# Patient Record
Sex: MALE | Race: WHITE | NOT HISPANIC OR LATINO | Employment: OTHER | URBAN - METROPOLITAN AREA
[De-identification: names, ages, dates, MRNs, and addresses within clinical notes are randomized per-mention and may not be internally consistent; named-entity substitution may affect disease eponyms.]

---

## 2017-03-24 ENCOUNTER — GENERIC CONVERSION - ENCOUNTER (OUTPATIENT)
Dept: OTHER | Facility: OTHER | Age: 77
End: 2017-03-24

## 2017-06-07 ENCOUNTER — GENERIC CONVERSION - ENCOUNTER (OUTPATIENT)
Dept: OTHER | Facility: OTHER | Age: 77
End: 2017-06-07

## 2017-07-27 ENCOUNTER — ALLSCRIPTS OFFICE VISIT (OUTPATIENT)
Dept: OTHER | Facility: OTHER | Age: 77
End: 2017-07-27

## 2017-08-15 ENCOUNTER — GENERIC CONVERSION - ENCOUNTER (OUTPATIENT)
Dept: OTHER | Facility: OTHER | Age: 77
End: 2017-08-15

## 2017-09-19 ENCOUNTER — GENERIC CONVERSION - ENCOUNTER (OUTPATIENT)
Dept: OTHER | Facility: OTHER | Age: 77
End: 2017-09-19

## 2017-10-26 ENCOUNTER — ALLSCRIPTS OFFICE VISIT (OUTPATIENT)
Dept: OTHER | Facility: OTHER | Age: 77
End: 2017-10-26

## 2017-10-27 NOTE — PROGRESS NOTES
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)   · severe per hospital ([facility] 2017)   3  Hypertension (401 9) (I10)   4  Need for pneumococcal vaccine (V03 82) (Z23)    Plan  Acute low back pain without sciatica, unspecified back pain laterality    · Apply an ice pack 4 times a day for 20 minutes the first 2 days ; Status:Complete;   Done:  80XSH5551 03:53PM   · Begin a walking program  Start with walks lasting 10 minutes and slowly work up to 30-40  minutes as pain allows ; Status:Complete;   Done: 74OJU3966 03:53PM   · Lumbopelvic stabilization exercises; Status:Complete;   Done: 86KSU6540 03:53PM   · We recommend that you avoid straining your back while lifting ; Status:Complete;   Done:  77LJC5322 03:53PM   · You may continue or resume your normal level of activity ; Status:Complete;   Done:  55XLZ2393 03:53PM  Need for pneumococcal vaccine    · Stop: Pneumovax 23 25 MCG/0 5ML Injection Injectable    Discussion/Summary    1 : Acute low back pain: Recommend range of motion and stretching exercises, ice, heat  For the 1st 24-48 hours, ice definitely, but then can add heat  I would recommend heat in the morning, and ice in the evening  Follow-up in approximately 1-2 weeks if needed  As far as medication for this, Tylenol for the moment  Will try to avoid nonsteroidal anti-inflammatories as they can competitively inhibit aspirin activity  Follow-up as above  If there is no change with this, consider formal physical therapy  3 vessel CAD: Stable  Follow with Cardiology  No changes  Hypertension: Stable at the moment  Doing quite well  Chief Complaint  c/o (L) sided low back pain with pain radiating into buttock and down entire leg  Onset about 5 weeks ago with involvement in both legs  Pain in (R) leg has since resolved  Is able to get some relief from Tylenol  pneumonia vaccine        History of Present Illness  HPI: 77 y/o male presents c/o lower back pain x 5 weeks as well as pain shooting down the back of the left leg  Symptoms are improving but only slightly  Has been taking tylenol pm at night  Used to go to a chiropractor for back pain and once had terrible right thigh pain that was similar  Per patient report, an MRI was done and showed a spinal abnormality but could not recall more specific details  No recent procedures, fever, chills, no bowel or bladder incontinence or saddle anaesthesia  Denies any trauma to the back or strenuous exercise  is having difficulty walking do to pain but no weakness  CAD: No current symptoms  Stable  He has not been having any particular issues at the moment  has not been having any cardiomyopathy issues recently  fibrillation has been going quite well  He was not recently having symptoms, but he was prior to putting on the monitor  Review of Systems    Constitutional: no fever,-- not feeling poorly,-- no chills-- and-- not feeling tired  Cardiovascular: no complaints of slow or fast heart rate, no chest pain, no palpitations, no leg claudication or lower extremity edema  Respiratory: no complaints of shortness of breath, no wheezing or cough, no dyspnea on exertion, no orthopnea or PND  Gastrointestinal: no complaints of abdominal pain, no constipation, no nausea or vomiting, no diarrhea or bloody stools  Genitourinary: no complaints of dysuria or incontinence, no hesitancy, no nocturia, no genital lesion, no inadequacy of penile erection  Musculoskeletal: arthralgias-- and-- myalgias, but-- as noted in HPI,-- no joint swelling-- and-- no joint stiffness  Integumentary: no complaints of skin rash or lesion, no itching or dry skin, no skin wounds  Neurological: no numbness,-- no dizziness-- and-- no fainting  ROS reviewed  Active Problems  1  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)   2  Callus (700) (L84)   3  Cerumen impaction (380 4) (H61 20)   4   Cholelithiasis And Bile Duct Calculi With Acute And Chronic Cholecystitis (574 80)   5  Chronic systolic congestive heart failure (428 22,428 0) (I50 22)   6  Depression screening (V79 0) (Z13 89)   7  Dermatitis (692 9) (L30 9)   8  Disc degeneration, lumbar (722 52) (M51 36)   9  Diverticulosis (562 10) (K57 90)   10  Dyslipidemia (272 4) (E78 5)   11  Fatigue (780 79) (R53 83)   12  Glaucoma screening (V80 1) (Z13 5)   13  Glaucoma Screening   14  Hemorrhoids (455 6) (K64 9)   15  Hyperglycemia (790 29) (R73 9)   16  Hypertension (401 9) (I10)   17  Hypothyroidism (244 9) (E03 9)   18  Impacted cerumen of both ears (380 4) (H61 23)   19  Ischemic cardiomyopathy (414 8) (I25 5)   20  Localized, primary osteoarthritis of lower leg, unspecified laterality   21  Need for influenza vaccination (V04 81) (Z23)   22  NSTEMI (non-ST elevated myocardial infarction) (410 70) (I21 4)   23  Old myocardial 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) (I25 2)   24  Otitis externa (380 10) (H60 90)   25  Overweight (278 02) (E66 3)   26  Palpitations (785 1) (R00 2)   27  Paroxysmal atrial fibrillation (427 31) (I48 0)   28  Preop general physical exam (V72 83) (Z01 818)   29  Prostate nodule (600 10) (N40 2)   30  Screening for colorectal cancer (V76 51) (Z12 11,Z12 12)   31  Screening for genitourinary condition (V81 6) (Z13 89)   32  Spinal stenosis (724 00) (M48 00)    Past Medical History  1  History of Acute Medial Meniscus Tear (836 0)   2  History of Bleeding hemorrhoids (455 8) (K64 9)   3  History of Choledocholithiasis (574 50) (K80 50)   4  History of Difficulty breathing (786 09) (R06 89)   5  History of Diverticulosis (562 10) (K57 90)   6  History of Foot Pain (Soft Tissue) (729 5)   7  History of abdominal pain (V13 89) (Z87 898)   8  History of Ileus (560 1) (K56 7)  Active Problems And Past Medical History Reviewed: The active problems and past medical history were reviewed and updated today  Family History  Mother    1   Family history of Lung Cancer (V16 1)  Father    2  Family history of Coronary Artery Disease (V17 49)  Brother    3  Family history of Diabetes Mellitus (V18 0)  Family History    4  Family history of Lung Cancer (V16 1)  Family History Reviewed: The family history was reviewed and updated today  Social History   · Being A Social Drinker   · Denied: History of Drug Use   · Marital History - Currently    · Never a smoker   · Occupation: Retired  The social history was reviewed and updated today  The social history was reviewed and is unchanged  Surgical History  1  History of Appendectomy   2  History of Appendectomy   3  History of CABG   4  History of CABG   5  History of Cath Stent Placement   6  History of Cholecystectomy Laparoscopic   7  History of Endoscopic Retrograde Cholangiopancreatography (ERCP)   8  History of PTCA   9  History of Tonsillectomy  Surgical History Reviewed: The surgical history was reviewed and updated today  Current Meds   1  Aspirin EC 81 MG Oral Tablet Delayed Release; Take 1 tablet daily; Therapy: 85POO7192 to Recorded   2  Co Q 10 100 MG Oral Capsule Recorded   3  Furosemide 20 MG Oral Tablet Recorded   4  Furosemide 20 MG Oral Tablet; One tablet daily; Therapy: (Recorded:84Voq1224) to Recorded   5  Isosorbide Mononitrate ER 60 MG Oral Tablet Extended Release 24 Hour; TAKE 1   TABLET ONCE DAILY; Therapy: 83JYR2889 to (96 070597) Recorded   6  Losartan Potassium 25 MG Oral Tablet; ONE HALF TABLET DAILY; Therapy: (Recorded:34Hkb3247) to Recorded   7  Nitrostat 0 4 MG Sublingual Tablet Sublingual; PLACE 1 TABLET UNDER THE TONGUE   EVERY 5 MINUTES UP TO 3 DOSES AS NEEDED FOR CHEST PAIN;   Therapy: 67VFC2059 to (Evaluate:85Cov2743)  Requested for: 11Phm3971; Last   Rx:58Tte5966 Ordered   8  Ranexa 500 MG Oral Tablet Extended Release 12 Hour; TAKE 1 TABLET EVERY 12   HOURS; Therapy: (Recorded:89Mwo1189) to Recorded   9   Toprol  MG Oral Tablet Extended Release 24 Hour; TAKE 1 TABLET ONCE DAILY; Therapy: (Recorded:66Tqb4416) to Recorded   10  Vitamin D 2000 UNIT Oral Capsule Recorded   11  Xarelto 20 MG Oral Tablet Recorded    The medication list was reviewed and updated today  Allergies  1  Zocor TABS    Physical Exam    Constitutional   General appearance: No acute distress, well appearing and well nourished  Eyes   Conjunctiva and lids: No swelling, erythema, or discharge  Pupils and irises: Equal, round and reactive to light  Pulmonary   Respiratory effort: No increased work of breathing or signs of respiratory distress  Auscultation of lungs: Clear to auscultation, equal breath sounds bilaterally, no wheezes, no rales, no rhonci  Cardiovascular   Auscultation of heart: Normal rate and rhythm, normal S1 and S2, without murmurs  Examination of extremities for edema and/or varicosities: Normal     Musculoskeletal   Gait and station: Abnormal  -- limp  Inspection/palpation of joints, bones, and muscles: Abnormal  -- +SLR on left  Full and = motor strength b/l  Neurologic   Reflexes: 2+ and symmetric  Sensation: No sensory loss  Results/Data  Falls Risk Assessment (Dx Z13 89 Screen for Neurologic Disorder) 26Oct2017 03:11PM User, [facility]     Test Name Result Flag Reference   Falls Risk      No falls in the past year       Signatures   Electronically signed by :  DANIEL Dickinson ; Oct 26 2017  3:59PM EST                       (Author)

## 2017-12-20 ENCOUNTER — GENERIC CONVERSION - ENCOUNTER (OUTPATIENT)
Dept: FAMILY MEDICINE CLINIC | Facility: CLINIC | Age: 77
End: 2017-12-20

## 2018-01-11 NOTE — MISCELLANEOUS
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)   6  Hypertension (401 9) (I10)   7  Old myocardial 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) (I25 2)   8  Need for influenza vaccination (V04 81) (Z23)    Plan  3-vessel CAD, Chronic systolic congestive heart failure, Ischemic cardiomyopathy,  NSTEMI (non-ST elevated myocardial infarction), Paroxysmal atrial fibrillation    · Cardiology Referral Other Co-Management  Dr Latosha Perez with [facility]  Status: Hold For -  Scheduling  Requested for: 92CQN6879   Ordered; For: 3-vessel CAD, Chronic systolic congestive heart failure, Ischemic cardiomyopathy, NSTEMI (non-ST elevated myocardial infarction), Paroxysmal atrial fibrillation; Ordered By: Ximena Marshall Performed:  Due: 44RUO1055  are Referring to a non- Preferred Provider : Established Patient  Care Summary provided  : Yes  3-vessel CAD, Ischemic cardiomyopathy, NSTEMI (non-ST elevated myocardial  infarction)    · Nitrostat 0 4 MG Sublingual Tablet Sublingual (Nitroglycerin); PLACE 1 TABLET  UNDER THE TONGUE EVERY 5 MINUTES UP TO 3 DOSES AS NEEDED FOR  CHEST PAIN   Rx By: Ximena Marshall; Dispense: 30 Days ; #:1 X 25 Tablet Sublingual Bottle; Refill: 0; For: 3-vessel CAD, Ischemic cardiomyopathy, NSTEMI (non-ST elevated myocardial infarction); DANY = N; Sent To: [facility]/PHARMACY #[id number] Need for influenza vaccination    · Stop: Influenza   For: Need for influenza vaccination; Ordered By:Tray Ocasio; Effective Date:27Jul2017; Last Updated By: Oracio Sierra; 7/27/2017 4:27:18 PM    Discussion/Summary  Discussion Summary:   #1: NST DEXTER: Patient had another acute MI  He is to follow with cardiology as soon as possible  He does have an appointment later on with [facility] heart specialists    #2: Chronic systolic heart failure: This is noted on the hospital discharge  Currently, he is having some coughing, along with some dyspnea on exertion  Question if this is secondary to CHF or the cardiomyopathy  Would follow-up with cardiology with regard to this  He does not seem to have any symptoms of edema, nor is cough secondary to ace inhibitors (i e  he is not on ACE inhibitors)  #3: Paroxysmal atrial fibrillation: Again, this was noted in the hospital  He is doing quite well at this point from the standpoint of atrial fibrillation, i e  no current palpitations or problems  He is on Xarelto for this  Follow with cardiology  #4: Ischemic cardiomyopathy: Patient did have catheterization recently, and they also recommended that he get a pacer/defibrillator  Kwasi Velasquez going to place that in approximately 30-40 days  Of note, we did review ischemic cardiomyopathy, and treatments  We also reviewed lethal arrhythmias that can occur with cardiomyopathy, and that will be the reason behind recommending a defibrillator  #5: CAD: Again, follow with Dr Florin Flores  Did have worsening of disease, as evidenced by having had the non-ST DEXTER  #6: Hypertension: Blood pressure today is quite good  No problems at the moment  He did mention that there was minor lightheadedness that was occurring on some occasions, mostly in the mornings  I would recommend that he follow-up about this with cardiology  I would not change medications at this point  #7: Old myocardial infarction: Patient has had 2 prior MIs as well  Again, follow with cardiology at [facility]   Patient is currently on Xarelto and aspirin  I would recommend that he speak with cardiology about whether or not this should continue, as Xarelto is a full anticoagulant, and this may result in slight increased risk of bleeding with aspirin added to it  Chief Complaint  Chief Complaint Free Text Note Form: Hospital follow up for: NSTEMI  Was in [facility] from 7/22/17 to 7/25/17    Pt states he is feeling well  Med list reviewed and up-dated  Requesting rx for Nitrostat  Unsure of dosage  Declines pneumonia vaccine  History of Present Illness  TCM Communication [place]: He was hospitalized at [facility]  The date of admission: 7/22/17, date of discharge: 7/25/17  Diagnosis: Heart Attack  He was discharged to home  Communication performed and completed by Julian Whitt   HPI: Patient was on vacation last week and [facility]  Was camping  He developed pain and pressure in the chest, with radiation through to the back  He also had some dyspnea with this  This was on Wednesday, and then on Thursday and Friday improved, with return to home on Friday  He continue to have some shortness of breath along with this, so he went to the emergency room on Saturday rather than call cardiology on Monday, and was admitted and diagnosed with NST DEXTER  Reviewed history and the chart  Patient did have myocardial infarction in 2014, 2016, and now in 2017  He has had catheterization Ã2, bypass surgery in 1999, and further evaluation recently in the hospital   On discharge from the hospital, he was also diagnosed with chronic systolic heart failure, paroxysmal atrial fibrillation, ischemic cardiomyopathy  Currently, he is having shortness of breath and difficulty with exertion  He is also noticing that he has a significant amount of fatigue since discharge from the hospital   Since discharge he is in cardiac rehab  Review of Systems  Complete-Male:   Constitutional: No fever or chills, feels well, no tiredness, no recent weight gain or weight loss  Eyes: No complaints of eye pain, no red eyes, no discharge from eyes, no itchy eyes  ENT: no complaints of earache, no hearing loss, no nosebleeds, no nasal discharge, no sore throat, no hoarseness  Cardiovascular: No complaints of slow heart rate, no fast heart rate, no chest pain, no palpitations, no leg claudication, no lower extremity  Respiratory: as noted in HPI  Gastrointestinal: No complaints of abdominal pain, no constipation, no nausea or vomiting, no diarrhea or bloody stools  Genitourinary: No complaints of dysuria, no incontinence, no hesitancy, no nocturia, no genital lesion, no testicular pain  Musculoskeletal: No complaints of arthralgia, no myalgias, no joint swelling or stiffness, no limb pain or swelling  Integumentary: No complaints of skin rash or skin lesions, no itching, no skin wound, no dry skin  Neurological: No compliants of headache, no confusion, no convulsions, no numbness or tingling, no dizziness or fainting, no limb weakness, no difficulty walking  Psychiatric: Is not suicidal, no sleep disturbances, no anxiety or depression, no change in personality, no emotional problems  Endocrine: No complaints of proptosis, no hot flashes, no muscle weakness, no erectile dysfunction, no deepening of the voice, no feelings of weakness  Hematologic/Lymphatic: No complaints of swollen glands, no swollen glands in the neck, does not bleed easily, no easy bruising  Active Problems    1  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)   2  Callus (700) (L84)   3  Cerumen impaction (380 4) (H61 20)   4  Cholelithiasis And Bile Duct Calculi With Acute And Chronic Cholecystitis (574 80)   5  Depression screening (V79 0) (Z13 89)   6  Dermatitis (692 9) (L30 9)   7  Disc degeneration, lumbar (722 52) (M51 36)   8  Diverticulosis (562 10) (K57 90)   9  Dyslipidemia (272 4) (E78 5)   10  Fatigue (780 79) (R53 83)   11  Glaucoma screening (V80 1) (Z13 5)   12  Glaucoma Screening   13  Hemorrhoids (455 6) (K64 9)   14  Hyperglycemia (790 29) (R73 9)   15  Hypertension (401 9) (I10)   16  Hypothyroidism (244 9) (E03 9)   17  Impacted cerumen of both ears (380 4) (H61 23)   18  Localized, primary osteoarthritis of lower leg, unspecified laterality   19  NSTEMI (non-ST elevated myocardial infarction) (410 70) (I21 4)   20   Otitis externa (380 10) (H60 90)   21  Overweight (278 02) (E66 3)   22  Palpitations (785 1) (R00 2)   23  Preop general physical exam (V72 83) (Z01 818)   24  Prostate nodule (600 10) (N40 2)   25  Screening for colorectal cancer (V76 51) (Z12 11,Z12 12)   26  Screening for genitourinary condition (V81 6) (Z13 89)   27  Spinal stenosis (724 00) (M48 00)    Past Medical History    1  History of Acute Medial Meniscus Tear (836 0)   2  History of Bleeding hemorrhoids (455 8) (K64 9)   3  History of Choledocholithiasis (574 50) (K80 50)   4  History of Difficulty breathing (786 09) (R06 89)   5  History of Diverticulosis (562 10) (K57 90)   6  History of Foot Pain (Soft Tissue) (729 5)   7  History of abdominal pain (V13 89) (Z87 898)   8  History of Ileus (560 1) (K56 7)    Surgical History    1  History of Appendectomy   2  History of Appendectomy   3  History of CABG   4  History of CABG   5  History of Cath Stent Placement   6  History of Cholecystectomy Laparoscopic   7  History of Endoscopic Retrograde Cholangiopancreatography (ERCP)   8  History of PTCA   9  History of Tonsillectomy  Surgical History Reviewed: The surgical history was reviewed and updated today  Family History  Mother    1  Family history of Lung Cancer (V16 1)  Father    2  Family history of Coronary Artery Disease (V17 49)  Brother    3  Family history of Diabetes Mellitus (V18 0)  Family History    4  Family history of Lung Cancer (V16 1)  Family History Reviewed: The family history was reviewed and updated today  Social History    · Being A Social Drinker   · Denied: History of Drug Use   · Marital History - Currently    · Never a smoker   · Occupation: Retired  Social History Reviewed: The social history was reviewed and updated today  The social history was reviewed and is unchanged  Current Meds   1  Aspirin EC 81 MG Oral Tablet Delayed Release; Take 1 tablet daily; Therapy: 22YWC5589 to Recorded   2   Co Q 10 100 MG Oral Capsule Recorded   3  Furosemide 20 MG Oral Tablet Recorded   4  Furosemide 20 MG Oral Tablet; One tablet daily; Therapy: (Recorded:60Evk8380) to Recorded   5  Isosorbide Mononitrate ER 60 MG Oral Tablet Extended Release 24 Hour; TAKE 1   TABLET ONCE DAILY; Therapy: 81VVM2443 to ((60) 1235-3165) Recorded   6  Losartan Potassium 25 MG Oral Tablet; ONE HALF TABLET DAILY; Therapy: (Recorded:68Osz6968) to Recorded   7  Ranexa 500 MG Oral Tablet Extended Release 12 Hour; TAKE 1 TABLET EVERY 12   HOURS; Therapy: (Recorded:13Zat5931) to Recorded   8  Toprol  MG Oral Tablet Extended Release 24 Hour; TAKE 1 TABLET ONCE DAILY; Therapy: (Recorded:46Zde0556) to Recorded   9  Vitamin D 2000 UNIT Oral Capsule Recorded   10  Xarelto 20 MG Oral Tablet Recorded  Medication List Reviewed: The medication list was reviewed and updated today  Allergies    1  Zocor TABS    Vitals  Signs   Recorded: 66Cir7136 04:34PM   Weight: 264 lb 12 8 oz  BMI Calculated: 34 18  BSA Calculated: 2 44  Recorded: 27Jul2017 04:31PM   Heart Rate: 60, L Radial  Pulse Quality: Irregular, L Radial  Systolic: 822, LUE, Sitting  Diastolic: 60, LUE, Sitting  BP Cuff Size: Large  Height: 6 ft 1 8 in    Physical Exam    Constitutional   General appearance: No acute distress, well appearing and well nourished  Eyes   Conjunctiva and lids: No swelling, erythema, or discharge  Pupils and irises: Equal, round and reactive to light  Ears, Nose, Mouth, and Throat   External inspection of ears and nose: Normal     Oropharynx: Normal with no erythema, edema, exudate or lesions  Pulmonary   Respiratory effort: No increased work of breathing or signs of respiratory distress  Auscultation of lungs: Clear to auscultation, equal breath sounds bilaterally, no wheezes, no rales, no rhonci  Cardiovascular   Auscultation of heart: Normal rate and rhythm, normal S1 and S2, without murmurs      Carotid pulses: Normal     Abdomen   Abdomen: Non-tender, no masses  Liver and spleen: No hepatomegaly or splenomegaly  Skin   Skin and subcutaneous tissue: Normal without rashes or lesions  Neurologic   Cranial nerves: Cranial nerves 2-12 intact  Reflexes: 2+ and symmetric  Sensation: No sensory loss  Psychiatric   Orientation to person, place and time: Normal     Mood and affect: Normal          Results/Data  PHQ-2 Adult Depression Screening 19Fqb6242 04:28PM User, Julianne     Test Name Result Flag Reference   PHQ-2 Adult Depression Score 0     Over the last two weeks, how often have you been bothered by any of the following problems? Little interest or pleasure in doing things: Not at all - 0  Feeling down, depressed, or hopeless: Not at all - 0   PHQ-2 Adult Depression Screening Negative         Health Management  Screening for colorectal cancer   COLONOSCOPY; every 10 years; Last 41YXU6841; Next Due: [id number]; Overdue  Health Maintenance   COLONOSCOPY; every 10 years; Last 58BVY0522; Next Due: [id number]; Overdue    Signatures   Electronically signed by :  DANIEL Martin ; Jul 27 2017  5:21PM EST                       (Author)

## 2018-01-14 VITALS
HEIGHT: 74 IN | BODY MASS INDEX: 33.98 KG/M2 | HEART RATE: 60 BPM | WEIGHT: 264.8 LBS | SYSTOLIC BLOOD PRESSURE: 110 MMHG | DIASTOLIC BLOOD PRESSURE: 60 MMHG

## 2018-01-17 ENCOUNTER — GENERIC CONVERSION - ENCOUNTER (OUTPATIENT)
Dept: OTHER | Facility: OTHER | Age: 78
End: 2018-01-17

## 2018-05-02 ENCOUNTER — OFFICE VISIT (OUTPATIENT)
Dept: FAMILY MEDICINE CLINIC | Facility: CLINIC | Age: 78
End: 2018-05-02
Payer: COMMERCIAL

## 2018-05-02 VITALS
DIASTOLIC BLOOD PRESSURE: 68 MMHG | HEART RATE: 64 BPM | WEIGHT: 266 LBS | SYSTOLIC BLOOD PRESSURE: 116 MMHG | BODY MASS INDEX: 34.34 KG/M2

## 2018-05-02 DIAGNOSIS — M48.061 SPINAL STENOSIS OF LUMBAR REGION, UNSPECIFIED WHETHER NEUROGENIC CLAUDICATION PRESENT: Primary | ICD-10-CM

## 2018-05-02 DIAGNOSIS — E78.2 MIXED HYPERLIPIDEMIA: ICD-10-CM

## 2018-05-02 DIAGNOSIS — M54.50 ACUTE BILATERAL LOW BACK PAIN WITHOUT SCIATICA: ICD-10-CM

## 2018-05-02 DIAGNOSIS — I10 ESSENTIAL HYPERTENSION: ICD-10-CM

## 2018-05-02 DIAGNOSIS — M51.36 DISC DEGENERATION, LUMBAR: ICD-10-CM

## 2018-05-02 PROBLEM — I25.2 OLD MYOCARDIAL INFARCTION: Status: ACTIVE | Noted: 2017-07-27

## 2018-05-02 PROBLEM — I25.5 CARDIOMYOPATHY, ISCHEMIC: Status: ACTIVE | Noted: 2017-07-25

## 2018-05-02 PROBLEM — I48.0 PAROXYSMAL ATRIAL FIBRILLATION (HCC): Status: ACTIVE | Noted: 2017-07-27

## 2018-05-02 PROCEDURE — 99214 OFFICE O/P EST MOD 30 MIN: CPT | Performed by: FAMILY MEDICINE

## 2018-05-02 RX ORDER — ISOSORBIDE MONONITRATE 60 MG/1
1 TABLET, EXTENDED RELEASE ORAL DAILY
COMMUNITY
Start: 2017-07-27

## 2018-05-02 RX ORDER — ASPIRIN 81 MG/1
1 TABLET ORAL DAILY
COMMUNITY
Start: 2013-08-14

## 2018-05-02 RX ORDER — NITROGLYCERIN 0.4 MG/1
1 TABLET SUBLINGUAL
COMMUNITY
Start: 2017-07-27

## 2018-05-02 RX ORDER — MULTIVIT-MIN/IRON/FOLIC ACID/K 18-600-40
CAPSULE ORAL
COMMUNITY

## 2018-05-02 RX ORDER — FUROSEMIDE 20 MG/1
TABLET ORAL
COMMUNITY
End: 2022-08-08

## 2018-05-02 RX ORDER — METOPROLOL SUCCINATE 200 MG/1
150 TABLET, EXTENDED RELEASE ORAL DAILY
COMMUNITY
End: 2022-08-08

## 2018-05-02 RX ORDER — RANOLAZINE 500 MG/1
1 TABLET, EXTENDED RELEASE ORAL EVERY 12 HOURS
COMMUNITY
End: 2020-02-25 | Stop reason: ALTCHOICE

## 2018-05-02 RX ORDER — CELECOXIB 200 MG/1
200 CAPSULE ORAL DAILY
Qty: 30 CAPSULE | Refills: 0 | Status: SHIPPED | OUTPATIENT
Start: 2018-05-02 | End: 2018-06-04 | Stop reason: SINTOL

## 2018-05-02 RX ORDER — LOSARTAN POTASSIUM 25 MG/1
0.5 TABLET ORAL DAILY
COMMUNITY

## 2018-05-02 NOTE — PATIENT INSTRUCTIONS
Problem List Items Addressed This Visit     Acute low back pain without sciatica     Patient does have history of spinal stenosis  At this point, his symptoms are more like sciatica then they are spinal stenosis  Would recommend physical therapy formal, consider x-ray, recommend Celebrex 200 mg daily  Re-evaluate in 2-4 weeks  Relevant Medications    celecoxib (CeleBREX) 200 mg capsule    Other Relevant Orders    Ambulatory referral to Physical Therapy    Disc degeneration, lumbar     Patient does have history of spinal stenosis  At this point, his symptoms are more like sciatica then they are spinal stenosis  Would recommend physical therapy formal, consider x-ray, recommend Celebrex 200 mg daily  Re-evaluate in 2-4 weeks  Hyperlipidemia     Stable at the moment  No changes  Hypertension     Stable at the moment  No changes  Relevant Medications    furosemide (LASIX) 20 mg tablet    losartan (COZAAR) 25 mg tablet    metoprolol succinate (TOPROL XL) 200 MG 24 hr tablet    Spinal stenosis - Primary     Patient does have history of spinal stenosis  At this point, his symptoms are more like sciatica then they are spinal stenosis  Would recommend physical therapy formal, consider x-ray, recommend Celebrex 200 mg daily  Re-evaluate in 2-4 weeks           Relevant Medications    celecoxib (CeleBREX) 200 mg capsule    Other Relevant Orders    Ambulatory referral to Physical Therapy

## 2018-05-02 NOTE — PROGRESS NOTES
Assessment/Plan:    Spinal stenosis  Patient does have history of spinal stenosis  At this point, his symptoms are more like sciatica then they are spinal stenosis  Would recommend physical therapy formal, consider x-ray, recommend Celebrex 200 mg daily  Re-evaluate in 2-4 weeks  Disc degeneration, lumbar  Patient does have history of spinal stenosis  At this point, his symptoms are more like sciatica then they are spinal stenosis  Would recommend physical therapy formal, consider x-ray, recommend Celebrex 200 mg daily  Re-evaluate in 2-4 weeks  Acute low back pain without sciatica  Patient does have history of spinal stenosis  At this point, his symptoms are more like sciatica then they are spinal stenosis  Would recommend physical therapy formal, consider x-ray, recommend Celebrex 200 mg daily  Re-evaluate in 2-4 weeks  Hyperlipidemia  Stable at the moment  No changes  Hypertension  Stable at the moment  No changes  Diagnoses and all orders for this visit:    Spinal stenosis of lumbar region, unspecified whether neurogenic claudication present  -     Ambulatory referral to Physical Therapy; Future  -     celecoxib (CeleBREX) 200 mg capsule; Take 1 capsule (200 mg total) by mouth daily for 30 days    Acute bilateral low back pain without sciatica  -     Ambulatory referral to Physical Therapy; Future  -     celecoxib (CeleBREX) 200 mg capsule; Take 1 capsule (200 mg total) by mouth daily for 30 days    Essential hypertension    Disc degeneration, lumbar    Mixed hyperlipidemia    Other orders  -     aspirin (ECOTRIN LOW STRENGTH) 81 mg EC tablet; Take 1 tablet by mouth daily  -     Cholecalciferol (VITAMIN D) 2000 units CAPS; Take by mouth  -     Coenzyme Q10 (CO Q 10) 100 MG CAPS; Take by mouth  -     diphenhydrAMINE-APAP  MG TABS; Take 1 tablet by mouth daily  -     furosemide (LASIX) 20 mg tablet; Take by mouth  -     isosorbide mononitrate (IMDUR) 60 mg 24 hr tablet;  Take 1 tablet by mouth daily  -     losartan (COZAAR) 25 mg tablet; Take 0 5 tablets by mouth daily  -     Multiple Vitamins-Minerals (MULTIVITAMIN ADULT PO); Take 1 capsule by mouth  -     nitroglycerin (NITROSTAT) 0 4 mg SL tablet; Place 1 tablet under the tongue every 5 (five) minutes as needed  -     ranolazine (RANEXA) 500 mg 12 hr tablet; Take 1 tablet by mouth every 12 (twelve) hours  -     metoprolol succinate (TOPROL XL) 200 MG 24 hr tablet; Take 1 tablet by mouth daily  -     rivaroxaban (XARELTO) 20 mg tablet; Take by mouth          Subjective: patient c/o low back pain x 2 months  that radiate more down the right leg, but that alternates and changes as well  Patient was doing stretches at home ( as well as cardiac rehab )Patient is taking Tylenol with little relief  ak     Patient ID: Betsy Montague is a 78 y o  male  Patient is having a significant amount of pain in his low back, along with going down into the legs  Initially was is left leg, but he is now also having right leg pain, and the severity varies from right to left  Currently it is mostly the right leg that the problem  Worse with lying down  When he goes to sleep, he has approximately 3 hours of severe pain  He is using Tylenol currently, but is not really changing things  Patient has had the pain for several months  He has been in cardiac rehab, so he has completed physical therapy treatment  Unfortunately, the pain continues to be a problem for him  He did mention that his right leg also gets quite weak at times            The following portions of the patient's history were reviewed and updated as appropriate: allergies, current medications, past family history, past medical history, past social history, past surgical history and problem list     Review of Systems      Objective:      /68   Pulse 64   Wt 121 kg (266 lb)   BMI 34 34 kg/m²          Physical Exam   Musculoskeletal:        Lumbar back: He exhibits decreased range of motion  He exhibits no tenderness, no bony tenderness, no swelling, no edema and no deformity  Legs:  Nursing note and vitals reviewed

## 2018-05-02 NOTE — ASSESSMENT & PLAN NOTE
Patient does have history of spinal stenosis  At this point, his symptoms are more like sciatica then they are spinal stenosis  Would recommend physical therapy formal, consider x-ray, recommend Celebrex 200 mg daily  Re-evaluate in 2-4 weeks

## 2018-05-07 ENCOUNTER — OFFICE VISIT (OUTPATIENT)
Dept: FAMILY MEDICINE CLINIC | Facility: CLINIC | Age: 78
End: 2018-05-07
Payer: COMMERCIAL

## 2018-05-07 VITALS
DIASTOLIC BLOOD PRESSURE: 70 MMHG | HEART RATE: 76 BPM | HEIGHT: 72 IN | WEIGHT: 266 LBS | SYSTOLIC BLOOD PRESSURE: 128 MMHG | BODY MASS INDEX: 36.03 KG/M2 | TEMPERATURE: 97.8 F

## 2018-05-07 DIAGNOSIS — J34.89 NASAL DISCHARGE: Primary | ICD-10-CM

## 2018-05-07 PROCEDURE — 99214 OFFICE O/P EST MOD 30 MIN: CPT | Performed by: FAMILY MEDICINE

## 2018-05-07 NOTE — PATIENT INSTRUCTIONS
Problem List Items Addressed This Visit     None      Visit Diagnoses     Nasal discharge    -  Primary    Unsure why patient has nasal discharge  Check sinus CT, ENT  Question sinus infection versus other      Relevant Orders    CT sinus wo contrast    Ambulatory Referral to Otolaryngology

## 2018-05-07 NOTE — PROGRESS NOTES
Assessment/Plan:    No problem-specific Assessment & Plan notes found for this encounter  Diagnoses and all orders for this visit:    Nasal discharge  Comments:  Unsure why patient has nasal discharge  Check sinus CT, ENT  Question sinus infection versus other  Orders:  -     CT sinus wo contrast; Future  -     Ambulatory Referral to Otolaryngology; Future          Subjective:   c/o last night while on the ground looking under his car, he suddenly had yellow discharge come out of his nose  He said it was very different from usual mucous drainage  Described it as the color of yolk  He denies any other symptoms except his back pain that he recentyt came in for  Wife encouraged this OV today  mjs     Patient ID: Jeramy Pascual is a 78 y o  male  Please see chief complaint  Patient had some discharge from the nose, watery, quite yellow, so he was unsure what it would be  Decided to come to the office to have us evaluate  There was a slight headache at the time that this was happening  It was more noticeable afterwards  Blood pressure immediately before this episode was normal, as he was checking as per his routine  Right afterwards, it was elevated  The blood pressure afterwards was approximately 155/102  The following portions of the patient's history were reviewed and updated as appropriate: allergies, current medications, past family history, past medical history, past social history, past surgical history and problem list     Review of Systems   Constitutional: Negative  HENT:        Per HPI   Respiratory: Negative  Cardiovascular: Negative  Objective:      /70   Pulse 76   Temp 97 8 °F (36 6 °C)   Ht 6' (1 829 m)   Wt 121 kg (266 lb)   BMI 36 08 kg/m²          Physical Exam   Constitutional: He appears well-developed and well-nourished  HENT:   Head: Normocephalic and atraumatic     Right Ear: External ear normal    Left Ear: External ear normal    Nose: Nose normal    Mouth/Throat: Oropharynx is clear and moist    Eyes: Pupils are equal, round, and reactive to light  Lymphadenopathy:     He has no cervical adenopathy  Nursing note and vitals reviewed

## 2018-05-09 ENCOUNTER — EVALUATION (OUTPATIENT)
Dept: PHYSICAL THERAPY | Facility: REHABILITATION | Age: 78
End: 2018-05-09
Payer: COMMERCIAL

## 2018-05-09 DIAGNOSIS — M48.061 SPINAL STENOSIS OF LUMBAR REGION WITHOUT NEUROGENIC CLAUDICATION: Primary | ICD-10-CM

## 2018-05-09 DIAGNOSIS — M54.50 ACUTE BILATERAL LOW BACK PAIN WITHOUT SCIATICA: ICD-10-CM

## 2018-05-09 PROCEDURE — G8978 MOBILITY CURRENT STATUS: HCPCS | Performed by: PHYSICAL THERAPIST

## 2018-05-09 PROCEDURE — G8979 MOBILITY GOAL STATUS: HCPCS | Performed by: PHYSICAL THERAPIST

## 2018-05-09 PROCEDURE — 97161 PT EVAL LOW COMPLEX 20 MIN: CPT | Performed by: PHYSICAL THERAPIST

## 2018-05-09 PROCEDURE — 97110 THERAPEUTIC EXERCISES: CPT | Performed by: PHYSICAL THERAPIST

## 2018-05-09 NOTE — PROGRESS NOTES
PT Evaluation     Today's date: 2018  Patient name: Bekah Valentin  : 1940  MRN: [id number]  Referring provider: Alexandra Elizabeth MD  Dx:   Encounter Diagnosis     ICD-10-CM    1  Spinal stenosis of lumbar region without neurogenic claudication M48 061    2  Acute bilateral low back pain without sciatica M54 5        Start Time: 1055  Stop Time: 1148  Total time in clinic (min): 53 minutes    Assessment  Impairments: abnormal or restricted ROM, activity intolerance, impaired physical strength and pain with function  Functional limitations: difficulty walking  Assessment details: Pt will benefit from skilled PT to address impairments associated with low back pain and lumbar stenosis including increased pain, decreased ROM, decreased strength and impaired functional mobility     Understanding of Dx/Px/POC: excellent and good   Prognosis: good    Goals  ST) Pt will demonstrate decreased pain to 5/10 at worst  2) Pt will demonstrate increased lumbar flexion ROM by >5 cm FTTF  3) Pt will demonstrated increased pop angle flexibility by >10 deg b/l  LT) Pt will demonstrate decreased pain to 3/10 at worst  2) Pt will demonstrate increased lumbar flexion ROM by >10 cm FTTF  3) Pt will demonstrated increased LE strength all planes to >4+-5/5 b/l  4) Pt will demonstrate ability to walk x30 mins in a store without increase in pain   5) Pt will demonstrate ability to SLS x15 secs b/l     Plan  Patient would benefit from: skilled PT  Planned modality interventions: cryotherapy, traction and thermotherapy: hydrocollator packs  Planned therapy interventions: abdominal trunk stabilization, balance, body mechanics training, functional ROM exercises, flexibility, manual therapy, neuromuscular re-education, patient education, postural training, strengthening, stretching, therapeutic activities, therapeutic exercise and home exercise program  Frequency: 2x week  Duration in weeks: 6  Treatment plan discussed with: patient  Plan details: Cont per POC  Assess response to HEP NV  Subjective Evaluation    History of Present Illness  Mechanism of injury: Pain began > 2 months ago  Pt reports pain started in his low back and shortly after he noticed LLE symptoms, post into thigh, calf and ankle  Pt reports symptoms progressed to RLE, mainly RLE in post buttocks and thigh now  Prescribed celebrex but cardiology advised him to not take it  Pt was in cardiac rehab 2018 for MI in   Pt is also being treated for unknown nasal discharge, will be getting CT scan this week and see an ENT  Recurrent probem    Quality of life: good    Pain  Current pain ratin  At best pain ratin  At worst pain rating: 10  Location: central low back pain   Relieving factors: change in position (sitting)  Aggravating factors: standing (sit to stand transfers, walking >3-4 mins, standing >1 hour)    Social Support  Steps to enter house: no  Stairs in house: yes   12  Lives in: [facility] and multiple-level home [place] with a basement)  Lives with: spouse and adult children    Employment status: not working    Diagnostic Tests  No diagnostic tests performed  Treatments  Current treatment: physical therapy  Patient Goals  Patient goals for therapy: decreased pain        Objective     Postural Observations  Standing posture: fair (forward flexed)        Active Range of Motion     Lumbar   Flexion: Active lumbar flexion: 35 cm FTTF     Extension: 9 (symptms into b/l post LE) degrees     Strength/Myotome Testing     Left Hip   Planes of Motion   Flexion: 4+  Abduction: 4  External rotation: 4+  Internal rotation: 4+    Right Hip   Planes of Motion   Flexion: 4-  Abduction: 4  External rotation: 4+  Internal rotation: 4+    Left Knee   Flexion: 4+  Extension: 4+    Right Knee   Flexion: 4+  Extension: 4+    Left Ankle/Foot   Dorsiflexion: 4+    Right Ankle/Foot   Dorsiflexion: 4+    General Comments     Lumbar Comments  Able to SLS x 3 secs b/l  Able to hr/tr  Pop angle 46 deg L, 55 deg R  SLR (+) R at 52 deg, L (+) at 44 deg  Slump test (+) b/l      Flowsheet Rows      Most Recent Value   PT/OT G-Codes   Current Score  54   Projected Score  64   FOTO information reviewed  Yes   Assessment Type  Evaluation   G code set  Mobility: Walking & Moving Around   Mobility: Walking and Moving Around Current Status ()  CK   Mobility: Walking and Moving Around Goal Status ()  CJ          Precautions: HTN, Hx of MI     Daily Treatment Diary     Manual  5/9                                                                                 Exercise Diary              Seated fwd flex stetch 5"x10            Seated hs stretch 10"x5ea            skc 10"x5 ea            Supine piri crossover stretch 10"x5 a            Tac/ppt *            Tac/ppt with ball sq *            Tac/ppt with pull apart *            Tac/ppt with SLR *            Tac/ppt with s/l abd *            Tac/ppt with march *            sls *            sharpened romberg *            Standing hip abd *            Hr/tr *            Standing slr *            recumbant bike *                                                                    Modalities              mhp prn            ice prn

## 2018-05-11 ENCOUNTER — OFFICE VISIT (OUTPATIENT)
Dept: PHYSICAL THERAPY | Facility: REHABILITATION | Age: 78
End: 2018-05-11
Payer: COMMERCIAL

## 2018-05-11 DIAGNOSIS — M48.061 SPINAL STENOSIS OF LUMBAR REGION WITHOUT NEUROGENIC CLAUDICATION: Primary | ICD-10-CM

## 2018-05-11 DIAGNOSIS — M54.50 ACUTE BILATERAL LOW BACK PAIN WITHOUT SCIATICA: ICD-10-CM

## 2018-05-11 PROCEDURE — 97010 HOT OR COLD PACKS THERAPY: CPT | Performed by: PHYSICAL THERAPIST

## 2018-05-11 PROCEDURE — 97110 THERAPEUTIC EXERCISES: CPT | Performed by: PHYSICAL THERAPIST

## 2018-05-11 NOTE — PROGRESS NOTES
Daily Note     Today's date: 2018  Patient name: Mansi Rhodes  : 1940  MRN: [id number]  Referring provider: Rosmery Lopez MD  Dx:   Encounter Diagnosis     ICD-10-CM    1  Spinal stenosis of lumbar region without neurogenic claudication M48 061    2  Acute bilateral low back pain without sciatica M54 5        Start Time: 0859  Stop Time: 0955  Total time in clinic (min): 56 minutes    Subjective: Pt reports compliance with HEP  Pt reports symptoms in his calf last night, he spent the day cutting his grass and doing yardwork  Right post thigh symptoms right now, no low back pain  Pt rates his symptoms at 1/10 right now         Objective: See treatment diary below    Precautions: HTN, Hx of MI      Daily Treatment Diary      Manual                      RLE long axis distraction    3 mins                                                                                                                         Exercise Diary                        Seated fwd flex stetch 5"x10  5"x10                   Seated hs stretch 10"x5ea 10"x5 ea                   skc 10"x5 ea 10"x3 ea                   Supine piri crossover stretch 10"x5 a 10"x3 ea                   Tac/ppt *  10"x10                   Tac/ppt with ball sq *  5"x10                   Tac/ppt with pull apart * red 5" 1x10                   Tac/ppt with SLR *                     Tac/ppt with s/l abd *                     Tac/ppt with march *                     sls *                     sharpened romberg *                     Standing hip abd *                     Hr/tr *                     Standing slr *                     recumbant bike *  5 mins                                                                                                                          Modalities                        mhp prn                     ice prn  10 mins low back seated                                                   Assessment: Tolerated treatment well  Pt required vc/tc to avoid lumbar ext upon return from seated fwd flexion stretch to avoid increase in RLE symptoms  After cueing, pt able to complete correctly without pain  Patient exhibited good technique with therapeutic exercises, able to complete TAC/PPT with LE exercises with good form but required cueing to avoid holding his breath  Pt will benefit from continued skilled PT to progress LE and core strength  Plan: Continue per plan of care  Assess compliance with updated HEP NV

## 2018-05-12 ENCOUNTER — HOSPITAL ENCOUNTER (OUTPATIENT)
Dept: CT IMAGING | Facility: HOSPITAL | Age: 78
Discharge: HOME | End: 2018-05-12
Payer: COMMERCIAL

## 2018-05-12 DIAGNOSIS — J34.89 NASAL DISCHARGE: ICD-10-CM

## 2018-05-12 PROCEDURE — 70486 CT MAXILLOFACIAL W/O DYE: CPT

## 2018-05-15 DIAGNOSIS — I48.0 PAROXYSMAL ATRIAL FIBRILLATION (HCC): Primary | ICD-10-CM

## 2018-05-15 DIAGNOSIS — J01.00 ACUTE NON-RECURRENT MAXILLARY SINUSITIS: Primary | ICD-10-CM

## 2018-05-15 PROCEDURE — 87205 SMEAR GRAM STAIN: CPT | Performed by: SPECIALIST

## 2018-05-15 PROCEDURE — 87070 CULTURE OTHR SPECIMN AEROBIC: CPT | Performed by: SPECIALIST

## 2018-05-15 RX ORDER — AMOXICILLIN 500 MG/1
500 TABLET, FILM COATED ORAL 3 TIMES DAILY
Qty: 30 TABLET | Refills: 0 | Status: SHIPPED | OUTPATIENT
Start: 2018-05-15 | End: 2018-05-25

## 2018-05-16 ENCOUNTER — LAB REQUISITION (OUTPATIENT)
Dept: LAB | Facility: HOSPITAL | Age: 78
End: 2018-05-16
Payer: COMMERCIAL

## 2018-05-16 ENCOUNTER — OFFICE VISIT (OUTPATIENT)
Dept: PHYSICAL THERAPY | Facility: REHABILITATION | Age: 78
End: 2018-05-16
Payer: COMMERCIAL

## 2018-05-16 DIAGNOSIS — M54.50 ACUTE BILATERAL LOW BACK PAIN WITHOUT SCIATICA: ICD-10-CM

## 2018-05-16 DIAGNOSIS — M48.061 SPINAL STENOSIS OF LUMBAR REGION WITHOUT NEUROGENIC CLAUDICATION: Primary | ICD-10-CM

## 2018-05-16 DIAGNOSIS — J32.0 CHRONIC MAXILLARY SINUSITIS: ICD-10-CM

## 2018-05-16 PROCEDURE — 97110 THERAPEUTIC EXERCISES: CPT | Performed by: PHYSICAL THERAPIST

## 2018-05-16 NOTE — PROGRESS NOTES
Daily Note     Today's date: 2018  Patient name: Karissa Garcia  : 1940  MRN: [id number]  Referring provider: Monica Summers MD  Dx:   Encounter Diagnosis     ICD-10-CM    1  Spinal stenosis of lumbar region without neurogenic claudication M48 061    2  Acute bilateral low back pain without sciatica M54 5        Start Time: 0900  Stop Time: 0955  Total time in clinic (min): 55 minutes    Subjective: Pt reports he is "doing pretty good" Today he feels pretty good with his back, no pain   2 nights ago he had increased low back pain, "I think it has to do with what I do in the day"     Objective: See treatment diary below    Precautions: HTN, Hx of MI      Daily Treatment Diary      Manual                    RLE long axis distraction    3 mins  3 mins                                                                                                                       Exercise Diary                      Seated fwd flex stetch 5"x10  5"x10  HEP                 Seated hs stretch 10"x5ea 10"x5 ea  HEP                 skc 10"x5 ea 10"x3 ea  HEP                 Supine piri crossover stretch 10"x5 a 10"x3 ea 10"x5 ea                 Tac/ppt *  10"x10 10"x10                 Tac/ppt with ball sq *  5"x10  5"x10                 Tac/ppt with pull apart * red 5" 1x10 Red 5"x10                 Tac/ppt with SLR *   1x5 ea                 Tac/ppt with s/l abd *   1x5 ea                 Tac/ppt with march *   1x10 ea alt                 sls *                     sharpened romberg *                     Standing hip abd *                     Hr/tr *                     Standing slr *                     recumbant bike *  5 mins   5 mins                 Repeat sit to stand                                                                                                     Modalities                      mhp prn                     ice prn  10 mins low back seated 10 mins low back seated                                                  Assessment: Pt demonstrated good control with TAC/PPT in supine however when cued to perform TAC in standing pt elevated shouders and held breath requiring vc/tc to avoid compensation and focus on breathing while performing TAC  Pt also required cueing to utilize TAC/PPT with bed mobility and transfers to assist in decreasing pain  Pt will benefit form continued skilled PT to progress core stab and LE strength       Plan: Cont per POC

## 2018-05-18 ENCOUNTER — OFFICE VISIT (OUTPATIENT)
Dept: PHYSICAL THERAPY | Facility: REHABILITATION | Age: 78
End: 2018-05-18
Payer: COMMERCIAL

## 2018-05-18 DIAGNOSIS — M54.50 ACUTE BILATERAL LOW BACK PAIN WITHOUT SCIATICA: ICD-10-CM

## 2018-05-18 DIAGNOSIS — M48.061 SPINAL STENOSIS OF LUMBAR REGION WITHOUT NEUROGENIC CLAUDICATION: Primary | ICD-10-CM

## 2018-05-18 LAB
BACTERIA WND AEROBE CULT: NORMAL
GRAM STN SPEC: NORMAL
GRAM STN SPEC: NORMAL

## 2018-05-18 PROCEDURE — 97012 MECHANICAL TRACTION THERAPY: CPT | Performed by: PHYSICAL THERAPIST

## 2018-05-18 PROCEDURE — 97110 THERAPEUTIC EXERCISES: CPT | Performed by: PHYSICAL THERAPIST

## 2018-05-18 NOTE — PROGRESS NOTES
Daily Note     Today's date: 2018  Patient name: Kristen Branham  : 1940  MRN: [id number]  Referring provider: Be Bruner MD  Dx:   Encounter Diagnosis     ICD-10-CM    1  Spinal stenosis of lumbar region without neurogenic claudication M48 061    2  Acute bilateral low back pain without sciatica M54 5        Start Time: 0856  Stop Time: 0954  Total time in clinic (min): 58 minutes    Subjective: Pt reports he still has a little bit of pain  Intermittent tingling  Sitting he feels fine but when he gets up his leg starts tingling      Objective: See treatment diary below    Precautions: HTN, Hx of MI      Daily Treatment Diary      Manual                 RLE long axis distraction    3 mins  3 mins  NP                                                                                                                   Exercise Diary                    Seated fwd flex stetch 5"x10  5"x10  HEP                 Seated hs stretch 10"x5ea 10"x5 ea  HEP                 skc 10"x5 ea 10"x3 ea  HEP                 Supine piri crossover stretch 10"x5 a 10"x3 ea 10"x5 ea  HEP               Tac/ppt *  10"x10 10"x10  HEP               Tac/ppt with ball sq *  5"x10  5"x10 5"x10               Tac/ppt with pull apart * red 5" 1x10 Red 5"x10 Green5"x12                Tac/ppt with SLR *   1x5 ea 1x5 ea                Tac/ppt with s/l abd *   1x5 ea 1x5 ea                Tac/ppt with march *   1x10 ea alt 1x10 ea alt               sls *                     sharpened romberg *                    Standing hip abd *                     Hr/tr *   1x10 ea                 Standing slr *                     recumbant bike *  5 mins  5 mins                 Repeat sit to stand     1x10                 TAC/PPT in standing     10"x5                                                                       Modalities                    mhp prn                     ice prn  10 mins low back seated 10 mins low back seated  pt defer               Mech lumbar txn       0-65#,static,  5 min                        Assessment: Pt required vc/tc to avoid lumbar extension upon standing during sit to stand transfer and to maintain TAC/PPT to decrease reproduction of radicular symptoms  Pt demonstrated good control with TAC/PPT in standing  Initiated use of mech txn as pt has demonstrated positive response to manual LAD in previous treatments  Plan: Cont per POC, assess response to updated HEP  Assess response to mech txn NV

## 2018-05-23 ENCOUNTER — OFFICE VISIT (OUTPATIENT)
Dept: PHYSICAL THERAPY | Facility: REHABILITATION | Age: 78
End: 2018-05-23
Payer: COMMERCIAL

## 2018-05-23 DIAGNOSIS — M54.50 ACUTE BILATERAL LOW BACK PAIN WITHOUT SCIATICA: ICD-10-CM

## 2018-05-23 DIAGNOSIS — M48.061 SPINAL STENOSIS OF LUMBAR REGION WITHOUT NEUROGENIC CLAUDICATION: Primary | ICD-10-CM

## 2018-05-23 PROCEDURE — 97150 GROUP THERAPEUTIC PROCEDURES: CPT | Performed by: PHYSICAL THERAPIST

## 2018-05-23 PROCEDURE — 97012 MECHANICAL TRACTION THERAPY: CPT | Performed by: PHYSICAL THERAPIST

## 2018-05-23 PROCEDURE — 97110 THERAPEUTIC EXERCISES: CPT | Performed by: PHYSICAL THERAPIST

## 2018-05-23 NOTE — PROGRESS NOTES
Daily Note     Today's date: 2018  Patient name: Maria Fernanda Ortega  : 1940  MRN: [id number]  Referring provider: Ghada Hartley MD  Dx:   Encounter Diagnosis     ICD-10-CM    1  Spinal stenosis of lumbar region without neurogenic claudication M48 061    2  Acute bilateral low back pain without sciatica M54 5        Start Time: 09  Stop Time: 09  Total time in clinic (min): 56 minutes    Subjective: Pt reports he is doing "really good, a lot less pain" He reports he is only taking 1 tylenol a day at night  Pt reports he still gets tingling when he gets up from a sitting position but it is less often       Objective: See treatment diary below    Precautions: HTN, Hx of MI      Daily Treatment Diary      Manual               RLE long axis distraction    3 mins  3 mins  NP                                                                                                                   Exercise Diary                  Seated fwd flex stetch 5"x10  5"x10  HEP                 Seated hs stretch 10"x5ea 10"x5 ea  HEP                 skc 10"x5 ea 10"x3 ea  HEP                 Supine piri crossover stretch 10"x5 a 10"x3 ea 10"x5 ea  HEP               Tac/ppt *  10"x10 10"x10  HEP               Tac/ppt with ball sq *  5"x10  5"x10 5"x10 HEP              Tac/ppt with pull apart * red 5" 1x10 Red 5"x10 Green 5"x12  HEP              Tac/ppt with SLR *   1x5 ea 1x5 ea  2x5 ea              Tac/ppt with s/l abd *   1x5 ea 1x5 ea  1x10 ea              Tac/ppt with march *   1x10 ea alt 1x10 ea alt HEP              sls *       30"x2 ea              sharpened romberg *      1'x1 ea              Standing hip abd *       1x10 ea              Hr/tr *   1x10 ea   2x10 ea              Standing slr *       1x10 ea              recumbant bike *  5 mins  5 mins   5 minsL2             Repeat sit to stand     1x10   2x10              TAC/PPT in standing     10"x5   10"x2           Mini squats          *              Lunges          *                   Modalities     5/11 5/16 5/18 5/23             mhp prn                     ice prn  10 mins low back seated 10 mins low back seated  pt defer               Mech lumbar txn       0-65#,static,  5 min  0-65#static,8min                      Assessment: Pt is progressing well with current treatment program evident by overall decreased pain intensity and frequency and increased activity tolerance       Plan: Cont per POC    Group from 9:02-9:19, 1:1 from 9:19-9:57

## 2018-05-25 ENCOUNTER — OFFICE VISIT (OUTPATIENT)
Dept: PHYSICAL THERAPY | Facility: REHABILITATION | Age: 78
End: 2018-05-25
Payer: COMMERCIAL

## 2018-05-25 DIAGNOSIS — M48.061 SPINAL STENOSIS OF LUMBAR REGION WITHOUT NEUROGENIC CLAUDICATION: Primary | ICD-10-CM

## 2018-05-25 DIAGNOSIS — M54.50 ACUTE BILATERAL LOW BACK PAIN WITHOUT SCIATICA: ICD-10-CM

## 2018-05-25 PROCEDURE — 97110 THERAPEUTIC EXERCISES: CPT | Performed by: PHYSICAL THERAPIST

## 2018-05-25 PROCEDURE — 97012 MECHANICAL TRACTION THERAPY: CPT | Performed by: PHYSICAL THERAPIST

## 2018-05-25 NOTE — PROGRESS NOTES
Daily Note     Today's date: 2018  Patient name: Bekah Valentin  : 1940  MRN: [id number]  Referring provider: Alexandra Elizabeth MD  Dx:   Encounter Diagnosis     ICD-10-CM    1  Spinal stenosis of lumbar region without neurogenic claudication M48 061    2  Acute bilateral low back pain without sciatica M54 5        Start Time: 0900  Stop Time: 09  Total time in clinic (min): 52 minutes    Subjective: Pt reports no pain or symptoms today  Pt reports 80% improvement as of today        Objective: See treatment diary below    Precautions: HTN, Hx of MI      Daily Treatment Diary      Manual              RLE long axis distraction    3 mins  3 mins  NP                                                                                                                   Exercise Diary                 Seated fwd flex stetch 5"x10  5"x10  HEP     5"x10            Seated hs stretch 10"x5ea 10"x5 ea  HEP                 skc 10"x5 ea 10"x3 ea  HEP                 Supine piri crossover stretch 10"x5 a 10"x3 ea 10"x5 ea  HEP               Tac/ppt *  10"x10 10"x10  HEP               Tac/ppt with ball sq *  5"x10  5"x10 5"x10 HEP              Tac/ppt with pull apart * red 5" 1x10 Red 5"x10 Green 5"x12  HEP              Tac/ppt with SLR *   1x5 ea 1x5 ea  2x5 ea              Tac/ppt with s/l abd *   1x5 ea 1x5 ea  1x10 ea              Tac/ppt with march *   1x10 ea alt 1x10 ea alt HEP              sls *       30"x2 ea  30"x2 ea            sharpened romberg *      1'x1 ea  1'x1ea            Standing hip abd *       1x10 ea  1x10ea            Hr/tr *   1x10 ea   2x10 ea  2x10ea            Standing slr *       1x10 ea  1x10 ea            recumbant bike *  5 mins  5 mins   5 minsL2 5 minsL3            Repeat sit to stand     1x10   2x10  2x10            TAC/PPT in standing     10"x5   10"x2             Mini squats          *              Lunges          *                 Modalities     5/11 5/16 5/18 5/23 5/25           mhp prn                     ice prn  10 mins low back seated 10 mins low back seated  pt defer               Mech lumbar txn       0-65#,static,  5 min  0-65#static,8min  85# static, 8 mins                    Assessment: Pt able to complete all exercises without increase in symptoms however pt fatigued with increased reps for standing exercises  Pt is progressing well towards LTG's with decreased pain intensity and frequency         Plan: Cont per POC

## 2018-05-30 ENCOUNTER — OFFICE VISIT (OUTPATIENT)
Dept: PHYSICAL THERAPY | Facility: REHABILITATION | Age: 78
End: 2018-05-30
Payer: COMMERCIAL

## 2018-05-30 DIAGNOSIS — M54.50 ACUTE BILATERAL LOW BACK PAIN WITHOUT SCIATICA: ICD-10-CM

## 2018-05-30 DIAGNOSIS — M48.061 SPINAL STENOSIS OF LUMBAR REGION WITHOUT NEUROGENIC CLAUDICATION: Primary | ICD-10-CM

## 2018-05-30 PROCEDURE — 97110 THERAPEUTIC EXERCISES: CPT | Performed by: PHYSICAL THERAPIST

## 2018-05-30 NOTE — PROGRESS NOTES
Daily Note     Today's date: 2018  Patient name: Lauren Carlson  : 1940  MRN: [id number]  Referring provider: Hesham Garcia MD  Dx:   Encounter Diagnosis     ICD-10-CM    1  Spinal stenosis of lumbar region without neurogenic claudication M48 061    2  Acute bilateral low back pain without sciatica M54 5        Start Time: 0859  Stop Time: 0950  Total time in clinic (min): 51 minutes    Subjective: Pt reports he still gets tingling in his LLE when he is sitting for a while and goes to stand up  He reports the symptoms are transient but he still notices them  He reports he has not tried to stretch prior to standing       Objective: See treatment diary below    Precautions: HTN, Hx of MI      Daily Treatment Diary      Manual          RLE long axis distraction    3 mins  3 mins  NP                                                                                                         Exercise Diary             Seated fwd flex stetch 5"x10  5"x10  HEP     5"x10  5" 2x5        Seated hs stretch 10"x5ea 10"x5 ea  HEP               skc 10"x5 ea 10"x3 ea  HEP               Supine piri crossover stretch 10"x5 a 10"x3 ea 10"x5 ea  HEP             Tac/ppt *  10"x10 10"x10  HEP             Tac/ppt with ball sq *  5"x10  5"x10 5"x10 HEP            Tac/ppt with pull apart * red 5" 1x10 Red 5"x10 Green 5"x12  HEP            Tac/ppt with SLR *   1x5 ea 1x5 ea  2x5 ea            Tac/ppt with s/l abd *   1x5 ea 1x5 ea  1x10 ea            Tac/ppt with march *   1x10 ea alt 1x10 ea alt HEP            sls *       30"x2 ea  30"x2 ea  30"x2 ea        sharpened romberg *      1'x1 ea  1'x1ea  1'x1 ea  foam NV      Standing hip abd *       1x10 ea  1x10ea  2x10 ea        Hr/tr *   1x10 ea   2x10 ea  2x10ea  2x10 ea        Standing slr *       1x10 ea  1x10 ea  2x10 ea        recumbant bike *  5 mins  5 mins   5 minsL2 5 mins L3  5 mins L3        Repeat sit to stand     1x10   2x10  2x10  1x10        TAC/PPT in standing     10"x5   10"x2   HEP        Mini squats          *    3" 1x10        Lunges          *           Star tap       1x5 ea     Seated nerve glide            Side steps                              Modalities     5/11 5/16 5/18 5/23 5/25 5/30        mhp prn                   ice prn  10 mins low back seated 10 mins low back seated  pt defer             Mech lumbar txn       0-65#,static,  5 min  0-65#static,8min  85# static, 8 mins  Trial hold                Assessment: Pt demonstrates increased lumbar lordosis during initial stage of sit to stand transfer  Pt cued to maintain TAC/PPT throughout entire sit to stand transfer to avoid increase in LLE symptoms  Pt had no reproduction of LLE symptoms throughout session  Pt challenged with addition of dynamic stability with star tap requiring increased use of UE support to maintain balance  Plan: Cont per POC  Pt to utilize seated fwd flex stretch during prolonged sitting and prior to standing to help alleviate symptoms and maintain TAC/PPT during transfer  Pt reports he is getting a tooth pulled tomorrow  Pt to discuss with dentist his PT and if he has any immediate restrictions for his appt on Friday

## 2018-06-01 ENCOUNTER — OFFICE VISIT (OUTPATIENT)
Dept: PHYSICAL THERAPY | Facility: REHABILITATION | Age: 78
End: 2018-06-01
Payer: COMMERCIAL

## 2018-06-01 DIAGNOSIS — M48.061 SPINAL STENOSIS OF LUMBAR REGION WITHOUT NEUROGENIC CLAUDICATION: Primary | ICD-10-CM

## 2018-06-01 DIAGNOSIS — M54.50 ACUTE BILATERAL LOW BACK PAIN WITHOUT SCIATICA: ICD-10-CM

## 2018-06-01 PROCEDURE — 97110 THERAPEUTIC EXERCISES: CPT | Performed by: PHYSICAL THERAPIST

## 2018-06-04 ENCOUNTER — OFFICE VISIT (OUTPATIENT)
Dept: FAMILY MEDICINE CLINIC | Facility: CLINIC | Age: 78
End: 2018-06-04
Payer: COMMERCIAL

## 2018-06-04 VITALS
HEART RATE: 76 BPM | BODY MASS INDEX: 35.35 KG/M2 | DIASTOLIC BLOOD PRESSURE: 72 MMHG | WEIGHT: 261 LBS | HEIGHT: 72 IN | SYSTOLIC BLOOD PRESSURE: 104 MMHG

## 2018-06-04 DIAGNOSIS — I10 ESSENTIAL HYPERTENSION: ICD-10-CM

## 2018-06-04 DIAGNOSIS — M54.50 ACUTE BILATERAL LOW BACK PAIN WITHOUT SCIATICA: Primary | ICD-10-CM

## 2018-06-04 DIAGNOSIS — J32.0 CHRONIC MAXILLARY SINUSITIS: ICD-10-CM

## 2018-06-04 PROCEDURE — 99214 OFFICE O/P EST MOD 30 MIN: CPT | Performed by: FAMILY MEDICINE

## 2018-06-04 RX ORDER — HYDROCODONE BITARTRATE AND ACETAMINOPHEN 5; 300 MG/1; MG/1
1 TABLET ORAL EVERY 4 HOURS PRN
Refills: 0 | COMMUNITY
Start: 2018-05-31 | End: 2020-02-25 | Stop reason: ALTCHOICE

## 2018-06-04 RX ORDER — AMOXICILLIN AND CLAVULANATE POTASSIUM 875; 125 MG/1; MG/1
1 TABLET, FILM COATED ORAL 2 TIMES DAILY
Refills: 0 | COMMUNITY
Start: 2018-05-30 | End: 2020-02-25 | Stop reason: ALTCHOICE

## 2018-06-04 NOTE — ASSESSMENT & PLAN NOTE
Patient continues to have some low back symptoms  There is radiculopathy, especially on the right lower extremity  At this point, he has completed physical therapy and had a significant improvement, but it is not gone, it still affect his activities of daily living  Based on this, I offered him the possibility of having an MRI, pain management, orthopedic follow-up  Initial step would be an MRI, and then follow up after that  With regard to closed versus open MRI, most pain management and orthopedic physicians feel that closed MRI is a better test, but open MRI is an alternative that is reasonably acceptable

## 2018-06-04 NOTE — PATIENT INSTRUCTIONS
Problem List Items Addressed This Visit        Respiratory    Chronic maxillary sinusitis     Improved after dental extraction  Follow-up in the future  Cardiovascular and Mediastinum    Hypertension     Stable at the moment  Blood pressure is actually on the low side today  No changes  Re-evaluate in the future  Of note, patient has had some issues with low blood pressures at times, and has been feeling hypotension  He feels quite tired and fatigued at those times, Bella Jo a dish rag    I would recommend that he follow with Cardiology did discuss the possibility of changing medications based on this  I did not feel comfortable adjusting any of his medications at the moment, including the Lasix, isosorbide, Cozaar, metoprolol, Ranexa  Other    Acute low back pain without sciatica - Primary     Patient continues to have some low back symptoms  There is radiculopathy, especially on the right lower extremity  At this point, he has completed physical therapy and had a significant improvement, but it is not gone, it still affect his activities of daily living  Based on this, I offered him the possibility of having an MRI, pain management, orthopedic follow-up  Initial step would be an MRI, and then follow up after that  With regard to closed versus open MRI, most pain management and orthopedic physicians feel that closed MRI is a better test, but open MRI is an alternative that is reasonably acceptable           Relevant Orders    MRI lumbar spine wo contrast

## 2018-06-04 NOTE — PROGRESS NOTES
Assessment/Plan:    Acute low back pain without sciatica  Patient continues to have some low back symptoms  There is radiculopathy, especially on the right lower extremity  At this point, he has completed physical therapy and had a significant improvement, but it is not gone, it still affect his activities of daily living  Based on this, I offered him the possibility of having an MRI, pain management, orthopedic follow-up  Initial step would be an MRI, and then follow up after that  With regard to closed versus open MRI, most pain management and orthopedic physicians feel that closed MRI is a better test, but open MRI is an alternative that is reasonably acceptable  Chronic maxillary sinusitis  Improved after dental extraction  Follow-up in the future  Hypertension  Stable at the moment  Blood pressure is actually on the low side today  No changes  Re-evaluate in the future  Of note, patient has had some issues with low blood pressures at times, and has been feeling hypotension  He feels quite tired and fatigued at those times, Quincy Olivera a dish rag    I would recommend that he follow with Cardiology did discuss the possibility of changing medications based on this  I did not feel comfortable adjusting any of his medications at the moment, including the Lasix, isosorbide, Cozaar, metoprolol, Ranexa  Diagnoses and all orders for this visit:    Acute bilateral low back pain without sciatica  -     MRI lumbar spine wo contrast; Future    Chronic maxillary sinusitis    Essential hypertension    Other orders  -     amoxicillin-clavulanate (AUGMENTIN) 875-125 mg per tablet; Take 1 tablet by mouth 2 (two) times a day  -     HYDROcodone-acetaminophen (XODOL) 5-300 MG per tablet; Take 1 tablet by mouth every 4 (four) hours as needed          Subjective:  2 week follow up to sinus infection and back pain/sciatica  sb     Patient ID: Evan Hurtado is a 78 y o  male      Patient did have his tooth extraction recently  That infection was concurrent with his sinus infection  Patient does report that the area is somewhat sore, but overall it is not doing too badly  There is not a significant amount of sinus tenderness or pain at the moment either  Patient no longer has discharge from the sinuses  He in his wife wondered about heart conditions causing back pain  He went to PT  He is done there at this time  It did help to resolve severe pain  Right leg is tingling at times  He can't walk for long time with this  On further discussion, the patient's pain that he is having is lower back pain, along with the radiculopathy of the right leg being numb and tingling  He did mention that that improved a bit if he walks for a longer period of time  Certain exercises and stretching improves the pain, and it goes away  All the pains that he is talking about are with low back initially, then into the lower extremities  The following portions of the patient's history were reviewed and updated as appropriate: allergies, current medications, past family history, past medical history, past social history, past surgical history and problem list     Review of Systems   Constitutional: Negative  HENT: Negative  Eyes: Negative  Respiratory: Negative  Cardiovascular:        Per HPI   Gastrointestinal: Negative  Endocrine: Negative  Genitourinary: Negative  Musculoskeletal: Negative  Skin: Negative  Allergic/Immunologic: Negative  Neurological: Negative  Hematological: Negative  Psychiatric/Behavioral: Negative  Objective:      /72   Pulse 76   Ht 6' (1 829 m)   Wt 118 kg (261 lb)   BMI 35 40 kg/m²          Physical Exam   Constitutional: He appears well-developed and well-nourished  HENT:   Head: Normocephalic and atraumatic  Neck: Normal range of motion  Neck supple  Cardiovascular: Normal rate, regular rhythm and normal heart sounds    Exam reveals no gallop and no friction rub  No murmur heard  Pulses:       Carotid pulses are 2+ on the right side, and 2+ on the left side  Pulmonary/Chest: Effort normal and breath sounds normal  No respiratory distress  He has no wheezes  He has no rales  Nursing note and vitals reviewed

## 2018-06-04 NOTE — LETTER
June 4, 2018     Tessa Guaman MD    Patient: Nakia Adams   YOB: 1940   Date of Visit: 6/4/2018       Dear Dr Jas Escobedo: Thank you for referring Joao Felton to me for evaluation  Below are my notes for this consultation  If you have questions, please do not hesitate to call me  I look forward to following your patient along with you  Sincerely,        Ann-Marie Zaragoza MD        CC: No Recipients  Ann-Marie Zaragoza MD  6/4/2018  1:47 PM  Sign at close encounter  Assessment/Plan:    Acute low back pain without sciatica  Patient continues to have some low back symptoms  There is radiculopathy, especially on the right lower extremity  At this point, he has completed physical therapy and had a significant improvement, but it is not gone, it still affect his activities of daily living  Based on this, I offered him the possibility of having an MRI, pain management, orthopedic follow-up  Initial step would be an MRI, and then follow up after that  With regard to closed versus open MRI, most pain management and orthopedic physicians feel that closed MRI is a better test, but open MRI is an alternative that is reasonably acceptable  Chronic maxillary sinusitis  Improved after dental extraction  Follow-up in the future  Hypertension  Stable at the moment  Blood pressure is actually on the low side today  No changes  Re-evaluate in the future  Of note, patient has had some issues with low blood pressures at times, and has been feeling hypotension  He feels quite tired and fatigued at those times, Sushil Rapp a dish rag    I would recommend that he follow with Cardiology did discuss the possibility of changing medications based on this  I did not feel comfortable adjusting any of his medications at the moment, including the Lasix, isosorbide, Cozaar, metoprolol, Ranexa         Diagnoses and all orders for this visit:    Acute bilateral low back pain without sciatica  -     MRI lumbar spine wo contrast; Future    Chronic maxillary sinusitis    Essential hypertension    Other orders  -     amoxicillin-clavulanate (AUGMENTIN) 875-125 mg per tablet; Take 1 tablet by mouth 2 (two) times a day  -     HYDROcodone-acetaminophen (XODOL) 5-300 MG per tablet; Take 1 tablet by mouth every 4 (four) hours as needed          Subjective:  2 week follow up to sinus infection and back pain/sciatica  s     Patient ID: Bekah Valentin is a 78 y o  male  Patient did have his tooth extraction recently  That infection was concurrent with his sinus infection  Patient does report that the area is somewhat sore, but overall it is not doing too badly  There is not a significant amount of sinus tenderness or pain at the moment either  Patient no longer has discharge from the sinuses  He in his wife wondered about heart conditions causing back pain  He went to PT  He is done there at this time  It did help to resolve severe pain  Right leg is tingling at times  He can't walk for long time with this  On further discussion, the patient's pain that he is having is lower back pain, along with the radiculopathy of the right leg being numb and tingling  He did mention that that improved a bit if he walks for a longer period of time  Certain exercises and stretching improves the pain, and it goes away  All the pains that he is talking about are with low back initially, then into the lower extremities  The following portions of the patient's history were reviewed and updated as appropriate: allergies, current medications, past family history, past medical history, past social history, past surgical history and problem list     Review of Systems   Constitutional: Negative  HENT: Negative  Eyes: Negative  Respiratory: Negative  Cardiovascular:        Per HPI   Gastrointestinal: Negative  Endocrine: Negative  Genitourinary: Negative  Musculoskeletal: Negative  Skin: Negative  Allergic/Immunologic: Negative  Neurological: Negative  Hematological: Negative  Psychiatric/Behavioral: Negative  Objective:      /72   Pulse 76   Ht 6' (1 829 m)   Wt 118 kg (261 lb)   BMI 35 40 kg/m²           Physical Exam   Constitutional: He appears well-developed and well-nourished  HENT:   Head: Normocephalic and atraumatic  Neck: Normal range of motion  Neck supple  Cardiovascular: Normal rate, regular rhythm and normal heart sounds  Exam reveals no gallop and no friction rub  No murmur heard  Pulses:       Carotid pulses are 2+ on the right side, and 2+ on the left side  Pulmonary/Chest: Effort normal and breath sounds normal  No respiratory distress  He has no wheezes  He has no rales  Nursing note and vitals reviewed

## 2018-06-04 NOTE — ASSESSMENT & PLAN NOTE
Stable at the moment  Blood pressure is actually on the low side today  No changes  Re-evaluate in the future  Of note, patient has had some issues with low blood pressures at times, and has been feeling hypotension  He feels quite tired and fatigued at those times, Cinthia June a dish rag    I would recommend that he follow with Cardiology did discuss the possibility of changing medications based on this  I did not feel comfortable adjusting any of his medications at the moment, including the Lasix, isosorbide, Cozaar, metoprolol, Ranexa

## 2018-06-06 ENCOUNTER — EVALUATION (OUTPATIENT)
Dept: PHYSICAL THERAPY | Facility: REHABILITATION | Age: 78
End: 2018-06-06
Payer: COMMERCIAL

## 2018-06-06 DIAGNOSIS — M48.061 SPINAL STENOSIS OF LUMBAR REGION WITHOUT NEUROGENIC CLAUDICATION: Primary | ICD-10-CM

## 2018-06-06 DIAGNOSIS — M54.50 ACUTE BILATERAL LOW BACK PAIN WITHOUT SCIATICA: ICD-10-CM

## 2018-06-06 PROCEDURE — G8980 MOBILITY D/C STATUS: HCPCS | Performed by: PHYSICAL THERAPIST

## 2018-06-06 PROCEDURE — G8979 MOBILITY GOAL STATUS: HCPCS | Performed by: PHYSICAL THERAPIST

## 2018-06-06 PROCEDURE — 97140 MANUAL THERAPY 1/> REGIONS: CPT | Performed by: PHYSICAL THERAPIST

## 2018-06-06 NOTE — PROGRESS NOTES
PT Evaluation     Today's date: 2018  Patient name: João Kimble  : 1940  MRN: [id number]  Referring provider: Carlotta Skiff, MD  Dx:   Encounter Diagnosis     ICD-10-CM    1  Spinal stenosis of lumbar region without neurogenic claudication M48 061    2  Acute bilateral low back pain without sciatica M54 5        Start Time: 0858  Stop Time: 0954  Total time in clinic (min): 56 minutes    Assessment  Impairments: abnormal or restricted ROM, activity intolerance, impaired physical strength and pain with function  Functional limitations: difficulty walking  Assessment details: Pt presents with decreased pain intensity and frequency, increased LE strength and improved balance and walking tolerance  Pt's LLE radicular symptoms have resolved since beginning PT however pt has RLE radicular symptoms present as well as some lingering low back pain and decreased standing tolerance     Understanding of Dx/Px/POC: good   Prognosis: good    Goals  ST) Pt will demonstrate decreased pain to 5/10 at worst -MET  2) Pt will demonstrate increased lumbar flexion ROM by >5 cm FTTF -MET  3) Pt will demonstrated increased pop angle flexibility by >10 deg b/l - PARTIALLY MET  LT) Pt will demonstrate decreased pain to 3/10 at worst -PARTIALLY MET  2) Pt will demonstrate increased lumbar flexion ROM by >10 cm FTTF -MET  3) Pt will demonstrated increased LE strength all planes to >4+-5/5 b/l -MET  4) Pt will demonstrate ability to walk x30 mins in a store without increase in pain - PARTIALLY MET  5) Pt will demonstrate ability to SLS x15 secs b/l -PARTIALLY MET      Plan  Patient would benefit from: skilled PT  Planned modality interventions: cryotherapy, traction and thermotherapy: hydrocollator packs  Planned therapy interventions: abdominal trunk stabilization, balance, body mechanics training, functional ROM exercises, flexibility, manual therapy, neuromuscular re-education, patient education, postural training, strengthening, stretching, therapeutic activities, therapeutic exercise and home exercise program  Treatment plan discussed with: patient  Plan details: D/C from skilled PT with [facility]  Pt to have MRI completed and f/u with physician in 3-4 weeks  Pt will contact PT regarding results and if continued PT is recommended  Subjective Evaluation    History of Present Illness  Mechanism of injury: Pt reports some continued low back pain  No symptoms in LLE since beginning PT  Pt does reports new RLE pain that he reports his physician is aware of and he recommended an MRI  He reports noticing his RLE pain a lot when sleeping  Pt reports 75% improvement  Pt reports improved sit to stand transfers, walking tolerance: 10-15 mins, standing tolerance: 20 mins  He reports being limited mostly by RLE pain now, his LLE has improved  Recurrent probem    Quality of life: good    Pain  Current pain ratin  At best pain ratin  At worst pain ratin  Location: central low back pain and RLE  Progression: improved    Social Support  Steps to enter house: no  Stairs in house: yes   12  Lives in: [facility] and multiple-level home [place] with a basement)  Lives with: spouse and adult children    Employment status: not working    Diagnostic Tests  No diagnostic tests performed  Treatments  Current treatment: physical therapy  Patient Goals  Patient goals for therapy: decreased pain        Objective     Postural Observations  Standing posture: fair (forward flexed)        Active Range of Motion     Lumbar   Flexion: Active lumbar flexion: 14 cm FTTF     Extension: 8 (symptms into R LE) degrees     Strength/Myotome Testing     Left Hip   Planes of Motion   Flexion: 4+  Abduction: 5  External rotation: 5  Internal rotation: 5    Right Hip   Planes of Motion   Flexion: 4+  Abduction: 5  External rotation: 4+  Internal rotation: 5    Left Knee   Flexion: 4+  Extension: 5    Right Knee   Flexion: 4+  Extension: 5    Left Ankle/Foot   Dorsiflexion: 5    Right Ankle/Foot   Dorsiflexion: 5    General Comments     Lumbar Comments  Able to SLS x 10 secs L, 13 secs R  Pop angle 44 deg L, 49 deg R  SLR (+) R at 44 deg, L (+) at 55 deg, (+) crossed SLR test on L  Slump test (+) R, (-) L      Flowsheet Rows      Most Recent Value   PT/OT G-Codes   Current Score  62   Projected Score  62   FOTO information reviewed  Yes   Assessment Type  Discharge   G code set  Mobility: Walking & Moving Around   Mobility: Walking and Moving Around Goal Status ()  CJ   Mobility: Walking and Moving Around Discharge Status ()  CJ        Precautions: HTN, Hx of MI      Daily Treatment Diary      Manual  5/9 5/11 5/16 5/17 5/23 5/25 5/30 6/1 6/6    RLE long axis distraction    3 mins  3 mins  NP             Re-eval/measurements                                                                                              Exercise Diary     5/11 5/16 5/17 5/23 5/25 5/30 6/1  6/6    Seated fwd flex stetch 5"x10  5"x10  HEP     5"x10  5" 2x5  5" 1x5  5"x10     Seated hs stretch 10"x5ea 10"x5 ea  HEP           10"x5 ea    skc 10"x5 ea 10"x3 ea  HEP               Supine piri crossover stretch 10"x5 a 10"x3 ea 10"x5 ea  HEP             Tac/ppt *  10"x10 10"x10  HEP             Tac/ppt with ball sq *  5"x10  5"x10 5"x10 HEP            Tac/ppt with pull apart * red 5" 1x10 Red 5"x10 Green 5"x12  HEP            Tac/ppt with SLR *   1x5 ea 1x5 ea  2x5 ea            Tac/ppt with s/l abd *   1x5 ea 1x5 ea  1x10 ea            Tac/ppt with march *   1x10 ea alt 1x10 ea alt HEP            sls *       30"x2 ea  30"x2 ea  30"x2 ea  30"x2 ea  HEP    sharpened romberg *       1'x1 ea  1'x1ea  1'x1 ea  1'x1 ea foam HEP    Standing hip abd *       1x10 ea  1x10ea  2x10 ea  2x10 ea  HEP    Hr/tr *   1x10 ea   2x10 ea  2x10ea  2x10 ea  2x10 ea  HEP    Standing slr *       1x10 ea  1x10 ea  2x10 ea  2x10 ea  HEP    recumbant bike *  5 mins  5 mins   5 minsL2 5 mins L3  5 mins L3  5 mins L3      Repeat sit to stand     1x10   2x10  2x10  1x10  D/C      TAC/PPT in standing     10"x5   10"x2   HEP        Mini squats          *    3" 1x10  3" 2x10  HEP    Lunges          *           Star tap             1x5 ea 1x5 ea HEP    Seated nerve glide               1x10 ea     Side steps               1/2 lap                                 Modalities     5/11 5/16 5/18 5/23 5/25 5/30 6/1 6/6    mhp prn                   ice prn  10 mins low back seated 10 mins low back seated  pt defer             Mech lumbar txn       0-65#,static,  5 min  0-65#static,8min  85# static, 8 mins  Trial hold

## 2018-06-15 ENCOUNTER — TELEPHONE (OUTPATIENT)
Dept: FAMILY MEDICINE CLINIC | Facility: CLINIC | Age: 78
End: 2018-06-15

## 2018-06-15 NOTE — TELEPHONE ENCOUNTER
Pt had an MRI DONE LAST WEEK AT [facility] AND WOULD LIKE TO KNOW THE RESULTS FROM THAT TEST  PLEASE CALL PT [phone] WITH FURTHER INSTRUCTIONS

## 2018-06-18 ENCOUNTER — TELEPHONE (OUTPATIENT)
Dept: FAMILY MEDICINE CLINIC | Facility: CLINIC | Age: 78
End: 2018-06-18

## 2018-06-18 DIAGNOSIS — M48.061 SPINAL STENOSIS OF LUMBAR REGION, UNSPECIFIED WHETHER NEUROGENIC CLAUDICATION PRESENT: Primary | ICD-10-CM

## 2018-06-18 NOTE — TELEPHONE ENCOUNTER
Pt had MRI done at [facility] AT THE [facility] on 6/9, I see the results in the computer, but nothing sent to clinical, pt would like to know the results if you can look at it and have someone from clinical call pt  Thanks!

## 2018-06-18 NOTE — TELEPHONE ENCOUNTER
I was never notified that the patient's MRI was completed  MRI does show canal stenosis, so he should be following up with Neurosurgery for further treatment options

## 2018-07-13 ENCOUNTER — OFFICE VISIT (OUTPATIENT)
Dept: FAMILY MEDICINE CLINIC | Facility: CLINIC | Age: 78
End: 2018-07-13
Payer: COMMERCIAL

## 2018-07-13 VITALS
HEIGHT: 72 IN | HEART RATE: 72 BPM | SYSTOLIC BLOOD PRESSURE: 110 MMHG | BODY MASS INDEX: 36.11 KG/M2 | DIASTOLIC BLOOD PRESSURE: 64 MMHG | WEIGHT: 266.6 LBS

## 2018-07-13 DIAGNOSIS — N28.1 RENAL CYST: ICD-10-CM

## 2018-07-13 DIAGNOSIS — Z12.5 SCREENING FOR PROSTATE CANCER: Primary | ICD-10-CM

## 2018-07-13 DIAGNOSIS — I10 ESSENTIAL HYPERTENSION: ICD-10-CM

## 2018-07-13 DIAGNOSIS — M48.061 SPINAL STENOSIS OF LUMBAR REGION, UNSPECIFIED WHETHER NEUROGENIC CLAUDICATION PRESENT: ICD-10-CM

## 2018-07-13 PROCEDURE — 3008F BODY MASS INDEX DOCD: CPT | Performed by: FAMILY MEDICINE

## 2018-07-13 PROCEDURE — 99214 OFFICE O/P EST MOD 30 MIN: CPT | Performed by: FAMILY MEDICINE

## 2018-07-13 PROCEDURE — 1036F TOBACCO NON-USER: CPT | Performed by: FAMILY MEDICINE

## 2018-07-13 PROCEDURE — 3078F DIAST BP <80 MM HG: CPT | Performed by: FAMILY MEDICINE

## 2018-07-13 PROCEDURE — 1160F RVW MEDS BY RX/DR IN RCRD: CPT | Performed by: FAMILY MEDICINE

## 2018-07-13 PROCEDURE — 3074F SYST BP LT 130 MM HG: CPT | Performed by: FAMILY MEDICINE

## 2018-07-13 NOTE — PROGRESS NOTES
Assessment and Plan:    Problem List Items Addressed This Visit        Cardiovascular and Mediastinum    Hypertension     Blood pressure doing well  No changes  Genitourinary    Renal cyst     Noted on MRI for back problems  This appears to be an incidental finding  Can repeat ultrasound in 6-12 months  MRI clearly said this was a cyst          Relevant Orders    US retroperitoneal complete       Other    Spinal stenosis     Patient reports he is doing much better with the spinal stenosis  It was definitely noticed on MRI, but after consult with Neurosurgery he did not wish to proceed at this time as he was feeling better  If things change in the future, he will certainly re-evaluate with Neurosurgery  I did review spinal stenosis with models that the patient's wife and himself could review  Other Visit Diagnoses     Screening for prostate cancer    -  Primary    Relevant Orders    PSA, total and free             Diagnoses and all orders for this visit:    Screening for prostate cancer  -     PSA, total and free; Future    Spinal stenosis of lumbar region, unspecified whether neurogenic claudication present    Renal cyst  -     US retroperitoneal complete; Future    Essential hypertension              Subjective: pt is here for follow up of mri of his back~cd     Patient ID: Bentley Tate is a 78 y o  male  CC:    No chief complaint on file  HPI:    Patient did have MRI recently  He did have no particular problems with it  It found that he had spinal stenosis  He did talk to Neurosurgery about it, and they offered him a possible procedure  He did have physical therapy prior in [place], and feels that that did help  Patient did mention that he got a new mattress, after that his back seem to be somewhat improved  Reviewed hypertension  No particular concerns today  Patient is still on anticoagulation for atrial fibrillation  No problems          The following portions of the patient's history were reviewed and updated as appropriate: allergies, current medications, past family history, past medical history, past social history, past surgical history and problem list       Review of Systems   Constitutional: Negative  HENT: Negative  Musculoskeletal:        Per HPI   Skin: Negative  All other systems reviewed and are negative  Data to review:       Objective:    Vitals:    07/13/18 1300   BP: 110/64   BP Location: Left arm   Patient Position: Sitting   Cuff Size: Large   Pulse: 72   Weight: 121 kg (266 lb 9 6 oz)   Height: 6' (1 829 m)        Physical Exam   Constitutional: He appears well-developed and well-nourished  Nursing note and vitals reviewed

## 2018-07-13 NOTE — ASSESSMENT & PLAN NOTE
Patient reports he is doing much better with the spinal stenosis  It was definitely noticed on MRI, but after consult with Neurosurgery he did not wish to proceed at this time as he was feeling better  If things change in the future, he will certainly re-evaluate with Neurosurgery  I did review spinal stenosis with models that the patient's wife and himself could review

## 2018-07-13 NOTE — PATIENT INSTRUCTIONS
Problem List Items Addressed This Visit        Cardiovascular and Mediastinum    Hypertension     Blood pressure doing well  No changes  Genitourinary    Renal cyst     Noted on MRI for back problems  This appears to be an incidental finding  Can repeat ultrasound in 6-12 months  MRI clearly said this was a cyst          Relevant Orders    US retroperitoneal complete       Other    Spinal stenosis     Patient reports he is doing much better with the spinal stenosis  It was definitely noticed on MRI, but after consult with Neurosurgery he did not wish to proceed at this time as he was feeling better  If things change in the future, he will certainly re-evaluate with Neurosurgery  I did review spinal stenosis with models that the patient's wife and himself could review             Other Visit Diagnoses     Screening for prostate cancer    -  Primary    Relevant Orders    PSA, total and free

## 2018-07-13 NOTE — ASSESSMENT & PLAN NOTE
Noted on MRI for back problems  This appears to be an incidental finding  Can repeat ultrasound in 6-12 months    MRI clearly said this was a cyst

## 2020-02-25 ENCOUNTER — OFFICE VISIT (OUTPATIENT)
Dept: FAMILY MEDICINE CLINIC | Facility: CLINIC | Age: 80
End: 2020-02-25
Payer: COMMERCIAL

## 2020-02-25 VITALS
BODY MASS INDEX: 34.46 KG/M2 | SYSTOLIC BLOOD PRESSURE: 110 MMHG | WEIGHT: 260 LBS | HEIGHT: 73 IN | TEMPERATURE: 96.7 F | DIASTOLIC BLOOD PRESSURE: 68 MMHG | RESPIRATION RATE: 16 BRPM | HEART RATE: 68 BPM

## 2020-02-25 DIAGNOSIS — R73.9 HYPERGLYCEMIA: ICD-10-CM

## 2020-02-25 DIAGNOSIS — I10 ESSENTIAL HYPERTENSION: ICD-10-CM

## 2020-02-25 DIAGNOSIS — H25.9 AGE-RELATED CATARACT OF BOTH EYES, UNSPECIFIED AGE-RELATED CATARACT TYPE: ICD-10-CM

## 2020-02-25 DIAGNOSIS — Z01.818 PRE-OP EXAMINATION: Primary | ICD-10-CM

## 2020-02-25 DIAGNOSIS — I50.22 CHRONIC SYSTOLIC CHF (CONGESTIVE HEART FAILURE) (HCC): ICD-10-CM

## 2020-02-25 DIAGNOSIS — M48.061 SPINAL STENOSIS OF LUMBAR REGION, UNSPECIFIED WHETHER NEUROGENIC CLAUDICATION PRESENT: ICD-10-CM

## 2020-02-25 DIAGNOSIS — Z00.00 HEALTHCARE MAINTENANCE: ICD-10-CM

## 2020-02-25 DIAGNOSIS — E66.09 CLASS 1 OBESITY DUE TO EXCESS CALORIES WITH SERIOUS COMORBIDITY AND BODY MASS INDEX (BMI) OF 34.0 TO 34.9 IN ADULT: ICD-10-CM

## 2020-02-25 DIAGNOSIS — I48.0 PAROXYSMAL ATRIAL FIBRILLATION (HCC): ICD-10-CM

## 2020-02-25 DIAGNOSIS — M17.0 PRIMARY OSTEOARTHRITIS OF BOTH KNEES: ICD-10-CM

## 2020-02-25 DIAGNOSIS — M54.6 ACUTE BILATERAL THORACIC BACK PAIN: ICD-10-CM

## 2020-02-25 PROBLEM — H26.9 CATARACT: Status: ACTIVE | Noted: 2020-02-25

## 2020-02-25 PROBLEM — M19.90 OSTEOARTHRITIS: Status: ACTIVE | Noted: 2020-02-25

## 2020-02-25 PROCEDURE — 3288F FALL RISK ASSESSMENT DOCD: CPT | Performed by: FAMILY MEDICINE

## 2020-02-25 PROCEDURE — G0439 PPPS, SUBSEQ VISIT: HCPCS | Performed by: FAMILY MEDICINE

## 2020-02-25 PROCEDURE — 3078F DIAST BP <80 MM HG: CPT | Performed by: FAMILY MEDICINE

## 2020-02-25 PROCEDURE — 1170F FXNL STATUS ASSESSED: CPT | Performed by: FAMILY MEDICINE

## 2020-02-25 PROCEDURE — 1160F RVW MEDS BY RX/DR IN RCRD: CPT | Performed by: FAMILY MEDICINE

## 2020-02-25 PROCEDURE — 99214 OFFICE O/P EST MOD 30 MIN: CPT | Performed by: FAMILY MEDICINE

## 2020-02-25 PROCEDURE — 1036F TOBACCO NON-USER: CPT | Performed by: FAMILY MEDICINE

## 2020-02-25 PROCEDURE — 3074F SYST BP LT 130 MM HG: CPT | Performed by: FAMILY MEDICINE

## 2020-02-25 PROCEDURE — 3008F BODY MASS INDEX DOCD: CPT | Performed by: FAMILY MEDICINE

## 2020-02-25 PROCEDURE — 1125F AMNT PAIN NOTED PAIN PRSNT: CPT | Performed by: FAMILY MEDICINE

## 2020-02-25 PROCEDURE — 1101F PT FALLS ASSESS-DOCD LE1/YR: CPT | Performed by: FAMILY MEDICINE

## 2020-02-25 RX ORDER — ACETAMINOPHEN 500 MG
500 TABLET ORAL
COMMUNITY

## 2020-02-25 RX ORDER — CLOPIDOGREL BISULFATE 75 MG/1
TABLET ORAL
COMMUNITY
End: 2020-02-25 | Stop reason: ALTCHOICE

## 2020-02-25 RX ORDER — TRAMADOL HYDROCHLORIDE 50 MG/1
50 TABLET ORAL EVERY 6 HOURS PRN
Qty: 30 TABLET | Refills: 0 | Status: SHIPPED | OUTPATIENT
Start: 2020-02-25 | End: 2022-04-22 | Stop reason: ALTCHOICE

## 2020-02-25 RX ORDER — HYDROCORTISONE ACETATE 25 MG/1
SUPPOSITORY RECTAL
COMMUNITY
Start: 2015-10-05 | End: 2020-02-25 | Stop reason: ALTCHOICE

## 2020-02-25 RX ORDER — OFLOXACIN 3 MG/ML
SOLUTION/ DROPS OPHTHALMIC
COMMUNITY
Start: 2020-02-14 | End: 2020-05-13

## 2020-02-25 RX ORDER — PREDNISOLONE ACETATE 10 MG/ML
SUSPENSION/ DROPS OPHTHALMIC
COMMUNITY
Start: 2020-02-14 | End: 2020-05-13

## 2020-02-25 NOTE — ASSESSMENT & PLAN NOTE
Patient does have some significant arthritis in the right knee based on findings today  Start with x-rays  After that, consider injection verses follow-up with Orthopedics

## 2020-02-25 NOTE — ASSESSMENT & PLAN NOTE
Wt Readings from Last 3 Encounters:   02/25/20 118 kg (260 lb)   07/13/18 121 kg (266 lb 9 6 oz)   06/04/18 118 kg (261 lb)     Stable at the moment  Follow with Cardiology as scheduled  No changes  Does not seem to disqualify him from surgery

## 2020-02-25 NOTE — ASSESSMENT & PLAN NOTE
Patient appears to be at low risk for the planned cataract surgery, despite is heart disease  Cardiology did visit with him recently, there was no further testing it was necessary  Given the fact that this is a low risk procedure, reasonable to proceed

## 2020-02-25 NOTE — PROGRESS NOTES
Presurgical Evaluation    Subjective:      Patient ID: Antonio Olea is a 70 y o  male  Chief Complaint   Patient presents with    Medicare Wellness Visit    Pre-op Exam     Pt is here for pre op for cataract sx       Patient is here for evaluation for cataract surgery  He will be having this done in the near future on the right eye on the 2nd of March, and then the left eye on the 23rd of March  Has been having some increasing problems with vision, went Ophthalmology, they recommended cataract removals  CAD:  Stable at the moment  He has not had any particular symptoms currently  Patient did see cardiology in January  At that time, no further cardiac testing was recommended  Currently on multiple medications including Imdur, losartan, metoprolol  Back pain:  Depending on movement, the patient does develop some significant lower back pain  Pain appears to be in the lower thoracic to midthoracic area  The pain seems to be related more when he does certain movements  It is present at all times, however  He has not tried any medications for it so far  Bilateral knee pain  Patient has had this for several months at least   Previously had surgery on the left knee  Knees do not give out  A-Fib: no current issues  Currently on Xarelto  Hyperlipidemia:  Noted previously  Not currently on medications  CHF:  Noted previously with Cardiology  Currently on Cozaar, Toprol  At 1 point, he was on Ranexa but he is not on that anymore  Patient also uses furosemide        The following portions of the patient's history were reviewed and updated as appropriate: allergies, current medications, past family history, past medical history, past social history, past surgical history and problem list     Procedure date:  March 2nd OD, March 23rd OS    Surgeon:  Dr Amina Lyons  Planned procedure:  Cataract extraction  Diagnosis for procedure:  Cataract bilateral    Prior anesthesia: Yes   General; Complications:  None / Tolerated well  MAC; Complications:  None / Tolerated well    CAD History: CAD  CHF  Arrhythmia  A-Fib   NOTE: Patient should see Cardiology if time available before surgery, and if appropriate  Pulmonary History: None    Renal history: None    Diabetes History:  None     Neurological History: None     On Immunosuppressant meds/biologics: No      Review of Systems   Constitutional: Negative  HENT: Negative  Eyes: Negative  Respiratory: Negative  Cardiovascular: Negative  Gastrointestinal: Negative  Endocrine: Negative  Genitourinary: Negative  Musculoskeletal: Positive for arthralgias and back pain  Skin: Negative  Allergic/Immunologic: Negative  Neurological: Negative  Hematological: Negative  Psychiatric/Behavioral: Negative            Current Outpatient Medications   Medication Sig Dispense Refill    acetaminophen (TYLENOL) 500 mg tablet Take 500 mg by mouth      aspirin (ECOTRIN LOW STRENGTH) 81 mg EC tablet Take 1 tablet by mouth daily      Cholecalciferol (VITAMIN D) 2000 units CAPS Take by mouth      Coenzyme Q10 (CO Q 10) 100 MG CAPS Take by mouth      furosemide (LASIX) 20 mg tablet Take by mouth      isosorbide mononitrate (IMDUR) 60 mg 24 hr tablet Take 1 tablet by mouth daily      losartan (COZAAR) 25 mg tablet Take 0 5 tablets by mouth daily      MAGNESIUM PO Take by mouth      metoprolol succinate (TOPROL XL) 200 MG 24 hr tablet Take 150 mg by mouth daily       Multiple Vitamins-Minerals (MULTIVITAMIN ADULT PO) Take 1 capsule by mouth      nitroglycerin (NITROSTAT) 0 4 mg SL tablet Place 1 tablet under the tongue every 5 (five) minutes as needed      rivaroxaban (XARELTO) 20 mg tablet Take 1 tablet (20 mg total) by mouth daily with dinner 28 tablet 0    ofloxacin (OCUFLOX) 0 3 % ophthalmic solution       prednisoLONE acetate (PRED FORTE) 1 % ophthalmic suspension       traMADol (ULTRAM) 50 mg tablet Take 1 tablet (50 mg total) by mouth every 6 (six) hours as needed for moderate pain 30 tablet 0     No current facility-administered medications for this visit  Allergies on file:   Atorvastatin; Bee venom;  Lisinopril; Perflutren; Perflutren lipid microsphere; Ranolazine; and Simvastatin    Patient Active Problem List   Diagnosis    3-vessel CAD    Acute low back pain without sciatica    Cardiomyopathy, ischemic    Calculus of gallbladder and bile duct with acute on chronic cholecystitis    Chronic systolic CHF (congestive heart failure) ([facility]    Disc degeneration, lumbar    Diverticulosis    Hyperlipidemia    Hyperglycemia    Hemorrhoids    Hypertension    Hypothyroidism    NSTEMI (non-ST elevated myocardial infarction) ([facility] [address] )    Old myocardial infarction    Obese    Paroxysmal atrial fibrillation ([facility])    Prostate nodule    S/P PTCA (percutaneous transluminal coronary angioplasty)    Spinal stenosis    Chronic maxillary sinusitis    Renal cyst    Cataract    Osteoarthritis    Thoracic back pain        Past Medical History:   Diagnosis Date    Bleeding hemorrhoids     Choledocholithiasis     Difficulty breathing     Diverticulosis     Ileus ([facility])        Past Surgical History:   Procedure Laterality Date    APPENDECTOMY      CHOLECYSTECTOMY LAPAROSCOPIC      CORONARY ANGIOPLASTY      CORONARY ANGIOPLASTY WITH STENT PLACEMENT      CORONARY ARTERY BYPASS GRAFT      ERCP      TONSILLECTOMY         Family History   Problem Relation Age of Onset    Lung cancer Mother     Coronary artery disease Father     Diabetes Brother     Lung cancer Family        Social History     Tobacco Use    Smoking status: Never Smoker    Smokeless tobacco: Never Used   Substance Use Topics    Alcohol use: Yes     Comment: social    Drug use: No       Objective:    Vitals:    02/25/20 1337   BP: 110/68   BP Location: Left arm   Patient Position: Sitting   Cuff Size: Adult   Pulse: 68   Resp: 16   Temp: (!) 96 7 °F (35 9 °C)   TempSrc: Tympanic   Weight: 118 kg (260 lb)   Height: 6' 1" (1 854 m)        Physical Exam   Constitutional: He is oriented to person, place, and time  He appears well-developed and well-nourished  No distress  HENT:   Head: Normocephalic and atraumatic  Right Ear: Hearing, tympanic membrane, external ear and ear canal normal    Left Ear: Hearing, tympanic membrane, external ear and ear canal normal    Nose: Nose normal  Right sinus exhibits no maxillary sinus tenderness and no frontal sinus tenderness  Left sinus exhibits no maxillary sinus tenderness and no frontal sinus tenderness  Mouth/Throat: Uvula is midline, oropharynx is clear and moist and mucous membranes are normal  No oropharyngeal exudate  Eyes: Pupils are equal, round, and reactive to light  Conjunctivae and EOM are normal  Lids are everted and swept, no foreign bodies found  Neck: Normal range of motion  Neck supple  Carotid bruit is not present  No tracheal deviation present  No thyromegaly present  Cardiovascular: Normal rate, regular rhythm, normal heart sounds and intact distal pulses  Exam reveals no gallop and no friction rub  No murmur heard  Pulses:       Carotid pulses are 2+ on the right side, and 2+ on the left side  Pulmonary/Chest: Effort normal and breath sounds normal  No respiratory distress  He has no wheezes  He has no rales  Abdominal: Soft  Bowel sounds are normal  He exhibits no distension  There is no tenderness  Musculoskeletal:        Right knee: He exhibits no swelling and no effusion  Tenderness found  Medial joint line tenderness noted  Thoracic back: He exhibits decreased range of motion and pain  He exhibits no tenderness  Back:    Lymphadenopathy:     He has no cervical adenopathy  Neurological: He is alert and oriented to person, place, and time  Coordination normal    Skin: Skin is warm and dry  He is not diaphoretic  Psychiatric: He has a normal mood and affect   His behavior is normal  Judgment and thought content normal    Nursing note and vitals reviewed  Preop labs/testing available and reviewed: no               EKG no    Echo no    Stress test/cath yes: [facility] at [facility]  See Cardio notes in [place], care everywhere  PFT/[place] no    Functional capacity: Climb stairs                        4 Mets   Pick the highest level patient can comfortably perform   4 mets or greater for surgery    RCRI  High Risk surgery? 1 Point  CAD History:         1 Point   MI; Positive Stress Test; CP due to Mi;  Nitrate Usage to control Angina; Pathologic Q wave on EKG  CHF Active:         1 Point   Pulm Edema; Paroxysmal Nocturnal Dyspnea;  Bibasilar Rales (crackles);S3; CHF on CXR  Cerebrovascular Disease (TIA or CVA):     1 Point  DM on Insulin:        1 Point  Serum Creat >2 0 mg/dl:       1 Point          Total Points: 1     Scorin: Class I, Very Low Risk (0 4%)     1: Class II, Low risk (0 9%)     2: Class III Moderate (6 6%)     3: Class IV High (>11%)      MARK Risk:  GFR:        For PCP:  If GFR>60, Hold ACE/ARB/Diuretic on the day of surgery, and NSAIDS 10 days before  If GFR<45, Consider PRE and POST op Nephrology Consult  If 46 <GFR> 59 : Has Patient had MARK in last 6 Months? no   If YES: Preop Nephrology consult   If No:  [facility] Nephrology consult  Assessment/Plan:    Patient is medically optimized (cleared) for the planned procedure  Further testing/evaluation is not required  Postop concerns: no    Problem List Items Addressed This Visit     Cataract     Patient appears to be at low risk for the planned cataract surgery, despite is heart disease  Cardiology did visit with him recently, there was no further testing it was necessary  Given the fact that this is a low risk procedure, reasonable to proceed           Relevant Medications    prednisoLONE acetate (PRED FORTE) 1 % ophthalmic suspension    ofloxacin (OCUFLOX) 0 3 % ophthalmic solution Chronic systolic CHF (congestive heart failure) (HCC)     Wt Readings from Last 3 Encounters:   02/25/20 118 kg (260 lb)   07/13/18 121 kg (266 lb 9 6 oz)   06/04/18 118 kg (261 lb)     Stable at the moment  Follow with Cardiology as scheduled  No changes  Does not seem to disqualify him from surgery  Hyperglycemia     Stable  Noted before  Did not rise to the level of diabetes  Hypertension     Blood pressure today is excellent  No changes in medications  Check in the future  Obese     BMI is slightly elevated  Would recommend limit carbohydrates, and increase exercise as tolerable  Exercise increase is a little bit difficult lately with back pain, as well as knee pain  Consider follow-up for those, then re-evaluate for obesity  Osteoarthritis     Patient does have some significant arthritis in the right knee based on findings today  Start with x-rays  After that, consider injection verses follow-up with Orthopedics  Relevant Medications    traMADol (ULTRAM) 50 mg tablet    Other Relevant Orders    XR knee 3 vw left non injury    XR knee 3 vw right non injury    Paroxysmal atrial fibrillation (HCC)     AFib seems to be doing quite well at this point  Exam today did not reveal any abnormalities  Follow with Cardiology  Continue on Xarelto  Spinal stenosis     In the past, the patient did have spinal stenosis  Went to neurosurgery at [address] [place] [address] [address]  They did seem to help him quite a bit  If he needs to have further evaluation/intervention, family would prefer that patient go to [facility]  Thoracic back pain     Reviewed with him about chronic pain from this, and verses acute pain  Given that he did have disc issues before, is likely to be present again but in a different level  With the eye surgery that is coming up, would recommend at this time starting on tramadol, 1 every 6 hours as needed for 30 days only    Educated patient that this is a short-term prescription only, not for chronic use  Reviewed about narcotics, as well as addiction with narcotics  Controlled Substance Review    [place] PDMP or [place]  reviewed: No red flags were identified; safe to proceed with prescription                Relevant Medications    traMADol (ULTRAM) 50 mg tablet    Other Relevant Orders    XR spine thoracic 3 vw      Other Visit Diagnoses     Pre-op examination    -  Primary           Diagnoses and all orders for this visit:    Pre-op examination    Age-related cataract of both eyes, unspecified age-related cataract type    Primary osteoarthritis of both knees  -     traMADol (ULTRAM) 50 mg tablet; Take 1 tablet (50 mg total) by mouth every 6 (six) hours as needed for moderate pain  -     XR knee 3 vw left non injury; Future  -     XR knee 3 vw right non injury; Future    Paroxysmal atrial fibrillation (HCC)    Essential hypertension    Hyperglycemia    Chronic systolic CHF (congestive heart failure) ([facility] [address] )    Spinal stenosis of lumbar region, unspecified whether neurogenic claudication present    Class 1 obesity due to excess calories with serious comorbidity and body mass index (BMI) of 34 0 to 34 9 in adult    Acute bilateral thoracic back pain  -     traMADol (ULTRAM) 50 mg tablet; Take 1 tablet (50 mg total) by mouth every 6 (six) hours as needed for moderate pain  -     XR spine thoracic 3 vw; Future    Other orders  -     Discontinue: hydrocortisone (Anusol-HC) 25 mg suppository  -     Discontinue: clopidogrel (PLAVIX) 75 mg tablet  -     acetaminophen (TYLENOL) 500 mg tablet;  Take 500 mg by mouth  -     prednisoLONE acetate (PRED FORTE) 1 % ophthalmic suspension  -     ofloxacin (OCUFLOX) 0 3 % ophthalmic solution  -     MAGNESIUM PO; Take by mouth          Pre-Surgery Instructions:   Medication Instructions    hydrocortisone (Anusol-HC) 25 mg suppository per anesthesia guidelines         NOTE: Please use the above to review important meds for your specialty, the remainder "per anesthesia Guidelines "    NOTE: Please place an Inbasket message for "[facility]" pool for complicated patients

## 2020-02-25 NOTE — PROGRESS NOTES
Assessment and Plan:     Problem List Items Addressed This Visit     Cataract     Patient appears to be at low risk for the planned cataract surgery, despite is heart disease  Cardiology did visit with him recently, there was no further testing it was necessary  Given the fact that this is a low risk procedure, reasonable to proceed  Relevant Medications    prednisoLONE acetate (PRED FORTE) 1 % ophthalmic suspension    ofloxacin (OCUFLOX) 0 3 % ophthalmic solution    Chronic systolic CHF (congestive heart failure) (HCC)     Wt Readings from Last 3 Encounters:   02/25/20 118 kg (260 lb)   07/13/18 121 kg (266 lb 9 6 oz)   06/04/18 118 kg (261 lb)     Stable at the moment  Follow with Cardiology as scheduled  No changes  Does not seem to disqualify him from surgery  Hyperglycemia     Stable  Noted before  Did not rise to the level of diabetes  Hypertension     Blood pressure today is excellent  No changes in medications  Check in the future  Obese     BMI is slightly elevated  Would recommend limit carbohydrates, and increase exercise as tolerable  Exercise increase is a little bit difficult lately with back pain, as well as knee pain  Consider follow-up for those, then re-evaluate for obesity  Osteoarthritis     Patient does have some significant arthritis in the right knee based on findings today  Start with x-rays  After that, consider injection verses follow-up with Orthopedics  Relevant Medications    traMADol (ULTRAM) 50 mg tablet    Other Relevant Orders    XR knee 3 vw left non injury    XR knee 3 vw right non injury    Paroxysmal atrial fibrillation (HCC)     AFib seems to be doing quite well at this point  Exam today did not reveal any abnormalities  Follow with Cardiology  Continue on Xarelto  Spinal stenosis     In the past, the patient did have spinal stenosis  Went to neurosurgery at [address] [place] [address] [address]    They did seem to help him quite a bit   If he needs to have further evaluation/intervention, family would prefer that patient go to [facility]  Thoracic back pain     Reviewed with him about chronic pain from this, and verses acute pain  Given that he did have disc issues before, is likely to be present again but in a different level  With the eye surgery that is coming up, would recommend at this time starting on tramadol, 1 every 6 hours as needed for 30 days only  Educated patient that this is a short-term prescription only, not for chronic use  Reviewed about narcotics, as well as addiction with narcotics  Controlled Substance Review    [place]MP or [place]  reviewed: No red flags were identified; safe to proceed with prescription                Relevant Medications    traMADol (ULTRAM) 50 mg tablet    Other Relevant Orders    XR spine thoracic 3 vw      Other Visit Diagnoses     Pre-op examination    -  Primary    Healthcare maintenance        Recommend Pneumovax/Prevnar  Otherwise, reasonable exam   Please see the a [address] note  BMI Counseling: Body mass index is 34 3 kg/m²  The BMI is above normal  Nutrition recommendations include decreasing portion sizes, encouraging healthy choices of fruits and vegetables, decreasing fast food intake, consuming healthier snacks, limiting drinks that contain sugar, moderation in carbohydrate intake, increasing intake of lean protein, reducing intake of saturated and trans fat and reducing intake of cholesterol  Exercise recommendations include exercising 3-5 times per week  No pharmacotherapy was ordered  Preventive health issues were discussed with patient, and age appropriate screening tests were ordered as noted in patient's After Visit Summary  Personalized health advice and appropriate referrals for health education or preventive services given if needed, as noted in patient's After Visit Summary       History of Present Illness:     Patient presents for [facility] Annual Wellness visit    Patient Care Team:  Liang Herrera MD as PCP - Rey Diamond MD as PCP - [facility] ([facility]     Problem List:     Patient Active Problem List   Diagnosis    3-vessel CAD    Acute low back pain without sciatica    Cardiomyopathy, ischemic    Calculus of gallbladder and bile duct with acute on chronic cholecystitis    Chronic systolic CHF [facility]congestive heart failure) ([facility])    Disc degeneration, lumbar    Diverticulosis    Hyperlipidemia    Hyperglycemia    Hemorrhoids    Hypertension    Hypothyroidism    NSTEMI (non-ST elevated myocardial infarction) ([facility] [address] [facility]    Old myocardial infarction    Obese    Paroxysmal atrial fibrillation (HCC)    Prostate nodule    S/P PTCA (percutaneous transluminal coronary angioplasty)    Spinal stenosis    Chronic maxillary sinusitis    Renal cyst    Cataract    Osteoarthritis    Thoracic back pain      Past Medical and Surgical History:     Past Medical History:   Diagnosis Date    Bleeding hemorrhoids     Choledocholithiasis     Difficulty breathing     Diverticulosis     Ileus ([facility])      Past Surgical History:   Procedure Laterality Date    APPENDECTOMY      CHOLECYSTECTOMY LAPAROSCOPIC      CORONARY ANGIOPLASTY      CORONARY ANGIOPLASTY WITH STENT PLACEMENT      CORONARY ARTERY BYPASS GRAFT      ERCP      TONSILLECTOMY        Family History:     Family History   Problem Relation Age of Onset    Lung cancer Mother     Coronary artery disease Father     Diabetes Brother     Lung cancer Family       Social History:        Social History     Socioeconomic History    Marital status: /Civil Union     Spouse name: None    Number of children: None    Years of education: None    Highest education level: None   Occupational History    Occupation: retired   Social Needs    Financial resource strain: None    Food insecurity:     Worry: None     Inability: None    Transportation needs:     Medical: None     Non-medical: None   Tobacco Use    Smoking status: Never Smoker    Smokeless tobacco: Never Used   Substance and Sexual Activity    Alcohol use: Yes     Comment: social    Drug use: No    Sexual activity: None   Lifestyle    Physical activity:     Days per week: None     Minutes per session: None    Stress: None   Relationships    Social connections:     Talks on phone: None     Gets together: None     Attends Baptism service: None     Active member of club or organization: None     Attends meetings of clubs or organizations: None     Relationship status: None    Intimate partner violence:     Fear of current or ex partner: None     Emotionally abused: None     Physically abused: None     Forced sexual activity: None   Other Topics Concern    None   Social History Narrative    None      Medications and Allergies:     Current Outpatient Medications   Medication Sig Dispense Refill    acetaminophen (TYLENOL) 500 mg tablet Take 500 mg by mouth      aspirin (ECOTRIN LOW STRENGTH) 81 mg EC tablet Take 1 tablet by mouth daily      Cholecalciferol (VITAMIN D) 2000 units CAPS Take by mouth      Coenzyme Q10 (CO Q 10) 100 MG CAPS Take by mouth      furosemide (LASIX) 20 mg tablet Take by mouth      isosorbide mononitrate (IMDUR) 60 mg 24 hr tablet Take 1 tablet by mouth daily      losartan (COZAAR) 25 mg tablet Take 0 5 tablets by mouth daily      MAGNESIUM PO Take by mouth      metoprolol succinate (TOPROL XL) 200 MG 24 hr tablet Take 150 mg by mouth daily       Multiple Vitamins-Minerals (MULTIVITAMIN ADULT PO) Take 1 capsule by mouth      nitroglycerin (NITROSTAT) 0 4 mg SL tablet Place 1 tablet under the tongue every 5 (five) minutes as needed      rivaroxaban (XARELTO) 20 mg tablet Take 1 tablet (20 mg total) by mouth daily with dinner 28 tablet 0    ofloxacin (OCUFLOX) 0 3 % ophthalmic solution       prednisoLONE acetate (PRED FORTE) 1 % ophthalmic suspension       traMADol (ULTRAM) 50 mg tablet Take 1 tablet (50 mg total) by mouth every 6 (six) hours as needed for moderate pain 30 tablet 0     No current facility-administered medications for this visit  Allergies   Allergen Reactions    Atorvastatin Cough     Per pt has had a problem with other statins also, does not remember names    Bee Venom     Lisinopril Other (See Comments)     cough    Perflutren Other (See Comments)     severe back lower back spasm    Perflutren Lipid Microsphere     Ranolazine      Alters mood   Simvastatin       Immunizations:     Immunization History   Administered Date(s) Administered    Influenza TIV (IM) 1940    TD (adult) Preservative Free 01/01/2007      Health Maintenance: There are no preventive care reminders to display for this patient  Topic Date Due    DTaP,Tdap,and Td Vaccines (1 - Tdap) 11/17/1951    Pneumococcal Vaccine: 65+ Years (1 of 2 - PCV13) 11/17/2007      Medicare Health Risk Assessment:     /68 (BP Location: Left arm, Patient Position: Sitting, Cuff Size: Adult)   Pulse 68   Temp (!) 96 7 °F (35 9 °C) (Tympanic)   Resp 16   Ht 6' 1" (1 854 m)   Wt 118 kg (260 lb)   BMI 34 30 kg/m²      Jessica Jaimes is here for his Subsequent Wellness visit  Health Risk Assessment:   Patient rates overall health as good  Patient feels that their physical health rating is same  Eyesight was rated as slightly worse  Hearing was rated as same  Patient feels that their emotional and mental health rating is same  Pain experienced in the last 7 days has been a lot  Patient's pain rating has been 8/10  Patient states that he has experienced no weight loss or gain in last 6 months  Depression Screening:   PHQ-2 Score: 0      Fall Risk Screening: In the past year, patient has experienced: no history of falling in past year      Home Safety:  Patient has trouble with stairs inside or outside of their home  Patient has working smoke alarms and has working carbon monoxide detector   Home safety hazards include: none  Nutrition:   Current diet is Low Carb, Limited junk food and No Added Salt  Medications:   Patient is currently taking over-the-counter supplements  OTC medications include: see medication list  Patient is able to manage medications  Activities of Daily Living (ADLs)/Instrumental Activities of Daily Living (IADLs):   Walk and transfer into and out of bed and chair?: Yes  Dress and groom yourself?: Yes    Bathe or shower yourself?: Yes    Feed yourself?  Yes  Do your laundry/housekeeping?: Yes  Manage your money, pay your bills and track your expenses?: Yes  Make your own meals?: Yes    Do your own shopping?: Yes    Previous Hospitalizations:   Any hospitalizations or ED visits within the last 12 months?: No      Advance Care Planning:   Living will: No    Durable POA for healthcare: No    Advanced directive: Yes    Advanced directive counseling given: Yes    Five wishes given: Yes    Patient declined ACP directive: No      Cognitive Screening:   Provider or family/friend/caregiver concerned regarding cognition?: No    PREVENTIVE SCREENINGS      Cardiovascular Screening:    General: Screening Not Indicated and History Lipid Disorder      Diabetes Screening:     General: Risks and Benefits Discussed and Screening Current      Prostate Cancer Screening:    General: Screening Not Indicated      Osteoporosis Screening:    General: Risks and Benefits Discussed and Screening Not Indicated      Abdominal Aortic Aneurysm (AAA) Screening:        General: Screening Not Indicated      Lung Cancer Screening:     General: Screening Not Indicated      Hepatitis C Screening:    General: Screening Not Indicated      Pankaj MD Berny

## 2020-02-25 NOTE — ASSESSMENT & PLAN NOTE
BMI is slightly elevated  Would recommend limit carbohydrates, and increase exercise as tolerable  Exercise increase is a little bit difficult lately with back pain, as well as knee pain  Consider follow-up for those, then re-evaluate for obesity

## 2020-02-25 NOTE — ASSESSMENT & PLAN NOTE
Reviewed with him about chronic pain from this, and verses acute pain  Given that he did have disc issues before, is likely to be present again but in a different level  With the eye surgery that is coming up, would recommend at this time starting on tramadol, 1 every 6 hours as needed for 30 days only  Educated patient that this is a short-term prescription only, not for chronic use  Reviewed about narcotics, as well as addiction with narcotics  Controlled Substance Review    [place]MP or [place]  reviewed: No red flags were identified; safe to proceed with prescription  Herlinda Song

## 2020-02-25 NOTE — PATIENT INSTRUCTIONS
Problem List Items Addressed This Visit     Cataract     Patient appears to be at low risk for the planned cataract surgery, despite is heart disease  Cardiology did visit with him recently, there was no further testing it was necessary  Given the fact that this is a low risk procedure, reasonable to proceed  Relevant Medications    prednisoLONE acetate (PRED FORTE) 1 % ophthalmic suspension    ofloxacin (OCUFLOX) 0 3 % ophthalmic solution    Chronic systolic CHF (congestive heart failure) (HCC)     Wt Readings from Last 3 Encounters:   02/25/20 118 kg (260 lb)   07/13/18 121 kg (266 lb 9 6 oz)   06/04/18 118 kg (261 lb)     Stable at the moment  Follow with Cardiology as scheduled  No changes  Does not seem to disqualify him from surgery  Hyperglycemia     Stable  Noted before  Did not rise to the level of diabetes  Hypertension     Blood pressure today is excellent  No changes in medications  Check in the future  Obese     BMI is slightly elevated  Would recommend limit carbohydrates, and increase exercise as tolerable  Exercise increase is a little bit difficult lately with back pain, as well as knee pain  Consider follow-up for those, then re-evaluate for obesity  Osteoarthritis     Patient does have some significant arthritis in the right knee based on findings today  Start with x-rays  After that, consider injection verses follow-up with Orthopedics  Relevant Medications    traMADol (ULTRAM) 50 mg tablet    Other Relevant Orders    XR knee 3 vw left non injury    XR knee 3 vw right non injury    Paroxysmal atrial fibrillation (HCC)     AFib seems to be doing quite well at this point  Exam today did not reveal any abnormalities  Follow with Cardiology  Continue on Xarelto  Spinal stenosis     In the past, the patient did have spinal stenosis  Went to neurosurgery at [address]  They did seem to help him quite a bit    If he needs to have further evaluation/intervention, family would prefer that patient go to [facility]  Thoracic back pain     Reviewed with him about chronic pain from this, and verses acute pain  Given that he did have disc issues before, is likely to be present again but in a different level  With the eye surgery that is coming up, would recommend at this time starting on tramadol, 1 every 6 hours as needed for 30 days only  Educated patient that this is a short-term prescription only, not for chronic use  Reviewed about narcotics, as well as addiction with narcotics  Controlled Sub[place] Review    [place]  reviewed: No red flags were identified; safe to proceed with prescription                Relevant Medications    traMADol (ULTRAM) 50 mg tablet    Other Relevant Orders    XR spine thoracic 3 vw      Other Visit Diagnoses     Pre-op examination    -  Primary

## 2020-02-25 NOTE — ASSESSMENT & PLAN NOTE
In the past, the patient did have spinal stenosis  Went to neurosurgery at [facility]  They did seem to help him quite a bit  If he needs to have further evaluation/intervention, family would prefer that patient go to [facility]

## 2020-02-25 NOTE — ASSESSMENT & PLAN NOTE
AFib seems to be doing quite well at this point  Exam today did not reveal any abnormalities  Follow with Cardiology  Continue on Xarelto

## 2020-02-25 NOTE — LETTER
February 25, 2020     Blanca Gtz DO  [address] [place], [place] [address] [place]    Patient: Jose Sosa   YOB: 1940   Date of Visit: 2/25/2020       Dear Dr Julia Lemons:    Thank you for referring Jorge Deras to me for evaluation  Below are my notes for this consultation  If you have questions, please do not hesitate to call me  I look forward to following your patient along with you  Sincerely,        Garrett Castorena MD        CC: No Recipients  Garrett Castorena MD  2/25/2020  2:26 PM  Sign at close encounter  Presurgical Evaluation    Subjective:      Patient ID: Jose Sosa is a 70 y o  male  Chief Complaint   Patient presents with    Medicare Wellness Visit    Pre-op Exam     Pt is here for pre op for cataract sx       Patient is here for evaluation for cataract surgery  He will be having this done in the near future on the right eye on the 2nd of March, and then the left eye on the 23rd of March  Has been having some increasing problems with vision, went Ophthalmology, they recommended cataract removals  CAD:  Stable at the moment  He has not had any particular symptoms currently  Patient did see cardiology in January  At that time, no further cardiac testing was recommended  Currently on multiple medications including Imdur, losartan, metoprolol  Back pain:  Depending on movement, the patient does develop some significant lower back pain  Pain appears to be in the lower thoracic to midthoracic area  The pain seems to be related more when he does certain movements  It is present at all times, however  He has not tried any medications for it so far  Bilateral knee pain  Patient has had this for several months at least   Previously had surgery on the left knee  Knees do not give out  A-Fib: no current issues  Currently on Xarelto  Hyperlipidemia:  Noted previously  Not currently on medications      CHF:  Noted previously with Cardiology  Currently on Cozaar, Toprol  At 1 point, he was on Ranexa but he is not on that anymore  Patient also uses furosemide  The following portions of the patient's history were reviewed and updated as appropriate: allergies, current medications, past family history, past medical history, past social history, past surgical history and problem list     Procedure date:  March 2nd OD, March 23rd OS    Surgeon:  Dr Judie Burger  Planned procedure:  Cataract extraction  Diagnosis for procedure:  Cataract bilateral    Prior anesthesia: Yes   General; Complications:  None / Tolerated well  MAC; Complications:  None / Tolerated well    CAD History: CAD  CHF  Arrhythmia  A-Fib   NOTE: Patient should see Cardiology if time available before surgery, and if appropriate  Pulmonary History: None    Renal history: None    Diabetes History:  None     Neurological History: None     On Immunosuppressant meds/biologics: No      Review of Systems   Constitutional: Negative  HENT: Negative  Eyes: Negative  Respiratory: Negative  Cardiovascular: Negative  Gastrointestinal: Negative  Endocrine: Negative  Genitourinary: Negative  Musculoskeletal: Positive for arthralgias and back pain  Skin: Negative  Allergic/Immunologic: Negative  Neurological: Negative  Hematological: Negative  Psychiatric/Behavioral: Negative            Current Outpatient Medications   Medication Sig Dispense Refill    acetaminophen (TYLENOL) 500 mg tablet Take 500 mg by mouth      aspirin (ECOTRIN LOW STRENGTH) 81 mg EC tablet Take 1 tablet by mouth daily      Cholecalciferol (VITAMIN D) 2000 units CAPS Take by mouth      Coenzyme Q10 (CO Q 10) 100 MG CAPS Take by mouth      furosemide (LASIX) 20 mg tablet Take by mouth      isosorbide mononitrate (IMDUR) 60 mg 24 hr tablet Take 1 tablet by mouth daily      losartan (COZAAR) 25 mg tablet Take 0 5 tablets by mouth daily      MAGNESIUM PO Take by mouth      metoprolol succinate (TOPROL XL) 200 MG 24 hr tablet Take 150 mg by mouth daily       Multiple Vitamins-Minerals (MULTIVITAMIN ADULT PO) Take 1 capsule by mouth      nitroglycerin (NITROSTAT) 0 4 mg SL tablet Place 1 tablet under the tongue every 5 (five) minutes as needed      rivaroxaban (XARELTO) 20 mg tablet Take 1 tablet (20 mg total) by mouth daily with dinner 28 tablet 0    ofloxacin (OCUFLOX) 0 3 % ophthalmic solution       prednisoLONE acetate (PRED FORTE) 1 % ophthalmic suspension       traMADol (ULTRAM) 50 mg tablet Take 1 tablet (50 mg total) by mouth every 6 (six) hours as needed for moderate pain 30 tablet 0     No current facility-administered medications for this visit  Allergies on file:   Atorvastatin; Bee venom;  Lisinopril; Perflutren; Perflutren lipid microsphere; Ranolazine; and Simvastatin    Patient Active Problem List   Diagnosis    3-vessel CAD    Acute low back pain without sciatica    Cardiomyopathy, ischemic    Calculus of gallbladder and bile duct with acute on chronic cholecystitis    Chronic systolic CHF [facility]congestive heart failure) ([facility])    Disc degeneration, lumbar    Diverticulosis    Hyperlipidemia    Hyperglycemia    Hemorrhoids    Hypertension    Hypothyroidism    NSTEMI (non-ST elevated myocardial infarction) ([facility] [address] )    Old myocardial infarction    Obese    Paroxysmal atrial fibrillation ([facility])    Prostate nodule    S/P PTCA (percutaneous transluminal coronary angioplasty)    Spinal stenosis    Chronic maxillary sinusitis    Renal cyst    Cataract    Osteoarthritis    Thoracic back pain        Past Medical History:   Diagnosis Date    Bleeding hemorrhoids     Choledocholithiasis     Difficulty breathing     Diverticulosis     Ileus ([facility])        Past Surgical History:   Procedure Laterality Date    APPENDECTOMY      CHOLECYSTECTOMY LAPAROSCOPIC      CORONARY ANGIOPLASTY      CORONARY ANGIOPLASTY WITH STENT PLACEMENT      CORONARY ARTERY BYPASS GRAFT      ERCP      TONSILLECTOMY         Family History   Problem Relation Age of Onset    Lung cancer Mother     Coronary artery disease Father     Diabetes Brother     Lung cancer Family        Social History     Tobacco Use    Smoking status: Never Smoker    Smokeless tobacco: Never Used   Substance Use Topics    Alcohol use: Yes     Comment: social    Drug use: No       Objective:    Vitals:    02/25/20 1337   BP: 110/68   BP Location: Left arm   Patient Position: Sitting   Cuff Size: Adult   Pulse: 68   Resp: 16   Temp: (!) 96 7 °F (35 9 °C)   TempSrc: Tympanic   Weight: 118 kg (260 lb)   Height: 6' 1" (1 854 m)        Physical Exam   Constitutional: He is oriented to person, place, and time  He appears well-developed and well-nourished  No distress  HENT:   Head: Normocephalic and atraumatic  Right Ear: Hearing, tympanic membrane, external ear and ear canal normal    Left Ear: Hearing, tympanic membrane, external ear and ear canal normal    Nose: Nose normal  Right sinus exhibits no maxillary sinus tenderness and no frontal sinus tenderness  Left sinus exhibits no maxillary sinus tenderness and no frontal sinus tenderness  Mouth/Throat: Uvula is midline, oropharynx is clear and moist and mucous membranes are normal  No oropharyngeal exudate  Eyes: Pupils are equal, round, and reactive to light  Conjunctivae and EOM are normal  Lids are everted and swept, no foreign bodies found  Neck: Normal range of motion  Neck supple  Carotid bruit is not present  No tracheal deviation present  No thyromegaly present  Cardiovascular: Normal rate, regular rhythm, normal heart sounds and intact distal pulses  Exam reveals no gallop and no friction rub  No murmur heard  Pulses:       Carotid pulses are 2+ on the right side, and 2+ on the left side  Pulmonary/Chest: Effort normal and breath sounds normal  No respiratory distress  He has no wheezes  He has no rales  Abdominal: Soft   Bowel sounds are normal  He exhibits no distension  There is no tenderness  Musculoskeletal:        Right knee: He exhibits no swelling and no effusion  Tenderness found  Medial joint line tenderness noted  Thoracic back: He exhibits decreased range of motion and pain  He exhibits no tenderness  Back:    Lymphadenopathy:     He has no cervical adenopathy  Neurological: He is alert and oriented to person, place, and time  Coordination normal    Skin: Skin is warm and dry  He is not diaphoretic  Psychiatric: He has a normal mood and affect  His behavior is normal  Judgment and thought content normal    Nursing note and vitals reviewed  Preop labs/testing available and reviewed: no               EKG no    Echo no    Stress test/cath yes: [facility] at [facility]  See Cardio notes in [facility] [address], care everywhere  PFT/[place] no    Functional capacity: Climb stairs                        4 Mets   Pick the highest level patient can comfortably perform   4 mets or greater for surgery    RCRI  High Risk surgery? 1 Point  CAD History:         1 Point   MI; Positive Stress Test; CP due to Mi;  Nitrate Usage to control Angina; Pathologic Q wave on EKG  CHF Active:         1 Point   Pulm Edema; Paroxysmal Nocturnal Dyspnea;  Bibasilar Rales (crackles);S3; CHF on CXR  Cerebrovascular Disease (TIA or CVA):     1 Point  DM on Insulin:        1 Point  Serum Creat >2 0 mg/dl:       1 Point          Total Points: 1     Scorin: Class I, Very Low Risk (0 4%)     1: Class II, Low risk (0 9%)     2: Class III Moderate (6 6%)     3: Class IV High (>11%)      MARK Risk:  GFR:        For PCP:  If GFR>60, Hold ACE/ARB/Diuretic on the day of surgery, and NSAIDS 10 days before  If GFR<45, Consider PRE and POST op Nephrology Consult  If 46 <GFR> 59 : Has Patient had MARK in last 6 Months? no   If YES: Preop Nephrology consult   If No:  [address] Nephrology consult             Assessment/Plan:    Patient is medically optimized (cleared) for the planned procedure  Further testing/evaluation is not required  Postop concerns: no    Problem List Items Addressed This Visit     Cataract     Patient appears to be at low risk for the planned cataract surgery, despite is heart disease  Cardiology did visit with him recently, there was no further testing it was necessary  Given the fact that this is a low risk procedure, reasonable to proceed  Relevant Medications    prednisoLONE acetate (PRED FORTE) 1 % ophthalmic suspension    ofloxacin (OCUFLOX) 0 3 % ophthalmic solution    Chronic systolic CHF (congestive heart failure) (HCC)     Wt Readings from Last 3 Encounters:   02/25/20 118 kg (260 lb)   07/13/18 121 kg (266 lb 9 6 oz)   06/04/18 118 kg (261 lb)     Stable at the moment  Follow with Cardiology as scheduled  No changes  Does not seem to disqualify him from surgery  Hyperglycemia     Stable  Noted before  Did not rise to the level of diabetes  Hypertension     Blood pressure today is excellent  No changes in medications  Check in the future  Obese     BMI is slightly elevated  Would recommend limit carbohydrates, and increase exercise as tolerable  Exercise increase is a little bit difficult lately with back pain, as well as knee pain  Consider follow-up for those, then re-evaluate for obesity  Osteoarthritis     Patient does have some significant arthritis in the right knee based on findings today  Start with x-rays  After that, consider injection verses follow-up with Orthopedics  Relevant Medications    traMADol (ULTRAM) 50 mg tablet    Other Relevant Orders    XR knee 3 vw left non injury    XR knee 3 vw right non injury    Paroxysmal atrial fibrillation (HCC)     AFib seems to be doing quite well at this point  Exam today did not reveal any abnormalities  Follow with Cardiology  Continue on Xarelto           Spinal stenosis     In the past, the patient did have spinal stenosis  [facility]surgery [facility]  They did seem to help him quite a bit  If he needs to have further evaluation/intervention, family would prefer that patient go to [facility]  Thoracic back pain     Reviewed with him about chronic pain from this, and verses acute pain  Given that he did have disc issues before, is likely to be present again but in a different level  With the eye surgery that is coming up, would recommend at this time starting on tramadol, 1 every 6 hours as needed for 30 days only  Educated patient that this is a short-term prescription only, not for chronic use  Reviewed about narcotics, as well as addiction with narcotics  Controlled Substance Review    [place] PDMP or [place]  reviewed: No red flags were identified; safe to proceed with prescription                Relevant Medications    traMADol (ULTRAM) 50 mg tablet    Other Relevant Orders    XR spine thoracic 3 vw      Other Visit Diagnoses     Pre-op examination    -  Primary           Diagnoses and all orders for this visit:    Pre-op examination    Age-related cataract of both eyes, unspecified age-related cataract type    Primary osteoarthritis of both knees  -     traMADol (ULTRAM) 50 mg tablet; Take 1 tablet (50 mg total) by mouth every 6 (six) hours as needed for moderate pain  -     XR knee 3 vw left non injury; Future  -     XR knee 3 vw right non injury; Future    Paroxysmal atrial fibrillation (HCC)    Essential hypertension    Hyperglycemia    Chronic systolic CHF (congestive heart failure) ([facility] [address] )    Spinal stenosis of lumbar region, unspecified whether neurogenic claudication present    Class 1 obesity due to excess calories with serious comorbidity and body mass index (BMI) of 34 0 to 34 9 in adult    Acute bilateral thoracic back pain  -     traMADol (ULTRAM) 50 mg tablet;  Take 1 tablet (50 mg total) by mouth every 6 (six) hours as needed for moderate pain  -     XR spine thoracic 3 vw; Future    Other orders  - Discontinue: hydrocortisone (Anusol-HC) 25 mg suppository  -     Discontinue: clopidogrel (PLAVIX) 75 mg tablet  -     acetaminophen (TYLENOL) 500 mg tablet; Take 500 mg by mouth  -     prednisoLONE acetate (PRED FORTE) 1 % ophthalmic suspension  -     ofloxacin (OCUFLOX) 0 3 % ophthalmic solution  -     MAGNESIUM PO; Take by mouth          Pre-Surgery Instructions:   Medication Instructions    hydrocortisone (Anusol-HC) 25 mg suppository per anesthesia guidelines         NOTE: Please use the above to review important meds for your specialty, the remainder "per anesthesia Guidelines "    NOTE: Please place an Inbasket message for "[facility]" pool for complicated patients

## 2020-04-14 DIAGNOSIS — I48.0 PAROXYSMAL ATRIAL FIBRILLATION (HCC): ICD-10-CM

## 2020-05-13 ENCOUNTER — OFFICE VISIT (OUTPATIENT)
Dept: FAMILY MEDICINE CLINIC | Facility: CLINIC | Age: 80
End: 2020-05-13
Payer: COMMERCIAL

## 2020-05-13 VITALS
DIASTOLIC BLOOD PRESSURE: 78 MMHG | HEART RATE: 80 BPM | SYSTOLIC BLOOD PRESSURE: 120 MMHG | HEIGHT: 73 IN | TEMPERATURE: 97.4 F | BODY MASS INDEX: 34.19 KG/M2 | WEIGHT: 258 LBS

## 2020-05-13 DIAGNOSIS — Z23 NEED FOR PNEUMOCOCCAL VACCINE: Primary | ICD-10-CM

## 2020-05-13 DIAGNOSIS — M17.0 PRIMARY OSTEOARTHRITIS OF BOTH KNEES: ICD-10-CM

## 2020-05-13 DIAGNOSIS — I10 ESSENTIAL HYPERTENSION: ICD-10-CM

## 2020-05-13 DIAGNOSIS — I48.0 PAROXYSMAL ATRIAL FIBRILLATION (HCC): ICD-10-CM

## 2020-05-13 DIAGNOSIS — I50.22 CHRONIC SYSTOLIC CHF (CONGESTIVE HEART FAILURE) (HCC): ICD-10-CM

## 2020-05-13 DIAGNOSIS — I25.5 CARDIOMYOPATHY, ISCHEMIC: ICD-10-CM

## 2020-05-13 PROCEDURE — 1036F TOBACCO NON-USER: CPT | Performed by: FAMILY MEDICINE

## 2020-05-13 PROCEDURE — 3074F SYST BP LT 130 MM HG: CPT | Performed by: FAMILY MEDICINE

## 2020-05-13 PROCEDURE — 1160F RVW MEDS BY RX/DR IN RCRD: CPT | Performed by: FAMILY MEDICINE

## 2020-05-13 PROCEDURE — 3078F DIAST BP <80 MM HG: CPT | Performed by: FAMILY MEDICINE

## 2020-05-13 PROCEDURE — 99214 OFFICE O/P EST MOD 30 MIN: CPT | Performed by: FAMILY MEDICINE

## 2020-05-13 PROCEDURE — 3008F BODY MASS INDEX DOCD: CPT | Performed by: FAMILY MEDICINE

## 2020-06-24 ENCOUNTER — OFFICE VISIT (OUTPATIENT)
Dept: FAMILY MEDICINE CLINIC | Facility: CLINIC | Age: 80
End: 2020-06-24
Payer: COMMERCIAL

## 2020-06-24 VITALS
HEART RATE: 76 BPM | DIASTOLIC BLOOD PRESSURE: 60 MMHG | SYSTOLIC BLOOD PRESSURE: 122 MMHG | HEIGHT: 73 IN | TEMPERATURE: 97.5 F | BODY MASS INDEX: 34.59 KG/M2 | WEIGHT: 261 LBS

## 2020-06-24 DIAGNOSIS — M17.0 PRIMARY OSTEOARTHRITIS OF BOTH KNEES: Primary | ICD-10-CM

## 2020-06-24 DIAGNOSIS — I48.0 PAROXYSMAL ATRIAL FIBRILLATION (HCC): ICD-10-CM

## 2020-06-24 DIAGNOSIS — I10 ESSENTIAL HYPERTENSION: ICD-10-CM

## 2020-06-24 PROCEDURE — 99214 OFFICE O/P EST MOD 30 MIN: CPT | Performed by: FAMILY MEDICINE

## 2020-06-24 PROCEDURE — 1160F RVW MEDS BY RX/DR IN RCRD: CPT | Performed by: FAMILY MEDICINE

## 2020-06-24 PROCEDURE — 3074F SYST BP LT 130 MM HG: CPT | Performed by: FAMILY MEDICINE

## 2020-06-24 PROCEDURE — 1036F TOBACCO NON-USER: CPT | Performed by: FAMILY MEDICINE

## 2020-06-24 PROCEDURE — 3078F DIAST BP <80 MM HG: CPT | Performed by: FAMILY MEDICINE

## 2020-06-24 PROCEDURE — 3008F BODY MASS INDEX DOCD: CPT | Performed by: FAMILY MEDICINE

## 2020-09-29 ENCOUNTER — OFFICE VISIT (OUTPATIENT)
Dept: FAMILY MEDICINE CLINIC | Facility: CLINIC | Age: 80
End: 2020-09-29
Payer: COMMERCIAL

## 2020-09-29 VITALS
HEIGHT: 73 IN | WEIGHT: 260 LBS | SYSTOLIC BLOOD PRESSURE: 132 MMHG | DIASTOLIC BLOOD PRESSURE: 82 MMHG | HEART RATE: 88 BPM | TEMPERATURE: 97.5 F | BODY MASS INDEX: 34.46 KG/M2

## 2020-09-29 DIAGNOSIS — I48.0 PAROXYSMAL ATRIAL FIBRILLATION (HCC): ICD-10-CM

## 2020-09-29 DIAGNOSIS — I10 ESSENTIAL HYPERTENSION: ICD-10-CM

## 2020-09-29 DIAGNOSIS — I50.22 CHRONIC SYSTOLIC CHF (CONGESTIVE HEART FAILURE) (HCC): ICD-10-CM

## 2020-09-29 DIAGNOSIS — R59.1 LYMPHADENOPATHY OF HEAD AND NECK: Primary | ICD-10-CM

## 2020-09-29 PROCEDURE — 99214 OFFICE O/P EST MOD 30 MIN: CPT | Performed by: FAMILY MEDICINE

## 2020-09-29 RX ORDER — CEFUROXIME AXETIL 500 MG/1
500 TABLET ORAL EVERY 12 HOURS SCHEDULED
Qty: 20 TABLET | Refills: 0 | Status: SHIPPED | OUTPATIENT
Start: 2020-09-29 | End: 2020-10-09

## 2020-09-29 NOTE — PATIENT INSTRUCTIONS
Problem List Items Addressed This Visit     Atrial fibrillation ([address] )     Stable at the moment  Continue on Xarelto  Continue with rate control  Doing quite well for now  CHF (congestive heart failure) (HCC)     Wt Readings from Last 3 Encounters:   09/29/20 118 kg (260 lb)   06/24/20 118 kg (261 lb)   05/13/20 117 kg (258 lb)       Stable  Follow with Cardiology as scheduled  No change at the moment  Of note, his weight is slightly elevated versus before  Hypertension     Stable the moment  No change  Lymphadenopathy of head and neck - Primary     New finding  Would recommend using Ceftin 500mg 1 BID for 10 days  Relevant Medications    cefuroxime (CEFTIN) 500 mg tablet          COVID 19 Instructions    Patsy Carrizales was advised to limit contact with others to essential tasks such as getting food, medications, and medical care  Proper handwashing reviewed, and Hand sanitzer when washing is not available  If the patient develops symptoms of COVID 19, the patient should call the office as soon as possible  For [id number] Flu season, it is strongly recommended that Flu Vaccinations be obtained  Please try to download [employer] Duo  Once you do download this on your phone, you will be prompted to add your phone number to the account  After that, he should receive a text from [employer], and use that code to verify your phone number  After that, you should be able to use Google Duo to receive and make video calls  Please download Microsoft Teams to your phone or computer  We will be transitioning to this platform for Video Visits  Instructions for downloading this are available from the office

## 2020-09-29 NOTE — PROGRESS NOTES
Assessment and Plan:    Problem List Items Addressed This Visit     Atrial fibrillation ([address] )     Stable at the moment  Continue on Xarelto  Continue with rate control  Doing quite well for now  CHF (congestive heart failure) (HCC)     Wt Readings from Last 3 Encounters:   09/29/20 118 kg (260 lb)   06/24/20 118 kg (261 lb)   05/13/20 117 kg (258 lb)       Stable  Follow with Cardiology as scheduled  No change at the moment  Of note, his weight is slightly elevated versus before  Hypertension     Stable the moment  No change  Lymphadenopathy of head and neck - Primary     New finding  Would recommend using Ceftin 500mg 1 BID for 10 days  Relevant Medications    cefuroxime (CEFTIN) 500 mg tablet                 Diagnoses and all orders for this visit:    Lymphadenopathy of head and neck  -     cefuroxime (CEFTIN) 500 mg tablet; Take 1 tablet (500 mg total) by mouth every 12 (twelve) hours for 10 days    Chronic systolic CHF (congestive heart failure) (HCC)    Paroxysmal atrial fibrillation (HCC)    Essential hypertension              Subjective:      Patient ID: Lokesh Last is a 70 y o  male  CC:    Chief Complaint   Patient presents with    Pain     c/o pain to the touch in left side of head and neck just above his ear  He thinks he feels a lump  He had one episode of dizzines when i asked  Onset 2 weeks  mjs       HPI:    Patient is having pain in the left-sided head  When he touches the area causes discomfort  Pain starts around the temple, and then goes back towards the back of the head, just above the ear, then radiates down into the neck as well  He thought there might be a lump at the base of the occiput  Palpating or rubbing on the area causes discomfort  Has been present for about 2 weeks now  Patient has not tried anything in particular yet, and nothing else seems to have made it better  Denies any rash  CAD: Has been doing well  Denies CP   Last Cardio recently  CHF: Has seen cardio for this  Again, no conerns today  A-Fib: Has been OK  On anticoags  The following portions of the patient's history were reviewed and updated as appropriate: allergies, current medications, past family history, past medical history, past social history, past surgical history and problem list       Review of Systems   Constitutional: Negative  HENT: Negative  Eyes: Negative  Respiratory: Negative  Cardiovascular: Negative  Gastrointestinal: Negative  Endocrine: Negative  Genitourinary: Negative  Musculoskeletal: Negative  Skin: Negative  Allergic/Immunologic: Negative  Neurological: Negative  Hematological: Positive for adenopathy  Psychiatric/Behavioral: Negative  Data to review:       Objective:    Vitals:    09/29/20 1416   BP: 132/82   Pulse: 88   Temp: 97 5 °F (36 4 °C)   Weight: 118 kg (260 lb)   Height: 6' 1" (1 854 m)        Physical Exam  Vitals signs and nursing note reviewed  Constitutional:       Appearance: Normal appearance  Neck:      Vascular: No carotid bruit  Cardiovascular:      Rate and Rhythm: Normal rate and regular rhythm  Pulses: Normal pulses  Carotid pulses are 2+ on the right side and 2+ on the left side  Heart sounds: Normal heart sounds  No murmur  No gallop  Pulmonary:      Effort: Pulmonary effort is normal  No respiratory distress  Breath sounds: Normal breath sounds  No stridor  No wheezing, rhonchi or rales  Chest:      Chest wall: No tenderness  Lymphadenopathy:      Cervical: Cervical adenopathy present  Right cervical: No superficial, deep or posterior cervical adenopathy  Left cervical: Posterior cervical adenopathy present  No superficial or deep cervical adenopathy  Neurological:      Mental Status: He is alert

## 2020-09-29 NOTE — ASSESSMENT & PLAN NOTE
Wt Readings from Last 3 Encounters:   09/29/20 118 kg (260 lb)   06/24/20 118 kg (261 lb)   05/13/20 117 kg (258 lb)       Stable  Follow with Cardiology as scheduled  No change at the moment  Of note, his weight is slightly elevated versus before

## 2020-10-13 ENCOUNTER — OFFICE VISIT (OUTPATIENT)
Dept: FAMILY MEDICINE CLINIC | Facility: CLINIC | Age: 80
End: 2020-10-13
Payer: COMMERCIAL

## 2020-10-13 VITALS
HEIGHT: 73 IN | HEART RATE: 80 BPM | TEMPERATURE: 97.3 F | DIASTOLIC BLOOD PRESSURE: 78 MMHG | WEIGHT: 260 LBS | SYSTOLIC BLOOD PRESSURE: 108 MMHG | BODY MASS INDEX: 34.46 KG/M2

## 2020-10-13 DIAGNOSIS — I50.42 HYPERTENSIVE HEART DISEASE WITH CHRONIC COMBINED SYSTOLIC AND DIASTOLIC CONGESTIVE HEART FAILURE (HCC): ICD-10-CM

## 2020-10-13 DIAGNOSIS — R59.1 LYMPHADENOPATHY OF HEAD AND NECK: Primary | ICD-10-CM

## 2020-10-13 DIAGNOSIS — I11.0 HYPERTENSIVE HEART DISEASE WITH CHRONIC COMBINED SYSTOLIC AND DIASTOLIC CONGESTIVE HEART FAILURE (HCC): ICD-10-CM

## 2020-10-13 PROCEDURE — 1160F RVW MEDS BY RX/DR IN RCRD: CPT | Performed by: FAMILY MEDICINE

## 2020-10-13 PROCEDURE — 3078F DIAST BP <80 MM HG: CPT | Performed by: FAMILY MEDICINE

## 2020-10-13 PROCEDURE — 1036F TOBACCO NON-USER: CPT | Performed by: FAMILY MEDICINE

## 2020-10-13 PROCEDURE — 99213 OFFICE O/P EST LOW 20 MIN: CPT | Performed by: FAMILY MEDICINE

## 2020-12-01 ENCOUNTER — OFFICE VISIT (OUTPATIENT)
Dept: FAMILY MEDICINE CLINIC | Facility: CLINIC | Age: 80
End: 2020-12-01
Payer: COMMERCIAL

## 2020-12-01 VITALS
HEIGHT: 73 IN | TEMPERATURE: 94.6 F | WEIGHT: 263 LBS | HEART RATE: 80 BPM | BODY MASS INDEX: 34.85 KG/M2 | DIASTOLIC BLOOD PRESSURE: 68 MMHG | SYSTOLIC BLOOD PRESSURE: 116 MMHG

## 2020-12-01 DIAGNOSIS — I11.0 HYPERTENSIVE HEART DISEASE WITH CHRONIC COMBINED SYSTOLIC AND DIASTOLIC CONGESTIVE HEART FAILURE (HCC): ICD-10-CM

## 2020-12-01 DIAGNOSIS — S63.501A SPRAIN OF RIGHT WRIST, INITIAL ENCOUNTER: ICD-10-CM

## 2020-12-01 DIAGNOSIS — Z79.01 ANTICOAGULATED BY ANTICOAGULATION TREATMENT: ICD-10-CM

## 2020-12-01 DIAGNOSIS — I48.0 PAROXYSMAL ATRIAL FIBRILLATION (HCC): ICD-10-CM

## 2020-12-01 DIAGNOSIS — M25.531 RIGHT WRIST PAIN: Primary | ICD-10-CM

## 2020-12-01 DIAGNOSIS — I50.42 HYPERTENSIVE HEART DISEASE WITH CHRONIC COMBINED SYSTOLIC AND DIASTOLIC CONGESTIVE HEART FAILURE (HCC): ICD-10-CM

## 2020-12-01 PROCEDURE — 99214 OFFICE O/P EST MOD 30 MIN: CPT | Performed by: FAMILY MEDICINE

## 2020-12-01 RX ORDER — PREDNISONE 20 MG/1
TABLET ORAL
Qty: 20 TABLET | Refills: 0 | Status: SHIPPED | OUTPATIENT
Start: 2020-12-01 | End: 2020-12-11

## 2020-12-15 ENCOUNTER — TELEMEDICINE (OUTPATIENT)
Dept: FAMILY MEDICINE CLINIC | Facility: CLINIC | Age: 80
End: 2020-12-15
Payer: COMMERCIAL

## 2020-12-15 VITALS
DIASTOLIC BLOOD PRESSURE: 72 MMHG | TEMPERATURE: 96.8 F | WEIGHT: 253 LBS | BODY MASS INDEX: 33.38 KG/M2 | SYSTOLIC BLOOD PRESSURE: 105 MMHG

## 2020-12-15 DIAGNOSIS — I11.0 HYPERTENSIVE HEART DISEASE WITH CHRONIC COMBINED SYSTOLIC AND DIASTOLIC CONGESTIVE HEART FAILURE (HCC): ICD-10-CM

## 2020-12-15 DIAGNOSIS — I50.42 CHRONIC COMBINED SYSTOLIC AND DIASTOLIC CONGESTIVE HEART FAILURE (HCC): ICD-10-CM

## 2020-12-15 DIAGNOSIS — I50.42 HYPERTENSIVE HEART DISEASE WITH CHRONIC COMBINED SYSTOLIC AND DIASTOLIC CONGESTIVE HEART FAILURE (HCC): ICD-10-CM

## 2020-12-15 DIAGNOSIS — B34.9 VIRAL INFECTION, UNSPECIFIED: ICD-10-CM

## 2020-12-15 DIAGNOSIS — B34.9 VIRAL INFECTION, UNSPECIFIED: Primary | ICD-10-CM

## 2020-12-15 PROCEDURE — U0003 INFECTIOUS AGENT DETECTION BY NUCLEIC ACID (DNA OR RNA); SEVERE ACUTE RESPIRATORY SYNDROME CORONAVIRUS 2 (SARS-COV-2) (CORONAVIRUS DISEASE [COVID-19]), AMPLIFIED PROBE TECHNIQUE, MAKING USE OF HIGH THROUGHPUT TECHNOLOGIES AS DESCRIBED BY CMS-2020-01-R: HCPCS | Performed by: FAMILY MEDICINE

## 2020-12-15 PROCEDURE — 3074F SYST BP LT 130 MM HG: CPT | Performed by: FAMILY MEDICINE

## 2020-12-15 PROCEDURE — 99214 OFFICE O/P EST MOD 30 MIN: CPT | Performed by: FAMILY MEDICINE

## 2020-12-15 PROCEDURE — 3078F DIAST BP <80 MM HG: CPT | Performed by: FAMILY MEDICINE

## 2020-12-16 ENCOUNTER — TELEMEDICINE (OUTPATIENT)
Dept: FAMILY MEDICINE CLINIC | Facility: CLINIC | Age: 80
End: 2020-12-16
Payer: COMMERCIAL

## 2020-12-16 DIAGNOSIS — I21.4 NSTEMI (NON-ST ELEVATED MYOCARDIAL INFARCTION) (HCC): ICD-10-CM

## 2020-12-16 DIAGNOSIS — I48.0 PAROXYSMAL ATRIAL FIBRILLATION (HCC): ICD-10-CM

## 2020-12-16 DIAGNOSIS — I50.42 CHRONIC COMBINED SYSTOLIC AND DIASTOLIC CONGESTIVE HEART FAILURE (HCC): ICD-10-CM

## 2020-12-16 DIAGNOSIS — I11.0 HYPERTENSIVE HEART DISEASE WITH CHRONIC COMBINED SYSTOLIC AND DIASTOLIC CONGESTIVE HEART FAILURE (HCC): ICD-10-CM

## 2020-12-16 DIAGNOSIS — I50.42 HYPERTENSIVE HEART DISEASE WITH CHRONIC COMBINED SYSTOLIC AND DIASTOLIC CONGESTIVE HEART FAILURE (HCC): ICD-10-CM

## 2020-12-16 DIAGNOSIS — U07.1 COVID-19 VIRUS INFECTION: Primary | ICD-10-CM

## 2020-12-16 LAB — SARS-COV-2 RNA SPEC QL NAA+PROBE: DETECTED

## 2020-12-16 PROCEDURE — 99214 OFFICE O/P EST MOD 30 MIN: CPT | Performed by: FAMILY MEDICINE

## 2020-12-16 RX ORDER — ALBUTEROL SULFATE 90 UG/1
3 AEROSOL, METERED RESPIRATORY (INHALATION) ONCE AS NEEDED
Status: CANCELLED | OUTPATIENT
Start: 2020-12-18

## 2020-12-16 RX ORDER — ACETAMINOPHEN 325 MG/1
650 TABLET ORAL ONCE AS NEEDED
Status: CANCELLED | OUTPATIENT
Start: 2020-12-18

## 2020-12-16 RX ORDER — SODIUM CHLORIDE 9 MG/ML
20 INJECTION, SOLUTION INTRAVENOUS ONCE
Status: CANCELLED | OUTPATIENT
Start: 2020-12-18

## 2020-12-16 NOTE — RESULT ENCOUNTER NOTE
COVID 19 Positive  Patient needs to isolate in home, and should have Virtual visits set up for daily check in till provider deems patient is released  Tylenol for fevers and aches if not allergic  Other meds can be reviewed with the provider

## 2020-12-17 ENCOUNTER — TELEMEDICINE (OUTPATIENT)
Dept: FAMILY MEDICINE CLINIC | Facility: CLINIC | Age: 80
End: 2020-12-17
Payer: COMMERCIAL

## 2020-12-17 DIAGNOSIS — U07.1 COVID-19 VIRUS INFECTION: Primary | ICD-10-CM

## 2020-12-17 PROCEDURE — 99213 OFFICE O/P EST LOW 20 MIN: CPT | Performed by: FAMILY MEDICINE

## 2020-12-18 ENCOUNTER — TELEMEDICINE (OUTPATIENT)
Dept: FAMILY MEDICINE CLINIC | Facility: CLINIC | Age: 80
End: 2020-12-18
Payer: COMMERCIAL

## 2020-12-18 ENCOUNTER — HOSPITAL ENCOUNTER (OUTPATIENT)
Dept: INFUSION CENTER | Facility: HOSPITAL | Age: 80
Discharge: HOME | End: 2020-12-18
Payer: COMMERCIAL

## 2020-12-18 VITALS
SYSTOLIC BLOOD PRESSURE: 115 MMHG | HEART RATE: 70 BPM | OXYGEN SATURATION: 95 % | RESPIRATION RATE: 20 BRPM | DIASTOLIC BLOOD PRESSURE: 71 MMHG | TEMPERATURE: 97.4 F

## 2020-12-18 DIAGNOSIS — U07.1 COVID-19 VIRUS INFECTION: Primary | ICD-10-CM

## 2020-12-18 PROCEDURE — 99213 OFFICE O/P EST LOW 20 MIN: CPT | Performed by: FAMILY MEDICINE

## 2020-12-18 PROCEDURE — 1160F RVW MEDS BY RX/DR IN RCRD: CPT | Performed by: FAMILY MEDICINE

## 2020-12-18 PROCEDURE — 1036F TOBACCO NON-USER: CPT | Performed by: FAMILY MEDICINE

## 2020-12-18 PROCEDURE — M0239 BAMLANIVIMAB-XXXX INFUSION: HCPCS | Performed by: FAMILY MEDICINE

## 2020-12-18 RX ORDER — ACETAMINOPHEN 325 MG/1
650 TABLET ORAL ONCE AS NEEDED
Status: CANCELLED | OUTPATIENT
Start: 2020-12-18

## 2020-12-18 RX ORDER — ALBUTEROL SULFATE 90 UG/1
3 AEROSOL, METERED RESPIRATORY (INHALATION) ONCE AS NEEDED
Status: DISCONTINUED | OUTPATIENT
Start: 2020-12-18 | End: 2020-12-21 | Stop reason: HOSPADM

## 2020-12-18 RX ORDER — ALBUTEROL SULFATE 90 UG/1
3 AEROSOL, METERED RESPIRATORY (INHALATION) ONCE AS NEEDED
Status: CANCELLED | OUTPATIENT
Start: 2020-12-18

## 2020-12-18 RX ORDER — ACETAMINOPHEN 325 MG/1
650 TABLET ORAL ONCE AS NEEDED
Status: DISCONTINUED | OUTPATIENT
Start: 2020-12-18 | End: 2020-12-21 | Stop reason: HOSPADM

## 2020-12-18 RX ORDER — SODIUM CHLORIDE 9 MG/ML
20 INJECTION, SOLUTION INTRAVENOUS ONCE
Status: COMPLETED | OUTPATIENT
Start: 2020-12-18 | End: 2020-12-18

## 2020-12-18 RX ORDER — SODIUM CHLORIDE 9 MG/ML
20 INJECTION, SOLUTION INTRAVENOUS ONCE
Status: CANCELLED | OUTPATIENT
Start: 2020-12-18

## 2020-12-18 RX ADMIN — SODIUM CHLORIDE 20 ML/HR: 0.9 INJECTION, SOLUTION INTRAVENOUS at 11:45

## 2020-12-18 RX ADMIN — SODIUM CHLORIDE 700 MG: 9 INJECTION, SOLUTION INTRAVENOUS at 12:25

## 2020-12-21 ENCOUNTER — TELEMEDICINE (OUTPATIENT)
Dept: FAMILY MEDICINE CLINIC | Facility: CLINIC | Age: 80
End: 2020-12-21
Payer: COMMERCIAL

## 2020-12-21 VITALS — TEMPERATURE: 97.6 F | DIASTOLIC BLOOD PRESSURE: 75 MMHG | SYSTOLIC BLOOD PRESSURE: 115 MMHG

## 2020-12-21 DIAGNOSIS — U07.1 COVID-19 VIRUS INFECTION: Primary | ICD-10-CM

## 2020-12-21 PROCEDURE — 99213 OFFICE O/P EST LOW 20 MIN: CPT | Performed by: FAMILY MEDICINE

## 2020-12-22 ENCOUNTER — TELEMEDICINE (OUTPATIENT)
Dept: FAMILY MEDICINE CLINIC | Facility: CLINIC | Age: 80
End: 2020-12-22
Payer: COMMERCIAL

## 2020-12-22 VITALS — TEMPERATURE: 98.3 F | SYSTOLIC BLOOD PRESSURE: 123 MMHG | DIASTOLIC BLOOD PRESSURE: 64 MMHG

## 2020-12-22 DIAGNOSIS — I48.0 PAROXYSMAL ATRIAL FIBRILLATION (HCC): ICD-10-CM

## 2020-12-22 DIAGNOSIS — I11.0 HYPERTENSIVE HEART DISEASE WITH CHRONIC COMBINED SYSTOLIC AND DIASTOLIC CONGESTIVE HEART FAILURE (HCC): ICD-10-CM

## 2020-12-22 DIAGNOSIS — I50.42 HYPERTENSIVE HEART DISEASE WITH CHRONIC COMBINED SYSTOLIC AND DIASTOLIC CONGESTIVE HEART FAILURE (HCC): ICD-10-CM

## 2020-12-22 DIAGNOSIS — I50.42 CHRONIC COMBINED SYSTOLIC AND DIASTOLIC CONGESTIVE HEART FAILURE (HCC): ICD-10-CM

## 2020-12-22 DIAGNOSIS — U07.1 COVID-19 VIRUS INFECTION: Primary | ICD-10-CM

## 2020-12-22 PROCEDURE — 3074F SYST BP LT 130 MM HG: CPT | Performed by: FAMILY MEDICINE

## 2020-12-22 PROCEDURE — 99214 OFFICE O/P EST MOD 30 MIN: CPT | Performed by: FAMILY MEDICINE

## 2020-12-22 PROCEDURE — 3078F DIAST BP <80 MM HG: CPT | Performed by: FAMILY MEDICINE

## 2020-12-23 ENCOUNTER — TELEMEDICINE (OUTPATIENT)
Dept: FAMILY MEDICINE CLINIC | Facility: CLINIC | Age: 80
End: 2020-12-23
Payer: COMMERCIAL

## 2020-12-23 DIAGNOSIS — U07.1 COVID-19 VIRUS INFECTION: Primary | ICD-10-CM

## 2020-12-23 PROCEDURE — 99212 OFFICE O/P EST SF 10 MIN: CPT | Performed by: FAMILY MEDICINE

## 2020-12-23 PROCEDURE — 1160F RVW MEDS BY RX/DR IN RCRD: CPT | Performed by: FAMILY MEDICINE

## 2020-12-23 PROCEDURE — 1036F TOBACCO NON-USER: CPT | Performed by: FAMILY MEDICINE

## 2021-02-12 DIAGNOSIS — Z23 ENCOUNTER FOR IMMUNIZATION: ICD-10-CM

## 2021-03-10 ENCOUNTER — TELEPHONE (OUTPATIENT)
Dept: FAMILY MEDICINE CLINIC | Facility: CLINIC | Age: 81
End: 2021-03-10

## 2021-03-10 NOTE — TELEPHONE ENCOUNTER
The patient was called and refused to schedule the Covid-19 Vaccine appointment  The Covid-19 Vaccine Health Maintenance Topic was postponed for 6 months

## 2021-12-06 ENCOUNTER — TELEPHONE (OUTPATIENT)
Dept: FAMILY MEDICINE CLINIC | Facility: CLINIC | Age: 81
End: 2021-12-06

## 2021-12-06 DIAGNOSIS — Z20.822 EXPOSURE TO COVID-19 VIRUS: Primary | ICD-10-CM

## 2021-12-06 PROCEDURE — U0003 INFECTIOUS AGENT DETECTION BY NUCLEIC ACID (DNA OR RNA); SEVERE ACUTE RESPIRATORY SYNDROME CORONAVIRUS 2 (SARS-COV-2) (CORONAVIRUS DISEASE [COVID-19]), AMPLIFIED PROBE TECHNIQUE, MAKING USE OF HIGH THROUGHPUT TECHNOLOGIES AS DESCRIBED BY CMS-2020-01-R: HCPCS | Performed by: FAMILY MEDICINE

## 2021-12-06 PROCEDURE — U0005 INFEC AGEN DETEC AMPLI PROBE: HCPCS | Performed by: FAMILY MEDICINE

## 2022-01-06 ENCOUNTER — OFFICE VISIT (OUTPATIENT)
Dept: FAMILY MEDICINE CLINIC | Facility: CLINIC | Age: 82
End: 2022-01-06
Payer: COMMERCIAL

## 2022-01-06 VITALS
HEIGHT: 73 IN | SYSTOLIC BLOOD PRESSURE: 122 MMHG | DIASTOLIC BLOOD PRESSURE: 74 MMHG | HEART RATE: 55 BPM | BODY MASS INDEX: 34.57 KG/M2 | WEIGHT: 260.8 LBS | TEMPERATURE: 97.2 F

## 2022-01-06 DIAGNOSIS — I50.42 CHRONIC COMBINED SYSTOLIC AND DIASTOLIC CONGESTIVE HEART FAILURE (HCC): Primary | ICD-10-CM

## 2022-01-06 DIAGNOSIS — Z86.16 HISTORY OF 2019 NOVEL CORONAVIRUS DISEASE (COVID-19): ICD-10-CM

## 2022-01-06 DIAGNOSIS — E66.09 CLASS 1 OBESITY DUE TO EXCESS CALORIES WITH SERIOUS COMORBIDITY AND BODY MASS INDEX (BMI) OF 34.0 TO 34.9 IN ADULT: ICD-10-CM

## 2022-01-06 DIAGNOSIS — R73.9 HYPERGLYCEMIA: ICD-10-CM

## 2022-01-06 DIAGNOSIS — I25.5 CARDIOMYOPATHY, ISCHEMIC: ICD-10-CM

## 2022-01-06 DIAGNOSIS — N40.2 PROSTATE NODULE: ICD-10-CM

## 2022-01-06 DIAGNOSIS — I48.0 PAROXYSMAL ATRIAL FIBRILLATION (HCC): ICD-10-CM

## 2022-01-06 DIAGNOSIS — M17.0 PRIMARY OSTEOARTHRITIS OF BOTH KNEES: ICD-10-CM

## 2022-01-06 DIAGNOSIS — E78.2 MIXED HYPERLIPIDEMIA: ICD-10-CM

## 2022-01-06 DIAGNOSIS — I50.42 HYPERTENSIVE HEART DISEASE WITH CHRONIC COMBINED SYSTOLIC AND DIASTOLIC CONGESTIVE HEART FAILURE (HCC): ICD-10-CM

## 2022-01-06 DIAGNOSIS — Z12.5 ENCOUNTER FOR SCREENING FOR MALIGNANT NEOPLASM OF PROSTATE: ICD-10-CM

## 2022-01-06 DIAGNOSIS — I11.0 HYPERTENSIVE HEART DISEASE WITH CHRONIC COMBINED SYSTOLIC AND DIASTOLIC CONGESTIVE HEART FAILURE (HCC): ICD-10-CM

## 2022-01-06 DIAGNOSIS — Z71.89 EDUCATED ABOUT COVID-19 VIRUS INFECTION: ICD-10-CM

## 2022-01-06 DIAGNOSIS — E03.9 ACQUIRED HYPOTHYROIDISM: ICD-10-CM

## 2022-01-06 PROBLEM — I25.2 OLD MYOCARDIAL INFARCTION: Status: RESOLVED | Noted: 2017-07-27 | Resolved: 2022-01-06

## 2022-01-06 PROBLEM — U07.1 COVID-19 VIRUS INFECTION: Status: RESOLVED | Noted: 2020-12-15 | Resolved: 2022-01-06

## 2022-01-06 PROCEDURE — 3288F FALL RISK ASSESSMENT DOCD: CPT | Performed by: FAMILY MEDICINE

## 2022-01-06 PROCEDURE — 1160F RVW MEDS BY RX/DR IN RCRD: CPT | Performed by: FAMILY MEDICINE

## 2022-01-06 PROCEDURE — G0439 PPPS, SUBSEQ VISIT: HCPCS | Performed by: FAMILY MEDICINE

## 2022-01-06 PROCEDURE — 1036F TOBACCO NON-USER: CPT | Performed by: FAMILY MEDICINE

## 2022-01-06 PROCEDURE — 1170F FXNL STATUS ASSESSED: CPT | Performed by: FAMILY MEDICINE

## 2022-01-06 PROCEDURE — 1125F AMNT PAIN NOTED PAIN PRSNT: CPT | Performed by: FAMILY MEDICINE

## 2022-01-06 PROCEDURE — 1101F PT FALLS ASSESS-DOCD LE1/YR: CPT | Performed by: FAMILY MEDICINE

## 2022-01-06 PROCEDURE — 3725F SCREEN DEPRESSION PERFORMED: CPT | Performed by: FAMILY MEDICINE

## 2022-01-06 PROCEDURE — 99214 OFFICE O/P EST MOD 30 MIN: CPT | Performed by: FAMILY MEDICINE

## 2022-01-06 RX ORDER — METOPROLOL SUCCINATE 50 MG/1
TABLET, EXTENDED RELEASE ORAL
COMMUNITY
Start: 2021-10-22

## 2022-01-06 RX ORDER — ASCORBIC ACID 500 MG
500 TABLET ORAL DAILY
COMMUNITY

## 2022-01-06 RX ORDER — FUROSEMIDE 40 MG/1
TABLET ORAL 2 TIMES DAILY
COMMUNITY
Start: 2021-03-15

## 2022-01-06 RX ORDER — METOPROLOL SUCCINATE 50 MG/1
50 TABLET, EXTENDED RELEASE ORAL DAILY
COMMUNITY
Start: 2021-04-22 | End: 2022-04-22

## 2022-01-06 RX ORDER — CALCIUM CARBONATE 300MG(750)
TABLET,CHEWABLE ORAL
COMMUNITY

## 2022-01-06 NOTE — PATIENT INSTRUCTIONS
Problem List Items Addressed This Visit     Atrial fibrillation ([address] )     Currently on Xarelto  Also using metoprolol for heart rate  Follow with Cardiology  Relevant Medications    metoprolol succinate (TOPROL-XL) 50 mg 24 hr tablet    metoprolol succinate (TOPROL-XL) 50 mg 24 hr tablet    Cardiomyopathy, ischemic     Patient following with Cardiology  No change at the moment  Relevant Medications    metoprolol succinate (TOPROL-XL) 50 mg 24 hr tablet    metoprolol succinate (TOPROL-XL) 50 mg 24 hr tablet    CHF (congestive heart failure) (HCC) - Primary     Wt Readings from Last 3 Encounters:   01/06/22 118 kg (260 lb 12 8 oz)   12/15/20 115 kg (253 lb)   12/01/20 119 kg (263 lb)       Weight fairly stable  No change  Follow with Cardiology  Continue current medications  Relevant Medications    metoprolol succinate (TOPROL-XL) 50 mg 24 hr tablet    metoprolol succinate (TOPROL-XL) 50 mg 24 hr tablet    Hyperglycemia     In November, the patient did have blood work done at [facility]  This included lipid panel and CMP  Blood sugar was elevated at that point  Recommend limit carbohydrates, recheck blood sugar  Will order testing  He can do that in the near future  Relevant Orders    Basic metabolic panel    Hyperlipidemia     Noted previously  Lipid panel continued to be up  Cardiology recommended he consider a newer agent, such as Praluent  He will be talking with them about this  He previously had problems with statins  Relevant Medications    Alirocumab 75 MG/ML SOAJ    Hypertensive heart disease with congestive heart failure (HCC)     Wt Readings from Last 3 Encounters:   01/06/22 118 kg (260 lb 12 8 oz)   12/15/20 115 kg (253 lb)   12/01/20 119 kg (263 lb)     Blood pressure stable  No change               Relevant Medications    metoprolol succinate (TOPROL-XL) 50 mg 24 hr tablet    metoprolol succinate (TOPROL-XL) 50 mg 24 hr tablet    Other Relevant Orders TSH, 3rd generation    CBC and differential    Basic metabolic panel    Hypothyroidism     Patient is due for TSH  Relevant Medications    metoprolol succinate (TOPROL-XL) 50 mg 24 hr tablet    metoprolol succinate (TOPROL-XL) 50 mg 24 hr tablet    Other Relevant Orders    TSH, 3rd generation    Obese     BMI elevated  Follow with future recheck  Limit carbs, try to increase exercise, given limitations of his current medical conditions  Osteoarthritis     Agree with knee replacement  Is likely that he is having some hamstring tenderness secondary to the arthritis and walking differently  Orthopedics recommended knee replacement  Patient will need cardiac clearance prior to surgery  Prostate nodule     Prostate nodule noted previously on the chart  No PSA recently  Check lab  Relevant Orders    PSA, Total Screen      Other Visit Diagnoses     Encounter for screening for malignant neoplasm of prostate         Relevant Orders    PSA, Total Screen    History of 2019 novel coronavirus disease (COVID-19)        Reviewed benefits and risks for antibody testing  Questions answered  Relevant Orders    SARS CoV 2 SEROLOGY (COVID 19) AB (IGG), [place]- Lab Collect    Educated about COVID-19 virus infection        Recommend COVID vaccine, despite prior history of COVID  COVID 19 Instructions    Bailee Castaneda was advised to limit contact with others to essential tasks such as getting food, medications, and medical care  Proper handwashing reviewed, and Hand sanitzer when washing is not available  If the patient develops symptoms of COVID 19, the patient should call the office as soon as possible  For 0905-1088 Flu season, it is strongly recommended that Flu Vaccinations be obtained  Virtual Visits:  Ayanna: This works on smart phones (any phone with Internet browsing capability)  You should get a text message when the provider is ready to see you    Click on the link in the text message, and the call should start  You will need to type in your name, and allow camera and microphone access  This is HIPPA compliant, and secure  If you have not already done so, get immunized to COVID 19  We are committed to getting you vaccinated as soon as possible and will be closely following CDC and [facility]  guidelines as they are released and revised  Please refer to our COVID-19 vaccine webpage for the most up to date information on the vaccine and our distribution efforts  This site will also have the most up to date recommendations for COVID booster vaccine  Mikhail subramanian    Call [phone] ([phone]), option 7    OUR NEW LOCATION:    [address], [address] [facility]  Fax: [phone]    Lab services and OB/GYN are at this location as well

## 2022-01-06 NOTE — ASSESSMENT & PLAN NOTE
Wt Readings from Last 3 Encounters:   01/06/22 118 kg (260 lb 12 8 oz)   12/15/20 115 kg (253 lb)   12/01/20 119 kg (263 lb)       Weight fairly stable  No change  Follow with Cardiology  Continue current medications

## 2022-01-06 NOTE — PROGRESS NOTES
Assessment and Plan:     Problem List Items Addressed This Visit     None           Preventive health issues were discussed with patient, and age appropriate screening tests were ordered as noted in patient's After Visit Summary  Personalized health advice and appropriate referrals for health education or preventive services given if needed, as noted in patient's After Visit Summary       History of Present Illness:     Patient presents for Medicare Annual Wellness visit    Patient Care Team:  Nata Mc MD as PCP - Yumiko Child MD as PCP - [address] [place] [address] (RTE)  [facility]     Problem List:     Patient Active Problem List   Diagnosis    3-vessel CAD    Acute low back pain without sciatica    Cardiomyopathy, ischemic    Calculus of gallbladder and bile duct with acute on chronic cholecystitis    CHF [facility]congestive [facility] failure) [facility]    Disc degeneration, lumbar    Diverticulosis    Hyperlipidemia    Hyperglycemia    Hemorrhoids    Hypertensive heart disease with congestive heart failure ([facility]    Hypothyroidism    NSTEMI (non-ST elevated myocardial infarction) ([facility]    Old myocardial infarction    Obese    Atrial fibrillation ([facility]    Prostate nodule    S/P PTCA (percutaneous transluminal coronary angioplasty)    Spinal stenosis    Chronic maxillary sinusitis    Renal cyst    Cataract    Osteoarthritis    Thoracic back pain    Anticoagulated by anticoagulation treatment    COVID-19 virus infection      Past Medical and Surgical History:     Past Medical History:   Diagnosis Date    Bleeding hemorrhoids     Choledocholithiasis     Difficulty breathing     Diverticulosis     Ileus ([facility]     Lymphadenopathy of head and neck 9/29/2020     Past Surgical History:   Procedure Laterality Date    APPENDECTOMY      CHOLECYSTECTOMY LAPAROSCOPIC      CORONARY ANGIOPLASTY      CORONARY ANGIOPLASTY WITH STENT PLACEMENT      CORONARY ARTERY BYPASS GRAFT      ERCP      TONSILLECTOMY        Family History:     Family History   Problem Relation Age of Onset    Lung cancer Mother     Coronary artery disease Father     Diabetes Brother     Lung cancer Family       Social History:     Social History     Socioeconomic History    Marital status: /Civil Union     Spouse name: None    Number of children: None    Years of education: None    Highest education level: None   Occupational History    Occupation: retired   Tobacco Use    Smoking status: Never Smoker    Smokeless tobacco: Never Used   Substance and Sexual Activity    Alcohol use: Not Currently     Comment: social    Drug use: No    Sexual activity: None   Other Topics Concern    None   Social History Narrative    None     Social Determinants of Health     Financial Resource Strain: Not on file   Food Insecurity: Not on file   Transportation Needs: Not on file   Physical Activity: Not on file   Stress: Not on file   Social Connections: Not on file   Intimate Partner Violence: Not on file   Housing Stability: Not on file      Medications and Allergies:     Current Outpatient Medications   Medication Sig Dispense Refill    acetaminophen (TYLENOL) 500 mg tablet Take 500 mg by mouth      Alirocumab 75 MG/ML SOAJ Inject 1 mL under the skin every 14 (fourteen) days      ascorbic acid ([employer] Vitamin C-Zora Hips) 500 MG tablet Take 500 mg by mouth daily      aspirin (ECOTRIN LOW STRENGTH) 81 mg EC tablet Take 1 tablet by mouth daily      Cholecalciferol (VITAMIN D) 2000 units CAPS Take by mouth      Coenzyme Q10 (CO Q 10) 100 MG CAPS Take by mouth      furosemide (LASIX) 20 mg tablet Take by mouth      furosemide (LASIX) 40 mg tablet TAKE 1 TABLET BY MOUTH EVERY DAY      isosorbide mononitrate (IMDUR) 60 mg 24 hr tablet Take 1 tablet by mouth daily      losartan (COZAAR) 25 mg tablet Take 0 5 tablets by mouth daily      Magnesium 400 MG TABS Take by mouth      MAGNESIUM PO Take by mouth      metoprolol succinate (TOPROL XL) 200 MG 24 hr tablet Take 150 mg by mouth daily       metoprolol succinate (TOPROL-XL) 50 mg 24 hr tablet       metoprolol succinate (TOPROL-XL) 50 mg 24 hr tablet Take 50 mg by mouth daily      Multiple Vitamins-Minerals (MULTIVITAMIN ADULT PO) Take 1 capsule by mouth      nitroglycerin (NITROSTAT) 0 4 mg SL tablet Place 1 tablet under the tongue every 5 (five) minutes as needed      rivaroxaban (XARELTO) 20 mg tablet Take 1 tablet (20 mg total) by mouth daily with dinner 28 tablet 0    traMADol (ULTRAM) 50 mg tablet Take 1 tablet (50 mg total) by mouth every 6 (six) hours as needed for moderate pain 30 tablet 0     No current facility-administered medications for this visit  Allergies   Allergen Reactions    Atorvastatin Cough     Per pt has had a problem with other statins also, does not remember names    Bee Venom     Lisinopril Other (See Comments)     cough    Perflutren Lipid Microsphere     Perflutren Lipid Microspheres Other (See Comments)     severe back lower back spasm    Ranolazine      Alters mood   Rosuvastatin Myalgia    Simvastatin Myalgia      Immunizations:     Immunization History   Administered Date(s) Administered    Influenza, seasonal, injectable 1942    TD (adult) Preservative Free 01/01/2007      Health Maintenance:         Topic Date Due    Hepatitis C Screening  Never done         Topic Date Due    COVID-19 Vaccine (1) Never done    Pneumococcal Vaccine: 65+ Years (1 of 2 - PPSV23) Never done    DTaP,Tdap,and Td Vaccines (1 - Tdap) 01/02/2007    Influenza Vaccine (1) 09/01/2021      Medicare Health Risk Assessment:     /74 (BP Location: Left arm, Patient Position: Sitting, Cuff Size: Adult)   Pulse 55   Temp (!) 97 2 °F (36 2 °C) (Temporal)   Ht 6' 1" (1 854 m)   Wt 118 kg (260 lb 12 8 oz)   BMI 34 41 kg/m²      Early Crew is here for his Subsequent Wellness visit       Health Risk Assessment: Patient rates overall health as fair  Patient feels that their physical health rating is slightly worse  Patient is satisfied with their life  Eyesight was rated as same  Hearing was rated as same  Patient feels that their emotional and mental health rating is same  Patients states they are sometimes angry  Patient states they are always unusually tired/fatigued  Pain experienced in the last 7 days has been some  Patient's pain rating has been 8/10  Patient states that he has experienced no weight loss or gain in last 6 months  Depression Screening:   PHQ-2 Score: 0      Fall Risk Screening: In the past year, patient has experienced: no history of falling in past year      Home Safety:  Patient has trouble with stairs inside or outside of their home  Patient has working smoke alarms and has working carbon monoxide detector  Nutrition:   Current diet is Diabetic  Medications:   Patient is currently taking over-the-counter supplements  OTC medications include: see medication list  Patient is not able to manage medications  Activities of Daily Living (ADLs)/Instrumental Activities of Daily Living (IADLs):   Walk and transfer into and out of bed and chair?: Yes  Dress and groom yourself?: Yes    Bathe or shower yourself?: Yes    Feed yourself?  Yes  Do your laundry/housekeeping?: Yes  Manage your money, pay your bills and track your expenses?: Yes  Make your own meals?: Yes    Do your own shopping?: Yes    Previous Hospitalizations:   Any hospitalizations or ED visits within the last 12 months?: No      Advance Care Planning:   Living will: No    Durable POA for healthcare: No    Advanced directive: No    Advanced directive counseling given: Yes    Five wishes given: Yes      Cognitive Screening:   Provider or family/friend/caregiver concerned regarding cognition?: No    PREVENTIVE SCREENINGS      Cardiovascular Screening:    General: Screening Not Indicated and History Lipid Disorder      Diabetes Screening:     General: Risks and Benefits Discussed      Colorectal Cancer Screening:     General: Risks and Benefits Discussed      Prostate Cancer Screening:    General: Screening Not Indicated      Osteoporosis Screening:    General: Screening Not Indicated      Abdominal Aortic Aneurysm (AAA) Screening:        General: Screening Not Indicated      Lung Cancer Screening:     General: Screening Not Indicated      Hepatitis C Screening:    General: Screening Not Indicated    Screening, Brief Intervention, and Referral to Treatment (SBIRT)    Screening  Typical number of drinks in a day: 0  Typical number of drinks in a week: 0  Interpretation: Low risk drinking behavior      Single Item Drug Screening:  How often have you used an illegal drug (including marijuana) or a prescription medication for non-medical reasons in the past year? never    Single Item Drug Screen Score: 0  Interpretation: Negative screen for possible drug use disorder      Gregor Aragon MD

## 2022-01-06 NOTE — ASSESSMENT & PLAN NOTE
Wt Readings from Last 3 Encounters:   01/06/22 118 kg (260 lb 12 8 oz)   12/15/20 115 kg (253 lb)   12/01/20 119 kg (263 lb)     Blood pressure stable  No change

## 2022-01-06 NOTE — ASSESSMENT & PLAN NOTE
Noted previously  Lipid panel continued to be up  Cardiology recommended he consider a newer agent, such as Praluent  He will be talking with them about this  He previously had problems with statins

## 2022-01-06 NOTE — PROGRESS NOTES
Assessment and Plan:    Problem List Items Addressed This Visit     Atrial fibrillation ([address] )     Currently on Xarelto  Also using metoprolol for heart rate  Follow with Cardiology  Relevant Medications    metoprolol succinate (TOPROL-XL) 50 mg 24 hr tablet    metoprolol succinate (TOPROL-XL) 50 mg 24 hr tablet    Cardiomyopathy, ischemic     Patient following with Cardiology  No change at the moment  Relevant Medications    metoprolol succinate (TOPROL-XL) 50 mg 24 hr tablet    metoprolol succinate (TOPROL-XL) 50 mg 24 hr tablet    CHF (congestive heart failure) (HCC) - Primary     Wt Readings from Last 3 Encounters:   01/06/22 118 kg (260 lb 12 8 oz)   12/15/20 115 kg (253 lb)   12/01/20 119 kg (263 lb)       Weight fairly stable  No change  Follow with Cardiology  Continue current medications  Relevant Medications    metoprolol succinate (TOPROL-XL) 50 mg 24 hr tablet    metoprolol succinate (TOPROL-XL) 50 mg 24 hr tablet    Hyperglycemia     In November, the patient did have blood work done at [address] [place] [address]  This included lipid panel and CMP  Blood sugar was elevated at that point  Recommend limit carbohydrates, recheck blood sugar  Will order testing  He can do that in the near future  Relevant Orders    Basic metabolic panel    Hyperlipidemia     Noted previously  Lipid panel continued to be up  Cardiology recommended he consider a newer agent, such as Praluent  He will be talking with them about this  He previously had problems with statins  Relevant Medications    Alirocumab 75 MG/ML SOAJ    Hypertensive heart disease with congestive heart failure (HCC)     Wt Readings from Last 3 Encounters:   01/06/22 118 kg (260 lb 12 8 oz)   12/15/20 115 kg (253 lb)   12/01/20 119 kg (263 lb)     Blood pressure stable  No change               Relevant Medications    metoprolol succinate (TOPROL-XL) 50 mg 24 hr tablet    metoprolol succinate (TOPROL-XL) 50 mg 24 hr tablet Other Relevant Orders    TSH, 3rd generation    CBC and differential    Basic metabolic panel    Hypothyroidism     Patient is due for TSH  Relevant Medications    metoprolol succinate (TOPROL-XL) 50 mg 24 hr tablet    metoprolol succinate (TOPROL-XL) 50 mg 24 hr tablet    Other Relevant Orders    TSH, 3rd generation    Obese     BMI elevated  Follow with future recheck  Limit carbs, try to increase exercise, given limitations of his current medical conditions  Osteoarthritis     Agree with knee replacement  Is likely that he is having some hamstring tenderness secondary to the arthritis and walking differently  Orthopedics recommended knee replacement  Patient will need cardiac clearance prior to surgery  Prostate nodule     Prostate nodule noted previously on the chart  No PSA recently  Check lab  Relevant Orders    PSA, Total Screen      Other Visit Diagnoses     Encounter for screening for malignant neoplasm of prostate         Relevant Orders    PSA, Total Screen    History of 2019 novel coronavirus disease (COVID-19)        Reviewed benefits and risks for antibody testing  Questions answered  Relevant Orders    SARS CoV 2 SEROLOGY (COVID 19) AB (IGG), [place]- Lab Collect    Educated about COVID-19 virus infection        Recommend COVID vaccine, despite prior history of COVID  Diagnoses and all orders for this visit:    Chronic combined systolic and diastolic congestive heart failure (HCC)    Paroxysmal atrial fibrillation (HCC)    Cardiomyopathy, ischemic    Mixed hyperlipidemia    Hyperglycemia  -     Basic metabolic panel; Future  -     Basic metabolic panel    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)  -     TSH, 3rd generation; Future  -     CBC and differential; Future  -     Basic metabolic panel;  Future  -     TSH, 3rd generation  -     CBC and differential  -     Basic metabolic panel    Acquired hypothyroidism  -     TSH, 3rd generation; Future  -     TSH, 3rd generation    Class 1 obesity due to excess calories with serious comorbidity and body mass index (BMI) of 34 0 to 34 9 in adult    Primary osteoarthritis of both knees    Prostate nodule  -     PSA, Total Screen; Future  -     PSA, Total Screen    Encounter for screening for malignant neoplasm of prostate   -     PSA, Total Screen; Future  -     PSA, Total Screen    History of 2019 novel coronavirus disease (COVID-19)  Comments:  Reviewed benefits and risks for antibody testing  Questions answered  Orders:  -     SARS CoV 2 SEROLOGY (COVID 19) AB (IGG), [place]- Lab Collect; Future  -     SARS CoV 2 SEROLOGY (COVID 19) AB (IGG), IA- Lab Collect    Educated about COVID-19 virus infection  Comments:  Recommend COVID vaccine, despite prior history of COVID  Other orders  -     metoprolol succinate (TOPROL-XL) 50 mg 24 hr tablet  -     metoprolol succinate (TOPROL-XL) 50 mg 24 hr tablet; Take 50 mg by mouth daily  -     furosemide (LASIX) 40 mg tablet; TAKE 1 TABLET BY MOUTH EVERY DAY  -     ascorbic acid ([employer] Vitamin C-Zora Hips) 500 MG tablet; Take 500 mg by mouth daily  -     Magnesium 400 MG TABS; Take by mouth  -     Alirocumab 75 MG/ML SOAJ; Inject 1 mL under the skin every 14 (fourteen) days              Subjective:      Patient ID: Zena Ambrose is a 80 y o  male  CC:    Chief Complaint   Patient presents with    Follow-up     Pt here for follow up   [facility] Wellness Visit     Pt here for AWV       HPI:    Patient is here to follow-up on multiple issues  Has not been to the office in a while  CHF:  Patient is following with Cardiology  His next visit is due in April  CAD: Again: Seeing Cardio  DJD:  Patient's have significant amount of arthritis in the knees  He reports that Orthopedics told him that he should have knee replacements  So far, the patient has had viscosupplementation    The viscosupplementation has not really made any improvement in his symptoms  Has pain posterior in the right leg  Has been lately for this  Hypertension: Seeing Cardio  Has been OK  Cardiomyopathy: Seeing Cardio as well  The following portions of the patient's history were reviewed and updated as appropriate: allergies, current medications, past family history, past medical history, past social history, past surgical history and problem list       Review of Systems   Constitutional: Negative for chills and fever  HENT: Negative for ear pain and sore throat  Eyes: Negative for pain and visual disturbance  Respiratory: Negative for cough and shortness of breath  Cardiovascular: Negative for chest pain and palpitations  Gastrointestinal: Negative for abdominal pain and vomiting  Genitourinary: Negative for dysuria and hematuria  Musculoskeletal: Positive for arthralgias and myalgias  Negative for back pain  Skin: Negative for color change and rash  Neurological: Negative for seizures and syncope  All other systems reviewed and are negative  Data to review:       Objective:    Vitals:    01/06/22 1527   BP: 122/74   BP Location: Left arm   Patient Position: Sitting   Cuff Size: Adult   Pulse: 55   Temp: (!) 97 2 °F (36 2 °C)   TempSrc: Temporal   Weight: 118 kg (260 lb 12 8 oz)   Height: 6' 1" (1 854 m)        Physical Exam  Vitals and nursing note reviewed  Constitutional:       Appearance: Normal appearance  Neck:      Vascular: No carotid bruit  Cardiovascular:      Rate and Rhythm: Normal rate and regular rhythm  Pulses: Normal pulses  Carotid pulses are 2+ on the right side and 2+ on the left side  Heart sounds: Normal heart sounds  No murmur heard  No gallop  Pulmonary:      Effort: Pulmonary effort is normal  No respiratory distress  Breath sounds: Normal breath sounds  No stridor  No wheezing, rhonchi or rales     Chest:      Chest wall: No tenderness  Neurological:      Mental Status: He is alert  BMI Counseling: Body mass index is 34 41 kg/m²  The BMI is above normal  Nutrition recommendations include decreasing portion sizes, encouraging healthy choices of fruits and vegetables, decreasing fast food intake, consuming healthier snacks, limiting drinks that contain sugar, moderation in carbohydrate intake, increasing intake of lean protein, reducing intake of saturated and trans fat and reducing intake of cholesterol  Exercise recommendations include exercising 3-5 times per week  No pharmacotherapy was ordered  Rationale for BMI follow-up plan is due to patient being overweight or obese  Depression Screening and Follow-up Plan: Patient was screened for depression during today's encounter  They screened negative with a PHQ-2 score of 0

## 2022-01-06 NOTE — ASSESSMENT & PLAN NOTE
In November, the patient did have blood work done at [address] [place] [address]  This included lipid panel and CMP  Blood sugar was elevated at that point  Recommend limit carbohydrates, recheck blood sugar  Will order testing  He can do that in the near future

## 2022-01-06 NOTE — ASSESSMENT & PLAN NOTE
Agree with knee replacement  Is likely that he is having some hamstring tenderness secondary to the arthritis and walking differently  Orthopedics recommended knee replacement  Patient will need cardiac clearance prior to surgery

## 2022-01-06 NOTE — ASSESSMENT & PLAN NOTE
BMI elevated  Follow with future recheck  Limit carbs, try to increase exercise, given limitations of his current medical conditions

## 2022-01-07 ENCOUNTER — TELEPHONE (OUTPATIENT)
Dept: FAMILY MEDICINE CLINIC | Facility: CLINIC | Age: 82
End: 2022-01-07

## 2022-01-07 NOTE — TELEPHONE ENCOUNTER
Pt spouse called advising pt is having nose bleeds, sxs started this morning  Ad per Vance Perez pt has bled for half an hour stop for ten minutes and restarted again  Pt is on Xarelto twice a day  Pt refuses to go to the ER or UC  Please advise

## 2022-01-07 NOTE — TELEPHONE ENCOUNTER
Unfortunately, life-threatening nosebleeds is a potential side effect to Xarelto  Because of this, the only option is the emergency room

## 2022-04-22 ENCOUNTER — OFFICE VISIT (OUTPATIENT)
Dept: FAMILY MEDICINE CLINIC | Facility: CLINIC | Age: 82
End: 2022-04-22
Payer: COMMERCIAL

## 2022-04-22 VITALS
SYSTOLIC BLOOD PRESSURE: 90 MMHG | BODY MASS INDEX: 33.8 KG/M2 | HEIGHT: 73 IN | RESPIRATION RATE: 16 BRPM | WEIGHT: 255 LBS | HEART RATE: 88 BPM | DIASTOLIC BLOOD PRESSURE: 58 MMHG | OXYGEN SATURATION: 98 %

## 2022-04-22 DIAGNOSIS — I20.8 STABLE ANGINA (HCC): ICD-10-CM

## 2022-04-22 DIAGNOSIS — I50.42 HYPERTENSIVE HEART DISEASE WITH CHRONIC COMBINED SYSTOLIC AND DIASTOLIC CONGESTIVE HEART FAILURE (HCC): ICD-10-CM

## 2022-04-22 DIAGNOSIS — K62.5 RECTAL BLEEDING: Primary | ICD-10-CM

## 2022-04-22 DIAGNOSIS — I25.5 CARDIOMYOPATHY, ISCHEMIC: ICD-10-CM

## 2022-04-22 DIAGNOSIS — I11.0 HYPERTENSIVE HEART DISEASE WITH CHRONIC COMBINED SYSTOLIC AND DIASTOLIC CONGESTIVE HEART FAILURE (HCC): ICD-10-CM

## 2022-04-22 DIAGNOSIS — I48.0 PAROXYSMAL ATRIAL FIBRILLATION (HCC): ICD-10-CM

## 2022-04-22 PROBLEM — I20.89 STABLE ANGINA: Status: ACTIVE | Noted: 2022-04-22

## 2022-04-22 LAB — SL AMB POCT HGB: 14.7

## 2022-04-22 PROCEDURE — 1036F TOBACCO NON-USER: CPT | Performed by: FAMILY MEDICINE

## 2022-04-22 PROCEDURE — 3725F SCREEN DEPRESSION PERFORMED: CPT | Performed by: FAMILY MEDICINE

## 2022-04-22 PROCEDURE — 99214 OFFICE O/P EST MOD 30 MIN: CPT | Performed by: FAMILY MEDICINE

## 2022-04-22 PROCEDURE — 1160F RVW MEDS BY RX/DR IN RCRD: CPT | Performed by: FAMILY MEDICINE

## 2022-04-22 PROCEDURE — 85018 HEMOGLOBIN: CPT | Performed by: FAMILY MEDICINE

## 2022-04-22 NOTE — PROGRESS NOTES
Assessment and Plan:    Problem List Items Addressed This Visit     Atrial fibrillation [facility])     Patient does have atrial fibrillation  As such, he is on anticoagulation with Xarelto  This will increase the chances of rectal bleeding issues  He is currently having some bleeding  Should continue on Xarelto for now  Relevant Orders    Ambulatory Referral to Colorectal Surgery    Cardiomyopathy, ischemic     Stable  Occasional chest pains  Follow with Cardiology  Hypertensive heart disease with congestive heart failure (HCC)     Wt Readings from Last 3 Encounters:   04/22/22 116 kg (255 lb)   01/06/22 118 kg (260 lb 12 8 oz)   12/15/20 115 kg (253 lb)       Patient's blood pressure on initial check today was reasonable, but then he did have orthostasis  Quite concerned that means that he is bleeding quite significantly  Will enter referral to Colorectal surgery stat  Rectal bleeding - Primary     Patient does have rectal bleeding  Fingerstick hemoglobin was quite good, but he did till  This suggest that he is going to have more problems in the very near future  Recommend stat consult with colorectal surgery  If he develops mica problems with orthostasis at home, would recommend that he call the ambulance and go directly to the hospital          Relevant Orders    POCT hemoglobin fingerstick (Completed)    Ambulatory Referral to Colorectal Surgery    Stable angina ([facility] [address] )     Occasional issues  Overall OK  Diagnoses and all orders for this visit:    Rectal bleeding  -     POCT hemoglobin fingerstick  -     Ambulatory Referral to Colorectal Surgery; Future    Paroxysmal atrial fibrillation ([address] )  -     Ambulatory Referral to Colorectal Surgery;  Future    Cardiomyopathy, ischemic    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)    Stable angina (HCC)            Subjective:      Patient ID: Nanda Lozano is a 80 y o  male  CC:    Chief Complaint   Patient presents with    Rectal Bleeding     pt here with c/o of rectal bleeding since tuesday, pt states there is alot of blood in the toilet when he has a bowel movement  Rcruz       HPI:    Please see CC  Has had rectal bleeding  Noted first on Tuesday  Red blood  No black stools  Has had in past with hard BM  No lightheadedness  The following portions of the patient's history were reviewed and updated as appropriate: allergies, current medications, past family history, past medical history, past social history, past surgical history and problem list       Review of Systems   Constitutional: Negative  Negative for fatigue  HENT: Negative  Respiratory: Negative  Cardiovascular: Negative  Gastrointestinal: Positive for anal bleeding and blood in stool  Negative for abdominal distention, abdominal pain, constipation, diarrhea, nausea, rectal pain and vomiting  Neurological: Negative for dizziness and light-headedness  Data to review:       Objective:    Vitals:    04/22/22 0802 04/22/22 0825 04/22/22 0826 04/22/22 0827   BP: 116/68 106/66 98/60 90/58   BP Location: Left arm      Patient Position: Sitting Supine Sitting Standing   Cuff Size: Large      Pulse: 60 76 82 88   Resp: 16      SpO2:  97% 97% 98%   Weight: 116 kg (255 lb)      Height: 6' 1" (1 854 m)           Physical Exam  Vitals and nursing note reviewed  Constitutional:       Appearance: Normal appearance  Neck:      Vascular: No carotid bruit  Cardiovascular:      Rate and Rhythm: Normal rate and regular rhythm  Pulses: Normal pulses  Carotid pulses are 2+ on the right side and 2+ on the left side  Heart sounds: Normal heart sounds  No murmur heard  No gallop  Pulmonary:      Effort: Pulmonary effort is normal  No respiratory distress  Breath sounds: Normal breath sounds  No stridor  No wheezing, rhonchi or rales     Chest:      Chest wall: No tenderness  Neurological:      Mental Status: He is alert

## 2022-04-22 NOTE — ASSESSMENT & PLAN NOTE
Patient does have rectal bleeding  Fingerstick hemoglobin was quite good, but he did till  This suggest that he is going to have more problems in the very near future  Recommend stat consult with colorectal surgery    If he develops mica problems with orthostasis at home, would recommend that he call the ambulance and go directly to the hospital

## 2022-04-22 NOTE — PATIENT INSTRUCTIONS
Problem List Items Addressed This Visit     Atrial fibrillation [facility])     Patient does have atrial fibrillation  As such, he is on anticoagulation with Xarelto  This will increase the chances of rectal bleeding issues  He is currently having some bleeding  Should continue on Xarelto for now  Relevant Orders    Ambulatory Referral to Colorectal Surgery    Cardiomyopathy, ischemic     Stable  Occasional chest pains  Follow with Cardiology  Hypertensive heart disease with congestive heart failure (HCC)     Wt Readings from Last 3 Encounters:   04/22/22 116 kg (255 lb)   01/06/22 118 kg (260 lb 12 8 oz)   12/15/20 115 kg (253 lb)       Patient's blood pressure on initial check today was reasonable, but then he did have orthostasis  Quite concerned that means that he is bleeding quite significantly  Will enter referral to Colorectal surgery stat  Rectal bleeding - Primary     Patient does have rectal bleeding  Fingerstick hemoglobin was quite good, but he did till  This suggest that he is going to have more problems in the very near future  Recommend stat consult with colorectal surgery  If he develops mica problems with orthostasis at home, would recommend that he call the ambulance and go directly to the hospital          Relevant Orders    POCT hemoglobin fingerstick (Completed)    Ambulatory Referral to Colorectal Surgery    Stable angina ([address] )     Occasional issues  Overall OK  COVID 19 Instructions    Annalee Gann was advised to limit contact with others to essential tasks such as getting food, medications, and medical care  Proper handwashing reviewed, and Hand sanitzer when washing is not available  If the patient develops symptoms of COVID 19, the patient should call the office as soon as possible  For 0019-1692 Flu season, it is strongly recommended that Flu Vaccinations be obtained        Virtual Visits:  Ayanna: This works on smart phones (any phone with Internet browsing capability)  You should get a text message when the provider is ready to see you  Click on the link in the text message, and the call should start  You will need to type in your name, and allow camera and microphone access  This is HIPPA compliant, and secure  If you have not already done so, get immunized to COVID 19  We are committed to getting you vaccinated as soon as possible and will be closely following CDC and [facility]  guidelines as they are released and revised  Please refer to our COVID-19 vaccine webpage for the most up to date information on the vaccine and our distribution efforts  This site will also have the most up to date recommendations for COVID booster vaccine  [email or web address] tn    Call [phone] ([phone]), option 7    OUR NEW LOCATION:    [address] [place], [place], [address]  Fax: [phone]    Lab services and OB/GYN are at this location as well

## 2022-04-22 NOTE — ASSESSMENT & PLAN NOTE
Patient does have atrial fibrillation  As such, he is on anticoagulation with Xarelto  This will increase the chances of rectal bleeding issues  He is currently having some bleeding  Should continue on Xarelto for now

## 2022-04-22 NOTE — ASSESSMENT & PLAN NOTE
Wt Readings from Last 3 Encounters:   04/22/22 116 kg (255 lb)   01/06/22 118 kg (260 lb 12 8 oz)   12/15/20 115 kg (253 lb)       Patient's blood pressure on initial check today was reasonable, but then he did have orthostasis  Quite concerned that means that he is bleeding quite significantly  Will enter referral to Colorectal surgery stat

## 2022-05-09 ENCOUNTER — OFFICE VISIT (OUTPATIENT)
Dept: FAMILY MEDICINE CLINIC | Facility: CLINIC | Age: 82
End: 2022-05-09
Payer: COMMERCIAL

## 2022-05-09 VITALS
TEMPERATURE: 97.6 F | HEIGHT: 73 IN | DIASTOLIC BLOOD PRESSURE: 62 MMHG | SYSTOLIC BLOOD PRESSURE: 100 MMHG | WEIGHT: 257.4 LBS | HEART RATE: 62 BPM | RESPIRATION RATE: 16 BRPM | BODY MASS INDEX: 34.11 KG/M2

## 2022-05-09 DIAGNOSIS — K64.9 HEMORRHOIDS, UNSPECIFIED HEMORRHOID TYPE: Primary | ICD-10-CM

## 2022-05-09 DIAGNOSIS — K63.5 POLYP OF COLON, UNSPECIFIED PART OF COLON, UNSPECIFIED TYPE: ICD-10-CM

## 2022-05-09 DIAGNOSIS — K62.5 RECTAL BLEEDING: ICD-10-CM

## 2022-05-09 DIAGNOSIS — K57.90 DIVERTICULOSIS: ICD-10-CM

## 2022-05-09 DIAGNOSIS — M17.0 PRIMARY OSTEOARTHRITIS OF BOTH KNEES: ICD-10-CM

## 2022-05-09 PROBLEM — I20.8 STABLE ANGINA (HCC): Status: ACTIVE | Noted: 2021-03-15

## 2022-05-09 PROBLEM — I20.89 STABLE ANGINA: Status: ACTIVE | Noted: 2021-03-15

## 2022-05-09 PROCEDURE — 3725F SCREEN DEPRESSION PERFORMED: CPT | Performed by: FAMILY MEDICINE

## 2022-05-09 PROCEDURE — 99214 OFFICE O/P EST MOD 30 MIN: CPT | Performed by: FAMILY MEDICINE

## 2022-05-09 NOTE — ASSESSMENT & PLAN NOTE
Patient reports that he still has significant pain bilaterally in the knees  After a while, he also develops back pain  Would recommend follow-up with orthopedics  He has seen them previously  Will likely need knee replacements

## 2022-05-09 NOTE — ASSESSMENT & PLAN NOTE
Patient reports he was told to take fiber, as well as MiraLax  This is kept the stools soft, which then decreases the pressure in hemorrhoids and decreases the chances of irritation bleeding    If he continues to have blood loss, consider follow-up with colorectal

## 2022-05-09 NOTE — PATIENT INSTRUCTIONS
Problem List Items Addressed This Visit     Colon polyp     Patient did have a polyp on the colonoscopy that he had for rectal bleeding  Pathology is still pending  Follow-up per Colorectal          Diverticulosis     Noted before  Try to limit residues such as seeds, popcorn         Hemorrhoids - Primary     Patient reports he was told to take fiber, as well as MiraLax  This is kept the stools soft, which then decreases the pressure in hemorrhoids and decreases the chances of irritation bleeding  If he continues to have blood loss, consider follow-up with colorectal          Relevant Orders    CBC and differential    Osteoarthritis     Patient reports that he still has significant pain bilaterally in the knees  After a while, he also develops back pain  Would recommend follow-up with orthopedics  He has seen them previously  Will likely need knee replacements  Rectal bleeding     Patient did have colonoscopy at [facility], with colorectal   Was felt to have hemorrhoids as the cause  Incidentally, also found polyp and diverticulosis  Diverticulosis was known from before  Feeling quite good overall  Will see if hemoglobin is stable  If it is not stable, consider follow-up with colorectal again for more definitive treatment for hemorrhoids  Otherwise, continue treatment as per Colorectal          Relevant Orders    CBC and differential          COVID 19 Instructions    John Brown was advised to limit contact with others to essential tasks such as getting food, medications, and medical care  Proper handwashing reviewed, and Hand sanitzer when washing is not available  If the patient develops symptoms of COVID 19, the patient should call the office as soon as possible  For 7701-5969 Flu season, it is strongly recommended that Flu Vaccinations be obtained  Virtual Visits:  Ayanna: This works on smart phones (any phone with Internet browsing capability)    You should get a text message when the provider is ready to see you  Click on the link in the text message, and the call should start  You will need to type in your name, and allow camera and microphone access  This is HIPPA compliant, and secure  If you have not already done so, get immunized to COVID 19  We are committed to getting you vaccinated as soon as possible and will be closely following CDC and [facility]  guidelines as they are released and revised  Please refer to our COVID-19 vaccine webpage for the most up to date information on the vaccine and our distribution efforts  This site will also have the most up to date recommendations for COVID booster vaccine  Mikhail subramanian    Call [phone] ([phone]), option 7    OUR NEW LOCATION:    [address], [address] [place], [place] [address], [place], [address]  Fax: [phone]    Lab services and OB/GYN are at this location as well

## 2022-05-09 NOTE — ASSESSMENT & PLAN NOTE
Patient did have colonoscopy at [facility], with colorectal   Was felt to have hemorrhoids as the cause  Incidentally, also found polyp and diverticulosis  Diverticulosis was known from before  Feeling quite good overall  Will see if hemoglobin is stable  If it is not stable, consider follow-up with colorectal again for more definitive treatment for hemorrhoids    Otherwise, continue treatment as per Colorectal

## 2022-05-09 NOTE — PROGRESS NOTES
Assessment and Plan:    Problem List Items Addressed This Visit     Colon polyp     Patient did have a polyp on the colonoscopy that he had for rectal bleeding  Pathology is still pending  Follow-up per Colorectal          Diverticulosis     Noted before  Try to limit residues such as seeds, popcorn         Hemorrhoids - Primary     Patient reports he was told to take fiber, as well as MiraLax  This is kept the stools soft, which then decreases the pressure in hemorrhoids and decreases the chances of irritation bleeding  If he continues to have blood loss, consider follow-up with colorectal          Relevant Orders    CBC and differential    Osteoarthritis     Patient reports that he still has significant pain bilaterally in the knees  After a while, he also develops back pain  Would recommend follow-up with orthopedics  He has seen them previously  Will likely need knee replacements  Rectal bleeding     Patient did have colonoscopy at [facility], with colorectal   Was felt to have hemorrhoids as the cause  Incidentally, also found polyp and diverticulosis  Diverticulosis was known from before  Feeling quite good overall  Will see if hemoglobin is stable  If it is not stable, consider follow-up with colorectal again for more definitive treatment for hemorrhoids  Otherwise, continue treatment as per Colorectal          Relevant Orders    CBC and differential                 Diagnoses and all orders for this visit:    Hemorrhoids, unspecified hemorrhoid type  -     CBC and differential; Future    Rectal bleeding  -     CBC and differential; Future    Diverticulosis    Polyp of colon, unspecified part of colon, unspecified type    Primary osteoarthritis of both knees            Subjective:      Patient ID: Yosef Tsang is a 80 y o  male      CC:    Chief Complaint   Patient presents with    Follow-up     Patient in office for 2 weeks follow up       HPI:    Patient did go to Colorectal surgery for rectal bleeding  They found colon polyp on colonoscopy, as well as hemorrhoids  Was likely the hemorrhoids that were causing problems  After the colonsocopy, colorectal recommended fiber, miralax  The following portions of the patient's history were reviewed and updated as appropriate: allergies, current medications, past family history, past medical history, past social history, past surgical history and problem list       Review of Systems   Constitutional: Negative for chills and fever  HENT: Negative for ear pain and sore throat  Eyes: Negative for pain and visual disturbance  Respiratory: Negative for cough and shortness of breath  Cardiovascular: Negative for chest pain and palpitations  Gastrointestinal: Negative for abdominal pain and vomiting  Genitourinary: Negative for dysuria and hematuria  Musculoskeletal: Positive for back pain  Negative for arthralgias  Knee pain   Skin: Negative for color change and rash  Neurological: Negative for seizures and syncope  All other systems reviewed and are negative          Data to review:       Objective:    Vitals:    05/09/22 0956   BP: 100/62   BP Location: Right arm   Patient Position: Sitting   Cuff Size: Adult   Pulse: 62   Resp: 16   Temp: 97 6 °F (36 4 °C)   TempSrc: Temporal   Weight: 117 kg (257 lb 6 4 oz)   Height: 6' 1" (1 854 m)        Physical Exam

## 2022-05-09 NOTE — ASSESSMENT & PLAN NOTE
Patient did have a polyp on the colonoscopy that he had for rectal bleeding  Pathology is still pending    Follow-up per Colorectal

## 2022-05-17 LAB
BASOPHILS # BLD AUTO: 61 CELLS/UL (ref 0–200)
BASOPHILS NFR BLD AUTO: 1 %
EOSINOPHIL # BLD AUTO: 311 CELLS/UL (ref 15–500)
EOSINOPHIL NFR BLD AUTO: 5.1 %
ERYTHROCYTE [DISTWIDTH] IN BLOOD BY AUTOMATED COUNT: 13.5 % (ref 11–15)
HCT VFR BLD AUTO: 41 % (ref 38.5–50)
HGB BLD-MCNC: 14.4 G/DL (ref 13.2–17.1)
LYMPHOCYTES # BLD AUTO: 1757 CELLS/UL (ref 850–3900)
LYMPHOCYTES NFR BLD AUTO: 28.8 %
MCH RBC QN AUTO: 29.3 PG (ref 27–33)
MCHC RBC AUTO-ENTMCNC: 35.1 G/DL (ref 32–36)
MCV RBC AUTO: 83.5 FL (ref 80–100)
MONOCYTES # BLD AUTO: 787 CELLS/UL (ref 200–950)
MONOCYTES NFR BLD AUTO: 12.9 %
NEUTROPHILS # BLD AUTO: 3184 CELLS/UL (ref 1500–7800)
NEUTROPHILS NFR BLD AUTO: 52.2 %
PLATELET # BLD AUTO: 200 THOUSAND/UL (ref 140–400)
PMV BLD REES-ECKER: 9.7 FL (ref 7.5–12.5)
RBC # BLD AUTO: 4.91 MILLION/UL (ref 4.2–5.8)
WBC # BLD AUTO: 6.1 THOUSAND/UL (ref 3.8–10.8)

## 2022-05-23 ENCOUNTER — TELEPHONE (OUTPATIENT)
Dept: FAMILY MEDICINE CLINIC | Facility: CLINIC | Age: 82
End: 2022-05-23

## 2022-05-23 ENCOUNTER — TELEMEDICINE (OUTPATIENT)
Dept: FAMILY MEDICINE CLINIC | Facility: CLINIC | Age: 82
End: 2022-05-23
Payer: COMMERCIAL

## 2022-05-23 DIAGNOSIS — U07.1 COVID-19: Primary | ICD-10-CM

## 2022-05-23 DIAGNOSIS — I25.5 CARDIOMYOPATHY, ISCHEMIC: ICD-10-CM

## 2022-05-23 DIAGNOSIS — I50.42 CHRONIC COMBINED SYSTOLIC AND DIASTOLIC CONGESTIVE HEART FAILURE (HCC): ICD-10-CM

## 2022-05-23 DIAGNOSIS — I25.10 3-VESSEL CAD: ICD-10-CM

## 2022-05-23 LAB — SARS-COV-2 AG UPPER RESP QL IA: ABNORMAL

## 2022-05-23 PROCEDURE — 99214 OFFICE O/P EST MOD 30 MIN: CPT | Performed by: NURSE PRACTITIONER

## 2022-05-23 PROCEDURE — 1160F RVW MEDS BY RX/DR IN RCRD: CPT | Performed by: NURSE PRACTITIONER

## 2022-05-23 PROCEDURE — 1036F TOBACCO NON-USER: CPT | Performed by: NURSE PRACTITIONER

## 2022-05-23 RX ORDER — ONDANSETRON 2 MG/ML
4 INJECTION INTRAMUSCULAR; INTRAVENOUS ONCE AS NEEDED
Status: CANCELLED | OUTPATIENT
Start: 2022-05-23

## 2022-05-23 RX ORDER — BEBTELOVIMAB 87.5 MG/ML
175 INJECTION, SOLUTION INTRAVENOUS ONCE
Status: CANCELLED | OUTPATIENT
Start: 2022-05-23

## 2022-05-23 RX ORDER — ACETAMINOPHEN 325 MG/1
650 TABLET ORAL ONCE AS NEEDED
Status: CANCELLED | OUTPATIENT
Start: 2022-05-23

## 2022-05-23 RX ORDER — SODIUM CHLORIDE 9 MG/ML
20 INJECTION, SOLUTION INTRAVENOUS ONCE
Status: CANCELLED | OUTPATIENT
Start: 2022-05-23

## 2022-05-23 RX ORDER — ALBUTEROL SULFATE 90 UG/1
3 AEROSOL, METERED RESPIRATORY (INHALATION) ONCE AS NEEDED
Status: CANCELLED | OUTPATIENT
Start: 2022-05-23

## 2022-05-23 NOTE — TELEPHONE ENCOUNTER
Patient's wife states their daughter was positive for COVID and living with them  Patient's daughter tested him and he is positive  Patient's daughter was given paxlovid by her provider and she gave patient one day worth  Patient's wife is asking for the office to call her back to see if he can have the medication

## 2022-05-23 NOTE — ASSESSMENT & PLAN NOTE
Patient consents to [facility] treatment  Unable to get paxlovid due to xarelto interaction  Will need 24 hours follow up     Start vitamin d and C

## 2022-05-23 NOTE — PROGRESS NOTES
COVID-19 Outpatient Progress Note    Assessment/Plan:    Problem List Items Addressed This Visit        Cardiovascular and Mediastinum    3-vessel CAD     Higher risk for covid complications            Cardiomyopathy, ischemic    CHF (congestive heart failure) ([facility] [address] )       Other    COVID-19 - Primary     Patient consents to [address] treatment  Unable to get paxlovid due to xarelto interaction  Will need 24 hours follow up  Start vitamin d and C  Disposition:     Patient has COVID-19 infection  Based off CDC guidelines, they were recommended to isolate for 5 days from the date of the positive test  If they remain asymptomatic, isolation may be ended followed by 5 days of wearing a mask when around othes to minimize risk of infecting others  If they have a fever, continue to stay home until fever resolves for at least 24 hours  Discussed symptom directed medication options with patient  Discussed vitamin D, vitamin C, and/or zinc supplementation with patient  Patient meets criteria for Bebtelovimab infusion  They were counseled in regards to risks, benefits, and side effects of this infusion  Heather Marshall is an investigational medicine used to treat mild-to-moderate symptoms of COVID-19 in adults and children (15years of age and older weighing at least 80 pounds (40 kg)) with positive results of direct SARS-CoV-2 viral testing, and who are at high risk of progression to severe COVID-19, including hospitalization or death, and for whom other COVID-19 treatment options approved or authorized by FDA are not available or clinically appropriate  Bebtelovimab is investigational because it is still being studied  There is limited information about the safety and effectiveness of using bebtelovimab to treat people with mild-to-moderate COVID-19  The FDA has authorized the emergency use of bebtelovimab for the treatment of COVID-19 under an Emergency Use Authorization (EUA)       Heather Marshall is not authorized for use in people who:  - are likely to be infected with a SARS-CoV-2 variant that is not able to be treated by bebtelovimab based on the circulating variants in your area (ask your health care provider about FDA and CDCs latest information on circulating variants by geographic area), or  - are hospitalized due to COVID-19, or  - require oxygen therapy and/or respiratory support due to COVID-19, or  - require an increase in baseline oxygen flow rate and/or respiratory support due to [address] and are on chronic oxygen therapy and/or respiratory support due to underlying nonCOVID-19 related comorbidity  How will I receive Bebtelovimab? Rema Candido will be given as an injection through a vein (intravenously or IV) over at least 30 seconds  You will be observed by your healthcare provider for at least 1 hour after you receive bebtelovimab  Possible side effects of Bebtelovimab: Allergic reactions can happen during and after infusion with bebtelovimab  Possible reactions include: fever, chills, nausea, headache, shortness of breath, low or high blood pressure, rapid or slow heart rate, chest discomfort or pain, weakness, confusion, feeling tired, wheezing, swelling of your lips, face, or throat, rash including hives, itching, muscle aches, dizziness, and sweating  These reactions may be severe or life threatening  Worsening symptoms after treatment: You may experience new or worsening symptoms after infusion, including fever, difficulty breathing, rapid or slow heart rate, tiredness, weakness or confusion  If these occur, contact your healthcare provider or seek immediate medical attention as some of these events have required hospitalization  It is unknown if these events are related to treatment or are due to the progression of COVID19      The side effects of getting any medicine by vein may include brief pain, bleeding, bruising of the skin, soreness, swelling, and possible infection at the infusion site  These are not all the possible side effects of bebtelovimab  Not a lot of people have been given bebtelovimab  Serious and unexpected side effects may happen  Bebtelovimab are still being studied so it is possible that all of the risks are not known at this time  It is possible that bebtelovimab could interfere with your body's own ability to fight off a future infection of SARS-CoV-2  Similarly, bebtelovimab may reduce your bodys immune response to a vaccine for SARS-CoV-2  Specific studies have not been conducted to address these possible risks  Talk to your healthcare provider if you have any questions  Emergency Use Authorization:    The [facility] FDA has made bebtelovimab available under an emergency access mechanism called an EUA  The EUA is supported by a  of Health and Human Service ([facility]) declaration that circumstances exist to justify the emergency use of drugs and biological products during the COVID-19 pandemic  Bebtelovimab have not undergone the same type of review as an FDA-approved or cleared product  The FDA may issue an EUA when certain criteria are met, which includes that there are no adequate, approved, and available alternatives  In addition, the FDA decision is based on the totality of scientific evidence available showing that it is reasonable to believe that the product meets certain criteria for safety, performance, and labeling and may be effective in treatment of patients during the COVID-19 pandemic  All of these criteria must be met to allow for the product to be used in the treatment of patients during the COVID-19 pandemic  The EUA for bebtelovimab together is in effect for the duration of the COVID-19 declaration justifying emergency use of these products, unless terminated or revoked (after which the product may no longer be used)  What if I am pregnant or breastfeeding?     There is no experience treating pregnant women or breastfeeding mothers with bebtelovimab  For a mother and unborn baby, the benefit of receiving bebtelovimab may be greater than the risk from the treatment  If you are pregnant or breastfeeding, discuss your options and specific situation with your healthcare provider  How do I report side effects with Bebtelovimab? Contact your healthcare provider if you have any side effects that bother you or do not go away  Report side effects to FDA MedWatch at www fda gov/medwatch, or call 1-956-TOA-6278 or to [address]  as shown below  Email: [email or web address]   Fax number: [phone]   Telephone number: [phone] ([phone])    Full fact sheet document for patients can be found at: Simeon estrada    The patient consents to proceed with bebtelovimab infusion  I have spent 20 minutes directly with the patient  Greater than 50% of this time was spent in counseling/coordination of care regarding: risks and benefits of treatment options, instructions for management, patient and family education, risk factor reductions and impressions  Encounter provider MP Samayoa    Provider located at [address] [address] [phone]    Recent Visits  No visits were found meeting these conditions  Showing recent visits within past 7 days and meeting all other requirements  Today's Visits  Date Type Provider Dept   05/23/22 Telemedicine MP Draper Pg [facility]   05/23/22 Telephone Hal , [address] [facility]   Showing today's visits and meeting all other requirements  Future Appointments  No visits were found meeting these conditions  Showing future appointments within next 150 days and meeting all other requirements     This virtual check-in was done via [employer] and patient was informed that this is a secure, HIPAA-compliant platform   He agrees to proceed  Patient agrees to participate in a virtual check in via telephone or video visit instead of presenting to the office to address urgent/immediate medical needs  Patient is aware this is a billable service  After connecting through [facility], the patient was identified by name and date of birth  Yosef Tsang was informed that this was a telemedicine visit and that the exam was being conducted confidentially over secure lines  My office door was closed  No one else was in the room  Yosef Tsang acknowledged consent and understanding of privacy and security of the telemedicine visit  I informed the patient that I have reviewed his record in Epic and presented the opportunity for him to ask any questions regarding the visit today  The patient agreed to participate  Verification of patient location:  Patient is located in the following state in which I hold an active license: [place]    Subjective:   Yosef Tsang is a 80 y o  male who has been screened for COVID-19  Symptom change since last report: unchanged  Patient's symptoms include fatigue, malaise, sore throat, cough and abdominal pain (with coughing )  Patient denies fever, chills, congestion, rhinorrhea, anosmia, loss of taste, shortness of breath, chest tightness, nausea, vomiting, diarrhea, myalgias and headaches  - Date of symptom onset: 5/20/2022  - Date of exposure: 5/17/2022  - Date of positive COVID-19 test: 5/23/2022  Type of test: Home antigen  Home antigen result verified by provider  Will get picture of test uploaded/scanned into patient's chart  COVID-19 vaccination status: Not vaccinated    Daughter lives with him she tested positive on home test on 5/17  He is not taking anything over the counter but taking vitamin c and vitamin d   Had covid in 2020       Lab Results   Component Value Date    SARSCOV2 Negative 12/06/2021    SARSCOV2 Detected (A) 12/15/2020    [address] [facility] Not Detected 04/17/2021    [facility] Positive (Patient Reported) (A) 05/23/2022     Past Medical History:   Diagnosis Date    Bleeding hemorrhoids     Choledocholithiasis     Difficulty breathing     Diverticulosis     Ileus (HCC)     Lymphadenopathy of head and neck 9/29/2020     Past Surgical History:   Procedure Laterality Date    APPENDECTOMY      CHOLECYSTECTOMY LAPAROSCOPIC      CORONARY ANGIOPLASTY      CORONARY ANGIOPLASTY WITH STENT PLACEMENT      CORONARY ARTERY BYPASS GRAFT      ERCP      TONSILLECTOMY       Current Outpatient Medications   Medication Sig Dispense Refill    acetaminophen (TYLENOL) 500 mg tablet Take 500 mg by mouth      ascorbic acid ([employer] Vitamin C-Zora Hips) 500 MG tablet Take 500 mg by mouth daily      aspirin (ECOTRIN LOW STRENGTH) 81 mg EC tablet Take 1 tablet by mouth daily      Cholecalciferol (VITAMIN D) 2000 units CAPS Take by mouth      Coenzyme Q10 (CO Q 10) 100 MG CAPS Take by mouth      furosemide (LASIX) 20 mg tablet Take by mouth      furosemide (LASIX) 40 mg tablet TAKE 1 TABLET BY MOUTH EVERY DAY      isosorbide mononitrate (IMDUR) 60 mg 24 hr tablet Take 1 tablet by mouth daily      losartan (COZAAR) 25 mg tablet Take 0 5 tablets by mouth daily      Magnesium 400 MG TABS Take by mouth      MAGNESIUM PO Take by mouth      metoprolol succinate (TOPROL XL) 200 MG 24 hr tablet Take 150 mg by mouth daily       metoprolol succinate (TOPROL-XL) 50 mg 24 hr tablet       metoprolol succinate (TOPROL-XL) 50 mg 24 hr tablet Take 50 mg by mouth daily      Multiple Vitamins-Minerals (MULTIVITAMIN ADULT PO) Take 1 capsule by mouth      nitroglycerin (NITROSTAT) 0 4 mg SL tablet Place 1 tablet under the tongue every 5 (five) minutes as needed      rivaroxaban (XARELTO) 20 mg tablet Take 1 tablet (20 mg total) by mouth daily with dinner 28 tablet 0     No current facility-administered medications for this visit       Allergies   Allergen Reactions    Atorvastatin Cough     Per pt has had a problem with other statins also, does not remember names    Bee Venom     Iodinated Diagnostic Agents Other (See Comments)    Lisinopril Other (See Comments)     cough    Perflutren Lipid Microsphere     Perflutren Lipid Microspheres Other (See Comments)     severe back lower back spasm    Ranolazine      Alters mood   Rosuvastatin Myalgia    Simvastatin Myalgia       Review of Systems   Constitutional: Positive for fatigue  Negative for chills and fever  HENT: Positive for sore throat and voice change  Negative for congestion and rhinorrhea  Respiratory: Positive for cough  Negative for chest tightness and shortness of breath  Gastrointestinal: Positive for abdominal pain (with coughing )  Negative for diarrhea, nausea and vomiting  Musculoskeletal: Negative for myalgias  Neurological: Negative for headaches  Objective: There were no vitals filed for this visit  Physical Exam    VIRTUAL VISIT DISCLAIMER    Micaela Richey verbally agrees to participate in [employer]  Pt is aware that [employer] could be limited without vital signs or the ability to perform a full hands-on physical Genie Squires understands he or the provider may request at any time to terminate the video visit and request the patient to seek care or treatment in person

## 2022-05-23 NOTE — TELEPHONE ENCOUNTER
----- Message from [address] [place] [address] sent at 5/22/2022  8:40 AM EDT -----  Regarding: Covid positive test results   Here is a picture of my covid positive test result taken on Sunday 5/22/22

## 2022-05-24 ENCOUNTER — HOSPITAL ENCOUNTER (OUTPATIENT)
Dept: INFUSION CENTER | Facility: HOSPITAL | Age: 82
Discharge: HOME | End: 2022-05-24
Payer: COMMERCIAL

## 2022-05-24 VITALS
SYSTOLIC BLOOD PRESSURE: 123 MMHG | OXYGEN SATURATION: 94 % | DIASTOLIC BLOOD PRESSURE: 73 MMHG | TEMPERATURE: 97 F | RESPIRATION RATE: 18 BRPM | HEART RATE: 74 BPM

## 2022-05-24 DIAGNOSIS — U07.1 COVID-19: ICD-10-CM

## 2022-05-24 DIAGNOSIS — I25.10 3-VESSEL CAD: ICD-10-CM

## 2022-05-24 DIAGNOSIS — I25.5 CARDIOMYOPATHY, ISCHEMIC: Primary | ICD-10-CM

## 2022-05-24 DIAGNOSIS — I50.42 CHRONIC COMBINED SYSTOLIC AND DIASTOLIC CONGESTIVE HEART FAILURE (HCC): ICD-10-CM

## 2022-05-24 PROCEDURE — M0222 HB BEBTELOVIMAB INJECTION: HCPCS | Performed by: FAMILY MEDICINE

## 2022-05-24 RX ORDER — SODIUM CHLORIDE 9 MG/ML
20 INJECTION, SOLUTION INTRAVENOUS ONCE
Status: DISCONTINUED | OUTPATIENT
Start: 2022-05-24 | End: 2022-05-27 | Stop reason: HOSPADM

## 2022-05-24 RX ORDER — ONDANSETRON 2 MG/ML
4 INJECTION INTRAMUSCULAR; INTRAVENOUS ONCE AS NEEDED
Status: CANCELLED | OUTPATIENT
Start: 2022-05-24

## 2022-05-24 RX ORDER — BEBTELOVIMAB 87.5 MG/ML
175 INJECTION, SOLUTION INTRAVENOUS ONCE
Status: COMPLETED | OUTPATIENT
Start: 2022-05-24 | End: 2022-05-24

## 2022-05-24 RX ORDER — BEBTELOVIMAB 87.5 MG/ML
175 INJECTION, SOLUTION INTRAVENOUS ONCE
Status: CANCELLED | OUTPATIENT
Start: 2022-05-24

## 2022-05-24 RX ORDER — SODIUM CHLORIDE 9 MG/ML
20 INJECTION, SOLUTION INTRAVENOUS ONCE
Status: CANCELLED | OUTPATIENT
Start: 2022-05-24

## 2022-05-24 RX ORDER — ALBUTEROL SULFATE 90 UG/1
3 AEROSOL, METERED RESPIRATORY (INHALATION) ONCE AS NEEDED
Status: DISCONTINUED | OUTPATIENT
Start: 2022-05-24 | End: 2022-05-27 | Stop reason: HOSPADM

## 2022-05-24 RX ORDER — ACETAMINOPHEN 325 MG/1
650 TABLET ORAL ONCE AS NEEDED
Status: DISCONTINUED | OUTPATIENT
Start: 2022-05-24 | End: 2022-05-27 | Stop reason: HOSPADM

## 2022-05-24 RX ORDER — ACETAMINOPHEN 325 MG/1
650 TABLET ORAL ONCE AS NEEDED
Status: CANCELLED | OUTPATIENT
Start: 2022-05-24

## 2022-05-24 RX ORDER — ALBUTEROL SULFATE 90 UG/1
3 AEROSOL, METERED RESPIRATORY (INHALATION) ONCE AS NEEDED
Status: CANCELLED | OUTPATIENT
Start: 2022-05-24

## 2022-05-24 RX ORDER — ONDANSETRON 2 MG/ML
4 INJECTION INTRAMUSCULAR; INTRAVENOUS ONCE AS NEEDED
Status: DISCONTINUED | OUTPATIENT
Start: 2022-05-24 | End: 2022-05-27 | Stop reason: HOSPADM

## 2022-05-24 RX ADMIN — BEBTELOVIMAB 175 MG: 87.5 INJECTION, SOLUTION INTRAVENOUS at 10:13

## 2022-05-24 NOTE — NURSING NOTE
Pt tolerated treatment with no adverse reactions  Aware that they will be observed for 1 hour  Pt resting comfortably  Will continue to monitor

## 2022-05-24 NOTE — NURSING NOTE
Pt here for Bebtelovimab  Pt given EUA and reviewed  Pt gives verbal consent to proceed with treatment  IV started with no issue  Vital signs stable  Pt resting comfortably and has no further questions or concerns  Pt in view of RN at all times

## 2022-07-12 ENCOUNTER — OFFICE VISIT (OUTPATIENT)
Dept: FAMILY MEDICINE CLINIC | Facility: CLINIC | Age: 82
End: 2022-07-12
Payer: COMMERCIAL

## 2022-07-12 VITALS
BODY MASS INDEX: 34.77 KG/M2 | TEMPERATURE: 96.7 F | WEIGHT: 262.38 LBS | HEART RATE: 81 BPM | DIASTOLIC BLOOD PRESSURE: 70 MMHG | SYSTOLIC BLOOD PRESSURE: 142 MMHG | HEIGHT: 73 IN

## 2022-07-12 DIAGNOSIS — I25.5 CARDIOMYOPATHY, ISCHEMIC: ICD-10-CM

## 2022-07-12 DIAGNOSIS — I50.42 HYPERTENSIVE HEART DISEASE WITH CHRONIC COMBINED SYSTOLIC AND DIASTOLIC CONGESTIVE HEART FAILURE (HCC): ICD-10-CM

## 2022-07-12 DIAGNOSIS — I11.0 HYPERTENSIVE HEART DISEASE WITH CHRONIC COMBINED SYSTOLIC AND DIASTOLIC CONGESTIVE HEART FAILURE (HCC): ICD-10-CM

## 2022-07-12 DIAGNOSIS — I48.0 PAROXYSMAL ATRIAL FIBRILLATION (HCC): ICD-10-CM

## 2022-07-12 DIAGNOSIS — M17.0 PRIMARY OSTEOARTHRITIS OF BOTH KNEES: Primary | ICD-10-CM

## 2022-07-12 DIAGNOSIS — G47.00 INSOMNIA, UNSPECIFIED TYPE: ICD-10-CM

## 2022-07-12 DIAGNOSIS — I25.10 3-VESSEL CAD: ICD-10-CM

## 2022-07-12 DIAGNOSIS — I50.42 CHRONIC COMBINED SYSTOLIC AND DIASTOLIC CONGESTIVE HEART FAILURE (HCC): ICD-10-CM

## 2022-07-12 PROCEDURE — 3725F SCREEN DEPRESSION PERFORMED: CPT | Performed by: FAMILY MEDICINE

## 2022-07-12 PROCEDURE — 99214 OFFICE O/P EST MOD 30 MIN: CPT | Performed by: FAMILY MEDICINE

## 2022-07-12 PROCEDURE — 1160F RVW MEDS BY RX/DR IN RCRD: CPT | Performed by: FAMILY MEDICINE

## 2022-07-12 NOTE — PATIENT INSTRUCTIONS
Problem List Items Addressed This Visit       3-vessel CAD     Stable at the moment  No current symptoms  Follow with Cardiology  Atrial fibrillation ([facility])     Stable  Patient on Xarelto  He did wonder about Watchman  I would recommend that he speak with his cardiologist about that, more specifically an electrophysiologist            Cardiomyopathy, ischemic     Stable at the moment  Follow with Cardiology  Has pacemaker in place  CHF (congestive heart failure) ([facility])     Wt Readings from Last 3 Encounters:   07/12/22 119 kg (262 lb 6 oz)   05/09/22 117 kg (257 lb 6 4 oz)   04/22/22 116 kg (255 lb)     Stable  Patient's weight is up from his last office visit  Question that is because of heart failure or because of actual weight gain  Hypertensive heart disease with congestive heart failure ([facility])     Wt Readings from Last 3 Encounters:   07/12/22 119 kg (262 lb 6 oz)   05/09/22 117 kg (257 lb 6 4 oz)   04/22/22 116 kg (255 lb)     Patient's blood pressure is minimally elevated at 140  For now, no changes  Re-evaluate in the future  He does have some discomfort, which may be increasing the blood pressure a bit  Osteoarthritis - Primary     Significant bilateral knee pain  Would recommend follow-up with orthopedics  Referrals entered  Has had injections in the past, but not really significant improvement  Again, at this point he would like to be able to have more range of mobility, and better endurance  Recommend orthopedic follow-up  Relevant Orders    Ambulatory Referral to Orthopedic Surgery    Ambulatory Referral to Orthopedic Surgery          Other Visit Diagnoses       Insomnia, unspecified type        Only occasionally  Would prefer to not use sedative hypnotic secondary to increased risk of falls  Consider Benadryl at HS, but again the concern with falls              COVID 19 Instructions    Raccoon Seed was advised to limit contact with others to essential tasks such as getting food, medications, and medical care  Proper handwashing reviewed, and Hand sanitzer when washing is not available  If the patient develops symptoms of COVID 19, the patient should call the office as soon as possible  For 7939-1372 Flu season, it is strongly recommended that Flu Vaccinations be obtained  Virtual Visits:  Ayanna: This works on smart phones (any phone with Internet browsing capability)  You should get a text message when the provider is ready to see you  Click on the link in the text message, and the call should start  You will need to type in your name, and allow camera and microphone access  This is HIPPA compliant, and secure  If you have not already done so, get immunized to COVID 19  We are committed to getting you vaccinated as soon as possible and will be closely following CDC and [facility]  guidelines as they are released and revised  Please refer to our COVID-19 vaccine webpage for the most up to date information on the vaccine and our distribution efforts  This site will also have the most up to date recommendations for COVID booster vaccine  Mikhail subramanian    Call [phone] ([phone]), option [phone]    OUR NEW LOCATION:    [address], [address] [place], [place] [address] , [place], [address]  Fax: [phone]    Lab services and OB/GYN are at this location as well

## 2022-07-12 NOTE — ASSESSMENT & PLAN NOTE
Stable  Patient on Xarelto  He did wonder about Watchman    I would recommend that he speak with his cardiologist about that, more specifically an electrophysiologist

## 2022-07-12 NOTE — PROGRESS NOTES
Assessment and Plan:    Problem List Items Addressed This Visit     3-vessel CAD     Stable at the moment  No current symptoms  Follow with Cardiology  Atrial fibrillation (HCC)     Stable  Patient on Xarelto  He did wonder about Watchman  I would recommend that he speak with his cardiologist about that, more specifically an electrophysiologist            Cardiomyopathy, ischemic     Stable at the moment  Follow with Cardiology  Has pacemaker in place  CHF (congestive heart failure) ([facility])     Wt Readings from Last 3 Encounters:   07/12/22 119 kg (262 lb 6 oz)   05/09/22 117 kg (257 lb 6 4 oz)   04/22/22 116 kg (255 lb)       Stable  Patient's weight is up from his last office visit  Question that is because of heart failure or because of actual weight gain  Hypertensive heart disease with congestive heart failure ([facility])     Wt Readings from Last 3 Encounters:   07/12/22 119 kg (262 lb 6 oz)   05/09/22 117 kg (257 lb 6 4 oz)   04/22/22 116 kg (255 lb)       Patient's blood pressure is minimally elevated at 140  For now, no changes  Re-evaluate in the future  He does have some discomfort, which may be increasing the blood pressure a bit  Osteoarthritis - Primary     Significant bilateral knee pain  Would recommend follow-up with orthopedics  Referrals entered  Has had injections in the past, but not really significant improvement  Again, at this point he would like to be able to have more range of mobility, and better endurance  Recommend orthopedic follow-up  Relevant Orders    Ambulatory Referral to Orthopedic Surgery    Ambulatory Referral to Orthopedic Surgery      Other Visit Diagnoses     Insomnia, unspecified type        Only occasionally  Would prefer to not use sedative hypnotic secondary to increased risk of falls  Consider Benadryl at HS, but again the concern with falls                   Diagnoses and all orders for this visit:    Primary osteoarthritis of both knees  -     Ambulatory Referral to Orthopedic Surgery; Future  -     Ambulatory Referral to Orthopedic Surgery; Future    3-vessel CAD    Chronic combined systolic and diastolic congestive heart failure (HCC)    Cardiomyopathy, ischemic    Paroxysmal atrial fibrillation ([address] )    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)    Insomnia, unspecified type  Comments: Only occasionally  Would prefer to not use sedative hypnotic secondary to increased risk of falls  Consider Benadryl at HS, but again the concern with falls  Subjective:      Patient ID: Tomer Larson is a 80 y o  male  CC:    No chief complaint on file  HPI:    Patient is here today because he has bilateral knee pain  Has been quite severe lately  At this point, he is ready to get new knees  He would like to follow-up with orthopedics for knee replacement  Reviewed about heart disease, i e  Patient would need follow-up with Cardiology for knee replacement  Blood pressure:  Denies any current issues  Reviewed blood pressure  Atrial fibrillation:  Follows with Cardiology  Denies any current issues  CHF:  Stable  Patient again following with Cardiology  Cardiomyopathy:  Patient does have a pacer defibrillator in place  Again, follows with Cardiology for this  The following portions of the patient's history were reviewed and updated as appropriate: allergies, current medications, past family history, past medical history, past social history, past surgical history and problem list       Review of Systems   Constitutional: Negative  HENT: Negative  Eyes: Negative  Respiratory: Negative  Musculoskeletal: Positive for arthralgias and joint swelling  Hematological: Negative  Psychiatric/Behavioral: Negative            Data to review:       Objective:    Vitals:    07/12/22 1509   BP: 142/70   BP Location: Left arm Patient Position: Sitting   Cuff Size: Large   Pulse: 81   Temp: (!) 96 7 °F (35 9 °C)   TempSrc: Temporal   Weight: 119 kg (262 lb 6 oz)   Height: 6' 1" (1 854 m)        Physical Exam  Vitals and nursing note reviewed  Constitutional:       Appearance: Normal appearance  Neck:      Vascular: No carotid bruit  Cardiovascular:      Rate and Rhythm: Normal rate and regular rhythm  Pulses: Normal pulses  Carotid pulses are 2+ on the right side and 2+ on the left side  Heart sounds: Normal heart sounds  No murmur heard  No gallop  Pulmonary:      Effort: Pulmonary effort is normal  No respiratory distress  Breath sounds: Normal breath sounds  No stridor  No wheezing, rhonchi or rales  Chest:      Chest wall: No tenderness  Neurological:      Mental Status: He is alert

## 2022-07-12 NOTE — ASSESSMENT & PLAN NOTE
Wt Readings from Last 3 Encounters:   07/12/22 119 kg (262 lb 6 oz)   05/09/22 117 kg (257 lb 6 4 oz)   04/22/22 116 kg (255 lb)       Patient's blood pressure is minimally elevated at 140  For now, no changes  Re-evaluate in the future  He does have some discomfort, which may be increasing the blood pressure a bit

## 2022-07-12 NOTE — ASSESSMENT & PLAN NOTE
Wt Readings from Last 3 Encounters:   07/12/22 119 kg (262 lb 6 oz)   05/09/22 117 kg (257 lb 6 4 oz)   04/22/22 116 kg (255 lb)       Stable  Patient's weight is up from his last office visit  Question that is because of heart failure or because of actual weight gain

## 2022-07-12 NOTE — ASSESSMENT & PLAN NOTE
Significant bilateral knee pain  Would recommend follow-up with orthopedics  Referrals entered  Has had injections in the past, but not really significant improvement  Again, at this point he would like to be able to have more range of mobility, and better endurance  Recommend orthopedic follow-up

## 2022-08-08 ENCOUNTER — OFFICE VISIT (OUTPATIENT)
Dept: FAMILY MEDICINE CLINIC | Facility: CLINIC | Age: 82
End: 2022-08-08
Payer: COMMERCIAL

## 2022-08-08 VITALS
HEIGHT: 73 IN | BODY MASS INDEX: 34.46 KG/M2 | SYSTOLIC BLOOD PRESSURE: 96 MMHG | WEIGHT: 260 LBS | DIASTOLIC BLOOD PRESSURE: 50 MMHG | HEART RATE: 60 BPM

## 2022-08-08 DIAGNOSIS — I11.0 HYPERTENSIVE HEART DISEASE WITH CHRONIC COMBINED SYSTOLIC AND DIASTOLIC CONGESTIVE HEART FAILURE (HCC): ICD-10-CM

## 2022-08-08 DIAGNOSIS — Z01.818 PRE-OP EXAM: Primary | ICD-10-CM

## 2022-08-08 DIAGNOSIS — E78.2 MIXED HYPERLIPIDEMIA: ICD-10-CM

## 2022-08-08 DIAGNOSIS — I50.42 CHRONIC COMBINED SYSTOLIC AND DIASTOLIC CONGESTIVE HEART FAILURE (HCC): ICD-10-CM

## 2022-08-08 DIAGNOSIS — I48.0 PAROXYSMAL ATRIAL FIBRILLATION (HCC): ICD-10-CM

## 2022-08-08 DIAGNOSIS — I50.42 HYPERTENSIVE HEART DISEASE WITH CHRONIC COMBINED SYSTOLIC AND DIASTOLIC CONGESTIVE HEART FAILURE (HCC): ICD-10-CM

## 2022-08-08 DIAGNOSIS — E03.9 ACQUIRED HYPOTHYROIDISM: ICD-10-CM

## 2022-08-08 PROCEDURE — 99214 OFFICE O/P EST MOD 30 MIN: CPT | Performed by: NURSE PRACTITIONER

## 2022-08-08 PROCEDURE — 1160F RVW MEDS BY RX/DR IN RCRD: CPT | Performed by: NURSE PRACTITIONER

## 2022-08-08 RX ORDER — CHLORHEXIDINE GLUCONATE 0.12 MG/ML
RINSE ORAL
COMMUNITY
Start: 2022-08-03

## 2022-08-08 NOTE — ASSESSMENT & PLAN NOTE
Wt Readings from Last 3 Encounters:   08/08/22 118 kg (260 lb)   07/12/22 119 kg (262 lb 6 oz)   05/09/22 117 kg (257 lb 6 4 oz)     Patient appears to be euvolemic at this point  Patient does have regular follow-up with Cardiology

## 2022-08-08 NOTE — PROGRESS NOTES
Assessment and Plan:    Problem List Items Addressed This Visit    None                {Assess/PlanSmartLinks:92318}          Subjective:      Patient ID: Les Santana is a 80 y o  male  CC:    Chief Complaint   Patient presents with    Pre-op Clearance     Patient is here for a medical clearance for multiple tooth extractions on 8/18/22 by Dr Patrice Curiel [facility]   Patient evidently has a tooth infection and is currently on an antibiotic  ak       HPI:    HPI    {Common ambulatory SmartLinks:69366}      Review of Systems      Data to review:       Objective:    Vitals:    08/08/22 1210   BP: 96/50   Pulse: 60   Weight: 118 kg (260 lb)   Height: 6' 1" (1 854 m)        Physical Exam

## 2022-08-08 NOTE — PROGRESS NOTES
Presurgical Evaluation    Subjective:      Patient ID: Avtar Harden is a 80 y o  male  Chief Complaint   Patient presents with    Pre-op Clearance     Patient is here for a medical clearance for multiple tooth extractions on 8/18/22 by Dr Hawa Martin [facility] ([place])  Patient evidently has a tooth infection and is currently on an antibiotic  ak       Patient is here for preop clearance for extraction of multiple teeth on 08/18/2022 with Dr Hong Díaz [place] [id number]  No preop EKG or blood work is required prior to this procedure however, patient did have recent EKG performed on 08/01/2022 which showed sinus rhythm with first-degree AV delay with occasional PVCs  Hypothyroidism:  Patient has not had his thyroid level checked in some time  Patient is not currently taking medication for this  Patient denies weight gain, cold intolerance, or increased fatigue  CHF/hypertensive heart disease/a-fib:  Patient is currently managed on Lasix, Imdur, losartan, and Toprol-XL  Patient is also currently anticoagulated on Xarelto  Patient denies chest pain, shortness of breath, dyspnea on exertion, or palpitations  Patient does have regular follow-up with [facility] cardiology  Hyperlipidemia:  Patient's most recent lipid panel showed slightly elevated total cholesterol and LDL  Patient is not currently taking cholesterol medication            The following portions of the patient's history were reviewed and updated as appropriate: allergies, current medications, past family history, past medical history, past social history, past surgical history and problem list     Procedure date: 08/18/2022    Surgeon:  Dr Hong Díaz  Planned procedure:  Tooth extraction  Diagnosis for procedure:  Multiple dental caries    Prior anesthesia: Yes   General; Complications:  None / Tolerated well    CAD History: CAD  CHF   NOTE: Patient should see Cardiology if time available before surgery, and if appropriate  Pulmonary History: None    Renal history: None    Diabetes History:  None     Neurological History: None     On Immunosuppressant meds/biologics: No      Review of Systems   Constitutional: Negative for chills and fever  HENT: Negative for ear pain and sore throat  Eyes: Negative for pain and visual disturbance  Respiratory: Negative for cough, chest tightness, shortness of breath and wheezing  Cardiovascular: Negative for chest pain, palpitations and leg swelling  Gastrointestinal: Negative for abdominal pain, constipation, diarrhea, nausea and vomiting  Endocrine: Negative for cold intolerance and heat intolerance  Genitourinary: Negative for decreased urine volume, dysuria and hematuria  Musculoskeletal: Negative for arthralgias, back pain and myalgias  Skin: Negative for color change and rash  Allergic/Immunologic: Negative for environmental allergies  Neurological: Negative for dizziness, seizures, syncope, weakness, light-headedness, numbness and headaches  Hematological: Negative for adenopathy  Psychiatric/Behavioral: Negative for confusion  The patient is not nervous/anxious  All other systems reviewed and are negative  Current Outpatient Medications   Medication Sig Dispense Refill    acetaminophen (TYLENOL) 500 mg tablet Take 500 mg by mouth      ascorbic acid (VITAMIN C) 500 MG tablet Take 500 mg by mouth daily      aspirin (ECOTRIN LOW STRENGTH) 81 mg EC tablet Take 1 tablet by mouth daily      chlorhexidine (PERIDEX) 0 12 % solution RINSE WITH 1/2 OZ TWICE A DAY FOR 30 SECONDS AFTER MEALS   DON'T SWALLOW      Cholecalciferol (VITAMIN D) 2000 units CAPS Take by mouth      Coenzyme Q10 (CO Q 10) 100 MG CAPS Take by mouth      furosemide (LASIX) 40 mg tablet Take by mouth 2 (two) times a day Patient takes 1/2 tab BID      isosorbide mononitrate (IMDUR) 60 mg 24 hr tablet Take 1 tablet by mouth daily      losartan (COZAAR) 25 mg tablet Take 0 5 tablets by mouth daily      Magnesium 400 MG TABS Take by mouth      metoprolol succinate (TOPROL-XL) 50 mg 24 hr tablet       Multiple Vitamins-Minerals (MULTIVITAMIN ADULT PO) Take 1 capsule by mouth      nitroglycerin (NITROSTAT) 0 4 mg SL tablet Place 1 tablet under the tongue every 5 (five) minutes as needed      rivaroxaban (XARELTO) 20 mg tablet Take 1 tablet (20 mg total) by mouth daily with dinner 28 tablet 0    metoprolol succinate (TOPROL-XL) 50 mg 24 hr tablet Take 50 mg by mouth daily       No current facility-administered medications for this visit         Allergies on file:   Atorvastatin, Bee venom, Iodinated diagnostic agents, Lisinopril, Perflutren lipid microsphere, Perflutren lipid microspheres, Ranolazine, Rosuvastatin, and Simvastatin    Patient Active Problem List   Diagnosis    3-vessel CAD    Acute low back pain without sciatica    Cardiomyopathy, ischemic    Calculus of gallbladder and bile duct with acute on chronic cholecystitis    CHF (congestive heart failure) (HCC)    Disc degeneration, lumbar    Diverticulosis    Hyperlipidemia    Hyperglycemia    Hemorrhoids    Hypertensive heart disease with congestive heart failure (HCC)    Hypothyroidism    History of non-ST elevation myocardial infarction (NSTEMI)    Obese    Atrial fibrillation (HCC)    Prostate nodule    S/P PTCA (percutaneous transluminal coronary angioplasty)    Spinal stenosis    Chronic maxillary sinusitis    Renal cyst    Cataract    Osteoarthritis    Thoracic back pain    Anticoagulated by anticoagulation treatment    Rectal bleeding    Stable angina (HCC)    Colon polyp    COVID-19        Past Medical History:   Diagnosis Date    Bleeding hemorrhoids     Choledocholithiasis     Difficulty breathing     Diverticulosis     Ileus (HCC)     Lymphadenopathy of head and neck 9/29/2020       Past Surgical History:   Procedure Laterality Date    APPENDECTOMY      CHOLECYSTECTOMY LAPAROSCOPIC      CORONARY ANGIOPLASTY      CORONARY ANGIOPLASTY WITH STENT PLACEMENT      CORONARY ARTERY BYPASS GRAFT      ERCP      TONSILLECTOMY         Family History   Problem Relation Age of Onset    Lung cancer Mother     Coronary artery disease Father     Diabetes Brother     Lung cancer Family        Social History     Tobacco Use    Smoking status: Never Smoker    Smokeless tobacco: Never Used   Substance Use Topics    Alcohol use: Not Currently     Comment: social    Drug use: No       Objective:    Vitals:    08/08/22 1210   BP: 96/50   Pulse: 60   Weight: 118 kg (260 lb)   Height: 6' 1" (1 854 m)        Physical Exam  Vitals and nursing note reviewed  Constitutional:       General: He is not in acute distress  Appearance: Normal appearance  He is not ill-appearing  HENT:      Head: Normocephalic  Eyes:      Conjunctiva/sclera: Conjunctivae normal    Cardiovascular:      Rate and Rhythm: Normal rate and regular rhythm  Pulses: Normal pulses  Carotid pulses are 2+ on the right side and 2+ on the left side  Radial pulses are 2+ on the right side and 2+ on the left side  Posterior tibial pulses are 2+ on the right side and 2+ on the left side  Heart sounds: Normal heart sounds  No murmur heard  Pulmonary:      Effort: Pulmonary effort is normal  No respiratory distress  Breath sounds: Normal breath sounds  No wheezing or rhonchi  Abdominal:      General: Abdomen is flat  Bowel sounds are normal  There is no distension  Palpations: Abdomen is soft  Tenderness: There is no abdominal tenderness  There is no guarding  Musculoskeletal:         General: Normal range of motion  Cervical back: Normal range of motion  Right lower leg: No edema  Left lower leg: No edema  Skin:     General: Skin is warm and dry  Capillary Refill: Capillary refill takes less than 2 seconds     Neurological:      General: No focal deficit present  Mental Status: He is alert  Psychiatric:         Mood and Affect: Mood normal          Behavior: Behavior normal          Thought Content: Thought content normal          Judgment: Judgment normal            Preop labs/testing available and reviewed: yes               EKG yes    Echo no    Stress test/cath no    PFT/[place] no    Functional capacity: Walking , 4-5 MPH               4 Mets   Pick the highest level patient can comfortably perform   4 mets or greater for surgery    RCRI  High Risk surgery? 1 Point  CAD History:         1 Point   MI; Positive Stress Test; CP due to Mi;  Nitrate Usage to control Angina; Pathologic Q wave on EKG  CHF Active:         1 Point   Pulm Edema; Paroxysmal Nocturnal Dyspnea;  Bibasilar Rales (crackles);S3; CHF on CXR  Cerebrovascular Disease (TIA or CVA):     1 Point  DM on Insulin:        1 Point  Serum Creat >2 0 mg/dl:       1 Point          Total Points: 1     Scorin: Class I, Very Low Risk (0 4%)     1: Class II, Low risk (0 9%)     2: Class III Moderate (6 6%)     3: Class IV High (>11%)      MARK Risk:  GFR:        For PCP:  If GFR>60, Hold ACE/ARB/Diuretic on the day of surgery, and NSAIDS 10 days before  If GFR<45, Consider PRE and POST op Nephrology Consult  If 46 <GFR> 59 : Has Patient had MARK in last 6 Months? no   If YES: Preop Nephrology consult   If No:  Ewelina Ward Nephrology consult  Assessment/Plan:    Patient is medically optimized (cleared) for the planned procedure  Further testing/evaluation is not required  Postop concerns: no    Problem List Items Addressed This Visit        Endocrine    Hypothyroidism     TSH and T4 levels were ordered to assess the status of this           Relevant Orders    TSH, 3rd generation    T4, free       Cardiovascular and Mediastinum    CHF (congestive heart failure) (HCC)     Wt Readings from Last 3 Encounters:   22 118 kg (260 lb)   22 119 kg (262 lb 6 oz)   22 117 kg (257 lb 6 4 oz)     Patient appears to be euvolemic at this point  Patient does have regular follow-up with Cardiology  Hypertensive heart disease with congestive heart failure (HCC)     Wt Readings from Last 3 Encounters:   08/08/22 118 kg (260 lb)   07/12/22 119 kg (262 lb 6 oz)   05/09/22 117 kg (257 lb 6 4 oz)     Patient has regular follow-up with Cardiology regarding this  Atrial fibrillation [facility])     Patient was advised to discontinue Xarelto 3 days prior to the procedure  Other    Hyperlipidemia     Lipid panel was ordered to be drawn prior to next office visit  Relevant Orders    Lipid Panel with Direct LDL reflex      Other Visit Diagnoses     Pre-op exam    -  Primary           Diagnoses and all orders for this visit:    Pre-op exam    Acquired hypothyroidism  -     TSH, 3rd generation; Future  -     T4, free; Future    Mixed hyperlipidemia  -     Lipid Panel with Direct LDL reflex; Future    Chronic combined systolic and diastolic congestive heart failure (HCC)    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)    Paroxysmal atrial fibrillation ([address] )    Other orders  -     chlorhexidine (PERIDEX) 0 12 % solution; RINSE WITH 1/2 OZ TWICE A DAY FOR 30 SECONDS AFTER MEALS   DON'T SWALLOW  -     Discontinue: mupirocin (BACTROBAN) 2 % ointment; PLEASE SEE ATTACHED FOR DETAILED DIRECTIONS (Patient not taking: Reported on 8/8/2022)        Pre-Surgery Instructions:   Medication Instructions    acetaminophen (TYLENOL) 500 mg tablet per anesthesia guidelines     ascorbic acid (VITAMIN C) 500 MG tablet per anesthesia guidelines     aspirin (ECOTRIN LOW STRENGTH) 81 mg EC tablet per anesthesia guidelines     chlorhexidine (PERIDEX) 0 12 % solution per anesthesia guidelines     Cholecalciferol (VITAMIN D) 2000 units CAPS per anesthesia guidelines     Coenzyme Q10 (CO Q 10) 100 MG CAPS per anesthesia guidelines  furosemide (LASIX) 20 mg tablet per anesthesia guidelines     furosemide (LASIX) 40 mg tablet per anesthesia guidelines     isosorbide mononitrate (IMDUR) 60 mg 24 hr tablet per anesthesia guidelines     losartan (COZAAR) 25 mg tablet per anesthesia guidelines     Magnesium 400 MG TABS per anesthesia guidelines     metoprolol succinate (TOPROL-XL) 50 mg 24 hr tablet per anesthesia guidelines     Multiple Vitamins-Minerals (MULTIVITAMIN ADULT PO) per anesthesia guidelines     nitroglycerin (NITROSTAT) 0 4 mg SL tablet per anesthesia guidelines     rivaroxaban (XARELTO) 20 mg tablet Stop taking 3 days prior to surgery        NOTE: Please use the above to review important meds for your specialty, the remainder "per anesthesia Guidelines "    NOTE: Please place an Inbasket message for "[facility]" pool for complicated patients

## 2022-08-08 NOTE — ASSESSMENT & PLAN NOTE
Wt Readings from Last 3 Encounters:   08/08/22 118 kg (260 lb)   07/12/22 119 kg (262 lb 6 oz)   05/09/22 117 kg (257 lb 6 4 oz)     Patient has regular follow-up with Cardiology regarding this

## 2022-08-12 LAB
CHOLEST SERPL-MCNC: 192 MG/DL
CHOLEST/HDLC SERPL: 5.6 (CALC)
HDLC SERPL-MCNC: 34 MG/DL
LDLC SERPL CALC-MCNC: 135 MG/DL (CALC)
NONHDLC SERPL-MCNC: 158 MG/DL (CALC)
T4 FREE SERPL-MCNC: 1.1 NG/DL (ref 0.8–1.8)
TRIGL SERPL-MCNC: 118 MG/DL
TSH SERPL-ACNC: 7.34 MIU/L (ref 0.4–4.5)

## 2022-08-25 ENCOUNTER — RA CDI HCC (OUTPATIENT)
Dept: OTHER | Facility: HOSPITAL | Age: 82
End: 2022-08-25

## 2022-08-25 NOTE — PROGRESS NOTES
I11 0, [id number]  [facility]  coding opportunities          Chart Reviewed number of suggestions sent to Provider: 2     Patients Insurance     Medicare Insurance: Medicare

## 2022-09-12 ENCOUNTER — OFFICE VISIT (OUTPATIENT)
Dept: FAMILY MEDICINE CLINIC | Facility: CLINIC | Age: 82
End: 2022-09-12
Payer: COMMERCIAL

## 2022-09-12 VITALS
HEIGHT: 73 IN | BODY MASS INDEX: 34.59 KG/M2 | SYSTOLIC BLOOD PRESSURE: 138 MMHG | DIASTOLIC BLOOD PRESSURE: 72 MMHG | HEART RATE: 77 BPM | WEIGHT: 261 LBS | OXYGEN SATURATION: 95 % | TEMPERATURE: 96.1 F

## 2022-09-12 DIAGNOSIS — Z98.61 S/P PTCA (PERCUTANEOUS TRANSLUMINAL CORONARY ANGIOPLASTY): Primary | ICD-10-CM

## 2022-09-12 DIAGNOSIS — I50.42 HYPERTENSIVE HEART DISEASE WITH CHRONIC COMBINED SYSTOLIC AND DIASTOLIC CONGESTIVE HEART FAILURE (HCC): ICD-10-CM

## 2022-09-12 DIAGNOSIS — I50.42 CHRONIC COMBINED SYSTOLIC AND DIASTOLIC CONGESTIVE HEART FAILURE (HCC): ICD-10-CM

## 2022-09-12 DIAGNOSIS — I48.0 PAROXYSMAL ATRIAL FIBRILLATION (HCC): ICD-10-CM

## 2022-09-12 DIAGNOSIS — I25.10 3-VESSEL CAD: ICD-10-CM

## 2022-09-12 DIAGNOSIS — E03.9 ACQUIRED HYPOTHYROIDISM: ICD-10-CM

## 2022-09-12 DIAGNOSIS — I11.0 HYPERTENSIVE HEART DISEASE WITH CHRONIC COMBINED SYSTOLIC AND DIASTOLIC CONGESTIVE HEART FAILURE (HCC): ICD-10-CM

## 2022-09-12 DIAGNOSIS — E78.2 MIXED HYPERLIPIDEMIA: ICD-10-CM

## 2022-09-12 PROCEDURE — 3725F SCREEN DEPRESSION PERFORMED: CPT | Performed by: FAMILY MEDICINE

## 2022-09-12 PROCEDURE — 1160F RVW MEDS BY RX/DR IN RCRD: CPT | Performed by: FAMILY MEDICINE

## 2022-09-12 PROCEDURE — 99214 OFFICE O/P EST MOD 30 MIN: CPT | Performed by: FAMILY MEDICINE

## 2022-09-12 NOTE — ASSESSMENT & PLAN NOTE
Wt Readings from Last 3 Encounters:   09/12/22 118 kg (261 lb)   08/08/22 118 kg (260 lb)   07/12/22 119 kg (262 lb 6 oz)       Stable  No changes specifically

## 2022-09-12 NOTE — ASSESSMENT & PLAN NOTE
TSH was low, T4 was normal   I would recommend starting on treatment for thyroid  Patient prefers to not take more medications currently, therefore will wait until next visit to review the TSH, and consider next options

## 2022-09-12 NOTE — PROGRESS NOTES
Assessment and Plan:    Problem List Items Addressed This Visit     3-vessel CAD     Stable  Continue with current treatments  Would prefer a lower LDL, but he is intolerant of statins  Relevant Orders    Comprehensive metabolic panel    Lipid panel    Atrial fibrillation (HCC)     Stable  Follow-up in the future  CHF (congestive heart failure) ([facility])     Wt Readings from Last 3 Encounters:   09/12/22 118 kg (261 lb)   08/08/22 118 kg (260 lb)   07/12/22 119 kg (262 lb 6 oz)       Follow with Cardiology  Relevant Orders    Comprehensive metabolic panel    Lipid panel    Hyperlipidemia     Patient does have hyperlipidemia  Would recommend recheck  Follow-up afterwards  Relevant Orders    Comprehensive metabolic panel    Lipid panel    Hypertensive heart disease with congestive heart failure (HCC)     Wt Readings from Last 3 Encounters:   09/12/22 118 kg (261 lb)   08/08/22 118 kg (260 lb)   07/12/22 119 kg (262 lb 6 oz)       Stable  No changes specifically  Relevant Orders    Comprehensive metabolic panel    Lipid panel    Hypothyroidism     TSH was low, T4 was normal   I would recommend starting on treatment for thyroid  Patient prefers to not take more medications currently, therefore will wait until next visit to review the TSH, and consider next options  Relevant Orders    TSH, 3rd generation    S/P PTCA (percutaneous transluminal coronary angioplasty) - Primary     Stable  Follow-up with surgeon appropriate  Relevant Orders    Comprehensive metabolic panel    Lipid panel                 Diagnoses and all orders for this visit:    S/P PTCA (percutaneous transluminal coronary angioplasty)  -     Comprehensive metabolic panel; Future  -     Lipid panel; Future    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)  -     Comprehensive metabolic panel; Future  -     Lipid panel;  Future    Mixed hyperlipidemia  -     Comprehensive metabolic panel; Future  -     Lipid panel; Future    Chronic combined systolic and diastolic congestive heart failure (HCC)  -     Comprehensive metabolic panel; Future  -     Lipid panel; Future    3-vessel CAD  -     Comprehensive metabolic panel; Future  -     Lipid panel; Future    Paroxysmal atrial fibrillation (HCC)    Acquired hypothyroidism  -     TSH, 3rd generation; Future            Subjective:      Patient ID: Danial Marion is a 80 y o  male  CC:    Chief Complaint   Patient presents with    Follow-up       HPI:    Patient is here to follow-up on several issues  Patient has significant bilateral knee pain  He reports he does need new knees  Atrial Fibrillation:  Currently on Xarelto  CAD:  On Imdur, metoprolol, losartan  No particular concerns  Hyperlipidemia:  Patient is not currently on statins secondary to prior side effects  Reviewed labs  The following portions of the patient's history were reviewed and updated as appropriate: allergies, current medications, past family history, past medical history, past social history, past surgical history and problem list       Review of Systems   Constitutional: Negative  HENT: Negative  Eyes: Negative  Respiratory: Negative  Cardiovascular: Negative  Gastrointestinal: Negative  Endocrine: Negative  Genitourinary: Negative  Musculoskeletal: Negative  Skin: Negative  Allergic/Immunologic: Negative  Neurological: Negative  Hematological: Negative  Psychiatric/Behavioral: Negative  Data to review:   TSH 7 34  Free T4 1 1  Total cholesterol 192, , HDL 34, triglycerides 118       Objective:    Vitals:    09/12/22 1344   BP: 138/72   BP Location: Left arm   Patient Position: Sitting   Cuff Size: Standard   Pulse: 77   Temp: (!) 96 1 °F (35 6 °C)   TempSrc: Tympanic   SpO2: 95%   Weight: 118 kg (261 lb)   Height: 6' 1" (1 854 m)        Physical Exam  Vitals and nursing note reviewed  Constitutional:       Appearance: Normal appearance  Neck:      Vascular: No carotid bruit  Cardiovascular:      Rate and Rhythm: Normal rate and regular rhythm  Pulses: Normal pulses  Carotid pulses are 2+ on the right side and 2+ on the left side  Heart sounds: Normal heart sounds  No murmur heard  No gallop  Pulmonary:      Effort: Pulmonary effort is normal  No respiratory distress  Breath sounds: Normal breath sounds  No stridor  No wheezing, rhonchi or rales  Chest:      Chest wall: No tenderness  Neurological:      Mental Status: He is alert  Depression Screening and Follow-up Plan: Patient was screened for depression during today's encounter  They screened negative with a PHQ-2 score of 0

## 2022-09-12 NOTE — ASSESSMENT & PLAN NOTE
Wt Readings from Last 3 Encounters:   09/12/22 118 kg (261 lb)   08/08/22 118 kg (260 lb)   07/12/22 119 kg (262 lb 6 oz)       Follow with Cardiology

## 2022-09-12 NOTE — ASSESSMENT & PLAN NOTE
Stable  Continue with current treatments  Would prefer a lower LDL, but he is intolerant of statins

## 2022-09-12 NOTE — PATIENT INSTRUCTIONS
Problem List Items Addressed This Visit       3-vessel CAD     Stable  Continue with current treatments  Would prefer a lower LDL, but he is intolerant of statins  Relevant Orders    Comprehensive metabolic panel    Lipid panel    Atrial fibrillation (HCC)     Stable  Follow-up in the future  CHF (congestive heart failure) (HCC)     Wt Readings from Last 3 Encounters:   09/12/22 118 kg (261 lb)   08/08/22 118 kg (260 lb)   07/12/22 119 kg (262 lb 6 oz)     Follow with Cardiology  Relevant Orders    Comprehensive metabolic panel    Lipid panel    Hyperlipidemia     Patient does have hyperlipidemia  Would recommend recheck  Follow-up afterwards  Relevant Orders    Comprehensive metabolic panel    Lipid panel    Hypertensive heart disease with congestive heart failure (HCC)     Wt Readings from Last 3 Encounters:   09/12/22 118 kg (261 lb)   08/08/22 118 kg (260 lb)   07/12/22 119 kg (262 lb 6 oz)     Stable  No changes specifically  Relevant Orders    Comprehensive metabolic panel    Lipid panel    Hypothyroidism     TSH was low, T4 was normal   I would recommend starting on treatment for thyroid  Patient prefers to not take more medications currently, therefore will wait until next visit to review the TSH, and consider next options  Relevant Orders    TSH, 3rd generation    S/P PTCA (percutaneous transluminal coronary angioplasty) - Primary     Stable  Follow-up with surgeon appropriate  Relevant Orders    Comprehensive metabolic panel    Lipid panel              COVID 19 Instructions    Beau Henderson was advised to limit contact with others to essential tasks such as getting food, medications, and medical care  Proper handwashing reviewed, and Hand sanitzer when washing is not available  If the patient develops symptoms of COVID 19, the patient should call the office as soon as possible      For [id number] Flu season, it is strongly recommended that Flu Vaccinations be obtained  Virtual Visits:  Ayanna: This works on smart phones (any phone with Internet browsing capability)  You should get a text message when the provider is ready to see you  Click on the link in the text message, and the call should start  You will need to type in your name, and allow camera and microphone access  This is HIPPA compliant, and secure  If you have not already done so, get immunized to COVID 19  We are committed to getting you vaccinated as soon as possible and will be closely following CDC and [facility]  guidelines as they are released and revised  Please refer to our COVID-19 vaccine webpage for the most up to date information on the vaccine and our distribution efforts  This site will also have the most up to date recommendations for COVID booster vaccine  Mikhail subramanian    Call [phone] ([phone]), option 7    OUR NEW LOCATION:    [address], [address] [place], [place] [address] , [place], [address]  Fax: [phone]    Lab services and OB/GYN are at this location as well

## 2022-10-12 ENCOUNTER — OFFICE VISIT (OUTPATIENT)
Dept: FAMILY MEDICINE CLINIC | Facility: CLINIC | Age: 82
End: 2022-10-12
Payer: COMMERCIAL

## 2022-10-12 VITALS
TEMPERATURE: 98.3 F | SYSTOLIC BLOOD PRESSURE: 134 MMHG | OXYGEN SATURATION: 97 % | WEIGHT: 263 LBS | BODY MASS INDEX: 34.85 KG/M2 | DIASTOLIC BLOOD PRESSURE: 86 MMHG | HEART RATE: 100 BPM | HEIGHT: 73 IN

## 2022-10-12 DIAGNOSIS — I50.42 CHRONIC COMBINED SYSTOLIC AND DIASTOLIC CONGESTIVE HEART FAILURE (HCC): ICD-10-CM

## 2022-10-12 DIAGNOSIS — R04.0 EPISTAXIS: ICD-10-CM

## 2022-10-12 DIAGNOSIS — J20.9 ACUTE BRONCHITIS, UNSPECIFIED ORGANISM: Primary | ICD-10-CM

## 2022-10-12 PROCEDURE — 99214 OFFICE O/P EST MOD 30 MIN: CPT | Performed by: PHYSICIAN ASSISTANT

## 2022-10-12 RX ORDER — AZITHROMYCIN 250 MG/1
TABLET, FILM COATED ORAL
Qty: 6 TABLET | Refills: 0 | Status: SHIPPED | OUTPATIENT
Start: 2022-10-12 | End: 2022-10-17

## 2022-10-12 RX ORDER — DEXTROMETHORPHAN HYDROBROMIDE AND PROMETHAZINE HYDROCHLORIDE 15; 6.25 MG/5ML; MG/5ML
5 SOLUTION ORAL 4 TIMES DAILY PRN
Qty: 120 ML | Refills: 0 | Status: SHIPPED | OUTPATIENT
Start: 2022-10-12 | End: 2022-10-19

## 2022-10-12 NOTE — PATIENT INSTRUCTIONS
Assessment/plan:   Acute bronchitis - patient did have more than 1- COVID test at home  His wife with similar symptoms also tested negative  He will be treated with Zithromax Z-Kirk as directed for infection  He will be given promethazine DM cough syrup to use every 4-6 hours as needed for cough  Patient was cautioned of drowsiness with this medication  2   CHF-presently stable without any recent weight gain or edema of the extremities  No sign of exacerbation  Continue to monitor if symptoms would worsen and obtain chest x-ray  Patient will contact our office if not improving or symptoms worsen  3  Epistaxis -likely secondary to nasal trauma /COVID testing and dryness  Recommend moisturizing saline gel as necessary

## 2022-10-12 NOTE — PROGRESS NOTES
Name: Glenn Hale      : 1940      MRN: [id number]  Encounter Provider: Juanita Escobedo PA-C  Encounter Date: 10/12/2022   Encounter department: [address] [place] [address] PRIMARY CARE    Assessment & Plan     Patient Instructions     Assessment/plan:   Acute bronchitis - patient did have more than 1- COVID test at home  His wife with similar symptoms also tested negative  He will be treated with Zithromax Z-Kirk as directed for infection  He will be given promethazine DM cough syrup to use every 4-6 hours as needed for cough  Patient was cautioned of drowsiness with this medication  2   CHF-presently stable without any recent weight gain or edema of the extremities  No sign of exacerbation  Continue to monitor if symptoms would worsen and obtain chest x-ray  Patient will contact our office if not improving or symptoms worsen  3  Epistaxis -likely secondary to nasal trauma /COVID testing and dryness  Recommend moisturizing saline gel as necessary  1  Acute bronchitis, unspecified organism  -     azithromycin (Zithromax) 250 mg tablet; Take 2 tablets (500 mg total) by mouth daily for 1 day, THEN 1 tablet (250 mg total) daily for 4 days   -     Promethazine-DM (PHENERGAN-DM) 6 25-15 mg/5 mL oral syrup; Take 5 mL by mouth 4 (four) times a day as needed for cough for up to 7 days    2  Chronic combined systolic and diastolic congestive heart failure (HCC)    3  Epistaxis         Subjective        HPI:  This is a 62-year-old gentleman that presents to the office with concerns over productive cough and clogged ears for the past 6 days  He has also had nasal congestion and nose bleed 2 days ago requiring visit to the emergency room  Patient states that he was doing several COVID test earlier in the week and taking some Mucinex DM for the congestion    He does feel that he has more productive mucus in the chest   He denies any recent weight gains or swelling in his extremities as he does have a history of CHF  Symptoms seem to be bothersome during the daytime as much as at nighttime  He has not had any recent fevers  His wife is sick with similar symptoms  She is also tested negative for COVID infection  Review of Systems   Constitutional: Positive for fatigue  Negative for chills and fever  HENT: Positive for congestion, ear pain, rhinorrhea, sinus pressure and sore throat  Eyes: Negative for visual disturbance  Respiratory: Positive for cough  Negative for chest tightness and shortness of breath  Cardiovascular: Negative for chest pain and palpitations  Gastrointestinal: Negative for diarrhea, nausea and vomiting  Endocrine: Negative for polyuria  Genitourinary: Negative for dysuria and frequency  Musculoskeletal: Negative for arthralgias and myalgias  Skin: Negative for pallor and rash  Neurological: Negative for dizziness, weakness, light-headedness, numbness and headaches  Psychiatric/Behavioral: Negative for agitation, behavioral problems and sleep disturbance  All other systems reviewed and are negative        Current Outpatient Medications on File Prior to Visit   Medication Sig   • acetaminophen (TYLENOL) 500 mg tablet Take 500 mg by mouth   • ascorbic acid (VITAMIN C) 500 MG tablet Take 500 mg by mouth daily   • aspirin (ECOTRIN LOW STRENGTH) 81 mg EC tablet Take 1 tablet by mouth daily   • Cholecalciferol (VITAMIN D) 2000 units CAPS Take by mouth   • Coenzyme Q10 (CO Q 10) 100 MG CAPS Take by mouth   • furosemide (LASIX) 40 mg tablet Take by mouth 2 (two) times a day Patient takes 1/2 tab BID   • isosorbide mononitrate (IMDUR) 60 mg 24 hr tablet Take 1 tablet by mouth daily   • losartan (COZAAR) 25 mg tablet Take 0 5 tablets by mouth daily   • Magnesium 400 MG TABS Take by mouth   • metoprolol succinate (TOPROL-XL) 50 mg 24 hr tablet    • Multiple Vitamins-Minerals (MULTIVITAMIN ADULT PO) Take 1 capsule by mouth   • nitroglycerin (NITROSTAT) 0 4 mg SL tablet Place 1 tablet under the tongue every 5 (five) minutes as needed   • rivaroxaban (XARELTO) 20 mg tablet Take 1 tablet (20 mg total) by mouth daily with dinner   • chlorhexidine (PERIDEX) 0 12 % solution RINSE WITH 1/2 OZ TWICE A DAY FOR 30 SECONDS AFTER MEALS   DON'T SWALLOW (Patient not taking: Reported on 10/12/2022)   • metoprolol succinate (TOPROL-XL) 50 mg 24 hr tablet Take 50 mg by mouth daily       Objective     /86 (BP Location: Left arm, Patient Position: Sitting, Cuff Size: Large)   Pulse 100   Temp 98 3 °F (36 8 °C) (Tympanic)   Ht 6' 1" (1 854 m)   Wt 119 kg (263 lb)   SpO2 97%   BMI 34 70 kg/m²     Physical Exam  Vitals and nursing note reviewed  Constitutional:       General: He is not in acute distress  Appearance: He is well-developed  HENT:      Head: Normocephalic and atraumatic  Right Ear: External ear normal       Left Ear: External ear normal       Nose: Nose normal       Mouth/Throat:      Pharynx: No oropharyngeal exudate  Eyes:      Conjunctiva/sclera: Conjunctivae normal       Pupils: Pupils are equal, round, and reactive to light  Neck:      Thyroid: No thyromegaly  Trachea: No tracheal deviation  Cardiovascular:      Rate and Rhythm: Normal rate and regular rhythm  Heart sounds: Normal heart sounds  No murmur heard  No friction rub  Pulmonary:      Effort: Pulmonary effort is normal  No respiratory distress  Breath sounds: Normal breath sounds  No wheezing or rales  Abdominal:      General: Bowel sounds are normal  There is no distension  Palpations: Abdomen is soft  Tenderness: There is no abdominal tenderness  There is no guarding or rebound  Musculoskeletal:         General: No tenderness  Normal range of motion  Cervical back: Normal range of motion and neck supple  Lymphadenopathy:      Cervical: No cervical adenopathy  Skin:     General: Skin is warm and dry  Findings: No erythema or rash     Neurological: Mental Status: He is alert and oriented to person, place, and time  Cranial Nerves: No cranial nerve deficit  Coordination: Coordination normal    Psychiatric:         Behavior: Behavior normal          Thought Content:  Thought content normal        Shaji Bañuelos PA-C

## 2022-11-07 ENCOUNTER — OFFICE VISIT (OUTPATIENT)
Dept: FAMILY MEDICINE CLINIC | Facility: CLINIC | Age: 82
End: 2022-11-07

## 2022-11-07 ENCOUNTER — APPOINTMENT (OUTPATIENT)
Dept: RADIOLOGY | Facility: MEDICAL CENTER | Age: 82
End: 2022-11-07

## 2022-11-07 VITALS
TEMPERATURE: 98.9 F | HEART RATE: 82 BPM | BODY MASS INDEX: 34.46 KG/M2 | SYSTOLIC BLOOD PRESSURE: 140 MMHG | HEIGHT: 73 IN | WEIGHT: 260 LBS | DIASTOLIC BLOOD PRESSURE: 84 MMHG

## 2022-11-07 DIAGNOSIS — R06.02 SOB (SHORTNESS OF BREATH): ICD-10-CM

## 2022-11-07 DIAGNOSIS — R06.02 SOB (SHORTNESS OF BREATH): Primary | ICD-10-CM

## 2022-11-07 DIAGNOSIS — Z20.822 SUSPECTED COVID-19 VIRUS INFECTION: ICD-10-CM

## 2022-11-07 DIAGNOSIS — I48.0 PAROXYSMAL ATRIAL FIBRILLATION (HCC): ICD-10-CM

## 2022-11-07 DIAGNOSIS — J40 BRONCHITIS: ICD-10-CM

## 2022-11-07 LAB
SARS-COV-2 AG UPPER RESP QL IA: NEGATIVE
VALID CONTROL: NORMAL

## 2022-11-07 RX ORDER — BENZONATATE 200 MG/1
200 CAPSULE ORAL 3 TIMES DAILY PRN
Qty: 20 CAPSULE | Refills: 0 | Status: SHIPPED | OUTPATIENT
Start: 2022-11-07 | End: 2022-11-17 | Stop reason: ALTCHOICE

## 2022-11-07 RX ORDER — METHYLPREDNISOLONE 4 MG/1
TABLET ORAL
Qty: 21 EACH | Refills: 0 | Status: SHIPPED | OUTPATIENT
Start: 2022-11-07

## 2022-11-07 RX ORDER — ALBUTEROL SULFATE 90 UG/1
2 AEROSOL, METERED RESPIRATORY (INHALATION) EVERY 6 HOURS PRN
Qty: 8 G | Refills: 0 | Status: SHIPPED | OUTPATIENT
Start: 2022-11-07 | End: 2022-11-11

## 2022-11-07 RX ORDER — DOXYCYCLINE HYCLATE 100 MG/1
100 CAPSULE ORAL EVERY 12 HOURS SCHEDULED
Qty: 14 CAPSULE | Refills: 0 | Status: SHIPPED | OUTPATIENT
Start: 2022-11-07 | End: 2022-11-14

## 2022-11-07 NOTE — PROGRESS NOTES
Name: Annalee Gann      : 1940      MRN: [id number]  Encounter Provider: MP Grier  Encounter Date: 2022   Encounter department: [place] [address]  SOB (shortness of breath)  Assessment & Plan:  Due to patient's continued shortness of breath I will have a chest x-ray completed to assess for any pneumonia or other acute pulmonary abnormalities  Seven day course of doxycycline was ordered to cover for pneumonia  Albuterol inhaler was ordered to be used as needed for shortness of breath  Medrol Dosepak was ordered to help with airway inflammation  Tessalon Perles were ordered to be used as needed for cough  BNP was ordered to assess for any signs of worsening CHF  A D-dimer was also ordered to assess for any signs of PE  Orders:  -     XR chest pa & lateral; Future; Expected date: 2022  -     doxycycline hyclate (VIBRAMYCIN) 100 mg capsule; Take 1 capsule (100 mg total) by mouth every 12 (twelve) hours for 7 days  -     albuterol (PROVENTIL HFA,VENTOLIN HFA) 90 mcg/act inhaler; Inhale 2 puffs every 6 (six) hours as needed for wheezing or shortness of breath  -     methylPREDNISolone 4 MG tablet therapy pack; Use as directed on package  -     benzonatate (TESSALON) 200 MG capsule; Take 1 capsule (200 mg total) by mouth 3 (three) times a day as needed for cough  -     D-dimer, quantitative; Future  -     NT-BNP PRO; Future    2  Suspected COVID-19 virus infection  -     POCT Rapid Covid Ag    3  Bronchitis  -     doxycycline hyclate (VIBRAMYCIN) 100 mg capsule; Take 1 capsule (100 mg total) by mouth every 12 (twelve) hours for 7 days  -     albuterol (PROVENTIL HFA,VENTOLIN HFA) 90 mcg/act inhaler; Inhale 2 puffs every 6 (six) hours as needed for wheezing or shortness of breath  -     methylPREDNISolone 4 MG tablet therapy pack; Use as directed on package  -     benzonatate (TESSALON) 200 MG capsule;  Take 1 capsule (200 mg total) by mouth 3 (three) times a day as needed for cough    4  Paroxysmal atrial fibrillation [facility])  Assessment & Plan:  No current issues regarding this  Patient does have regular follow-up with Cardiology  Depression Screening and Follow-up Plan: Patient was screened for depression during today's encounter  They screened negative with a PHQ-2 score of 0  Subjective      URI symptoms: The patient was originally seen in the office on 10/12/22 and was diagnosed with bronchitis  He was treated with Azithromycin and he reports this did improve his symptoms for some time but over the past few days he has been having an increased productive cough  He is reporting GONZALES as well as SOB with rest  He is also reporting left lower back pain  He reports bilateral ear congestion as well  He denies fevers, chills, sore throat, rhinorrhea, nasal congestion, or PND  Patient denies noting any increased bilateral lower extremity edema or orthopnea  The patient has not completed a home COVID test recently  Patient's rapid COVID test performed in the office today was negative  A-fib:  Patient is currently managed on Imdur and Toprol XL for rate control and Xarelto for anticoagulation  Patient does have regular follow-up with Cardiology regarding this  Patient currently denies any new chest pain, palpitations, or angina symptoms  Review of Systems   Constitutional: Negative for chills and fever  HENT: Negative for congestion, ear pain, postnasal drip, rhinorrhea, sinus pressure, sinus pain and sore throat  Bilateral ear congestion   Eyes: Negative for pain and visual disturbance  Respiratory: Positive for cough (productive ), chest tightness and shortness of breath (with activity and rest)  Negative for wheezing  Cardiovascular: Negative for chest pain, palpitations and leg swelling  Gastrointestinal: Negative for abdominal pain, constipation, diarrhea, nausea and vomiting     Endocrine: Negative for cold intolerance and heat intolerance  Genitourinary: Negative for decreased urine volume, dysuria and hematuria  Musculoskeletal: Negative for arthralgias, back pain and myalgias  Skin: Negative for color change and rash  Allergic/Immunologic: Negative for environmental allergies  Neurological: Negative for dizziness, seizures, syncope, weakness, light-headedness, numbness and headaches  Hematological: Negative for adenopathy  Psychiatric/Behavioral: Negative for confusion  The patient is not nervous/anxious  All other systems reviewed and are negative  Current Outpatient Medications on File Prior to Visit   Medication Sig   • acetaminophen (TYLENOL) 500 mg tablet Take 500 mg by mouth   • ascorbic acid (VITAMIN C) 500 MG tablet Take 500 mg by mouth daily   • aspirin (ECOTRIN LOW STRENGTH) 81 mg EC tablet Take 1 tablet by mouth daily   • Cholecalciferol (VITAMIN D) 2000 units CAPS Take by mouth   • Coenzyme Q10 (CO Q 10) 100 MG CAPS Take by mouth   • furosemide (LASIX) 40 mg tablet Take by mouth 2 (two) times a day Patient takes 1/2 tab BID   • isosorbide mononitrate (IMDUR) 60 mg 24 hr tablet Take 1 tablet by mouth daily   • losartan (COZAAR) 25 mg tablet Take 0 5 tablets by mouth daily   • Magnesium 400 MG TABS Take by mouth   • metoprolol succinate (TOPROL-XL) 50 mg 24 hr tablet    • Multiple Vitamins-Minerals (MULTIVITAMIN ADULT PO) Take 1 capsule by mouth   • nitroglycerin (NITROSTAT) 0 4 mg SL tablet Place 1 tablet under the tongue every 5 (five) minutes as needed   • rivaroxaban (XARELTO) 20 mg tablet Take 1 tablet (20 mg total) by mouth daily with dinner   • TURMERIC PO Take 1 Dose by mouth   • metoprolol succinate (TOPROL-XL) 50 mg 24 hr tablet Take 50 mg by mouth daily   • [DISCONTINUED] chlorhexidine (PERIDEX) 0 12 % solution RINSE WITH 1/2 OZ TWICE A DAY FOR 30 SECONDS AFTER MEALS   DON'T SWALLOW (Patient not taking: Reported on 10/12/2022)       Objective     /84 (BP Location: Right arm, Patient Position: Sitting, Cuff Size: Standard)   Pulse 82   Temp 98 9 °F (37 2 °C) (Tympanic)   Ht 6' 1" (1 854 m)   Wt 118 kg (260 lb)   BMI 34 30 kg/m²     Physical Exam  Vitals and nursing note reviewed  Constitutional:       General: He is not in acute distress  Appearance: Normal appearance  He is not ill-appearing  HENT:      Right Ear: Hearing and tympanic membrane normal       Left Ear: Hearing and tympanic membrane normal       Mouth/Throat:      Pharynx: No pharyngeal swelling, oropharyngeal exudate, posterior oropharyngeal erythema or uvula swelling  Tonsils: No tonsillar exudate or tonsillar abscesses  Eyes:      Conjunctiva/sclera: Conjunctivae normal    Cardiovascular:      Rate and Rhythm: Normal rate and regular rhythm  Pulses: Normal pulses  Carotid pulses are 2+ on the right side and 2+ on the left side  Radial pulses are 2+ on the right side and 2+ on the left side  Posterior tibial pulses are 2+ on the right side and 2+ on the left side  Heart sounds: Normal heart sounds  No murmur heard  Pulmonary:      Effort: Pulmonary effort is normal  No respiratory distress  Breath sounds: Normal breath sounds  No wheezing, rhonchi or rales  Abdominal:      General: Abdomen is flat  Bowel sounds are normal  There is no distension  Palpations: Abdomen is soft  Tenderness: There is no abdominal tenderness  There is no guarding  Musculoskeletal:         General: Normal range of motion  Cervical back: Normal range of motion  Right lower leg: No edema  Left lower leg: No edema  Skin:     General: Skin is warm and dry  Capillary Refill: Capillary refill takes less than 2 seconds  Neurological:      General: No focal deficit present  Mental Status: He is alert and oriented to person, place, and time     Psychiatric:         Mood and Affect: Mood normal          Behavior: Behavior normal          Thought Content:  Thought content normal          Judgment: Judgment normal        MP Cuenca

## 2022-11-07 NOTE — ASSESSMENT & PLAN NOTE
Due to patient's continued shortness of breath I will have a chest x-ray completed to assess for any pneumonia or other acute pulmonary abnormalities  Seven day course of doxycycline was ordered to cover for pneumonia  Albuterol inhaler was ordered to be used as needed for shortness of breath  Medrol Dosepak was ordered to help with airway inflammation  Tessalon Perles were ordered to be used as needed for cough  BNP was ordered to assess for any signs of worsening CHF    A D-dimer was also ordered to assess for any signs of PE

## 2022-11-08 ENCOUNTER — APPOINTMENT (OUTPATIENT)
Dept: LAB | Facility: CLINIC | Age: 82
End: 2022-11-08

## 2022-11-08 DIAGNOSIS — R06.02 SOB (SHORTNESS OF BREATH): ICD-10-CM

## 2022-11-08 LAB
D DIMER PPP FEU-MCNC: 0.28 UG/ML FEU
NT-PROBNP SERPL-MCNC: 1272 PG/ML

## 2022-11-09 DIAGNOSIS — I50.42 CHRONIC COMBINED SYSTOLIC AND DIASTOLIC CONGESTIVE HEART FAILURE (HCC): Primary | ICD-10-CM

## 2022-11-10 ENCOUNTER — TELEPHONE (OUTPATIENT)
Dept: FAMILY MEDICINE CLINIC | Facility: CLINIC | Age: 82
End: 2022-11-10

## 2022-11-10 NOTE — TELEPHONE ENCOUNTER
Is patient currently having any shortness of breath, fevers, or chills? Also, do they have a pulse oximeter on hand to check the patient's oxygen saturation?

## 2022-11-10 NOTE — TELEPHONE ENCOUNTER
Patient wife called, patient cough has not improved medication prescribed worked for a day or so per wife, patient has been coughing up phlegm and per wife she was scared she almost had to call 911 because of him coughing so hard and turning red please call patient regarding this matter

## 2022-11-11 ENCOUNTER — OFFICE VISIT (OUTPATIENT)
Dept: FAMILY MEDICINE CLINIC | Facility: CLINIC | Age: 82
End: 2022-11-11

## 2022-11-11 VITALS
BODY MASS INDEX: 33.8 KG/M2 | HEIGHT: 73 IN | OXYGEN SATURATION: 94 % | WEIGHT: 255 LBS | HEART RATE: 79 BPM | SYSTOLIC BLOOD PRESSURE: 122 MMHG | DIASTOLIC BLOOD PRESSURE: 62 MMHG

## 2022-11-11 DIAGNOSIS — J40 BRONCHITIS: ICD-10-CM

## 2022-11-11 DIAGNOSIS — R06.02 SOB (SHORTNESS OF BREATH): Primary | ICD-10-CM

## 2022-11-11 DIAGNOSIS — I50.42 CHRONIC COMBINED SYSTOLIC AND DIASTOLIC CONGESTIVE HEART FAILURE (HCC): ICD-10-CM

## 2022-11-11 DIAGNOSIS — R06.02 SOB (SHORTNESS OF BREATH): ICD-10-CM

## 2022-11-11 DIAGNOSIS — I50.42 HYPERTENSIVE HEART DISEASE WITH CHRONIC COMBINED SYSTOLIC AND DIASTOLIC CONGESTIVE HEART FAILURE (HCC): ICD-10-CM

## 2022-11-11 DIAGNOSIS — I11.0 HYPERTENSIVE HEART DISEASE WITH CHRONIC COMBINED SYSTOLIC AND DIASTOLIC CONGESTIVE HEART FAILURE (HCC): ICD-10-CM

## 2022-11-11 DIAGNOSIS — I25.5 CARDIOMYOPATHY, ISCHEMIC: ICD-10-CM

## 2022-11-11 DIAGNOSIS — I48.0 PAROXYSMAL ATRIAL FIBRILLATION (HCC): ICD-10-CM

## 2022-11-11 RX ORDER — CEFUROXIME AXETIL 500 MG/1
500 TABLET ORAL EVERY 12 HOURS SCHEDULED
Qty: 20 TABLET | Refills: 0 | Status: SHIPPED | OUTPATIENT
Start: 2022-11-11 | End: 2022-11-21

## 2022-11-11 RX ORDER — ALBUTEROL SULFATE 90 UG/1
AEROSOL, METERED RESPIRATORY (INHALATION)
Qty: 18 G | Refills: 0 | Status: SHIPPED | OUTPATIENT
Start: 2022-11-11 | End: 2022-11-17 | Stop reason: ALTCHOICE

## 2022-11-11 RX ORDER — GUAIFENESIN AND CODEINE PHOSPHATE 100; 10 MG/5ML; MG/5ML
5 SOLUTION ORAL 3 TIMES DAILY PRN
Qty: 118 ML | Refills: 0 | Status: SHIPPED | OUTPATIENT
Start: 2022-11-11 | End: 2022-11-17 | Stop reason: ALTCHOICE

## 2022-11-11 RX ORDER — PROMETHAZINE HYDROCHLORIDE AND CODEINE PHOSPHATE 6.25; 1 MG/5ML; MG/5ML
5 SOLUTION ORAL EVERY 8 HOURS PRN
Qty: 118 ML | Refills: 0 | Status: SHIPPED | OUTPATIENT
Start: 2022-11-11 | End: 2022-11-11

## 2022-11-11 NOTE — ASSESSMENT & PLAN NOTE
Wt Readings from Last 3 Encounters:   11/11/22 116 kg (255 lb)   11/07/22 118 kg (260 lb)   10/12/22 119 kg (263 lb)     Patient does have history of heart failure, but with the current weight and symptoms, he does not seem to have heart failure issues right now  The BNP was elevated on most recent check, but not significantly elevated verses prior testing  Should pay attention to edema, but I do feel that his symptoms are more related to infection and they would be CHF

## 2022-11-11 NOTE — ASSESSMENT & PLAN NOTE
Patient was on doxycycline  He did get a prescription for prednisone, but did not take it yet  He was also taking Tessalon for the cough which did not seem to help at all  Recommend that he take the prednisone, and will change antibiotics  Ceftin, 500 mg b i d  For 10 days  Since the Tessalon did not help, will trial  Reviewed PDMP  No red flags  Phenergan with codeine  Number Of Epidermal Sutures (Optional): 4

## 2022-11-11 NOTE — ASSESSMENT & PLAN NOTE
Patient did have cardioversion recently, but still notes some episodes of irregular heartbeat  This is using his Kardia monitor, which shows that he does have AFib  Should follow with Cardiology  Patient does not feel it, and has no specific concerns

## 2022-11-11 NOTE — TELEPHONE ENCOUNTER
Pt wife states he only has SOB when he has a coughing spell denies any fevers or chills  Pt O2 is 92%

## 2022-11-11 NOTE — PATIENT INSTRUCTIONS
1  SOB (shortness of breath)  Assessment & Plan:  Patient was on doxycycline  He did get a prescription for prednisone, but did not take it yet  He was also taking Tessalon for the cough which did not seem to help at all  Recommend that he take the prednisone, and will change antibiotics  Ceftin, 500 mg b i d  For 10 days  Since the Tessalon did not help, will trial  Reviewed PDMP  No red flags  Phenergan with codeine  Orders:  -     cefuroxime (CEFTIN) 500 mg tablet; Take 1 tablet (500 mg total) by mouth every 12 (twelve) hours for 10 days  -     Promethazine-Codeine 6 25-10 MG/5ML SOLN; Take 5 mL by mouth every 8 (eight) hours as needed (Cough)    2  Bronchitis  -     cefuroxime (CEFTIN) 500 mg tablet; Take 1 tablet (500 mg total) by mouth every 12 (twelve) hours for 10 days  -     Promethazine-Codeine 6 25-10 MG/5ML SOLN; Take 5 mL by mouth every 8 (eight) hours as needed (Cough)    3  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 [facility])  Assessment & Plan:  Wt Readings from Last 3 Encounters:   11/11/22 116 kg (255 lb)   11/07/22 118 kg (260 lb)   10/12/22 119 kg (263 lb)   Blood pressure stable  No change  4  Chronic combined systolic and diastolic congestive heart failure (HCC)  Assessment & Plan:  Wt Readings from Last 3 Encounters:   11/11/22 116 kg (255 lb)   11/07/22 118 kg (260 lb)   10/12/22 119 kg (263 lb)     Patient does have history of heart failure, but with the current weight and symptoms, he does not seem to have heart failure issues right now  The BNP was elevated on most recent check, but not significantly elevated verses prior testing  Should pay attention to edema, but I do feel that his symptoms are more related to infection and they would be CHF  5  Cardiomyopathy, ischemic  Assessment & Plan:  Patient does have ischemic cardiomyopathy  Has been a problem for him for quite a few years    Would recommend follow-up with Cardiology  6  Paroxysmal atrial fibrillation [facility])  Assessment & Plan:  Patient did have cardioversion recently, but still notes some episodes of irregular heartbeat  This is using his Kardia monitor, which shows that he does have AFib  Should follow with Cardiology  Patient does not feel it, and has no specific concerns  COVID 19 Instructions    John Gilltist was advised to limit contact with others to essential tasks such as getting food, medications, and medical care  Proper handwashing reviewed, and Hand sanitzer when washing is not available  If the patient develops symptoms of COVID 19, the patient should call the office as soon as possible  For [id number] Flu season, it is strongly recommended that Flu Vaccinations be obtained  Virtual Visits:  Ayanna: This works on smart phones (any phone with Internet browsing capability)  You should get a text message when the provider is ready to see you  Click on the link in the text message, and the call should start  You will need to type in your name, and allow camera and microphone access  This is HIPPA compliant, and secure  If you have not already done so, get immunized to COVID 19  We are committed to getting you vaccinated as soon as possible and will be closely following CDC and [facility]  guidelines as they are released and revised  Please refer to our COVID-19 vaccine webpage for the most up to date information on the vaccine and our distribution efforts  This site will also have the most up to date recommendations for COVID booster vaccine  Mikhail subramanian    Call [phone] ([phone]), option 7    OUR NEW LOCATION:    [address], [address] [place], [place] [address] , [place], [address]  Fax: [phone]    Lab services and OB/GYN are at this location as well

## 2022-11-11 NOTE — ASSESSMENT & PLAN NOTE
Wt Readings from Last 3 Encounters:   11/11/22 116 kg (255 lb)   11/07/22 118 kg (260 lb)   10/12/22 119 kg (263 lb)   Blood pressure stable  No change

## 2022-11-11 NOTE — ASSESSMENT & PLAN NOTE
Patient does have ischemic cardiomyopathy  Has been a problem for him for quite a few years  Would recommend follow-up with Cardiology

## 2022-11-11 NOTE — TELEPHONE ENCOUNTER
Lily Castillo called in because none of the pharmacies have the Promethazine- Codeine and was told to have doctor call something else in    please call [facility] [phone] to see what they have avail  [address], [place], [address] [facility]

## 2022-11-11 NOTE — PROGRESS NOTES
Name: Zakiya Hwang      : 1940      MRN: [id number]  Encounter Provider: Apple White MD  Encounter Date: 2022   Encounter department: [place] [address]     1  SOB (shortness of breath)  Assessment & Plan:  Patient was on doxycycline  He did get a prescription for prednisone, but did not take it yet  He was also taking Tessalon for the cough which did not seem to help at all  Recommend that he take the prednisone, and will change antibiotics  Ceftin, 500 mg b i d  For 10 days  Since the Tessalon did not help, will trial  Reviewed PDMP  No red flags  Phenergan with codeine  Orders:  -     cefuroxime (CEFTIN) 500 mg tablet; Take 1 tablet (500 mg total) by mouth every 12 (twelve) hours for 10 days  -     Promethazine-Codeine 6 25-10 MG/5ML SOLN; Take 5 mL by mouth every 8 (eight) hours as needed (Cough)    2  Bronchitis  -     cefuroxime (CEFTIN) 500 mg tablet; Take 1 tablet (500 mg total) by mouth every 12 (twelve) hours for 10 days  -     Promethazine-Codeine 6 25-10 MG/5ML SOLN; Take 5 mL by mouth every 8 (eight) hours as needed (Cough)    3  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 [facility])  Assessment & Plan:  Wt Readings from Last 3 Encounters:   22 116 kg (255 lb)   22 118 kg (260 lb)   10/12/22 119 kg (263 lb)   Blood pressure stable  No change  4  Chronic combined systolic and diastolic congestive heart failure (HCC)  Assessment & Plan:  Wt Readings from Last 3 Encounters:   22 116 kg (255 lb)   22 118 kg (260 lb)   10/12/22 119 kg (263 lb)     Patient does have history of heart failure, but with the current weight and symptoms, he does not seem to have heart failure issues right now  The BNP was elevated on most recent check, but not significantly elevated verses prior testing    Should pay attention to edema, but I do feel that his symptoms are more related to infection and they would be CHF  5  Cardiomyopathy, ischemic  Assessment & Plan:  Patient does have ischemic cardiomyopathy  Has been a problem for him for quite a few years  Would recommend follow-up with Cardiology  6  Paroxysmal atrial fibrillation [facility])  Assessment & Plan:  Patient did have cardioversion recently, but still notes some episodes of irregular heartbeat  This is using his Kardia monitor, which shows that he does have AFib  Should follow with Cardiology  Patient does not feel it, and has no specific concerns  Subjective      Chief Complaint   Patient presents with   • Cough     Pt states the cough is worse today then it has been and the SOB as well  kw   • Shortness of Breath       Cough, SOB  Cough constant at night  Post nasal drip  Coughing fits with this  Started Sunday  Seen Monday  BNP is elevated, but about as same as before  Weight is stable  Legs ache frequently as well  Review of Systems   Constitutional: Positive for activity change and fatigue  HENT: Positive for congestion and postnasal drip  Eyes: Negative  Respiratory: Positive for cough and shortness of breath  Cardiovascular: Positive for chest pain (burning kateryna so often ) and palpitations (irreg HR )  Negative for leg swelling  Gastrointestinal: Negative  Endocrine: Negative  Genitourinary: Negative  Musculoskeletal: Negative  Skin: Negative  Allergic/Immunologic: Negative  Neurological: Negative  Hematological: Negative  Psychiatric/Behavioral: Negative          Current Outpatient Medications on File Prior to Visit   Medication Sig   • acetaminophen (TYLENOL) 500 mg tablet Take 500 mg by mouth   • albuterol (PROVENTIL HFA,VENTOLIN HFA) 90 mcg/act inhaler Inhale 2 puffs every 6 (six) hours as needed for wheezing or shortness of breath   • ascorbic acid (VITAMIN C) 500 MG tablet Take 500 mg by mouth daily   • aspirin (ECOTRIN LOW STRENGTH) 81 mg EC tablet Take 1 tablet by mouth daily   • benzonatate (TESSALON) 200 MG capsule Take 1 capsule (200 mg total) by mouth 3 (three) times a day as needed for cough   • Cholecalciferol (VITAMIN D) 2000 units CAPS Take by mouth   • Coenzyme Q10 (CO Q 10) 100 MG CAPS Take by mouth   • doxycycline hyclate (VIBRAMYCIN) 100 mg capsule Take 1 capsule (100 mg total) by mouth every 12 (twelve) hours for 7 days   • furosemide (LASIX) 40 mg tablet Take by mouth 2 (two) times a day Patient takes 1/2 tab BID   • isosorbide mononitrate (IMDUR) 60 mg 24 hr tablet Take 1 tablet by mouth daily   • losartan (COZAAR) 25 mg tablet Take 0 5 tablets by mouth daily   • Magnesium 400 MG TABS Take by mouth   • methylPREDNISolone 4 MG tablet therapy pack Use as directed on package   • metoprolol succinate (TOPROL-XL) 50 mg 24 hr tablet    • metoprolol succinate (TOPROL-XL) 50 mg 24 hr tablet Take 50 mg by mouth daily   • Multiple Vitamins-Minerals (MULTIVITAMIN ADULT PO) Take 1 capsule by mouth   • nitroglycerin (NITROSTAT) 0 4 mg SL tablet Place 1 tablet under the tongue every 5 (five) minutes as needed   • rivaroxaban (XARELTO) 20 mg tablet Take 1 tablet (20 mg total) by mouth daily with dinner   • TURMERIC PO Take 1 Dose by mouth       Objective     /62 (BP Location: Left arm, Patient Position: Sitting)   Pulse 79   Ht 6' 1" (1 854 m)   Wt 116 kg (255 lb)   SpO2 94%   BMI 33 64 kg/m²     Physical Exam  Vitals and nursing note reviewed  Constitutional:       General: He is not in acute distress  Appearance: He is well-developed  He is ill-appearing (voice congestion)  HENT:      Head: Normocephalic and atraumatic  Nose: Congestion present  Cardiovascular:      Rate and Rhythm: Normal rate  Rhythm irregular  Pulses:           Carotid pulses are 2+ on the right side and 2+ on the left side  Heart sounds: Normal heart sounds  No murmur heard  No friction rub  No gallop     Pulmonary:      Effort: Pulmonary effort is normal  No respiratory distress  Breath sounds: Normal breath sounds  No wheezing or rales  Comments: No egophony    Musculoskeletal:      Cervical back: Normal range of motion and neck supple  Neurological:      Mental Status: He is alert         Garrett Catsorena MD

## 2022-11-11 NOTE — TELEPHONE ENCOUNTER
Pt daughter notified and states she would like to see Dr Mikaela Jasmine due to him being PCP and time availability

## 2022-11-11 NOTE — TELEPHONE ENCOUNTER
1  SOB (shortness of breath)  -     albuterol (PROVENTIL HFA,VENTOLIN HFA) 90 mcg/act inhaler; TAKE 2 PUFFS BY MOUTH EVERY 6 HOURS AS NEEDED FOR WHEEZE OR FOR SHORTNESS OF BREATH  -     guaifenesin-codeine (GUAIFENESIN AC) 100-10 MG/5ML liquid; Take 5 mL by mouth 3 (three) times a day as needed for cough    2  Bronchitis  -     albuterol (PROVENTIL HFA,VENTOLIN HFA) 90 mcg/act inhaler; TAKE 2 PUFFS BY MOUTH EVERY 6 HOURS AS NEEDED FOR WHEEZE OR FOR SHORTNESS OF BREATH  -     guaifenesin-codeine (GUAIFENESIN AC) 100-10 MG/5ML liquid; Take 5 mL by mouth 3 (three) times a day as needed for cough      Spoke with pharmacist     They no longer carry Phenergan with codeine  This is a corporate decision, they are not stocking it at all

## 2022-11-17 ENCOUNTER — OFFICE VISIT (OUTPATIENT)
Dept: FAMILY MEDICINE CLINIC | Facility: CLINIC | Age: 82
End: 2022-11-17

## 2022-11-17 ENCOUNTER — RA CDI HCC (OUTPATIENT)
Dept: OTHER | Facility: HOSPITAL | Age: 82
End: 2022-11-17

## 2022-11-17 VITALS
HEIGHT: 73 IN | WEIGHT: 253.6 LBS | OXYGEN SATURATION: 96 % | SYSTOLIC BLOOD PRESSURE: 108 MMHG | DIASTOLIC BLOOD PRESSURE: 62 MMHG | TEMPERATURE: 98.7 F | BODY MASS INDEX: 33.61 KG/M2 | HEART RATE: 72 BPM

## 2022-11-17 DIAGNOSIS — I50.42 CHRONIC COMBINED SYSTOLIC AND DIASTOLIC CONGESTIVE HEART FAILURE (HCC): ICD-10-CM

## 2022-11-17 DIAGNOSIS — I48.0 PAROXYSMAL ATRIAL FIBRILLATION (HCC): ICD-10-CM

## 2022-11-17 DIAGNOSIS — J40 BRONCHITIS: Primary | ICD-10-CM

## 2022-11-17 PROBLEM — U07.1 COVID-19: Status: RESOLVED | Noted: 2022-05-23 | Resolved: 2022-11-17

## 2022-11-17 RX ORDER — ALIROCUMAB 75 MG/ML
1 INJECTION, SOLUTION SUBCUTANEOUS
COMMUNITY
Start: 2022-11-14

## 2022-11-17 NOTE — PATIENT INSTRUCTIONS
COVID 19 Instructions    Rochelle Arana was advised to limit contact with others to essential tasks such as getting food, medications, and medical care  Proper handwashing reviewed, and Hand sanitzer when washing is not available  If the patient develops symptoms of COVID 19, the patient should call the office as soon as possible  For 0479-3147 Flu season, it is strongly recommended that Flu Vaccinations be obtained  Virtual Visits:  Ayanna: This works on smart phones (any phone with Internet browsing capability)  You should get a text message when the provider is ready to see you  Click on the link in the text message, and the call should start  You will need to type in your name, and allow camera and microphone access  This is HIPPA compliant, and secure  If you have not already done so, get immunized to COVID 19  We are committed to getting you vaccinated as soon as possible and will be closely following CDC and [facility]  guidelines as they are released and revised  Please refer to our COVID-19 vaccine webpage for the most up to date information on the vaccine and our distribution efforts  This site will also have the most up to date recommendations for COVID booster vaccine  Mikhail subramanian    Call [phone] ([phone]), option 7    OUR NEW LOCATION:    [address], [address] [place], [place] [address] [address] , [place], [address]  Fax: [phone]    Lab services and OB/GYN are at this location as well  1  Bronchitis  Assessment & Plan:  Much better  Still some wheezes, though  Should continue with inhalers and abx  2  Chronic combined systolic and diastolic congestive heart failure [facility])  Assessment & Plan:  Wt Readings from Last 3 Encounters:   11/17/22 115 kg (253 lb 9 6 oz)   11/11/22 116 kg (255 lb)   11/07/22 118 kg (260 lb)     Stable  No changes              3  Paroxysmal atrial fibrillation [facility])  Assessment & Plan:  Patient did have cardioversion for AFib  He now is noticing some increasing palpitations and rapid heartbeat  I do agree that he should follow with Cardiology  Some of this may be related to beta agonist use, but he really cannot not use that

## 2022-11-17 NOTE — PROGRESS NOTES
I77 810, I11 0  [facility] 75  coding opportunities          Chart Reviewed number of suggestions sent to Provider: 2     Patients Insurance     Medicare Insurance: [employer]

## 2022-11-17 NOTE — PROGRESS NOTES
Name: Alia King      : 1940      MRN: [id number]  Encounter Provider: Josette Orlando MD  Encounter Date: 2022   Encounter department: [place] [address]     1  Bronchitis  Assessment & Plan:  Much better  Still some wheezes, though  Should continue with inhalers and abx  2  Chronic combined systolic and diastolic congestive heart failure [facility])  Assessment & Plan:  Wt Readings from Last 3 Encounters:   22 115 kg (253 lb 9 6 oz)   22 116 kg (255 lb)   22 118 kg (260 lb)     Stable  No changes  3  Paroxysmal atrial fibrillation [facility])  Assessment & Plan:  Patient did have cardioversion for AFib  He now is noticing some increasing palpitations and rapid heartbeat  I do agree that he should follow with Cardiology  Some of this may be related to beta agonist use, but he really cannot not use that  Subjective      Chief Complaint   Patient presents with   • Cough     Pt complaints of a cough, nasal congestion for the past 2 weeks  Pt is already on antibiotics  Daughter works at a  and she was sent home due to babies having RSV  Patient has had some issues recently  His daughter takes care of children at a , and several were sent home with RSV  Daughter lives with patient  He did have bronchitis before, and wazs on o abx and steroids  Feels better than before  He is noting irreg HR as well  Has been OK before  Had cardioversion but noting increased irreg HR  Review of Systems   Constitutional: Negative  HENT: Negative  Respiratory: Positive for cough and wheezing  Negative for shortness of breath          Current Outpatient Medications on File Prior to Visit   Medication Sig   • acetaminophen (TYLENOL) 500 mg tablet Take 500 mg by mouth   • Alirocumab (Praluent) 75 MG/ML SOAJ Inject 1 mL under the skin every 14 (fourteen) days   • ascorbic acid (VITAMIN C) 500 MG tablet Take 500 mg by mouth daily   • aspirin (ECOTRIN LOW STRENGTH) 81 mg EC tablet Take 1 tablet by mouth daily   • cefuroxime (CEFTIN) 500 mg tablet Take 1 tablet (500 mg total) by mouth every 12 (twelve) hours for 10 days   • Cholecalciferol (VITAMIN D) 2000 units CAPS Take by mouth   • Coenzyme Q10 (CO Q 10) 100 MG CAPS Take by mouth   • furosemide (LASIX) 40 mg tablet Take by mouth 2 (two) times a day Patient takes 1/2 tab BID   • isosorbide mononitrate (IMDUR) 60 mg 24 hr tablet Take 1 tablet by mouth daily   • losartan (COZAAR) 25 mg tablet Take 0 5 tablets by mouth daily   • Magnesium 400 MG TABS Take by mouth   • methylPREDNISolone 4 MG tablet therapy pack Use as directed on package   • metoprolol succinate (TOPROL-XL) 50 mg 24 hr tablet    • Multiple Vitamins-Minerals (MULTIVITAMIN ADULT PO) Take 1 capsule by mouth   • nitroglycerin (NITROSTAT) 0 4 mg SL tablet Place 1 tablet under the tongue every 5 (five) minutes as needed   • rivaroxaban (XARELTO) 20 mg tablet Take 1 tablet (20 mg total) by mouth daily with dinner   • TURMERIC PO Take 1 Dose by mouth   • metoprolol succinate (TOPROL-XL) 50 mg 24 hr tablet Take 50 mg by mouth daily   • [DISCONTINUED] albuterol (PROVENTIL HFA,VENTOLIN HFA) 90 mcg/act inhaler TAKE 2 PUFFS BY MOUTH EVERY 6 HOURS AS NEEDED FOR WHEEZE OR FOR SHORTNESS OF BREATH   • [DISCONTINUED] benzonatate (TESSALON) 200 MG capsule Take 1 capsule (200 mg total) by mouth 3 (three) times a day as needed for cough   • [DISCONTINUED] guaifenesin-codeine (GUAIFENESIN AC) 100-10 MG/5ML liquid Take 5 mL by mouth 3 (three) times a day as needed for cough       Objective     /62 (BP Location: Right arm, Patient Position: Sitting, Cuff Size: Large)   Pulse 72   Temp 98 7 °F (37 1 °C) (Tympanic)   Ht 6' 1" (1 854 m)   Wt 115 kg (253 lb 9 6 oz)   SpO2 96%   BMI 33 46 kg/m²     Physical Exam  Vitals and nursing note reviewed  Constitutional:       Appearance: He is well-developed and well-nourished  HENT:      Head: Normocephalic and atraumatic  Cardiovascular:      Rate and Rhythm: Normal rate and regular rhythm  Pulses:           Carotid pulses are 2+ on the right side and 2+ on the left side  Heart sounds: Normal heart sounds  No murmur heard  No friction rub  No gallop  Pulmonary:      Effort: Pulmonary effort is normal  No respiratory distress  Breath sounds: Normal breath sounds  No wheezing or rales  Musculoskeletal:      Cervical back: Normal range of motion and neck supple         Ballard Bence, MD

## 2022-11-17 NOTE — ASSESSMENT & PLAN NOTE
Patient did have cardioversion for AFib  He now is noticing some increasing palpitations and rapid heartbeat  I do agree that he should follow with Cardiology  Some of this may be related to beta agonist use, but he really cannot not use that

## 2022-11-17 NOTE — ASSESSMENT & PLAN NOTE
Wt Readings from Last 3 Encounters:   11/17/22 115 kg (253 lb 9 6 oz)   11/11/22 116 kg (255 lb)   11/07/22 118 kg (260 lb)     Stable  No changes

## 2022-11-28 ENCOUNTER — OFFICE VISIT (OUTPATIENT)
Dept: FAMILY MEDICINE CLINIC | Facility: CLINIC | Age: 82
End: 2022-11-28

## 2022-11-28 VITALS
DIASTOLIC BLOOD PRESSURE: 76 MMHG | SYSTOLIC BLOOD PRESSURE: 138 MMHG | HEIGHT: 73 IN | HEART RATE: 82 BPM | OXYGEN SATURATION: 99 % | BODY MASS INDEX: 33.93 KG/M2 | WEIGHT: 256 LBS

## 2022-11-28 DIAGNOSIS — I48.0 PAROXYSMAL ATRIAL FIBRILLATION (HCC): ICD-10-CM

## 2022-11-28 DIAGNOSIS — I50.42 HYPERTENSIVE HEART DISEASE WITH CHRONIC COMBINED SYSTOLIC AND DIASTOLIC CONGESTIVE HEART FAILURE (HCC): ICD-10-CM

## 2022-11-28 DIAGNOSIS — I50.42 CHRONIC COMBINED SYSTOLIC AND DIASTOLIC CONGESTIVE HEART FAILURE (HCC): ICD-10-CM

## 2022-11-28 DIAGNOSIS — I11.0 HYPERTENSIVE HEART DISEASE WITH CHRONIC COMBINED SYSTOLIC AND DIASTOLIC CONGESTIVE HEART FAILURE (HCC): ICD-10-CM

## 2022-11-28 DIAGNOSIS — J40 BRONCHITIS: Primary | ICD-10-CM

## 2022-11-28 DIAGNOSIS — I25.5 CARDIOMYOPATHY, ISCHEMIC: ICD-10-CM

## 2022-11-28 PROBLEM — Z95.810 ICD (IMPLANTABLE CARDIOVERTER-DEFIBRILLATOR) IN PLACE: Status: ACTIVE | Noted: 2021-02-18

## 2022-11-28 NOTE — ASSESSMENT & PLAN NOTE
Patient does have a cardiac echo scheduled in the near future  Will follow-up with Cardiology after that  After cardioversion, the patient does use his kardia monitor on occasion and notes some minor irregular heartbeats  Nothing specific with AFib, but irregular heart rate

## 2022-11-28 NOTE — ASSESSMENT & PLAN NOTE
Wt Readings from Last 3 Encounters:   11/28/22 116 kg (256 lb)   11/17/22 115 kg (253 lb 9 6 oz)   11/11/22 116 kg (255 lb)       Stable  Echo scheduled  Follow with Cardiology

## 2022-11-28 NOTE — PROGRESS NOTES
Name: Jose Sosa      : 1940      MRN: [id number]  Encounter Provider: Garrett Castorena MD  Encounter Date: 2022   Encounter department: [facility] PRIMARY CARE    Assessment & Plan     1  Bronchitis  Assessment & Plan:  Improving, but taking a bit  No change at the moment  2  Chronic combined systolic and diastolic congestive heart failure [facility])  Assessment & Plan:  Wt Readings from Last 3 Encounters:   22 116 kg (256 lb)   22 115 kg (253 lb 9 6 oz)   22 116 kg (255 lb)       Stable  Echo scheduled  Follow with Cardiology  3  Paroxysmal atrial fibrillation [facility])  Assessment & Plan:  Patient does have a cardiac echo scheduled in the near future  Will follow-up with Cardiology after that  After cardioversion, the patient does use his kardia monitor on occasion and notes some minor irregular heartbeats  Nothing specific with AFib, but irregular heart rate  4  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 [facility])  Assessment & Plan:  Wt Readings from Last 3 Encounters:   22 116 kg (256 lb)   22 115 kg (253 lb 9 6 oz)   22 116 kg (255 lb)       Blood pressure is OK  No changes  5  Cardiomyopathy, ischemic  Assessment & Plan:  Stable  Follow-up with Cardiology  Subjective      Chief Complaint   Patient presents with   • Follow-up     Patient present today for his 2 week follow up  Pt states he feels 95 % better but still has a bit of a cough  Patient feels he is much better than what he was at the last visit  Still some nasal symptoms and some minor congestion in morning  Clears up by the time he has breakfast   Some cough as well  Review of Systems   Constitutional: Negative  HENT: Negative  Respiratory: Positive for cough  Negative for shortness of breath and wheezing  Cardiovascular: Negative  Gastrointestinal: Negative  Musculoskeletal:        Knee pain         Current Outpatient Medications on File Prior to Visit   Medication Sig   • acetaminophen (TYLENOL) 500 mg tablet Take 500 mg by mouth   • Alirocumab (Praluent) 75 MG/ML SOAJ Inject 1 mL under the skin every 14 (fourteen) days   • ascorbic acid (VITAMIN C) 500 MG tablet Take 500 mg by mouth daily   • aspirin (ECOTRIN LOW STRENGTH) 81 mg EC tablet Take 1 tablet by mouth daily   • Cholecalciferol (VITAMIN D) 2000 units CAPS Take by mouth   • Coenzyme Q10 (CO Q 10) 100 MG CAPS Take by mouth   • furosemide (LASIX) 40 mg tablet Take by mouth 2 (two) times a day Patient takes 1/2 tab BID   • isosorbide mononitrate (IMDUR) 60 mg 24 hr tablet Take 1 tablet by mouth daily   • losartan (COZAAR) 25 mg tablet Take 0 5 tablets by mouth daily   • Magnesium 400 MG TABS Take by mouth   • metoprolol succinate (TOPROL-XL) 50 mg 24 hr tablet    • Multiple Vitamins-Minerals (MULTIVITAMIN ADULT PO) Take 1 capsule by mouth   • nitroglycerin (NITROSTAT) 0 4 mg SL tablet Place 1 tablet under the tongue every 5 (five) minutes as needed   • rivaroxaban (XARELTO) 20 mg tablet Take 1 tablet (20 mg total) by mouth daily with dinner   • TURMERIC PO Take 1 Dose by mouth   • metoprolol succinate (TOPROL-XL) 50 mg 24 hr tablet Take 50 mg by mouth daily   • [DISCONTINUED] methylPREDNISolone 4 MG tablet therapy pack Use as directed on package       Objective     /76 (BP Location: Right arm, Patient Position: Sitting, Cuff Size: Large)   Pulse 82   Ht 6' 1" (1 854 m)   Wt 116 kg (256 lb)   SpO2 99%   BMI 33 78 kg/m²     Physical Exam  Vitals and nursing note reviewed  Constitutional:       Appearance: He is well-developed and well-nourished  HENT:      Head: Normocephalic and atraumatic  Cardiovascular:      Rate and Rhythm: Normal rate and regular rhythm  Pulses:           Carotid pulses are 2+ on the right side and 2+ on the left side  Heart sounds: Normal heart sounds   No murmur heard     No friction rub  No gallop  Pulmonary:      Effort: Pulmonary effort is normal  No respiratory distress  Breath sounds: Normal breath sounds  No wheezing or rales  Musculoskeletal:      Cervical back: Normal range of motion and neck supple         Farideh Jacobsen MD

## 2022-11-28 NOTE — PATIENT INSTRUCTIONS
1  Bronchitis  Assessment & Plan:  Improving, but taking a bit  No change at the moment  2  Chronic combined systolic and diastolic congestive heart failure [facility])  Assessment & Plan:  Wt Readings from Last 3 Encounters:   11/28/22 116 kg (256 lb)   11/17/22 115 kg (253 lb 9 6 oz)   11/11/22 116 kg (255 lb)       Stable  Echo scheduled  Follow with Cardiology  3  Paroxysmal atrial fibrillation [facility])  Assessment & Plan:  Patient does have a cardiac echo scheduled in the near future  Will follow-up with Cardiology after that  After cardioversion, the patient does use his kardia monitor on occasion and notes some minor irregular heartbeats  Nothing specific with AFib, but irregular heart rate  4  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 [facility])  Assessment & Plan:  Wt Readings from Last 3 Encounters:   11/28/22 116 kg (256 lb)   11/17/22 115 kg (253 lb 9 6 oz)   11/11/22 116 kg (255 lb)       Blood pressure is OK  No changes  5  Cardiomyopathy, ischemic  Assessment & Plan:  Stable  Follow-up with Cardiology  COVID 19 Instructions    Sapphire Radha was advised to limit contact with others to essential tasks such as getting food, medications, and medical care  Proper handwashing reviewed, and Hand sanitzer when washing is not available  If the patient develops symptoms of COVID 19, the patient should call the office as soon as possible  For 5384-6687 Flu season, it is strongly recommended that Flu Vaccinations be obtained  Virtual Visits:  Ayanna: This works on smart phones (any phone with Internet browsing capability)  You should get a text message when the provider is ready to see you  Click on the link in the text message, and the call should start  You will need to type in your name, and allow camera and microphone access  This is HIPPA compliant, and secure        If you have not already done so, get immunized to COVID 19  We are committed to getting you vaccinated as soon as possible and will be closely following CDC and [facility]  guidelines as they are released and revised  Please refer to our COVID-19 vaccine webpage for the most up to date information on the vaccine and our distribution efforts  This site will also have the most up to date recommendations for COVID booster vaccine  Mikhail subramanian    Call [phone] ([phone]), option [phone]    OUR NEW LOCATION:    [address], [address] [place] [address] [address] [place], [place], [address]  Fax: [phone]    Lab services and OB/GYN are at this location as well

## 2022-11-28 NOTE — ASSESSMENT & PLAN NOTE
Wt Readings from Last 3 Encounters:   11/28/22 116 kg (256 lb)   11/17/22 115 kg (253 lb 9 6 oz)   11/11/22 116 kg (255 lb)       Blood pressure is OK  No changes

## 2022-11-29 ENCOUNTER — HOSPITAL ENCOUNTER (OUTPATIENT)
Dept: NON INVASIVE DIAGNOSTICS | Facility: CLINIC | Age: 82
Discharge: HOME | End: 2022-11-29

## 2022-11-29 VITALS
DIASTOLIC BLOOD PRESSURE: 62 MMHG | SYSTOLIC BLOOD PRESSURE: 108 MMHG | BODY MASS INDEX: 33.53 KG/M2 | HEART RATE: 78 BPM | HEIGHT: 73 IN | WEIGHT: 253 LBS

## 2022-11-29 DIAGNOSIS — I50.42 CHRONIC COMBINED SYSTOLIC AND DIASTOLIC CONGESTIVE HEART FAILURE (HCC): ICD-10-CM

## 2022-11-29 LAB
AORTIC ROOT: 4.4 CM
APICAL FOUR CHAMBER EJECTION FRACTION: 49 %
ASCENDING AORTA: 4.3 CM
DOP CALC LVOT AREA: 5.31 CM2
DOP CALC LVOT DIAMETER: 2.6 CM
E WAVE DECELERATION TIME: 190 MS
FRACTIONAL SHORTENING: 22 % (ref 28–44)
INTERVENTRICULAR SEPTUM IN DIASTOLE (PARASTERNAL SHORT AXIS VIEW): 1.3 CM
INTERVENTRICULAR SEPTUM: 1.3 CM (ref 0.6–1.1)
LAAS-AP2: 31.9 CM2
LAAS-AP4: 24.7 CM2
LEFT ATRIUM SIZE: 5.1 CM
LEFT INTERNAL DIMENSION IN SYSTOLE: 4.9 CM (ref 2.1–4)
LEFT VENTRICLE DIASTOLIC VOLUME (MOD BIPLANE): 239 ML
LEFT VENTRICLE SYSTOLIC VOLUME (MOD BIPLANE): 132 ML
LEFT VENTRICULAR INTERNAL DIMENSION IN DIASTOLE: 6.3 CM (ref 3.5–6)
LEFT VENTRICULAR POSTERIOR WALL IN END DIASTOLE: 1.3 CM
LEFT VENTRICULAR STROKE VOLUME: 88 ML
LV EF: 45 %
LVSV (TEICH): 88 ML
MV E'TISSUE VEL-SEP: 7 CM/S
MV PEAK A VEL: 0.85 M/S
MV PEAK E VEL: 77 CM/S
MV STENOSIS PRESSURE HALF TIME: 55 MS
MV VALVE AREA P 1/2 METHOD: 4 CM2
RIGHT ATRIUM AREA SYSTOLE A4C: 21.7 CM2
RIGHT VENTRICLE ID DIMENSION: 3.7 CM
SL CV LEFT ATRIUM LENGTH A2C: 6.3 CM
SL CV LV EF: 40
SL CV PED ECHO LEFT VENTRICLE DIASTOLIC VOLUME (MOD BIPLANE) 2D: 199 ML
SL CV PED ECHO LEFT VENTRICLE SYSTOLIC VOLUME (MOD BIPLANE) 2D: 111 ML
TR MAX PG: 22 MMHG
TR PEAK VELOCITY: 2.4 M/S
TRICUSPID VALVE PEAK REGURGITATION VELOCITY: 2.37 M/S

## 2023-01-18 ENCOUNTER — RA CDI HCC (OUTPATIENT)
Dept: OTHER | Facility: HOSPITAL | Age: 83
End: 2023-01-18

## 2023-01-18 NOTE — PROGRESS NOTES
I77 810, I11 0  [facility] 75  coding opportunities          Chart Reviewed number of suggestions sent to Provider: 2     Patients Insurance     Medicare Insurance: [employer]

## 2023-01-25 ENCOUNTER — CONSULT (OUTPATIENT)
Dept: FAMILY MEDICINE CLINIC | Facility: CLINIC | Age: 83
End: 2023-01-25

## 2023-01-25 VITALS
SYSTOLIC BLOOD PRESSURE: 140 MMHG | BODY MASS INDEX: 33.93 KG/M2 | OXYGEN SATURATION: 98 % | TEMPERATURE: 97.1 F | WEIGHT: 256 LBS | RESPIRATION RATE: 18 BRPM | DIASTOLIC BLOOD PRESSURE: 86 MMHG | HEIGHT: 73 IN | HEART RATE: 71 BPM

## 2023-01-25 DIAGNOSIS — I11.0 HYPERTENSIVE HEART DISEASE WITH CHRONIC COMBINED SYSTOLIC AND DIASTOLIC CONGESTIVE HEART FAILURE (HCC): ICD-10-CM

## 2023-01-25 DIAGNOSIS — I20.8 STABLE ANGINA (HCC): ICD-10-CM

## 2023-01-25 DIAGNOSIS — K63.5 POLYP OF COLON, UNSPECIFIED PART OF COLON, UNSPECIFIED TYPE: ICD-10-CM

## 2023-01-25 DIAGNOSIS — M17.0 PRIMARY OSTEOARTHRITIS OF BOTH KNEES: ICD-10-CM

## 2023-01-25 DIAGNOSIS — I50.42 CHRONIC COMBINED SYSTOLIC AND DIASTOLIC CONGESTIVE HEART FAILURE (HCC): ICD-10-CM

## 2023-01-25 DIAGNOSIS — Z95.810 ICD (IMPLANTABLE CARDIOVERTER-DEFIBRILLATOR) IN PLACE: ICD-10-CM

## 2023-01-25 DIAGNOSIS — Z01.818 PRE-OP EXAMINATION: Primary | ICD-10-CM

## 2023-01-25 DIAGNOSIS — I48.0 PAROXYSMAL ATRIAL FIBRILLATION (HCC): ICD-10-CM

## 2023-01-25 DIAGNOSIS — I25.5 CARDIOMYOPATHY, ISCHEMIC: ICD-10-CM

## 2023-01-25 DIAGNOSIS — I25.10 3-VESSEL CAD: ICD-10-CM

## 2023-01-25 DIAGNOSIS — R73.9 HYPERGLYCEMIA: ICD-10-CM

## 2023-01-25 DIAGNOSIS — K57.90 DIVERTICULOSIS: ICD-10-CM

## 2023-01-25 DIAGNOSIS — I50.42 HYPERTENSIVE HEART DISEASE WITH CHRONIC COMBINED SYSTOLIC AND DIASTOLIC CONGESTIVE HEART FAILURE (HCC): ICD-10-CM

## 2023-01-25 PROBLEM — Z78.9 STATIN INTOLERANCE: Status: ACTIVE | Noted: 2022-11-14

## 2023-01-25 RX ORDER — SACUBITRIL AND VALSARTAN 24; 26 MG/1; MG/1
1 TABLET, FILM COATED ORAL 2 TIMES DAILY
COMMUNITY
Start: 2023-01-10 | End: 2023-01-25 | Stop reason: SINTOL

## 2023-01-25 NOTE — PROGRESS NOTES
Presurgical Evaluation    Subjective:      Patient ID: Heaven Cline is a [de-identified] y o  male  Chief Complaint   Patient presents with   • Pre-op Exam   • Medicare Wellness Visit       Patient does have significant osteoarthritis symptoms  Has been going on for a while now  This will be of his right knee  He did see orthopedics who recommended knee replacement  Cardiology had seen him as well secondary to his heart failure  They wanted him to be on Entresto, and to be on that for a while prior to having the surgery to make sure that the ejection fraction improved  Unfortunately, he is no longer able to take [facility] as he was having severe pains with this  Since that, cardiology recommended that he discontinue Entresto, and restart on losartan  CHF: Please see above  Hypertension: Currently on losartan 25 mg, metoprolol 50 mg  Hyperlipidemia: Currently on Praluent  Atrial fibrillation: Currently on Xarelto  He is also on metoprolol for heart rate control  ICD placement: Patient does have an ICD in place  ICD was placed for cardiomyopathy  Again, following with cardiology  Heart disease: Patient did have MI in the past   Has followed with cardiology  Again, please see discussion of CHF  The following portions of the patient's history were reviewed and updated as appropriate: allergies, current medications, past family history, past medical history, past social history, past surgical history and problem list     Procedure date: 7Feb2023    Surgeon:  John Lloyd  Planned procedure:  Right Knee replacement  Diagnosis for procedure:  DJD    Prior anesthesia: Yes   General; Complications:  None / Tolerated well  MAC; Complications:  None / Tolerated well    CAD History: CAD  CHF  Cardiomyopathy  Arrhythmia  A-Fib   NOTE: Patient should see Cardiology if time available before surgery, and if appropriate      Pulmonary History: COPD    Renal history: CKD stage 3 - GFR 30-59    Diabetes History:  None     Neurological History: None     On Immunosuppressant meds/biologics: No      Review of Systems   Constitutional: Negative  HENT: Negative  Musculoskeletal: Positive for arthralgias and gait problem  All other systems reviewed and are negative  Current Outpatient Medications   Medication Sig Dispense Refill   • acetaminophen (TYLENOL) 500 mg tablet Take 500 mg by mouth     • Alirocumab (Praluent) 75 MG/ML SOAJ Inject 1 mL under the skin every 14 (fourteen) days     • ascorbic acid (VITAMIN C) 500 MG tablet Take 500 mg by mouth daily     • aspirin (ECOTRIN LOW STRENGTH) 81 mg EC tablet Take 1 tablet by mouth daily     • Cholecalciferol (VITAMIN D) 2000 units CAPS Take by mouth     • Coenzyme Q10 (CO Q 10) 100 MG CAPS Take by mouth     • furosemide (LASIX) 40 mg tablet Take by mouth 2 (two) times a day Patient takes 1/2 tab BID     • isosorbide mononitrate (IMDUR) 60 mg 24 hr tablet Take 1 tablet by mouth daily     • losartan (COZAAR) 25 mg tablet Take 0 5 tablets by mouth daily     • Magnesium 400 MG TABS Take by mouth     • metoprolol succinate (TOPROL-XL) 50 mg 24 hr tablet      • Multiple Vitamins-Minerals (MULTIVITAMIN ADULT PO) Take 1 capsule by mouth     • nitroglycerin (NITROSTAT) 0 4 mg SL tablet Place 1 tablet under the tongue every 5 (five) minutes as needed     • rivaroxaban (XARELTO) 20 mg tablet Take 1 tablet (20 mg total) by mouth daily with dinner 28 tablet 0   • TURMERIC PO Take 1 Dose by mouth     • metoprolol succinate (TOPROL-XL) 50 mg 24 hr tablet Take 50 mg by mouth daily       No current facility-administered medications for this visit         Allergies on file:   Atorvastatin, Bee venom, Iodinated contrast media, Lisinopril, Perflutren lipid microsphere, Perflutren lipid microspheres, Ranolazine, Rosuvastatin, Sacubitril-valsartan, and Simvastatin    Patient Active Problem List   Diagnosis   • 3-vessel CAD   • Acute low back pain without sciatica   • Cardiomyopathy, ischemic   • Calculus of gallbladder and bile duct with acute on chronic cholecystitis   • CHF (congestive heart failure) (HCC)   • Disc degeneration, lumbar   • Diverticulosis   • Hyperlipidemia   • Hyperglycemia   • Hemorrhoids   • Hypertensive heart disease with congestive heart failure (HCC)   • Hypothyroidism   • History of non-ST elevation myocardial infarction (NSTEMI)   • Obese   • Atrial fibrillation (HCC)   • Prostate nodule   • S/P PTCA (percutaneous transluminal coronary angioplasty)   • Spinal stenosis   • Chronic maxillary sinusitis   • Renal cyst   • Cataract   • Osteoarthritis   • Thoracic back pain   • Anticoagulated by anticoagulation treatment   • Rectal bleeding   • Stable angina (HCC)   • Colon polyp   • SOB (shortness of breath)   • Bronchitis   • ICD (implantable cardioverter-defibrillator) in place   • Statin intolerance        Past Medical History:   Diagnosis Date   • Bleeding hemorrhoids    • Choledocholithiasis    • COVID-19 5/23/2022    Symptoms onset 5/20/22   • Difficulty breathing    • Diverticulosis    • Ileus ([facility])    • Lymphadenopathy of head and neck 9/29/2020       Past Surgical History:   Procedure Laterality Date   • APPENDECTOMY     • CHOLECYSTECTOMY LAPAROSCOPIC     • CORONARY ANGIOPLASTY     • CORONARY ANGIOPLASTY WITH STENT PLACEMENT     • CORONARY ARTERY BYPASS GRAFT     • ERCP     • TONSILLECTOMY         Family History   Problem Relation Age of Onset   • Lung cancer Mother    • Coronary artery disease Father    • Diabetes Brother    • Lung cancer Family        Social History     Tobacco Use   • Smoking status: Never   • Smokeless tobacco: Never   Substance Use Topics   • Alcohol use: Not Currently     Comment: social   • Drug use: No       Objective:    Vitals:    01/25/23 1451   BP: 140/86   BP Location: Left arm   Patient Position: Sitting   Cuff Size: Standard   Pulse: 71   Resp: 18   Temp: (!) 97 1 °F (36 2 °C)   TempSrc: Tympanic   SpO2: 98%   Weight: 116 kg (256 lb)   Height: 6' 1" (1 854 m)        Physical Exam  Vitals and nursing note reviewed  Constitutional:       General: He is not in acute distress  Appearance: He is well-developed  He is not diaphoretic  HENT:      Head: Normocephalic and atraumatic  Right Ear: Hearing, tympanic membrane, ear canal and external ear normal       Left Ear: Hearing, tympanic membrane, ear canal and external ear normal       Nose: Nose normal       Right Sinus: No maxillary sinus tenderness or frontal sinus tenderness  Left Sinus: No maxillary sinus tenderness or frontal sinus tenderness  Mouth/Throat:      Pharynx: Uvula midline  No oropharyngeal exudate  Eyes:      General: Lids are everted, no foreign bodies appreciated  Conjunctiva/sclera: Conjunctivae normal       Pupils: Pupils are equal, round, and reactive to light  Neck:      Thyroid: No thyromegaly  Vascular: No carotid bruit  Trachea: No tracheal deviation  Cardiovascular:      Rate and Rhythm: Normal rate and regular rhythm  Pulses:           Carotid pulses are 2+ on the right side and 2+ on the left side  Heart sounds: Normal heart sounds  No murmur heard  No friction rub  No gallop  Pulmonary:      Effort: Pulmonary effort is normal  No respiratory distress  Breath sounds: Normal breath sounds  No wheezing or rales  Abdominal:      General: Bowel sounds are normal  There is no distension  Palpations: Abdomen is soft  Tenderness: There is no abdominal tenderness  Musculoskeletal:      Cervical back: Normal range of motion and neck supple  Lymphadenopathy:      Cervical: No cervical adenopathy  Skin:     General: Skin is warm and dry  Neurological:      Mental Status: He is alert and oriented to person, place, and time  Coordination: Coordination normal    Psychiatric:         Behavior: Behavior normal          Thought Content:  Thought content normal          Judgment: Judgment normal            Preop labs/testing available and reviewed: yes               EKG no  Per Cardiology    Echo no; Per Cardiology    Stress test/cath no; Per Cardiology    PFT/Mk no    Functional capacity: Climb stairs                        4 Mets   Pick the highest level patient can comfortably perform   4 mets or greater for surgery    RCRI  High Risk surgery? 1 Point  CAD History:         1 Point   MI; Positive Stress Test; CP due to Mi;  Nitrate Usage to control Angina; Pathologic Q wave on EKG  CHF Active:         1 Point   Pulm Edema; Paroxysmal Nocturnal Dyspnea;  Bibasilar Rales (crackles);S3; CHF on CXR  Cerebrovascular Disease (TIA or CVA):     1 Point  DM on Insulin:        1 Point  Serum Creat >2 0 mg/dl:       1 Point          Total Points: 2     Scorin: Class I, Very Low Risk (0 4%)     1: Class II, Low risk (0 9%)     2: Class III Moderate (6 6%)     3: Class IV High (>11%)      MARK Risk:  GFR:        For PCP:  If GFR>60, Hold ACE/ARB/Diuretic on the day of surgery, and NSAIDS 10 days before  If GFR<45, Consider PRE and POST op Nephrology Consult  If 46 <GFR> 59 : Has Patient had MARK in last 6 Months? no   If YES: Preop Nephrology consult   If No:  [address] Nephrology consult  Assessment/Plan:    Patient is not medically optimized (cleared) for the planned procedure  Further testing/evaluation is required  Cardiology evaluation  Postop concerns: no    Problem List Items Addressed This Visit     3-vessel CAD     Stable at the moment  Not having any chest pains or pressures  Follow-up with cardiology  Atrial fibrillation ([address] )     She does have atrial fibrillation  Heart rate today was reasonable  Reviewed about anticoagulation: Patient must hold anticoagulant approximately 3 days prior to surgery  This does increase risk of stroke, but it is the only way to continue at this point    Given that he has not had valvular abnormalities or artificial valve, reasonable to hold the Xarelto  Cardiomyopathy, ischemic     Follow with cardiology         CHF (congestive heart failure) ([facility] [address] )     Wt Readings from Last 3 Encounters:   01/25/23 116 kg (256 lb)   11/29/22 115 kg (253 lb)   11/28/22 116 kg (256 lb)   Stable at the moment  Patient was intolerant of Entresto  Back on losartan  Assuming cardiology feels that he is at lower risk for planned surgery, may proceed  Colon polyp     Colonoscopy done in May 2022: Showed 2 polyps  5-year repeat was recommended  Diverticulosis     Noted on colonoscopy, 5/22 at [facility]  Hyperglycemia     Patient did have elevated blood sugar at 1 point in the past   Nothing specific at the moment  Limit carbohydrates  Hypertensive heart disease with congestive heart failure ([facility] [address] )     Wt Readings from Last 3 Encounters:   01/25/23 116 kg (256 lb)   11/29/22 115 kg (253 lb)   11/28/22 116 kg (256 lb)   Blood pressure today is minimally elevated, but overall doing relatively well  Continue to follow-up as scheduled  ICD (implantable cardioverter-defibrillator) in place     Patient does have ICD  Follow with cardiology  Osteoarthritis     Patient does have severe osteoarthritis at the moment  Agree with planned knee replacement, assuming cardiology feels it is reasonable for him to proceed  Labs were reviewed via care everywhere, no significant abnormalities, including kidney function being outstanding  Stable angina (HCC)     Has had angina before  Follow-up with cardiology          Other Visit Diagnoses     Pre-op examination    -  Primary           Diagnoses and all orders for this visit:    Pre-op examination    Primary osteoarthritis of both knees    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 ([facility] [address] )    ICD (implantable cardioverter-defibrillator) in place    Chronic combined systolic and diastolic congestive heart failure (HCC)    Stable angina (HCC)    Polyp of colon, unspecified part of colon, unspecified type    Diverticulosis    3-vessel CAD    Hyperglycemia    Paroxysmal atrial fibrillation (HCC)    Cardiomyopathy, ischemic    Other orders  -     Discontinue: Entresto 24-26 MG TABS; Take 1 tablet by mouth 2 (two) times a day (Patient not taking: Reported on 1/25/2023)        Pre-Surgery Instructions:   Medication Instructions   • acetaminophen (TYLENOL) 500 mg tablet per anesthesia guidelines    • Alirocumab (Praluent) 75 MG/ML SOAJ per anesthesia guidelines    • ascorbic acid (VITAMIN C) 500 MG tablet Stop taking 1 week prior to surgery   • aspirin (ECOTRIN LOW STRENGTH) 81 mg EC tablet Stop taking 1 week prior to surgery   • Cholecalciferol (VITAMIN D) 2000 units CAPS per anesthesia guidelines    • Coenzyme Q10 (CO Q 10) 100 MG CAPS per anesthesia guidelines    • furosemide (LASIX) 40 mg tablet per anesthesia guidelines    • isosorbide mononitrate (IMDUR) 60 mg 24 hr tablet per anesthesia guidelines    • losartan (COZAAR) 25 mg tablet per anesthesia guidelines    • Magnesium 400 MG TABS per anesthesia guidelines    • metoprolol succinate (TOPROL-XL) 50 mg 24 hr tablet per anesthesia guidelines    • Multiple Vitamins-Minerals (MULTIVITAMIN ADULT PO) Stop taking 1 week prior to surgery   • nitroglycerin (NITROSTAT) 0 4 mg SL tablet per anesthesia guidelines    • rivaroxaban (XARELTO) 20 mg tablet Stop taking 3 days prior to surgery   • TURMERIC PO Stop taking 1 week prior to surgery        NOTE: Please use the above to review important meds for your specialty, the remainder "per anesthesia Guidelines "    NOTE: Please place an Inbasket message for "[facility]" pool for complicated patients      I have spent 60 minutes with Patient and family today in which greater than 50% of this time was spent in counseling/coordination of care regarding Diagnostic results, Prognosis, Risks and benefits of tx options, Intructions for management, Patient and family education, Importance of tx compliance, Risk factor reductions and Impressions

## 2023-01-25 NOTE — PROGRESS NOTES
Assessment and Plan:     BMI Counseling: Body mass index is 33 78 kg/m²  The BMI is above normal  Nutrition recommendations include decreasing portion sizes, encouraging healthy choices of fruits and vegetables, decreasing fast food intake, consuming healthier snacks, limiting drinks that contain sugar, moderation in carbohydrate intake, increasing intake of lean protein, reducing intake of saturated and trans fat and reducing intake of cholesterol  Exercise recommendations include exercising 3-5 times per week  No pharmacotherapy was ordered  Rationale for BMI follow-up plan is due to patient being overweight or obese  Depression Screening and Follow-up Plan: Patient was screened for depression during today's encounter  They screened negative with a PHQ-2 score of 0  Preventive health issues were discussed with patient, and age appropriate screening tests were ordered as noted in patient's After Visit Summary  Personalized health advice and appropriate referrals for health education or preventive services given if needed, as noted in patient's After Visit Summary  History of Present Illness:     Patient presents for a Medicare Wellness Visit    Patient does have significant osteoarthritis symptoms  Has been going on for a while now  This will be of his right knee  He did see orthopedics who recommended knee replacement  Cardiology had seen him as well secondary to his heart failure  They wanted him to be on Entresto, and to be on that for a while prior to having the surgery to make sure that the ejection fraction improved  Unfortunately, he is no longer able to take [facility] as he was having severe pains with this  Since that, cardiology recommended that he discontinue Entresto, and restart on losartan  CHF: Please see above  Hypertension: Currently on losartan 25 mg, metoprolol 50 mg  Hyperlipidemia: Currently on Praluent  Atrial fibrillation: Currently on Xarelto    He is also on metoprolol for heart rate control  ICD placement: Patient does have an ICD in place  ICD was placed for cardiomyopathy  Again, following with cardiology  Heart disease: Patient did have MI in the past   Has followed with cardiology  Again, please see discussion of CHF  Patient Care Team:  Seema Franklin MD as PCP - General (Family Medicine)  Seema Franklin MD as PCP - [address] [place] [address] (RTE)  [facility]     Review of Systems:     Review of Systems   Constitutional: Negative  HENT: Negative  Eyes: Negative  Respiratory: Negative  Cardiovascular: Negative  Gastrointestinal: Negative  Endocrine: Negative  Genitourinary: Negative  Musculoskeletal: Positive for arthralgias  Skin: Negative  Allergic/Immunologic: Negative  Neurological: Negative  Hematological: Negative  Psychiatric/Behavioral: Negative           Problem List:     Patient Active Problem List   Diagnosis   • 3-vessel CAD   • Acute low back pain without sciatica   • Cardiomyopathy, ischemic   • Calculus of gallbladder and bile duct with acute on chronic cholecystitis   • CHF (congestive heart failure) ([facility])   • Disc degeneration, lumbar   • Diverticulosis   • Hyperlipidemia   • Hyperglycemia   • Hemorrhoids   • Hypertensive heart disease with congestive heart failure ([facility] [address] )   • Hypothyroidism   • History of non-ST elevation myocardial infarction (NSTEMI)   • Obese   • Atrial fibrillation ([facility])   • Prostate nodule   • S/P PTCA (percutaneous transluminal coronary angioplasty)   • Spinal stenosis   • Chronic maxillary sinusitis   • Renal cyst   • Cataract   • Osteoarthritis   • Thoracic back pain   • Anticoagulated by anticoagulation treatment   • Rectal bleeding   • Stable angina ([facility])   • Colon polyp   • SOB (shortness of breath)   • Bronchitis   • ICD (implantable cardioverter-defibrillator) in place   • Statin intolerance      Past Medical and Surgical History: Past Medical History:   Diagnosis Date   • Bleeding hemorrhoids    • Choledocholithiasis    • COVID-19 5/23/2022    Symptoms onset 5/20/22   • Difficulty breathing    • Diverticulosis    • Ileus (HCC)    • Lymphadenopathy of head and neck 9/29/2020     Past Surgical History:   Procedure Laterality Date   • APPENDECTOMY     • CHOLECYSTECTOMY LAPAROSCOPIC     • CORONARY ANGIOPLASTY     • CORONARY ANGIOPLASTY WITH STENT PLACEMENT     • CORONARY ARTERY BYPASS GRAFT     • ERCP     • TONSILLECTOMY        Family History:     Family History   Problem Relation Age of Onset   • Lung cancer Mother    • Coronary artery disease Father    • Diabetes Brother    • Lung cancer Family       Social History:     Social History     Socioeconomic History   • Marital status: /Civil Union     Spouse name: None   • Number of children: None   • Years of education: None   • Highest education level: None   Occupational History   • Occupation: retired   Tobacco Use   • Smoking status: Never   • Smokeless tobacco: Never   Substance and Sexual Activity   • Alcohol use: Not Currently     Comment: social   • Drug use: No   • Sexual activity: None   Other Topics Concern   • None   Social History Narrative   • None     Social Determinants of Health     Financial Resource Strain: Low Risk    • Difficulty of Paying Living Expenses: Not hard at all   Food Insecurity: Not on file   Transportation Needs: No Transportation Needs   • Lack of Transportation (Medical): No   • Lack of Transportation (Non-Medical):  No   Physical Activity: Not on file   Stress: Not on file   Social Connections: Not on file   Intimate Partner Violence: Not on file   Housing Stability: Not on file      Medications and Allergies:     Current Outpatient Medications   Medication Sig Dispense Refill   • acetaminophen (TYLENOL) 500 mg tablet Take 500 mg by mouth     • Alirocumab (Praluent) 75 MG/ML SOAJ Inject 1 mL under the skin every 14 (fourteen) days     • ascorbic acid (VITAMIN C) 500 MG tablet Take 500 mg by mouth daily     • aspirin (ECOTRIN LOW STRENGTH) 81 mg EC tablet Take 1 tablet by mouth daily     • Cholecalciferol (VITAMIN D) 2000 units CAPS Take by mouth     • Coenzyme Q10 (CO Q 10) 100 MG CAPS Take by mouth     • furosemide (LASIX) 40 mg tablet Take by mouth 2 (two) times a day Patient takes 1/2 tab BID     • isosorbide mononitrate (IMDUR) 60 mg 24 hr tablet Take 1 tablet by mouth daily     • losartan (COZAAR) 25 mg tablet Take 0 5 tablets by mouth daily     • Magnesium 400 MG TABS Take by mouth     • metoprolol succinate (TOPROL-XL) 50 mg 24 hr tablet      • Multiple Vitamins-Minerals (MULTIVITAMIN ADULT PO) Take 1 capsule by mouth     • nitroglycerin (NITROSTAT) 0 4 mg SL tablet Place 1 tablet under the tongue every 5 (five) minutes as needed     • rivaroxaban (XARELTO) 20 mg tablet Take 1 tablet (20 mg total) by mouth daily with dinner 28 tablet 0   • TURMERIC PO Take 1 Dose by mouth     • metoprolol succinate (TOPROL-XL) 50 mg 24 hr tablet Take 50 mg by mouth daily       No current facility-administered medications for this visit  Allergies   Allergen Reactions   • Atorvastatin Cough     Per pt has had a problem with other statins also, does not remember names   • Bee Venom    • Iodinated Contrast Media Other (See Comments)   • Lisinopril Other (See Comments)     cough   • Perflutren Lipid Microsphere    • Perflutren Lipid Microspheres Other (See Comments)     severe back lower back spasm   • Ranolazine      Alters mood  • Rosuvastatin Myalgia   • Sacubitril-Valsartan Other (See Comments)     "severe back pain"   • Simvastatin Myalgia      Immunizations:     Immunization History   Administered Date(s) Administered   • Influenza, seasonal, injectable 1940   • TD (adult) Preservative Free 01/01/2007      Health Maintenance: There are no preventive care reminders to display for this patient        Topic Date Due   • COVID-19 Vaccine (1) Never done   • Pneumococcal Vaccine: 65+ Years (1 - PCV) Never done   • Influenza Vaccine (1) 09/01/2022      Medicare Screening Tests and Risk Assessments:     Andree Best is here for his Subsequent Wellness visit  Health Risk Assessment:   Patient rates overall health as good  Patient feels that their physical health rating is slightly better  Patient is satisfied with their life  Eyesight was rated as same  Hearing was rated as same  Patient feels that their emotional and mental health rating is slightly better  Patients states they are never, rarely angry  Patient states they are often unusually tired/fatigued  Pain experienced in the last 7 days has been some  Patient's pain rating has been 7/10  Patient states that he has experienced no weight loss or gain in last 6 months  Depression Screening:   PHQ-2 Score: 0      Fall Risk Screening: In the past year, patient has experienced: no history of falling in past year      Home Safety:  Patient has trouble with stairs inside or outside of their home  Patient has working smoke alarms and has working carbon monoxide detector  Home safety hazards include: none  Nutrition:   Current diet is Low Cholesterol and No Added Salt  Medications:   Patient is currently taking over-the-counter supplements  OTC medications include: see medication list  Patient is able to manage medications  Activities of Daily Living (ADLs)/Instrumental Activities of Daily Living (IADLs):   Walk and transfer into and out of bed and chair?: Yes  Dress and groom yourself?: Yes    Bathe or shower yourself?: Yes    Feed yourself?  Yes  Do your laundry/housekeeping?: Yes  Manage your money, pay your bills and track your expenses?: Yes  Make your own meals?: Yes    Do your own shopping?: Yes    Previous Hospitalizations:   Any hospitalizations or ED visits within the last 12 months?: Yes    How many hospitalizations have you had in the last year?: 1-2    Advance Care Planning:   Living will: No Durable POA for healthcare: No    Advanced directive: No    Advanced directive counseling given: Yes    Five wishes given: Yes      Comments: Reviewed advanced directives  Cognitive Screening:   Provider or family/friend/caregiver concerned regarding cognition?: No    PREVENTIVE SCREENINGS      Cardiovascular Screening:    General: Screening Not Indicated and History Lipid Disorder      Diabetes Screening:     General: Screening Current      Colorectal Cancer Screening:     General: Screening Current      Prostate Cancer Screening:    General: Screening Not Indicated      Osteoporosis Screening:    General: Screening Not Indicated      Abdominal Aortic Aneurysm (AAA) Screening:        General: Screening Not Indicated      Lung Cancer Screening:     General: Screening Not Indicated      Hepatitis C Screening:    General: Screening Not Indicated    Screening, Brief Intervention, and Referral to Treatment (SBIRT)    Screening  Typical number of drinks in a day: 0  Typical number of drinks in a week: 0  Interpretation: Low risk drinking behavior  Single Item Drug Screening:  How often have you used an illegal drug (including marijuana) or a prescription medication for non-medical reasons in the past year? never    Single Item Drug Screen Score: 0  Interpretation: Negative screen for possible drug use disorder    No results found  Physical Exam:     /86 (BP Location: Left arm, Patient Position: Sitting, Cuff Size: Standard)   Pulse 71   Temp (!) 97 1 °F (36 2 °C) (Tympanic)   Resp 18   Ht 6' 1" (1 854 m)   Wt 116 kg (256 lb)   SpO2 98%   BMI 33 78 kg/m²     Physical Exam  Vitals and nursing note reviewed  Exam conducted with a chaperone present  Constitutional:       General: He is not in acute distress  Appearance: Normal appearance  He is normal weight  He is not ill-appearing, toxic-appearing or diaphoretic  HENT:      Head: Normocephalic        Right Ear: Tympanic membrane, ear canal and external ear normal  There is no impacted cerumen  Left Ear: Tympanic membrane, ear canal and external ear normal  There is no impacted cerumen  Nose: Nose normal    Eyes:      General: No scleral icterus  Right eye: No discharge  Left eye: No discharge  Extraocular Movements: Extraocular movements intact  Conjunctiva/sclera: Conjunctivae normal       Pupils: Pupils are equal, round, and reactive to light  Cardiovascular:      Rate and Rhythm: Normal rate and regular rhythm  Pulses: Normal pulses  Heart sounds: No murmur heard  No friction rub  No gallop  Pulmonary:      Effort: Pulmonary effort is normal  No respiratory distress  Breath sounds: Normal breath sounds  No stridor  No wheezing, rhonchi or rales  Abdominal:      General: Abdomen is flat  Bowel sounds are normal  There is no distension  Palpations: There is no mass  Tenderness: There is no abdominal tenderness  There is no guarding or rebound  Musculoskeletal:         General: Normal range of motion  Cervical back: Normal range of motion  Skin:     General: Skin is warm and dry  Capillary Refill: Capillary refill takes less than 2 seconds  Coloration: Skin is not jaundiced  Findings: No bruising or erythema  Neurological:      General: No focal deficit present  Mental Status: He is alert and oriented to person, place, and time  Mental status is at baseline  Cranial Nerves: No cranial nerve deficit  Sensory: No sensory deficit  Motor: No weakness  Coordination: Coordination normal       Gait: Gait normal       Deep Tendon Reflexes: Reflexes normal    Psychiatric:         Mood and Affect: Mood normal          Behavior: Behavior normal          Thought Content:  Thought content normal          Judgment: Judgment normal           Daya Amezcua MD

## 2023-01-25 NOTE — ASSESSMENT & PLAN NOTE
Patient does have severe osteoarthritis at the moment  Agree with planned knee replacement, assuming cardiology feels it is reasonable for him to proceed  Labs were reviewed via care everywhere, no significant abnormalities, including kidney function being outstanding

## 2023-01-25 NOTE — ASSESSMENT & PLAN NOTE
Patient did have elevated blood sugar at 1 point in the past   Nothing specific at the moment  Limit carbohydrates

## 2023-01-25 NOTE — PATIENT INSTRUCTIONS
Medicare Preventive Visit Patient Instructions  Thank you for completing your Welcome to Medicare Visit or Medicare Annual Wellness Visit today  Your next wellness visit will be due in one year (1/26/2024)  The screening/preventive services that you may require over the next 5-10 years are detailed below  Some tests may not apply to you based off risk factors and/or age  Screening tests ordered at today's visit but not completed yet may show as past due  Also, please note that scanned in results may not display below  Preventive Screenings:  Service Recommendations Previous Testing/Comments   Colorectal Cancer Screening  Colonoscopy    Fecal Occult Blood Test (FOBT)/Fecal Immunochemical Test (FIT)  Fecal DNA/Cologuard Test  Flexible Sigmoidoscopy Age: 39-70 years old   Colonoscopy: every 10 years (May be performed more frequently if at higher risk)  OR  FOBT/FIT: every 1 year  OR  Cologuard: every 3 years  OR  Sigmoidoscopy: every 5 years  Screening may be recommended earlier than age 39 if at higher risk for colorectal cancer  Also, an individualized decision between you and your healthcare provider will decide whether screening between the ages of 74-80 would be appropriate   Colonoscopy: Not on file  FOBT/FIT: Not on file  Cologuard: Not on file  Sigmoidoscopy: Not on file          Prostate Cancer Screening Individualized decision between patient and health care provider in men between ages of 53-78   Medicare will cover every 12 months beginning on the day after your 50th birthday PSA: No results in last 5 years     Screening Not Indicated     Hepatitis C Screening Once for adults born between [facility]  More frequently in patients at high risk for Hepatitis C Hep C Antibody: Not on file        Diabetes Screening 1-2 times per year if you're at risk for diabetes or have pre-diabetes Fasting glucose: No results in last 5 years (No results in last 5 years)  A1C: No results in last 5 years (No results in last 5 years)      Cholesterol Screening Once every 5 years if you don't have a lipid disorder  May order more often based on risk factors  Lipid panel: 01/06/2023  Screening Not Indicated  History Lipid Disorder      Other Preventive Screenings Covered by Medicare:  Abdominal Aortic Aneurysm (AAA) Screening: covered once if your at risk  You're considered to be at risk if you have a family history of AAA or a male between the age of 73-68 who smoking at least 100 cigarettes in your lifetime  Lung Cancer Screening: covers low dose CT scan once per year if you meet all of the following conditions: (1) Age 50-69; (2) No signs or symptoms of lung cancer; (3) Current smoker or have quit smoking within the last 15 years; (4) You have a tobacco smoking history of at least 20 pack years (packs per day x number of years you smoked); (5) You get a written order from a healthcare provider  Glaucoma Screening: covered annually if you're considered high risk: (1) You have diabetes OR (2) Family history of glaucoma OR (3)  aged 48 and older OR (3)  American aged 72 and older  Osteoporosis Screening: covered every 2 years if you meet one of the following conditions: (1) Have a vertebral abnormality; (2) On glucocorticoid therapy for more than 3 months; (3) Have primary hyperparathyroidism; (4) On osteoporosis medications and need to assess response to drug therapy  HIV Screening: covered annually if you're between the age of 12-76  Also covered annually if you are younger than 13 and older than 72 with risk factors for HIV infection  For pregnant patients, it is covered up to 3 times per pregnancy      Immunizations:  Immunization Recommendations   Influenza Vaccine Annual influenza vaccination during flu season is recommended for all persons aged >= 6 months who do not have contraindications   Pneumococcal Vaccine   * Pneumococcal conjugate vaccine = PCV13 (Prevnar 13), PCV15 (Vaxneuvance), PCV20 (Prevnar 20)  * Pneumococcal polysaccharide vaccine = PPSV23 (Pneumovax) Adults 2364 years old: 1-3 doses may be recommended based on certain risk factors  Adults 72 years old: 1-2 doses may be recommended based off what pneumonia vaccine you previously received   Hepatitis B Vaccine 3 dose series if at intermediate or high risk (ex: diabetes, end stage renal disease, liver disease)   Tetanus (Td) Vaccine - COST NOT COVERED BY MEDICARE PART B Following completion of primary series, a booster dose should be given every 10 years to maintain immunity against tetanus  Td may also be given as tetanus wound prophylaxis  Tdap Vaccine - COST NOT COVERED BY MEDICARE PART B Recommended at least once for all adults  For pregnant patients, recommended with each pregnancy  Shingles Vaccine (Shingrix) - COST NOT COVERED BY MEDICARE PART B  2 shot series recommended in those aged 48 and above     Health Maintenance Due:  There are no preventive care reminders to display for this patient  Immunizations Due:      Topic Date Due    COVID-19 Vaccine (1) Never done    Pneumococcal Vaccine: 65+ Years (1 - PCV) Never done    Influenza Vaccine (1) 09/01/2022     Advance Directives   What are advance directives? Advance directives are legal documents that state your wishes and plans for medical care  These plans are made ahead of time in case you lose your ability to make decisions for yourself  Advance directives can apply to any medical decision, such as the treatments you want, and if you want to donate organs  What are the types of advance directives? There are many types of advance directives, and each state has rules about how to use them  You may choose a combination of any of the following:  Living will: This is a written record of the treatment you want  You can also choose which treatments you do not want, which to limit, and which to stop at a certain time   This includes surgery, medicine, IV fluid, and tube feedings  Durable power of  for healthcare [place] [facility]): This is a written record that states who you want to make healthcare choices for you when you are unable to make them for yourself  This person, called a proxy, is usually a family member or a friend  You may choose more than 1 proxy  Do not resuscitate (DNR) order:  A DNR order is used in case your heart stops beating or you stop breathing  It is a request not to have certain forms of treatment, such as CPR  A DNR order may be included in other types of advance directives  Medical directive: This covers the care that you want if you are in a coma, near death, or unable to make decisions for yourself  You can list the treatments you want for each condition  Treatment may include pain medicine, surgery, blood transfusions, dialysis, IV or tube feedings, and a ventilator (breathing machine)  Values history: This document has questions about your views, beliefs, and how you feel and think about life  This information can help others choose the care that you would choose  Why are advance directives important? An advance directive helps you control your care  Although spoken wishes may be used, it is better to have your wishes written down  Spoken wishes can be misunderstood, or not followed  Treatments may be given even if you do not want them  An advance directive may make it easier for your family to make difficult choices about your care  Weight Management   Why it is important to manage your weight:  Being overweight increases your risk of health conditions such as heart disease, high blood pressure, type 2 diabetes, and certain types of cancer  It can also increase your risk for osteoarthritis, sleep apnea, and other respiratory problems  Aim for a slow, steady weight loss  Even a small amount of weight loss can lower your risk of health problems    How to lose weight safely:  A safe and healthy way to lose weight is to eat fewer calories and get regular exercise  You can lose up about 1 pound a week by decreasing the number of calories you eat by 500 calories each day  Healthy meal plan for weight management:  A healthy meal plan includes a variety of foods, contains fewer calories, and helps you stay healthy  A healthy meal plan includes the following:  Eat whole-grain foods more often  A healthy meal plan should contain fiber  Fiber is the part of grains, fruits, and vegetables that is not broken down by your body  Whole-grain foods are healthy and provide extra fiber in your diet  Some examples of whole-grain foods are whole-wheat breads and pastas, oatmeal, brown rice, and bulgur  Eat a variety of vegetables every day  Include dark, leafy greens such as spinach, kale, rancho greens, and mustard greens  Eat yellow and orange vegetables such as carrots, sweet potatoes, and winter squash  Eat a variety of fruits every day  Choose fresh or canned fruit (canned in its own juice or light syrup) instead of juice  Fruit juice has very little or no fiber  Eat low-fat dairy foods  Drink fat-free (skim) milk or 1% milk  Eat fat-free yogurt and low-fat cottage cheese  Try low-fat cheeses such as mozzarella and other reduced-fat cheeses  Choose meat and other protein foods that are low in fat  Choose beans or other legumes such as split peas or lentils  Choose fish, skinless poultry (chicken or turkey), or lean cuts of red meat (beef or pork)  Before you cook meat or poultry, cut off any visible fat  Use less fat and oil  Try baking foods instead of frying them  Add less fat, such as margarine, sour cream, regular salad dressing and mayonnaise to foods  Eat fewer high-fat foods  Some examples of high-fat foods include french fries, doughnuts, ice cream, and cakes  Eat fewer sweets  Limit foods and drinks that are high in sugar  This includes candy, cookies, regular soda, and sweetened drinks    Exercise:  Exercise at least 30 minutes per day on most days of the week  Some examples of exercise include walking, biking, dancing, and swimming  You can also fit in more physical activity by taking the stairs instead of the elevator or parking farther away from stores  Ask your healthcare provider about the best exercise plan for you  © Copyright [employer] 2018 Information is for End User's use only and may not be sold, redistributed or otherwise used for commercial purposes  All illustrations and images included in CareNotes® are the copyrighted property of [employer]  or [address] [place] [address]    Pre-Surgery Instructions:   Medication Instructions    acetaminophen (TYLENOL) 500 mg tablet per anesthesia guidelines     Alirocumab (Praluent) 75 MG/ML SOAJ per anesthesia guidelines     ascorbic acid (VITAMIN C) 500 MG tablet Stop taking 1 week prior to surgery    aspirin (ECOTRIN LOW STRENGTH) 81 mg EC tablet Stop taking 1 week prior to surgery    Cholecalciferol (VITAMIN D) 2000 units CAPS per anesthesia guidelines     Coenzyme Q10 (CO Q 10) 100 MG CAPS per anesthesia guidelines     furosemide (LASIX) 40 mg tablet per anesthesia guidelines     isosorbide mononitrate (IMDUR) 60 mg 24 hr tablet per anesthesia guidelines     losartan (COZAAR) 25 mg tablet per anesthesia guidelines     Magnesium 400 MG TABS per anesthesia guidelines     metoprolol succinate (TOPROL-XL) 50 mg 24 hr tablet per anesthesia guidelines     Multiple Vitamins-Minerals (MULTIVITAMIN ADULT PO) Stop taking 1 week prior to surgery    nitroglycerin (NITROSTAT) 0 4 mg SL tablet per anesthesia guidelines     rivaroxaban (XARELTO) 20 mg tablet Stop taking 3 days prior to surgery    TURMERIC PO Stop taking 1 week prior to surgery     1  Pre-op examination    2  Primary osteoarthritis of both knees  Assessment & Plan:  Patient does have severe osteoarthritis at the moment  Agree with planned knee replacement, assuming cardiology feels it is reasonable for him to proceed    Labs were reviewed via care everywhere, no significant abnormalities, including kidney function being outstanding  3  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)  Assessment & Plan:  Wt Readings from Last 3 Encounters:   01/25/23 116 kg (256 lb)   11/29/22 115 kg (253 lb)   11/28/22 116 kg (256 lb)   Blood pressure today is minimally elevated, but overall doing relatively well  Continue to follow-up as scheduled  4  ICD (implantable cardioverter-defibrillator) in place  Assessment & Plan:  Patient does have ICD  Follow with cardiology  5  Chronic combined systolic and diastolic congestive heart failure (HCC)  Assessment & Plan:  Wt Readings from Last 3 Encounters:   01/25/23 116 kg (256 lb)   11/29/22 115 kg (253 lb)   11/28/22 116 kg (256 lb)   Stable at the moment  Patient was intolerant of Entresto  Back on losartan  Assuming cardiology feels that he is at lower risk for planned surgery, may proceed  6  Stable angina [facility])  Assessment & Plan:  Has had angina before  Follow-up with cardiology  7  Polyp of colon, unspecified part of colon, unspecified type  Assessment & Plan:  Colonoscopy done in May 2022: Showed 2 polyps  5-year repeat was recommended  8  Diverticulosis  Assessment & Plan:  Noted on colonoscopy, 5/22 at [facility]  9  3-vessel CAD  Assessment & Plan:  Stable at the moment  Not having any chest pains or pressures  Follow-up with cardiology  10  Hyperglycemia  Assessment & Plan:  Patient did have elevated blood sugar at 1 point in the past   Nothing specific at the moment  Limit carbohydrates  11  Paroxysmal atrial fibrillation ([address] )  Assessment & Plan:  She does have atrial fibrillation  Heart rate today was reasonable  Reviewed about anticoagulation: Patient must hold anticoagulant approximately 3 days prior to surgery    This does increase risk of stroke, but it is the only way to continue at this point  Given that he has not had valvular abnormalities or artificial valve, reasonable to hold the Xarelto  12  Cardiomyopathy, ischemic  Assessment & Plan:  Follow with cardiology          COVID 19 Instructions    Cheryl Whitley was advised to limit contact with others to essential tasks such as getting food, medications, and medical care  Proper handwashing reviewed, and Hand sanitzer when washing is not available  If the patient develops symptoms of COVID 19, the patient should call the office as soon as possible  For 0739-7527 Flu season, it is strongly recommended that Flu Vaccinations be obtained  Virtual Visits:  Ayanna: This works on smart phones (any phone with Internet browsing capability)  You should get a text message when the provider is ready to see you  Click on the link in the text message, and the call should start  You will need to type in your name, and allow camera and microphone access  This is HIPPA compliant, and secure  If you have not already done so, get immunized to COVID 19  We are committed to getting you vaccinated as soon as possible and will be closely following CDC and [facility]  guidelines as they are released and revised  Please refer to our COVID-19 vaccine webpage for the most up to date information on the vaccine and our distribution efforts  This site will also have the most up to date recommendations for COVID booster vaccine  Mikhail subramanian    Call [phone] ([phone]), option 7    OUR NEW LOCATION:    [address], [address]  Fax: [phone]    Lab services and OB/GYN are at this location as well

## 2023-01-25 NOTE — ASSESSMENT & PLAN NOTE
She does have atrial fibrillation  Heart rate today was reasonable  Reviewed about anticoagulation: Patient must hold anticoagulant approximately 3 days prior to surgery  This does increase risk of stroke, but it is the only way to continue at this point  Given that he has not had valvular abnormalities or artificial valve, reasonable to hold the Xarelto

## 2023-01-25 NOTE — ASSESSMENT & PLAN NOTE
Wt Readings from Last 3 Encounters:   01/25/23 116 kg (256 lb)   11/29/22 115 kg (253 lb)   11/28/22 116 kg (256 lb)   Stable at the moment  Patient was intolerant of Entresto  Back on losartan  Assuming cardiology feels that he is at lower risk for planned surgery, may proceed

## 2023-01-25 NOTE — LETTER
January 25, 2023     Mark Burks MD  [address]  [address] [employer]    Patient: Lynn Zuñiga   YOB: 1940   Date of Visit: 1/25/2023       Dear Dr George Callahan: Thank you for referring Deonte Saldana to me for evaluation  Below are my notes for this consultation  Patient was seen today for preop for knee replacement  I am not clear based on the review of notes that I saw in epic whether or not he is cleared for surgery from cardiac standpoint  I think this has to do with the care everywhere function, but to be clear, do feel that this patient should be reasonable for his upcoming knee replacement if it is done in the hospital?    Other than this, I do not have any specific concerns about him having surgery  If you have questions, please do not hesitate to call me  I look forward to following your patient along with you           Sincerely,        Nathalia Block MD        CC: Bean Priest MD

## 2023-01-26 ENCOUNTER — TELEPHONE (OUTPATIENT)
Dept: ADMINISTRATIVE | Facility: OTHER | Age: 83
End: 2023-01-26

## 2023-01-26 NOTE — TELEPHONE ENCOUNTER
----- Message from Jaziel Germain MD sent at 1/25/2023  3:35 PM EST -----  Regarding: Care Gap Update  01/25/23 3:35 PM    Hello, our patient attached above has had CRC: Colonoscopy completed/performed  Please assist in updating the patient chart by pulling the Care Everywhere (CE) document  The date of service is 5/2022       Thank you,  Jaziel [facility]

## 2023-01-26 NOTE — TELEPHONE ENCOUNTER
Upon review of the In Basket request we were able to locate, review, and update the patient chart as requested for CRC: Colonoscopy  Any additional questions or concerns should be emailed to the Practice Liaisons via the appropriate education email address, please do not reply via In Basket      Thank you  Latrice Rivera MA

## 2023-03-28 ENCOUNTER — TELEPHONE (OUTPATIENT)
Dept: FAMILY MEDICINE CLINIC | Facility: CLINIC | Age: 83
End: 2023-03-28

## 2023-03-28 DIAGNOSIS — N50.89 TESTICLE SWELLING: Primary | ICD-10-CM

## 2023-03-28 NOTE — TELEPHONE ENCOUNTER
1  Testicle swelling  Assessment & Plan:  Patient reports some increasing testicular swelling lately  He wondered what in particular to do about it  Is painful as well  At this point, I would recommend an ultrasound, and then follow-up office visit or consider follow-up with urology directly  Orders:  -     US scrotum and testicles;  Future; Expected date: 03/28/2023

## 2023-03-28 NOTE — ASSESSMENT & PLAN NOTE
Patient reports some increasing testicular swelling lately  He wondered what in particular to do about it  Is painful as well  At this point, I would recommend an ultrasound, and then follow-up office visit or consider follow-up with urology directly

## 2023-03-29 ENCOUNTER — TELEPHONE (OUTPATIENT)
Dept: UROLOGY | Facility: MEDICAL CENTER | Age: 83
End: 2023-03-29

## 2023-03-29 NOTE — TELEPHONE ENCOUNTER
Spoke to pt  He is scheduled for new pt appt with Dr Celena Nolan on 4/20/23  Pt's US is scheduled for 4/3/23

## 2023-03-29 NOTE — TELEPHONE ENCOUNTER
Please Triage -   New Patient- [place]    What is the reason for the patients appointment? Patient's wife called stating patient is having swollen testicles for the last  9 months  He just had knee surgery 9 weeks ago and he is recuperating for that  He will scheduled the u/s of scrotum ordered by his family doctor  Number for central scheduling provided  Please review and advise  Patient can be reached at [phone] or  [phone]         Imaging/Lab Results:      Do we accept the patient's insurance or is the patient Self-Pay? Provider & Plan: Delaney Simons   Member ID#:       Has the patient had any previous urologist(s)?no        Have patient records been requested?in epic        Has the patient had any outside testing done?u/s he will scheduling       Does the patient have a personal history of cancer?no       Patient can be reached at :

## 2023-04-03 ENCOUNTER — HOSPITAL ENCOUNTER (OUTPATIENT)
Dept: RADIOLOGY | Facility: HOSPITAL | Age: 83
Discharge: HOME | End: 2023-04-03

## 2023-04-03 DIAGNOSIS — N50.89 TESTICLE SWELLING: ICD-10-CM

## 2023-04-06 ENCOUNTER — OFFICE VISIT (OUTPATIENT)
Dept: FAMILY MEDICINE CLINIC | Facility: CLINIC | Age: 83
End: 2023-04-06

## 2023-04-06 VITALS
DIASTOLIC BLOOD PRESSURE: 62 MMHG | TEMPERATURE: 97.6 F | RESPIRATION RATE: 14 BRPM | BODY MASS INDEX: 32.87 KG/M2 | WEIGHT: 248 LBS | HEART RATE: 65 BPM | SYSTOLIC BLOOD PRESSURE: 98 MMHG | HEIGHT: 73 IN

## 2023-04-06 DIAGNOSIS — I50.42 CHRONIC COMBINED SYSTOLIC AND DIASTOLIC CONGESTIVE HEART FAILURE (HCC): ICD-10-CM

## 2023-04-06 DIAGNOSIS — I50.42 HYPERTENSIVE HEART DISEASE WITH CHRONIC COMBINED SYSTOLIC AND DIASTOLIC CONGESTIVE HEART FAILURE (HCC): ICD-10-CM

## 2023-04-06 DIAGNOSIS — I11.0 HYPERTENSIVE HEART DISEASE WITH CHRONIC COMBINED SYSTOLIC AND DIASTOLIC CONGESTIVE HEART FAILURE (HCC): ICD-10-CM

## 2023-04-06 DIAGNOSIS — G47.9 SLEEP DISTURBANCE: ICD-10-CM

## 2023-04-06 DIAGNOSIS — F41.9 ANXIETY: Primary | ICD-10-CM

## 2023-04-06 DIAGNOSIS — I25.5 CARDIOMYOPATHY, ISCHEMIC: ICD-10-CM

## 2023-04-06 DIAGNOSIS — M17.0 PRIMARY OSTEOARTHRITIS OF BOTH KNEES: ICD-10-CM

## 2023-04-06 DIAGNOSIS — I48.0 PAROXYSMAL ATRIAL FIBRILLATION (HCC): ICD-10-CM

## 2023-04-06 RX ORDER — TRAZODONE HYDROCHLORIDE 50 MG/1
50 TABLET ORAL
Qty: 30 TABLET | Refills: 2 | Status: SHIPPED | OUTPATIENT
Start: 2023-04-06

## 2023-04-06 RX ORDER — GABAPENTIN 100 MG/1
CAPSULE ORAL
COMMUNITY
Start: 2023-03-17

## 2023-04-06 NOTE — PATIENT INSTRUCTIONS
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)  Assessment & Plan:  Wt Readings from Last 3 Encounters:   04/06/23 112 kg (248 lb)   01/25/23 116 kg (256 lb)   11/29/22 115 kg (253 lb)       98/62 in office today  Patient's blood pressure today is a bit low, no specific changes recommended based on that  Would continue to follow in the future  Watch for orthostasis  Of note, his weight is also down from before  4  Chronic combined systolic and diastolic congestive heart failure (HCC)  Assessment & Plan:  Wt Readings from Last 3 Encounters:   04/06/23 112 kg (248 lb)   01/25/23 116 kg (256 lb)   11/29/22 115 kg (253 lb)         Stable at the moment  Weight is down slightly from before  This is likely due to the ability to do increased exercise after knee replacement  Continue to follow  Watch for CHF symptoms  5  Paroxysmal atrial fibrillation (HCC)  Assessment & Plan:  Stable on anticoagulation  Using Xarelto  6  Cardiomyopathy, ischemic  Assessment & Plan:  Stable  Follow with cardiology  7  Primary osteoarthritis of both knees  Assessment & Plan:  Post knee replacement  He feels Tylenol is helping  No changes  No NSAIDs due to anticoag              COVID 19 Instructions    Jong Dumont was advised to limit contact with others to essential tasks such as getting food, medications, and medical care  Proper handwashing reviewed, and Hand sanitzer when washing is not available  If the patient develops symptoms of COVID 19, the patient should call the office as soon as possible  For 1378-2195 Flu season, it is strongly recommended that Flu Vaccinations be obtained  Virtual Visits:  Ayanna: This works on smart phones (any phone with Internet browsing capability)  You should get a text message when the provider is ready to see you  Click on the link in the text message, and the call should start  You will need to type in your name, and allow camera and microphone access  This is HIPPA compliant, and secure  If you have not already done so, get immunized to COVID 19  We are committed to getting you vaccinated as soon as possible and will be closely following CDC and [facility]  guidelines as they are released and revised  Please refer to our COVID-19 vaccine webpage for the most up to date information on the vaccine and our distribution efforts  This site will also have the most up to date recommendations for COVID booster vaccine  Mikhail subramanian    Call [phone] ([phone]), option 7    OUR NEW LOCATION:    [address], [address] [place], [place] [address] , [place], [address]  Fax: [phone]    Lab services and OB/GYN are at this location as well

## 2023-04-06 NOTE — ASSESSMENT & PLAN NOTE
Wt Readings from Last 3 Encounters:   04/06/23 112 kg (248 lb)   01/25/23 116 kg (256 lb)   11/29/22 115 kg (253 lb)       98/62 in office today  Patient's blood pressure today is a bit low, no specific changes recommended based on that  Would continue to follow in the future  Watch for orthostasis  Of note, his weight is also down from before

## 2023-04-06 NOTE — ASSESSMENT & PLAN NOTE
CHAUNCEY score of 12  Symptoms at night  Trazodone 50mg qhs, NOT prn  This helps both sleep and anxiety  Follow in 2 weeks  There is likely some aspect of anxiety causing the insomnia  Some of it also certainly could be related to pain, but I feel it is more anxiety related than not  Again, follow in 2 weeks

## 2023-04-06 NOTE — ASSESSMENT & PLAN NOTE
Wt Readings from Last 3 Encounters:   04/06/23 112 kg (248 lb)   01/25/23 116 kg (256 lb)   11/29/22 115 kg (253 lb)         Stable at the moment  Weight is down slightly from before  This is likely due to the ability to do increased exercise after knee replacement  Continue to follow  Watch for CHF symptoms

## 2023-04-06 NOTE — PROGRESS NOTES
Name: Marcin Colby      : 1940      MRN: [id number]  Encounter Provider: Sarah Childress MD  Encounter Date: 2023   Encounter department: [facility] PRIMARY CARE    Assessment & Plan     1  Restless  Assessment & Plan:  CHAUNCEY score of 12  Symptoms at night  Trazodone 50mg qhs, NOT prn  This helps both sleep and anxiety  Follow in 2 weeks  There is likely some aspect of anxiety causing the insomnia  Some of it also certainly could be related to pain, but I feel it is more anxiety related than not  Again, follow in 2 weeks  2  Sleep disturbance    3  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)  Assessment & Plan:  Wt Readings from Last 3 Encounters:   23 112 kg (248 lb)   23 116 kg (256 lb)   22 115 kg (253 lb)       98/62 in office today  Patient's blood pressure today is a bit low, no specific changes recommended based on that  Would continue to follow in the future  Watch for orthostasis  Of note, his weight is also down from before  4  Chronic combined systolic and diastolic congestive heart failure (HCC)  Assessment & Plan:  Wt Readings from Last 3 Encounters:   23 112 kg (248 lb)   23 116 kg (256 lb)   22 115 kg (253 lb)         Stable at the moment  Weight is down slightly from before  This is likely due to the ability to do increased exercise after knee replacement  Continue to follow  Watch for CHF symptoms  5  Paroxysmal atrial fibrillation (HCC)  Assessment & Plan:  Stable on anticoagulation  Using Xarelto  6  Cardiomyopathy, ischemic  Assessment & Plan:  Stable  Follow with cardiology  7  Primary osteoarthritis of both knees  Assessment & Plan:  Post knee replacement  He feels Tylenol is helping  No changes  No NSAIDs due to anticoag  Subjective      HPI  Review of Systems   Constitutional: Negative for chills and fever     HENT: Negative for ear pain and sore throat  Eyes: Negative for pain and visual disturbance  Respiratory: Negative for cough and shortness of breath  Cardiovascular: Negative for chest pain and palpitations  Gastrointestinal: Negative for abdominal pain and vomiting  Genitourinary: Negative for dysuria and hematuria  Musculoskeletal: Negative for arthralgias and back pain  Skin: Negative for color change and rash  Neurological: Negative for seizures and syncope  Psychiatric/Behavioral: Positive for sleep disturbance  All other systems reviewed and are negative  Current Outpatient Medications on File Prior to Visit   Medication Sig   • acetaminophen (TYLENOL) 500 mg tablet Take 500 mg by mouth   • Alirocumab (Praluent) 75 MG/ML SOAJ Inject 1 mL under the skin every 14 (fourteen) days   • ascorbic acid (VITAMIN C) 500 MG tablet Take 500 mg by mouth daily   • aspirin (ECOTRIN LOW STRENGTH) 81 mg EC tablet Take 1 tablet by mouth daily   • Cholecalciferol (VITAMIN D) 2000 units CAPS Take by mouth   • Coenzyme Q10 (CO Q 10) 100 MG CAPS Take by mouth   • furosemide (LASIX) 40 mg tablet Take by mouth 2 (two) times a day Patient takes 1/2 tab BID   • isosorbide mononitrate (IMDUR) 60 mg 24 hr tablet Take 1 tablet by mouth daily   • losartan (COZAAR) 25 mg tablet Take 0 5 tablets by mouth daily   • Magnesium 400 MG TABS Take by mouth   • metoprolol succinate (TOPROL-XL) 50 mg 24 hr tablet    • Multiple Vitamins-Minerals (MULTIVITAMIN ADULT PO) Take 1 capsule by mouth   • nitroglycerin (NITROSTAT) 0 4 mg SL tablet Place 1 tablet under the tongue every 5 (five) minutes as needed   • rivaroxaban (XARELTO) 20 mg tablet Take 1 tablet (20 mg total) by mouth daily with dinner   • TURMERIC PO Take 1 Dose by mouth   • gabapentin (NEURONTIN) 100 mg capsule TAKE 1 CAPSULE (100 MG TOTAL) BY MOUTH NIGHTLY FOR 30 DOSES   (Patient not taking: Reported on 4/6/2023)   • metoprolol succinate (TOPROL-XL) 50 mg 24 hr tablet "Take 50 mg by mouth daily       Objective     BP 98/62 (BP Location: Right arm, Patient Position: Sitting, Cuff Size: Adult)   Pulse 65   Temp 97 6 °F (36 4 °C) (Temporal)   Resp 14   Ht 6' 1\" (1 854 m)   Wt 112 kg (248 lb)   BMI 32 72 kg/m²     Physical Exam  Jared Spencer MD  "

## 2023-04-06 NOTE — PROGRESS NOTES
Name: Steven Rich      : 1940      MRN: [id number]  Encounter Provider: Rupa Pedroza MD  Encounter Date: 2023   Encounter department: [facility] PRIMARY CARE    Assessment & Plan     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)  Assessment & Plan:  Wt Readings from Last 3 Encounters:   23 112 kg (248 lb)   23 116 kg (256 lb)   22 115 kg (253 lb)       98/62 in office today  Patient's blood pressure today is a bit low, no specific changes recommended based on that  Would continue to follow in the future  Watch for orthostasis  Of note, his weight is also down from before  4  Chronic combined systolic and diastolic congestive heart failure (HCC)  Assessment & Plan:  Wt Readings from Last 3 Encounters:   23 112 kg (248 lb)   23 116 kg (256 lb)   22 115 kg (253 lb)         Stable at the moment  Weight is down slightly from before  This is likely due to the ability to do increased exercise after knee replacement  Continue to follow  Watch for CHF symptoms  5  Paroxysmal atrial fibrillation (HCC)  Assessment & Plan:  Stable on anticoagulation  Using Xarelto  6  Cardiomyopathy, ischemic  Assessment & Plan:  Stable  Follow with cardiology        7  Primary osteoarthritis of both knees  Assessment & Plan:  Post knee replacement  He feels Tylenol is helping  No changes  No NSAIDs due to anticoag  Depression Screening and Follow-up Plan: Patient was screened for depression during today's encounter  They screened negative with a PHQ-2 score of 0  Chaparro Kohler is an [de-identified] yo male presenting today for difficulty sleeping  Only taking oxycodone at night for last several weeks  Thursday (7 days ago) was last night  Arms feel restless, and last night  Internally feels restless  Taking tylenol, which doesn't help as well, however feels pain is improving with each week  Tried tylenol PM, which did not help  Feels very similar to in past when he would get anxious the night before a big event, however no new life stressors  Getting up and moving around relieves stress  Stiffness in legs improves significantly with walking around  He took his BP this morning while feeling anxious and BP was 130s/80s  Usually does not have anxiety in morning  Historically took Ambien in the past after death of a loved one, felt it was the only medication that didn't make you feel groggy in the morning  Slept 2 hours last night  Review of Systems   Constitutional: Negative for chills and fever  HENT: Negative for ear pain and sore throat  Eyes: Negative for pain and visual disturbance  Respiratory: Negative for cough and shortness of breath  Cardiovascular: Negative for chest pain and palpitations  Gastrointestinal: Negative for abdominal pain and vomiting  Genitourinary: Negative for dysuria and hematuria  Musculoskeletal: Negative for arthralgias and back pain  Skin: Negative for color change and rash  Neurological: Negative for seizures and syncope  Psychiatric/Behavioral: Positive for agitation and sleep disturbance  The patient is nervous/anxious  All other systems reviewed and are negative        Current Outpatient Medications on File Prior to Visit   Medication Sig   • acetaminophen (TYLENOL) 500 "mg tablet Take 500 mg by mouth   • Alirocumab (Praluent) 75 MG/ML SOAJ Inject 1 mL under the skin every 14 (fourteen) days   • ascorbic acid (VITAMIN C) 500 MG tablet Take 500 mg by mouth daily   • aspirin (ECOTRIN LOW STRENGTH) 81 mg EC tablet Take 1 tablet by mouth daily   • Cholecalciferol (VITAMIN D) 2000 units CAPS Take by mouth   • Coenzyme Q10 (CO Q 10) 100 MG CAPS Take by mouth   • furosemide (LASIX) 40 mg tablet Take by mouth 2 (two) times a day Patient takes 1/2 tab BID   • isosorbide mononitrate (IMDUR) 60 mg 24 hr tablet Take 1 tablet by mouth daily   • losartan (COZAAR) 25 mg tablet Take 0 5 tablets by mouth daily   • Magnesium 400 MG TABS Take by mouth   • metoprolol succinate (TOPROL-XL) 50 mg 24 hr tablet    • Multiple Vitamins-Minerals (MULTIVITAMIN ADULT PO) Take 1 capsule by mouth   • nitroglycerin (NITROSTAT) 0 4 mg SL tablet Place 1 tablet under the tongue every 5 (five) minutes as needed   • rivaroxaban (XARELTO) 20 mg tablet Take 1 tablet (20 mg total) by mouth daily with dinner   • TURMERIC PO Take 1 Dose by mouth   • gabapentin (NEURONTIN) 100 mg capsule TAKE 1 CAPSULE (100 MG TOTAL) BY MOUTH NIGHTLY FOR 30 DOSES  (Patient not taking: Reported on 4/6/2023)   • metoprolol succinate (TOPROL-XL) 50 mg 24 hr tablet Take 50 mg by mouth daily       Objective     BP 98/62 (BP Location: Right arm, Patient Position: Sitting, Cuff Size: Adult)   Pulse 65   Temp 97 6 °F (36 4 °C) (Temporal)   Resp 14   Ht 6' 1\" (1 854 m)   Wt 112 kg (248 lb)   BMI 32 72 kg/m²     Physical Exam  Vitals and nursing note reviewed  Constitutional:       Appearance: Normal appearance  HENT:      Head: Normocephalic and atraumatic  Cardiovascular:      Rate and Rhythm: Normal rate and regular rhythm  Pulses: Normal pulses  Heart sounds: Normal heart sounds  Pulmonary:      Effort: Pulmonary effort is normal       Breath sounds: Normal breath sounds  No wheezing, rhonchi or rales     Neurological:      " General: No focal deficit present  Mental Status: He is alert     Psychiatric:         Mood and Affect: Mood normal        Roderick Nails MD

## 2023-04-20 ENCOUNTER — OFFICE VISIT (OUTPATIENT)
Dept: FAMILY MEDICINE CLINIC | Facility: CLINIC | Age: 83
End: 2023-04-20

## 2023-04-20 VITALS
SYSTOLIC BLOOD PRESSURE: 106 MMHG | HEART RATE: 88 BPM | DIASTOLIC BLOOD PRESSURE: 62 MMHG | HEIGHT: 73 IN | WEIGHT: 247 LBS | TEMPERATURE: 98.7 F | BODY MASS INDEX: 32.74 KG/M2

## 2023-04-20 DIAGNOSIS — I50.42 HYPERTENSIVE HEART DISEASE WITH CHRONIC COMBINED SYSTOLIC AND DIASTOLIC CONGESTIVE HEART FAILURE (HCC): ICD-10-CM

## 2023-04-20 DIAGNOSIS — I11.0 HYPERTENSIVE HEART DISEASE WITH CHRONIC COMBINED SYSTOLIC AND DIASTOLIC CONGESTIVE HEART FAILURE (HCC): ICD-10-CM

## 2023-04-20 DIAGNOSIS — B34.9 VIRAL INFECTION, UNSPECIFIED: ICD-10-CM

## 2023-04-20 DIAGNOSIS — F41.9 ANXIETY: Primary | ICD-10-CM

## 2023-04-20 DIAGNOSIS — I48.0 PAROXYSMAL ATRIAL FIBRILLATION (HCC): ICD-10-CM

## 2023-04-20 NOTE — ASSESSMENT & PLAN NOTE
Patient is much better on trazodone 50  Reviewed about increasing medication versus staying with the same dose  Since it does seem to be significantly better, he and his wife agreed that he would stay with 50 mg rather than increasing to 75  In the future, if it gets worse we can certainly increase as needed

## 2023-04-20 NOTE — PATIENT INSTRUCTIONS
1  Anxiety  Assessment & Plan:  Patient is much better on trazodone 50  Reviewed about increasing medication versus staying with the same dose  Since it does seem to be significantly better, he and his wife agreed that he would stay with 50 mg rather than increasing to 75  In the future, if it gets worse we can certainly increase as needed  2  Paroxysmal atrial fibrillation [facility])  Assessment & Plan:  Patient is following with cardiology at some point  Continue on anticoagulant  3  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 [facility])  Assessment & Plan:  Wt Readings from Last 3 Encounters:   04/20/23 112 kg (247 lb)   04/20/23 111 kg (244 lb)   04/06/23 112 kg (248 lb)     Patient's blood pressure today is a bit low  Again, watch for orthostasis  Continue to follow with cardiology  4  Viral infection, unspecified  Comments:  negative COVID POCT  Flonase for 2 weeks PRN  Ocean NS  If using AMoxil at home, dose would be 500mg TID  Orders:  -     Poct Covid 19 Rapid Antigen Test        COVID 19 Instructions    Bryce Iqbal was advised to limit contact with others to essential tasks such as getting food, medications, and medical care  Proper handwashing reviewed, and Hand sanitzer when washing is not available  If the patient develops symptoms of COVID 19, the patient should call the office as soon as possible  For 3100-8228 Flu season, it is strongly recommended that Flu Vaccinations be obtained  Virtual Visits:  Ayanna: This works on smart phones (any phone with Internet browsing capability)  You should get a text message when the provider is ready to see you  Click on the link in the text message, and the call should start  You will need to type in your name, and allow camera and microphone access  This is HIPPA compliant, and secure  If you have not already done so, get immunized to COVID 19        We are committed to getting you vaccinated as soon as possible and will be closely following CDC and [facility]  guidelines as they are released and revised  Please refer to our COVID-19 vaccine webpage for the most up to date information on the vaccine and our distribution efforts  This site will also have the most up to date recommendations for COVID booster vaccine  Kimaniaugustus tn    Call [phone] ([phone]), option 7    OUR NEW LOCATION:    [address], [address] [place], [place] [address] [place], [place], [address]  Fax: [phone]    Lab services and OB/GYN are at this location as well

## 2023-04-20 NOTE — PROGRESS NOTES
Name: Faye Quintana      : 1940      MRN: [id number]  Encounter Provider: Roberta Abraham MD  Encounter Date: 2023   Encounter department: [facility] PRIMARY CARE    Assessment & Plan     1  Anxiety  Assessment & Plan:  Patient is much better on trazodone 50  Reviewed about increasing medication versus staying with the same dose  Since it does seem to be significantly better, he and his wife agreed that he would stay with 50 mg rather than increasing to 75  In the future, if it gets worse we can certainly increase as needed  2  Paroxysmal atrial fibrillation [facility])  Assessment & Plan:  Patient is following with cardiology at some point  Continue on anticoagulant  3  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 [facility])  Assessment & Plan:  Wt Readings from Last 3 Encounters:   23 112 kg (247 lb)   23 111 kg (244 lb)   23 112 kg (248 lb)     Patient's blood pressure today is a bit low  Again, watch for orthostasis  Continue to follow with cardiology  4  Viral infection, unspecified  Comments:  negative COVID POCT  Flonase for 2 weeks PRN  Ocean NS  If using AMoxil at home, dose would be 500mg TID  Orders:  -     Poct Covid 19 Rapid Antigen Test  -     POCT Rapid Covid Ag           Subjective      Patient is here to follow-up on several issues  He did go to urology earlier today  They felt that there was nothing in particular to do about the hydrocele  Since starting the trazodone, the patient reports that his anxiety is significantly better, though he did have an episode last night  His wife reports that there is a little bit of anxiety every night, but it is definitely better than what it was  4 days of coughing will now  Some runny nose, some sneezing  Seems a bit better today  Denies any muscle aches or weakness  Denies anosmia  No face or tooth pain    Sore throat initially on Saturday  Review of Systems   Constitutional: Negative  HENT: Negative  Eyes: Negative  Respiratory: Negative  Cardiovascular: Negative  Gastrointestinal: Negative  Endocrine: Negative  Genitourinary: Negative  Musculoskeletal: Negative  Skin: Negative  Allergic/Immunologic: Negative  Neurological: Negative  Hematological: Negative  Psychiatric/Behavioral: Negative          Current Outpatient Medications on File Prior to Visit   Medication Sig   • acetaminophen (TYLENOL) 500 mg tablet Take 500 mg by mouth   • Alirocumab (Praluent) 75 MG/ML SOAJ Inject 1 mL under the skin every 14 (fourteen) days   • amoxicillin (AMOXIL) 500 MG tablet 4 TABLETS BY MOUTH 1 HOUR BEFORE DENTIST   • ascorbic acid (VITAMIN C) 500 MG tablet Take 500 mg by mouth daily   • aspirin (ECOTRIN LOW STRENGTH) 81 mg EC tablet Take 1 tablet by mouth daily   • Cholecalciferol (VITAMIN D) 2000 units CAPS Take by mouth   • Coenzyme Q10 (CO Q 10) 100 MG CAPS Take by mouth   • furosemide (LASIX) 40 mg tablet Take by mouth 2 (two) times a day Patient takes 1/2 tab BID   • isosorbide mononitrate (IMDUR) 60 mg 24 hr tablet Take 1 tablet by mouth daily   • losartan (COZAAR) 25 mg tablet Take 0 5 tablets by mouth daily   • Magnesium 400 MG TABS Take by mouth   • metoprolol succinate (TOPROL-XL) 50 mg 24 hr tablet 2 (two) times a day 1 in the morning & 1 at dinner   • Multiple Vitamins-Minerals (MULTIVITAMIN ADULT PO) Take 1 capsule by mouth   • nitroglycerin (NITROSTAT) 0 4 mg SL tablet Place 1 tablet under the tongue every 5 (five) minutes as needed   • rivaroxaban (XARELTO) 20 mg tablet Take 1 tablet (20 mg total) by mouth daily with dinner   • traZODone (DESYREL) 50 mg tablet Take 1 tablet (50 mg total) by mouth daily at bedtime   • TURMERIC PO Take 1 Dose by mouth   • metoprolol succinate (TOPROL-XL) 50 mg 24 hr tablet Take 50 mg by mouth daily   • [DISCONTINUED] gabapentin (NEURONTIN) 100 mg capsule TAKE 1 "CAPSULE (100 MG TOTAL) BY MOUTH NIGHTLY FOR 30 DOSES  (Patient not taking: Reported on 4/6/2023)       Objective     /62   Pulse 88 Comment: irregular  Temp 98 7 °F (37 1 °C)   Ht 6' 1\" (1 854 m)   Wt 112 kg (247 lb)   BMI 32 59 kg/m²     Physical Exam  Vitals and nursing note reviewed  Constitutional:       Appearance: He is well-developed  HENT:      Head: Normocephalic and atraumatic  Cardiovascular:      Rate and Rhythm: Normal rate and regular rhythm  Pulses:           Carotid pulses are 2+ on the right side and 2+ on the left side  Heart sounds: Normal heart sounds  No murmur heard  No friction rub  No gallop  Pulmonary:      Effort: Pulmonary effort is normal  No respiratory distress  Breath sounds: Normal breath sounds  No wheezing or rales  Musculoskeletal:      Cervical back: Normal range of motion and neck supple         Tonavi Ceja MD  "

## 2023-04-20 NOTE — ASSESSMENT & PLAN NOTE
Wt Readings from Last 3 Encounters:   04/20/23 112 kg (247 lb)   04/20/23 111 kg (244 lb)   04/06/23 112 kg (248 lb)     Patient's blood pressure today is a bit low  Again, watch for orthostasis  Continue to follow with cardiology

## 2023-04-28 DIAGNOSIS — G47.9 SLEEP DISTURBANCE: ICD-10-CM

## 2023-04-28 DIAGNOSIS — F41.9 ANXIETY: ICD-10-CM

## 2023-04-28 RX ORDER — TRAZODONE HYDROCHLORIDE 50 MG/1
TABLET ORAL
Qty: 90 TABLET | Refills: 1 | Status: SHIPPED | OUTPATIENT
Start: 2023-04-28

## 2023-05-17 ENCOUNTER — RA CDI HCC (OUTPATIENT)
Dept: OTHER | Facility: HOSPITAL | Age: 83
End: 2023-05-17

## 2023-05-17 NOTE — PROGRESS NOTES
[id number]  [facility] 75  coding opportunities          Chart Reviewed number of suggestions sent to Provider: 1     Patients Insurance     Medicare Insurance: [employer]

## 2023-05-25 ENCOUNTER — OFFICE VISIT (OUTPATIENT)
Dept: FAMILY MEDICINE CLINIC | Facility: CLINIC | Age: 83
End: 2023-05-25

## 2023-05-25 VITALS
OXYGEN SATURATION: 94 % | HEART RATE: 42 BPM | BODY MASS INDEX: 32.74 KG/M2 | HEIGHT: 73 IN | DIASTOLIC BLOOD PRESSURE: 64 MMHG | WEIGHT: 247 LBS | SYSTOLIC BLOOD PRESSURE: 112 MMHG

## 2023-05-25 DIAGNOSIS — I11.0 HYPERTENSIVE HEART DISEASE WITH CHRONIC COMBINED SYSTOLIC AND DIASTOLIC CONGESTIVE HEART FAILURE (HCC): ICD-10-CM

## 2023-05-25 DIAGNOSIS — I48.0 PAROXYSMAL ATRIAL FIBRILLATION (HCC): Primary | ICD-10-CM

## 2023-05-25 DIAGNOSIS — E78.2 MIXED HYPERLIPIDEMIA: ICD-10-CM

## 2023-05-25 DIAGNOSIS — Z95.810 ICD (IMPLANTABLE CARDIOVERTER-DEFIBRILLATOR) IN PLACE: ICD-10-CM

## 2023-05-25 DIAGNOSIS — I50.42 CHRONIC COMBINED SYSTOLIC AND DIASTOLIC CONGESTIVE HEART FAILURE (HCC): ICD-10-CM

## 2023-05-25 DIAGNOSIS — I50.42 HYPERTENSIVE HEART DISEASE WITH CHRONIC COMBINED SYSTOLIC AND DIASTOLIC CONGESTIVE HEART FAILURE (HCC): ICD-10-CM

## 2023-05-25 NOTE — PATIENT INSTRUCTIONS
1  Paroxysmal atrial fibrillation (HCC)  Assessment & Plan:  Stable at the moment  Follow with cardiology  Currently on Xarelto  Does have pacer defibrillator  Had bradycardia noted on exam today  Again, follow-up with cardiology and discuss about possibly reprogramming pacemaker if he is having symptomatic low heart rate  2  Chronic combined systolic and diastolic congestive heart failure (HCC)  Assessment & Plan:  Wt Readings from Last 3 Encounters:   05/25/23 112 kg (247 lb)   04/20/23 112 kg (247 lb)   04/20/23 111 kg (244 lb)     Follow-up with cardiology  No changes at the moment  3  ICD (implantable cardioverter-defibrillator) in place  Assessment & Plan:  Recommend talk with cardiology about settings for his pacer, as he is feeling tired  4  Mixed hyperlipidemia  -     Cholesterol, total; Future; Expected date: 07/06/2023  -     Comprehensive metabolic panel; Future; Expected date: 07/06/2023  -     HDL cholesterol; Future; Expected date: 07/06/2023  -     LDL cholesterol, direct; Future; Expected date: 07/06/2023  -     Cholesterol, total  -     Comprehensive metabolic panel  -     HDL cholesterol  -     LDL cholesterol, direct    5  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 ([facility] [address] )        COVID 19 Instructions    China Garza was advised to limit contact with others to essential tasks such as getting food, medications, and medical care  Proper handwashing reviewed, and Hand sanitzer when washing is not available  If the patient develops symptoms of COVID 19, the patient should call the office as soon as possible  For 3033-2262 Flu season, it is strongly recommended that Flu Vaccinations be obtained  Virtual Visits:  Ayanna: This works on smart phones (any phone with Internet browsing capability)  You should get a text message when the provider is ready to see you    Click on the link in the text message, and the call should start  You will need to type in your name, and allow camera and microphone access  This is HIPPA compliant, and secure  If you have not already done so, get immunized to COVID 19  We are committed to getting you vaccinated as soon as possible and will be closely following CDC and [facility]  guidelines as they are released and revised  Please refer to our COVID-19 vaccine webpage for the most up to date information on the vaccine and our distribution efforts  This site will also have the most up to date recommendations for COVID booster vaccine  Mikhial subramanian    Call [phone] ([phone]), option 7    OUR NEW LOCATION:    [address], [address] [place], [place] [address], [place], [address]  Fax: [phone]    Lab services and OB/GYN are at this location as well

## 2023-05-25 NOTE — PROGRESS NOTES
Name: Abimael Troncoso      : 1940      MRN: [id number]  Encounter Provider: Marco Antonio Spring MD  Encounter Date: 2023   Encounter department: [facility] PRIMARY CARE    Assessment & Plan     1  Paroxysmal atrial fibrillation (HCC)  Assessment & Plan:  Stable at the moment  Follow with cardiology  Currently on Xarelto  Does have pacer defibrillator  Had bradycardia noted on exam today  Again, follow-up with cardiology and discuss about possibly reprogramming pacemaker if he is having symptomatic low heart rate  2  Chronic combined systolic and diastolic congestive heart failure (HCC)  Assessment & Plan:  Wt Readings from Last 3 Encounters:   23 112 kg (247 lb)   23 112 kg (247 lb)   23 111 kg (244 lb)     Follow-up with cardiology  No changes at the moment  3  ICD (implantable cardioverter-defibrillator) in place  Assessment & Plan:  Recommend talk with cardiology about settings for his pacer, as he is feeling tired  4  Mixed hyperlipidemia  -     Cholesterol, total; Future; Expected date: 2023  -     Comprehensive metabolic panel; Future; Expected date: 2023  -     HDL cholesterol; Future; Expected date: 2023  -     LDL cholesterol, direct; Future; Expected date: 2023  -     Cholesterol, total  -     Comprehensive metabolic panel  -     HDL cholesterol  -     LDL cholesterol, direct    5  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 ([facility] [address] )           Subjective      Chief Complaint   Patient presents with   • Follow-up     Pt present today for his 1 month follow up  Patient is here to follow-up after his last visit  Patient is doing relatively well as far as A-fib is concerned  Heart rate today is low  He reports he does see cardiology in   CHF:  Reviewed weight    It is slightly higher than previous, though minimal   No current problems with shortness of breath or wheezing  No swelling in the lower extremities either  Reviewed medications  Hyperlipidemia: Patient is due again in July for laboratory studies  Did not have orders from before  Has been fairly stable at the moment  On Praluent  Following with cardiology  Anxiety: Reports stable with the trazodone at 50  Review of Systems   Constitutional: Negative  HENT: Negative  Eyes: Negative  Respiratory: Negative  Cardiovascular: Negative  Gastrointestinal: Negative  Endocrine: Negative  Genitourinary: Negative  Musculoskeletal: Negative  Skin: Negative  Allergic/Immunologic: Negative  Neurological: Negative  Hematological: Negative  Psychiatric/Behavioral: Negative          Current Outpatient Medications on File Prior to Visit   Medication Sig   • acetaminophen (TYLENOL) 500 mg tablet Take 500 mg by mouth   • Alirocumab (Praluent) 75 MG/ML SOAJ Inject 1 mL under the skin every 14 (fourteen) days   • amoxicillin (AMOXIL) 500 MG tablet 4 TABLETS BY MOUTH 1 HOUR BEFORE DENTIST   • ascorbic acid (VITAMIN C) 500 MG tablet Take 500 mg by mouth daily   • aspirin (ECOTRIN LOW STRENGTH) 81 mg EC tablet Take 1 tablet by mouth daily   • Cholecalciferol (VITAMIN D) 2000 units CAPS Take by mouth   • Coenzyme Q10 (CO Q 10) 100 MG CAPS Take by mouth   • furosemide (LASIX) 40 mg tablet Take by mouth 2 (two) times a day Patient takes 1/2 tab BID   • isosorbide mononitrate (IMDUR) 60 mg 24 hr tablet Take 1 tablet by mouth daily   • losartan (COZAAR) 25 mg tablet Take 0 5 tablets by mouth daily   • Magnesium 400 MG TABS Take by mouth   • metoprolol succinate (TOPROL-XL) 50 mg 24 hr tablet 2 (two) times a day 1 in the morning & 1 at dinner   • Multiple Vitamins-Minerals (MULTIVITAMIN ADULT PO) Take 1 capsule by mouth   • nitroglycerin (NITROSTAT) 0 4 mg SL tablet Place 1 tablet under the tongue every 5 (five) minutes as needed   • rivaroxaban (XARELTO) 20 mg tablet Take 1 tablet (20 mg "total) by mouth daily with dinner   • traZODone (DESYREL) 50 mg tablet TAKE 1 TABLET BY MOUTH DAILY AT BEDTIME   • TURMERIC PO Take 1 Dose by mouth       Objective     /64 (BP Location: Left arm, Patient Position: Sitting, Cuff Size: Large)   Pulse (!) 42   Ht 6' 1\" (1 854 m)   Wt 112 kg (247 lb)   SpO2 94%   BMI 32 59 kg/m²     Physical Exam  Vitals and nursing note reviewed  Constitutional:       Appearance: He is well-developed  HENT:      Head: Normocephalic and atraumatic  Cardiovascular:      Rate and Rhythm: Normal rate and regular rhythm  Pulses:           Carotid pulses are 2+ on the right side and 2+ on the left side  Heart sounds: Normal heart sounds  No murmur heard  No friction rub  No gallop  Pulmonary:      Effort: Pulmonary effort is normal  No respiratory distress  Breath sounds: Normal breath sounds  No wheezing or rales  Musculoskeletal:      Cervical back: Normal range of motion and neck supple         Bran Jo MD  "

## 2023-05-25 NOTE — ASSESSMENT & PLAN NOTE
Stable at the moment  Follow with cardiology  Currently on Xarelto  Does have pacer defibrillator  Had bradycardia noted on exam today  Again, follow-up with cardiology and discuss about possibly reprogramming pacemaker if he is having symptomatic low heart rate

## 2023-06-01 LAB
SARS-COV-2 AG UPPER RESP QL IA: NEGATIVE
VALID CONTROL: NORMAL

## 2023-06-12 LAB
ALBUMIN SERPL-MCNC: 4.2 G/DL (ref 3.6–5.1)
ALBUMIN/GLOB SERPL: 1.8 (CALC) (ref 1–2.5)
ALP SERPL-CCNC: 49 U/L (ref 35–144)
ALT SERPL-CCNC: 20 U/L (ref 9–46)
AST SERPL-CCNC: 19 U/L (ref 10–35)
BILIRUB SERPL-MCNC: 0.8 MG/DL (ref 0.2–1.2)
BUN SERPL-MCNC: 21 MG/DL (ref 7–25)
BUN/CREAT SERPL: 31 (CALC) (ref 6–22)
CALCIUM SERPL-MCNC: 9.2 MG/DL (ref 8.6–10.3)
CHLORIDE SERPL-SCNC: 105 MMOL/L (ref 98–110)
CHOLEST SERPL-MCNC: 110 MG/DL
CO2 SERPL-SCNC: 28 MMOL/L (ref 20–32)
CREAT SERPL-MCNC: 0.68 MG/DL (ref 0.7–1.22)
GFR/BSA.PRED SERPLBLD CYS-BASED-ARV: 94 ML/MIN/1.73M2
GLOBULIN SER CALC-MCNC: 2.4 G/DL (CALC) (ref 1.9–3.7)
GLUCOSE SERPL-MCNC: 112 MG/DL (ref 65–99)
HDLC SERPL-MCNC: 36 MG/DL
LDLC SERPL DIRECT ASSAY-MCNC: 57 MG/DL
POTASSIUM SERPL-SCNC: 4.2 MMOL/L (ref 3.5–5.3)
PROT SERPL-MCNC: 6.6 G/DL (ref 6.1–8.1)
SODIUM SERPL-SCNC: 139 MMOL/L (ref 135–146)

## 2023-08-09 ENCOUNTER — TELEPHONE (OUTPATIENT)
Dept: FAMILY MEDICINE CLINIC | Facility: CLINIC | Age: 83
End: 2023-08-09

## 2023-08-09 DIAGNOSIS — F41.9 ANXIETY: ICD-10-CM

## 2023-08-09 DIAGNOSIS — G47.9 SLEEP DISTURBANCE: ICD-10-CM

## 2023-08-09 DIAGNOSIS — G47.00 INSOMNIA, UNSPECIFIED TYPE: Primary | ICD-10-CM

## 2023-08-09 RX ORDER — TRAZODONE HYDROCHLORIDE 50 MG/1
100 TABLET ORAL
Qty: 90 TABLET | Refills: 1
Start: 2023-08-09

## 2023-08-09 NOTE — ASSESSMENT & PLAN NOTE
Wife called. He is having problems staying asleep. Does get up frequently at night due to lasix. Recommend Lasix in AM, and then 6-8 hours later. Wife wondered about Ambien, as that helped a bit before. Recommend increase Trazodone to 100mg at HS.

## 2023-08-09 NOTE — TELEPHONE ENCOUNTER
1. Insomnia, unspecified type  Assessment & Plan: Wife called. He is having problems staying asleep. Does get up frequently at night due to lasix. Recommend Lasix in AM, and then 6-8 hours later. Wife wondered about Ambien, as that helped a bit before. Recommend increase Trazodone to 100mg at HS. 2. Anxiety  -     traZODone (DESYREL) 50 mg tablet; Take 2 tablets (100 mg total) by mouth daily at bedtime    3. Sleep disturbance  -     traZODone (DESYREL) 50 mg tablet;  Take 2 tablets (100 mg total) by mouth daily at bedtime

## 2023-08-13 NOTE — ASSESSMENT & PLAN NOTE
Physical Therapy    Visit Type: initial evaluation  SUBJECTIVE  Patient agreed to participate in therapy this date.  Nurse Dior cleared patient for physical therapy.  \"I am always moving around my house, hanging clothes on the line outside and doing the washing. I am always on my feet\"  Patient / Family Goal: return to previous functional status, maximize function and return home    Pain     Location: Occasional pain in left lower extremity that occurs when patient spends too much time supine, is relieved by sidelying      OBJECTIVE     Cognitive Status   Level of Consciousness   - alert  Orientation    - Oriented to: person, place, time and situation     Range of Motion (ROM)   (degrees unless noted; active unless noted; norms in ( ); negative=lacking to 0, positive=beyond 0)  WFL: LLE, RLE    Strength  (out of 5 unless noted, standard test position unless noted)   WFL: LLE, RLE       Sensation/Dermatome Testing:    Intact: LLE light touch, RLE light touch    Bed Mobility  - Supine to sit: modified independent  Transfers  Assistive devices: gait belt  - Sit to stand: supervision  - Stand to sit: supervision  - Toilet: supervision    Ambulation / Gait  - Assistive device: gait belt  - Distance (feet unless otherwise indicated): 200, 100  - Assist Level: supervision and contact guard/touching/steadying assist  - Surface: even  - Description: unsteady    Stair Ambulation  - Number of steps: standard flight  Ascend  - Assist Level: gait belt used and contact guard  - Pattern: reciprocal  - Railing: right  Descend   - Assist Level: gait belt used and contact guard  - Pattern: reciprocal  - Railing: left   Interventions     Skilled input: Verbal instruction/cues, tactile instruction/cues and posture correction  Verbal Consent: Writer verbally educated and received verbal consent for hand placement, positioning of patient, and techniques to be performed today from patient for clothing adjustments for techniques,  Wt Readings from Last 3 Encounters:   01/25/23 116 kg (256 lb)   11/29/22 115 kg (253 lb)   11/28/22 116 kg (256 lb)   Blood pressure today is minimally elevated, but overall doing relatively well  Continue to follow-up as scheduled  therapist position for techniques and hand placement and palpation for techniques as described above and how they are pertinent to the patient's plan of care.         Education:   - Present and ready to learn: patient  Education provided during session:  - Safety with bed mobility, transfers, gait and stairs. Falls prevention education.  - Results of above outlined education: Verbalizes understanding, Demonstrates understanding and Needs reinforcement    ASSESSMENT   Patient will benefit from skilled therapy to address listed impairments and functional limitations.    Discharge needs based on today's assessment:   - Current level of function: slightly below baseline level of function   - Therapy needs at discharge: outpatient therapy 1-3 times per week   - Activities of daily living (ADLs) requiring support at discharge: ambulation and stairs   - Impairments that require further therapy intervention: balance and activity tolerance         Discharge Recommendations  PT/OT Mobility Equipment for Discharge: None       • Predicted patient presentation: Low (stable) - Patient comorbidities and complexities, as defined above, will have little effect on progress for prescribed plan of care.    Pain at End of Session:   Pain: 0/10    PLAN (while hospitalized)  Suggestions for next session as indicated: PT Frequency: 3-5 x per week  Frequency Comments: 1/5 last 8/13 (H) SC    Interventions: bed mobility, functional transfer training, gait training, patient/family training, safety education and stairs retraining  Agreement to plan and goals: patient agrees with goals and treatment plan        GOALS  Review Date: 8/13/2023  Long Term Goals: (to be met by time of discharge from hospital)  Sit to supine: Patient will complete sit to supine modified independent.  Supine to sit: Patient will complete supine to sit modified independent.  Status: progressing/ongoing  Sit to stand: Patient will complete sit to stand transfer with  gait belt, independent.   Status: progressing/ongoing  Ambulation (even): Patient will ambulate on even surface for 150 feet with gait belt, modified independent.   Status: partially met   Flight of stairs: Patient will ambulate a flight of stairs with gait belt modified independent.  Status: progressing/ongoing    Documented in the chart in the following areas: Prior Level of Function. Assessment/Plan.      Patient at End of Session:   Location: in chair  Safety measures: alarm system in place/re-engaged, lines intact and equipment intact  Handoff to: nurse (Dior)      Therapy procedure time and total treatment time can be found documented on the Time Entry flowsheet

## 2023-08-24 ENCOUNTER — OFFICE VISIT (OUTPATIENT)
Dept: FAMILY MEDICINE CLINIC | Facility: CLINIC | Age: 83
End: 2023-08-24
Payer: COMMERCIAL

## 2023-08-24 VITALS
WEIGHT: 255 LBS | OXYGEN SATURATION: 96 % | BODY MASS INDEX: 33.8 KG/M2 | HEART RATE: 69 BPM | RESPIRATION RATE: 18 BRPM | DIASTOLIC BLOOD PRESSURE: 80 MMHG | SYSTOLIC BLOOD PRESSURE: 132 MMHG | HEIGHT: 73 IN

## 2023-08-24 DIAGNOSIS — M54.32 LEFT SIDED SCIATICA: Primary | ICD-10-CM

## 2023-08-24 DIAGNOSIS — M47.816 LUMBAR SPONDYLOSIS: ICD-10-CM

## 2023-08-24 PROCEDURE — 99214 OFFICE O/P EST MOD 30 MIN: CPT | Performed by: NURSE PRACTITIONER

## 2023-08-24 RX ORDER — METHOCARBAMOL 500 MG/1
500 TABLET, FILM COATED ORAL 3 TIMES DAILY PRN
Qty: 30 TABLET | Refills: 0 | Status: SHIPPED | OUTPATIENT
Start: 2023-08-24

## 2023-08-24 RX ORDER — PREDNISONE 20 MG/1
60 TABLET ORAL DAILY
Qty: 15 TABLET | Refills: 0 | Status: SHIPPED | OUTPATIENT
Start: 2023-08-24 | End: 2023-08-29

## 2023-08-24 NOTE — ASSESSMENT & PLAN NOTE
Patient's symptoms are consistent with left-sided sciatica which is most likely being caused by worsening of his lumbar spondylosis. Patient will be started on a 5-day course of prednisone to help with acute inflammation. He was also prescribed Robaxin 500 mg to be used as needed up to 3 times per day to help with muscle spasms. PT and chiropractor referrals were placed. Patient was also referred to pain management to see if he can qualify for a steroid injection.

## 2023-08-24 NOTE — PROGRESS NOTES
Name: Cassia Rod      : 1940      MRN: [id number]  Encounter Provider: MP Vivar  Encounter Date: 2023   Encounter department: [place] [address]     1. Left sided sciatica  Assessment & Plan:  Patient's symptoms are consistent with left-sided sciatica. Patient will be started on a 5-day course of prednisone to help with acute inflammation. He was also prescribed Robaxin 500 mg to be used as needed up to 3 times per day to help with muscle spasms. PT and chiropractor referrals were placed. Patient was also referred to pain management to see if he can qualify for a steroid injection. Orders:  -     methocarbamol (ROBAXIN) 500 mg tablet; Take 1 tablet (500 mg total) by mouth 3 (three) times a day as needed for muscle spasms  -     predniSONE 20 mg tablet; Take 3 tablets (60 mg total) by mouth daily for 5 days  -     Ambulatory referral to Spine & Pain Management; Future  -     Ambulatory Referral to Physical Therapy; Future  -     Ambulatory Referral to Chiropractic; Future    2. Lumbar spondylosis  Assessment & Plan:  Patient's symptoms are consistent with left-sided sciatica which is most likely being caused by worsening of his lumbar spondylosis. Patient will be started on a 5-day course of prednisone to help with acute inflammation. He was also prescribed Robaxin 500 mg to be used as needed up to 3 times per day to help with muscle spasms. PT and chiropractor referrals were placed. Patient was also referred to pain management to see if he can qualify for a steroid injection. Orders:  -     methocarbamol (ROBAXIN) 500 mg tablet; Take 1 tablet (500 mg total) by mouth 3 (three) times a day as needed for muscle spasms  -     predniSONE 20 mg tablet; Take 3 tablets (60 mg total) by mouth daily for 5 days  -     Ambulatory referral to Spine & Pain Management; Future  -     Ambulatory Referral to Physical Therapy;  Future  -     Ambulatory Referral to Chiropractic; Future        Depression Screening and Follow-up Plan: Patient was screened for depression during today's encounter. They screened negative with a PHQ-2 score of 0. Subjective      Buttocks pain: Over the past days the patient has been reporting symptoms of left buttocks pain. He does report radiation of the pain to his left leg as well. He also reports paresthesias of the left leg especially when waking in the morning. He reports that he often feels as though his leg is numb when waking in the morning. He denies any bowel or bladder incontinence or weakness of his left leg. Of note, the patient did have an MRI of his lumbar spine completed in 2018 which showed severe multilevel lumbar spondylosis with severe central canal, lateral recess, and foraminal stenosis. The patient is chronically managed on Xarelto so he is unable to take NSAIDs. He reports he has been taking Tylenol as needed and this has not been improving his symptoms much. Review of Systems   Constitutional: Negative for chills and fever. HENT: Negative for ear pain and sore throat. Eyes: Negative for pain and visual disturbance. Respiratory: Negative for cough, chest tightness, shortness of breath and wheezing. Cardiovascular: Negative for chest pain, palpitations and leg swelling. Gastrointestinal: Negative for abdominal pain, constipation, diarrhea, nausea and vomiting. Endocrine: Negative for cold intolerance and heat intolerance. Genitourinary: Negative for decreased urine volume, dysuria and hematuria. Musculoskeletal: Negative for arthralgias, back pain, joint swelling and myalgias. Skin: Negative for color change and rash. Allergic/Immunologic: Negative for environmental allergies. Neurological: Negative for dizziness, seizures, syncope, weakness, light-headedness, numbness and headaches. Hematological: Negative for adenopathy.    Psychiatric/Behavioral: Negative for confusion. The patient is not nervous/anxious. All other systems reviewed and are negative. Current Outpatient Medications on File Prior to Visit   Medication Sig   • acetaminophen (TYLENOL) 500 mg tablet Take 500 mg by mouth   • Alirocumab (Praluent) 75 MG/ML SOAJ Inject 1 mL under the skin every 14 (fourteen) days   • amoxicillin (AMOXIL) 500 MG tablet 4 TABLETS BY MOUTH 1 HOUR BEFORE DENTIST   • ascorbic acid (VITAMIN C) 500 MG tablet Take 500 mg by mouth daily   • aspirin (ECOTRIN LOW STRENGTH) 81 mg EC tablet Take 1 tablet by mouth daily   • Cholecalciferol (VITAMIN D) 2000 units CAPS Take by mouth   • Coenzyme Q10 (CO Q 10) 100 MG CAPS Take by mouth   • furosemide (LASIX) 40 mg tablet Take by mouth 2 (two) times a day Patient takes 1/2 tab BID   • isosorbide mononitrate (IMDUR) 60 mg 24 hr tablet Take 1 tablet by mouth daily   • losartan (COZAAR) 25 mg tablet Take 0.5 tablets by mouth daily   • Magnesium 400 MG TABS Take by mouth   • metoprolol succinate (TOPROL-XL) 50 mg 24 hr tablet 2 (two) times a day 1 in the morning & 1 at dinner   • Multiple Vitamins-Minerals (MULTIVITAMIN ADULT PO) Take 1 capsule by mouth   • nitroglycerin (NITROSTAT) 0.4 mg SL tablet Place 1 tablet under the tongue every 5 (five) minutes as needed   • rivaroxaban (XARELTO) 20 mg tablet Take 1 tablet (20 mg total) by mouth daily with dinner   • traZODone (DESYREL) 50 mg tablet Take 2 tablets (100 mg total) by mouth daily at bedtime   • TURMERIC PO Take 1 Dose by mouth       Objective     /80 (BP Location: Right arm, Patient Position: Sitting, Cuff Size: Large)   Pulse 69   Resp 18   Ht 6' 1" (1.854 m)   Wt 116 kg (255 lb)   SpO2 96%   BMI 33.64 kg/m²     Physical Exam  Vitals and nursing note reviewed. Constitutional:       General: He is not in acute distress. Appearance: Normal appearance. He is not ill-appearing. HENT:      Head: Normocephalic.    Eyes:      Conjunctiva/sclera: Conjunctivae normal. Cardiovascular:      Rate and Rhythm: Normal rate and regular rhythm. Pulses: Normal pulses. Carotid pulses are 2+ on the right side and 2+ on the left side. Radial pulses are 2+ on the right side and 2+ on the left side. Posterior tibial pulses are 2+ on the right side and 2+ on the left side. Heart sounds: Normal heart sounds. No murmur heard. Pulmonary:      Effort: Pulmonary effort is normal. No respiratory distress. Breath sounds: Normal breath sounds. No decreased breath sounds, wheezing, rhonchi or rales. Abdominal:      General: Abdomen is flat. Bowel sounds are normal. There is no distension. Palpations: Abdomen is soft. Tenderness: There is no abdominal tenderness. There is no guarding. Musculoskeletal:      Cervical back: Normal range of motion. Lumbar back: Spasms and tenderness present. No swelling, edema or bony tenderness. Normal range of motion. Positive left straight leg raise test. Negative right straight leg raise test.      Right lower leg: No edema. Left lower leg: No edema. Comments: Tenderness was noted with palpation of left SI notch. No tenderness was noted with palpation of midline lumbar spine or bilateral lumbar paraspinals. Left straight leg raise test was also positive. Skin:     General: Skin is warm and dry. Capillary Refill: Capillary refill takes less than 2 seconds. Neurological:      General: No focal deficit present. Mental Status: He is alert and oriented to person, place, and time. Psychiatric:         Mood and Affect: Mood normal.         Behavior: Behavior normal.         Thought Content:  Thought content normal.         Judgment: Judgment normal.       MP Carroll

## 2023-08-24 NOTE — ASSESSMENT & PLAN NOTE
Patient's symptoms are consistent with left-sided sciatica. Patient will be started on a 5-day course of prednisone to help with acute inflammation. He was also prescribed Robaxin 500 mg to be used as needed up to 3 times per day to help with muscle spasms. PT and chiropractor referrals were placed. Patient was also referred to pain management to see if he can qualify for a steroid injection.

## 2023-09-06 ENCOUNTER — TELEPHONE (OUTPATIENT)
Dept: FAMILY MEDICINE CLINIC | Facility: CLINIC | Age: 83
End: 2023-09-06

## 2023-09-06 DIAGNOSIS — M54.32 LEFT SIDED SCIATICA: ICD-10-CM

## 2023-09-06 DIAGNOSIS — M51.36 DISC DEGENERATION, LUMBAR: Primary | ICD-10-CM

## 2023-09-06 NOTE — TELEPHONE ENCOUNTER
X-ray was ordered to be completed.   Please advise patient he can have this completed at any care now or hospital.

## 2023-09-06 NOTE — TELEPHONE ENCOUNTER
Pt's wife called stating pt needed an x-ray order written for pt's back. States pt saw DL on 8/24, and was recommended to see a chiropractor or a PT. Pt went and saw Dr. Tray Sol in [facility], who told pt he should get an x-ray of his lower back. Dominic Young states pt has another appointment tomorrow, and would like to get x-ray done before then. Can call pt back at [phone].

## 2023-09-07 ENCOUNTER — HOSPITAL ENCOUNTER (OUTPATIENT)
Dept: RADIOLOGY | Facility: HOSPITAL | Age: 83
Discharge: HOME | End: 2023-09-07
Payer: COMMERCIAL

## 2023-09-07 DIAGNOSIS — M54.32 LEFT SIDED SCIATICA: ICD-10-CM

## 2023-09-07 DIAGNOSIS — M51.36 DISC DEGENERATION, LUMBAR: ICD-10-CM

## 2023-09-07 PROCEDURE — 72200 X-RAY EXAM SI JOINTS: CPT

## 2023-09-07 PROCEDURE — 72110 X-RAY EXAM L-2 SPINE 4/>VWS: CPT

## 2023-10-23 ENCOUNTER — CONSULT (OUTPATIENT)
Dept: PAIN MEDICINE | Facility: CLINIC | Age: 83
End: 2023-10-23
Payer: COMMERCIAL

## 2023-10-23 VITALS
BODY MASS INDEX: 33.13 KG/M2 | WEIGHT: 250 LBS | SYSTOLIC BLOOD PRESSURE: 137 MMHG | HEART RATE: 82 BPM | DIASTOLIC BLOOD PRESSURE: 76 MMHG | HEIGHT: 73 IN

## 2023-10-23 DIAGNOSIS — M48.061 SPINAL STENOSIS OF LUMBAR REGION, UNSPECIFIED WHETHER NEUROGENIC CLAUDICATION PRESENT: ICD-10-CM

## 2023-10-23 DIAGNOSIS — M47.816 LUMBAR SPONDYLOSIS: ICD-10-CM

## 2023-10-23 DIAGNOSIS — M54.16 LUMBAR RADICULOPATHY: Primary | ICD-10-CM

## 2023-10-23 PROCEDURE — 99204 OFFICE O/P NEW MOD 45 MIN: CPT | Performed by: ANESTHESIOLOGY

## 2023-10-23 NOTE — PROGRESS NOTES
Assessment  1. Lumbar radiculopathy    2. Lumbar spondylosis    3. Spinal stenosis of lumbar region, unspecified whether neurogenic claudication present        Plan  26-year-old male with a history of CAD status post stent placement, ICD, CHF on chronic anticoagulation with Xarelto, referred by LEEROY Serrano, presenting for initial consultation regarding a 6-month history of lumbosacral back pain that radiates into the posterior lateral aspect of the left lower extremity to the ankle with occasional subjective weakness. Fortunately, the patient has noticed improvement in his symptoms. He denies any trauma or inciting event. MRI of the lumbar spine demonstrates multilevel spondylosis from L2-3 to L5-S1. Last MRI of the lumbar spine was from 2018 demonstrating retrolisthesis of L3. Anterolisthesis of L4 on L5. Multilevel spondylosis with varying degrees of central and foraminal stenosis most pronounced at L3-4 and L4-5. The patient does take Tylenol as needed with some relief. He has not done any recent formal physical therapy. He has tried some chiropractic treatment with minimal improvement. He has found moderate relief with a home exercise program.  The patient's symptoms are consistent with left L5-S1 radiculopathy which fortunately is improving. 1.  I will refer the patient to physical therapy as I feel he would benefit from Denisse-based exercises  2. Patient may take Tylenol 500 to 1000 mg every 8 hours as needed and should not exceed more than 4000 mg in 24 hours  3. We will avoid NSAIDs secondary to anticoagulation  4. I will follow-up with the patient in 6 weeks      My impressions and treatment recommendations were discussed in detail with the patient who verbalized understanding and had no further questions. Discharge instructions were provided. I personally saw and examined the patient and I agree with the above discussed plan of care.     No orders of the defined types were placed in this encounter. No orders of the defined types were placed in this encounter. History of Present Illness    Cassia Rod is a 82 y.o. male with a history of CAD status post stent placement, ICD, CHF on chronic anticoagulation with Xarelto, referred by LEEROY Moreno, presenting for initial consultation regarding a 6-month history of lumbosacral back pain that radiates into the posterior lateral aspect of the left lower extremity to the ankle with occasional subjective weakness. Fortunately, the patient has noticed improvement in his symptoms. He denies any trauma or inciting event. He denies any right lower extremity symptoms, bladder or bowel incontinence, or saddle anesthesia. MRI of the lumbar spine demonstrates multilevel spondylosis from L2-3 to L5-S1. Last MRI of the lumbar spine was from 2018 demonstrating retrolisthesis of L3. Anterolisthesis of L4 on L5. Multilevel spondylosis with varying degrees of central and foraminal stenosis most pronounced at L3-4 and L4-5. The patient does take Tylenol as needed with some relief. He has not done any recent formal physical therapy. He has tried some chiropractic treatment with minimal improvement. He has found moderate relief with a home exercise program.  The patient currently rates his pain a 2 out of 10 and the pain is intermittent. The pain is worse at night and described as cramping and sharp. The pain is increased with lying down and decreased with standing and walking. He has found relief with exercise and a TENS unit. Other than as stated above, the patient denies any interval changes in medications, medical condition, mental condition, symptoms, or allergies since the last office visit. I have personally reviewed and/or updated the patient's past medical history, past surgical history, family history, social history, current medications, allergies, and vital signs today.      Review of Systems   Constitutional:  Negative for fever and unexpected weight change. HENT:  Negative for trouble swallowing. Eyes:  Negative for visual disturbance. Respiratory:  Positive for shortness of breath. Negative for wheezing. Cardiovascular:  Positive for palpitations. Negative for chest pain. Gastrointestinal:  Negative for constipation, diarrhea, nausea and vomiting. Endocrine: Positive for polyuria. Negative for cold intolerance, heat intolerance and polydipsia. Genitourinary:  Negative for difficulty urinating and frequency. Musculoskeletal:  Positive for myalgias. Negative for arthralgias, gait problem and joint swelling. Skin:  Negative for rash. Neurological:  Negative for dizziness, seizures, syncope, weakness and headaches. Hematological:  Does not bruise/bleed easily. Psychiatric/Behavioral:  Negative for dysphoric mood. All other systems reviewed and are negative.       Patient Active Problem List   Diagnosis    3-vessel CAD    Acute low back pain without sciatica    Cardiomyopathy, ischemic    Calculus of gallbladder and bile duct with acute on chronic cholecystitis    CHF (congestive heart failure) ([facility])    Disc degeneration, lumbar    Diverticulosis    Hyperlipidemia    Hyperglycemia    Hemorrhoids    Hypertensive heart disease with congestive heart failure (HCC)    Hypothyroidism    History of non-ST elevation myocardial infarction (NSTEMI)    Obese    Atrial fibrillation (HCC)    Prostate nodule    S/P PTCA (percutaneous transluminal coronary angioplasty)    Spinal stenosis    Chronic maxillary sinusitis    Renal cyst    Cataract    Osteoarthritis    Thoracic back pain    Anticoagulated by anticoagulation treatment    Rectal bleeding    Stable angina    Colon polyp    SOB (shortness of breath)    Bronchitis    ICD (implantable cardioverter-defibrillator) in place    Statin intolerance    Testicle swelling    Anxiety    Insomnia    Left sided sciatica    Lumbar spondylosis       Past Medical History:   Diagnosis Date    Bleeding hemorrhoids     Choledocholithiasis     COVID-19 5/23/2022    Symptoms onset 5/20/22    Difficulty breathing     Diverticulosis     Ileus (HCC)     Lymphadenopathy of head and neck 9/29/2020       Past Surgical History:   Procedure Laterality Date    APPENDECTOMY      CHOLECYSTECTOMY LAPAROSCOPIC      CORONARY ANGIOPLASTY      CORONARY ANGIOPLASTY WITH STENT PLACEMENT      CORONARY ARTERY BYPASS GRAFT      ERCP      TONSILLECTOMY         Family History   Problem Relation Age of Onset    Lung cancer Mother     Coronary artery disease Father     Diabetes Brother     Lung cancer Family        Social History     Occupational History    Occupation: retired   Tobacco Use    Smoking status: Never    Smokeless tobacco: Never   Substance and Sexual Activity    Alcohol use: Not Currently     Comment: social    Drug use: No    Sexual activity: Not on file       Current Outpatient Medications on File Prior to Visit   Medication Sig    acetaminophen (TYLENOL) 500 mg tablet Take 500 mg by mouth    Alirocumab (Praluent) 75 MG/ML SOAJ Inject 1 mL under the skin every 14 (fourteen) days    amoxicillin (AMOXIL) 500 MG tablet 4 TABLETS BY MOUTH 1 HOUR BEFORE DENTIST    ascorbic acid (VITAMIN C) 500 MG tablet Take 500 mg by mouth daily    aspirin (ECOTRIN LOW STRENGTH) 81 mg EC tablet Take 1 tablet by mouth daily    Cholecalciferol (VITAMIN D) 2000 units CAPS Take by mouth    Coenzyme Q10 (CO Q 10) 100 MG CAPS Take by mouth    furosemide (LASIX) 40 mg tablet Take by mouth 2 (two) times a day Patient takes 1/2 tab BID    isosorbide mononitrate (IMDUR) 60 mg 24 hr tablet Take 1 tablet by mouth daily    losartan (COZAAR) 25 mg tablet Take 0.5 tablets by mouth daily    Magnesium 400 MG TABS Take by mouth    methocarbamol (ROBAXIN) 500 mg tablet Take 1 tablet (500 mg total) by mouth 3 (three) times a day as needed for muscle spasms    metoprolol succinate (TOPROL-XL) 50 mg 24 hr tablet 2 (two) times a day 1 in the morning & 1 at dinner    Multiple Vitamins-Minerals (MULTIVITAMIN ADULT PO) Take 1 capsule by mouth    nitroglycerin (NITROSTAT) 0.4 mg SL tablet Place 1 tablet under the tongue every 5 (five) minutes as needed    rivaroxaban (XARELTO) 20 mg tablet Take 1 tablet (20 mg total) by mouth daily with dinner    traZODone (DESYREL) 50 mg tablet Take 2 tablets (100 mg total) by mouth daily at bedtime    TURMERIC PO Take 1 Dose by mouth     No current facility-administered medications on file prior to visit. Allergies   Allergen Reactions    Atorvastatin Cough     Per pt has had a problem with other statins also, does not remember names    Bee Venom     Diphenhydramine Other (See Comments)    Iodinated Contrast Media Other (See Comments)    Lisinopril Other (See Comments)     cough    Perflutren Lipid Microsphere     Perflutren Lipid Microspheres Other (See Comments)     severe back lower back spasm    Ranolazine      Alters mood. Rosuvastatin Myalgia    Sacubitril-Valsartan Other (See Comments)     "severe back pain"    Simvastatin Myalgia       Physical Exam    /76   Pulse 82   Ht 6' 1" (1.854 m)   Wt 113 kg (250 lb)   BMI 32.98 kg/m²     Constitutional: normal, well developed, well nourished, alert, in no distress and non-toxic and no overt pain behavior. Eyes: anicteric  HEENT: grossly intact  Neck: supple, symmetric, trachea midline and no masses   Pulmonary:even and unlabored  Cardiovascular:No edema or pitting edema present  Skin:Normal without rashes or lesions and well hydrated  Psychiatric:Mood and affect appropriate  Neurologic:Cranial Nerves II-XII grossly intact  Musculoskeletal:normal gait. Bilateral lumbar paraspinals nontender to palpation. Bilateral SI joints nontender to palpation. Bilateral trochanteric flares nontender to palpation. Bilateral patellar and Achilles reflexes were 2/4 and symmetrical.  No clonus was noted bilaterally.   Bilateral lower extremity strength was 5/5 in all muscle groups. Sensation intact to light touch in L3 thru S1 dermatomes bilaterally. Positive straight leg raise on the left. Negative Roland's and Gaenslen's test bilaterally. Imaging    Study Result    Narrative & Impression   LUMBAR SPINE     INDICATION:  M51.36: Other intervertebral disc degeneration, lumbar region. M54.32: Sciatica, left side. COMPARISON: No prior lumbar studies, chest radiograph 5/18/2009     VIEWS:  XR SPINE LUMBAR MINIMUM 4 VIEWS NON INJURY  Images: 5     FINDINGS:     There are 5 non rib bearing lumbar vertebral bodies. Supernumerary rib suspected on the right at L1. There is no evidence of acute fracture or destructive osseous lesion. Alignment is unremarkable. Moderate to severe multilevel degenerative disc disease L2-3 through L5-S1. Lower lumbar facet arthropathy. Marginal endplate osteophytes lower thoracic spine. The pedicles appear intact. Atherosclerotic vascular calcifications. Cholecystectomy clips. IMPRESSION:     No acute osseous abnormality. Degenerative changes as described. Workstation performed: [id number]        Study Result    Narrative & Impression   SACROILIAC JOINTS     INDICATION:  M51.36: Other intervertebral disc degeneration, lumbar region. M54.32: Sciatica, left side. COMPARISON:  None     VIEWS:  XR SACROILIAC JOINTS < 3 VIEWS  Images: 4     FINDINGS:     The SI joints appear symmetric without evidence of focal erosions or joint space widening. Sacral arcuate lines appear intact. No fracture or pathologic bone lesions seen. Included portions of the pelvis are unremarkable. Surgical clips left groin. Vascular calcifications. Degenerative changes visualized lumbar spine. IMPRESSION:     Unremarkable SI joints. Workstation performed: [id number]       XR spine thoracic 3 vw  Order: [id number]  Impression    Impression:    1.   Mild to moderate degenerative disc disease throughout the mid to lower  thoracic spine without acute osseous abnormality    2. Multilevel degenerative disc disease of the cervical spine, incompletely  evaluated    3. Tortuous aorta                Workstation:[id number]  Narrative    Exam date:2/26/2020 1:10 PM    Exam: 2 views thoracic spine    Comparison: None available    History:Thoracic back pain    Findings: There are 12 rib bearing thoracic vertebra. Multilevel degenerative disc disease  throughout the mid to lower thoracic spine, with multilevel anterior osteophyte  formation. Vertebral body heights are maintained without occult fracture. Multilevel degenerative disc disease throughout the cervical spine is also  noted, incompletely evaluated. Median sternotomy wires are present, multiple which are disrupted. AICD wires  are noted. Visualized lung fields are clear, aorta is tortuous. Surgical hernia  coils are seen within the upper abdomen. Coarse vascular calcifications noted.   Exam End: 02/26/20  1:34 PM    Specimen Collected: 02/27/20  9:34 AM Last Resulted: 02/27/20  9:39 AM   Received From: [address] [place] [address]  Result Received: 05/25/23  9:52 AM

## 2023-10-30 ENCOUNTER — EVALUATION (OUTPATIENT)
Dept: PHYSICAL THERAPY | Facility: REHABILITATION | Age: 83
End: 2023-10-30
Payer: COMMERCIAL

## 2023-10-30 DIAGNOSIS — M47.816 LUMBAR SPONDYLOSIS: ICD-10-CM

## 2023-10-30 DIAGNOSIS — M48.061 SPINAL STENOSIS OF LUMBAR REGION, UNSPECIFIED WHETHER NEUROGENIC CLAUDICATION PRESENT: ICD-10-CM

## 2023-10-30 DIAGNOSIS — M54.16 LUMBAR RADICULOPATHY: ICD-10-CM

## 2023-10-30 PROCEDURE — 97110 THERAPEUTIC EXERCISES: CPT | Performed by: PHYSICAL THERAPIST

## 2023-10-30 PROCEDURE — 97161 PT EVAL LOW COMPLEX 20 MIN: CPT | Performed by: PHYSICAL THERAPIST

## 2023-10-30 NOTE — PROGRESS NOTES
PT Evaluation     Today's date: 10/30/2023  Patient name: Renaldo Banuelos  : 1940  MRN: [id number]  Referring provider: Yessenia Lobo DO  Dx:   Encounter Diagnosis     ICD-10-CM    1. Lumbar spondylosis  M47.816 Ambulatory referral to Physical Therapy      2. Spinal stenosis of lumbar region, unspecified whether neurogenic claudication present  M48.061 Ambulatory referral to Physical Therapy      3. Lumbar radiculopathy  M54.16 Ambulatory referral to Physical Therapy          Start Time: 1200  Stop Time: 1255  Total time in clinic (min): 55 minutes    Assessment  Assessment details: Patient is a 82 y.o. male presenting to initial examination with chief complaint of lumbar and radicular L LE pain. Signs and symptoms are consistent with low back pain with flexion preference. Primary impairments include lumbar extension dysfunction, L piriformis flexibility deficits, and B/L hamstring flexibility deficits. As a result of impairments patient experiences limitations with functional/daily activities including blowing leaves, WB, standing/walking, sleeping, and transitions. Educated patient regarding plan of care and answered all patient questions to patient satisfaction. Patient would benefit from skilled PT interventions to address above impairments, achieve goals, and to maximize function.  Thank you for the referral.    Impairments: abnormal muscle firing, abnormal or restricted ROM, abnormal movement, activity intolerance, impaired physical strength and pain with function     Prognosis: good    Goals  Impairment Goals: 4-6 weeks  - Patient to decrease pain to 0/10  - Patient to demonstrate proper core activation and body mechanics during functional activities    Functional Goals: by discharge  - Patient to discharge to independent [facility]  - Patient to return to prior level of function  - Patient to tolerate standing and walking with appropriate flexion stretches  - Patient to tolerate blowing leaves without increased pain or difficulty  - Patient to improve sleep quality    Plan  Patient would benefit from: skilled physical therapy  Planned modality interventions: cryotherapy, TENS and thermotherapy: hydrocollator packs  Planned therapy interventions: flexibility, home exercise program, joint mobilization, manual therapy, neuromuscular re-education, patient education, strengthening, stretching, therapeutic activities, therapeutic exercise and functional ROM exercises  Frequency: 1x week  Duration in weeks: 8  Treatment plan discussed with: patient      Subjective Evaluation    History of Present Illness  Mechanism of injury: HISTORY OF PRESENT ILLNESS: Patient reports onset of low back pain beginning about 6-7 months ago after he underwent R TKA. Pain is localized to lumbar spine and radiates distally to L LE to the level of the ankle posteriorly. No sensory changes or B/B changes. Pain has been improving with time. He recently consulted with spine and pain who recommended PT. No groin pain.   PRIOR TREATMENT: chiropractic with some relief  AGGRAVATING FACTORS: blowing leaves, WB, standing/walking, sleeping, transitions  EASING FACTORS: sitting, sidelying, heat  WORK: retired  IMAGING: lumbar x-rays revealed moderate to severe degenerative changes L2-S1  FUNCTIONAL LIMITATIONS: blowing leaves, WB, standing/walking, sleeping, and transitions  SUBJECTIVE FUNCTIONAL LEVEL: 60%  PATIENT GOAL: be able to do more activity for a longer period of time and to improve my sleeping  Pain  Current pain ratin  At best pain ratin  At worst pain ratin  Location: low back  Quality: sharp        Objective     Neurological Testing     Sensation     Lumbar   Left   Intact: light touch    Right   Intact: light touch    Reflexes   Left   Patellar (L4): normal (2+)  Achilles (S1): absent (0)    Right   Patellar (L4): normal (2+)  Achilles (S1): absent (0)    Active Range of Motion     Lumbar   Flexion:  Restriction level: moderate  Extension:  Restriction level: moderate  Left lateral flexion:  Restriction level: moderate  Right lateral flexion:  Restriction level: moderate  Left rotation:  Restriction level: minimal  Right rotation:  Restriction level: minimal    Additional Active Range of Motion Details  No familiar symptom reproduction with lumbar AROM    Passive Range of Motion   Left Hip   Flexion: [facility]  External rotation (90/90): [facility]  Internal rotation (90/90): 20 degrees   Mechanical Assessment    Cervical      Thoracic      Lumbar    Sitting flexion: repeated movements  Pain location: centralized  Pain intensity: better  Standing extension: repeated movements  Pain location: peripheralized  Pain intensity: worse    Strength/Myotome Testing     Left Hip   Planes of Motion   Flexion: 5  Abduction: 5    Right Hip   Planes of Motion   Flexion: 5  Abduction: 5    Left Knee   Extension: 5    Right Knee   Extension: 5    Left Ankle/Foot   Dorsiflexion: 5  Eversion: 5  Great toe extension: 5    Right Ankle/Foot   Dorsiflexion: 5  Eversion: 5  Great toe extension: 5    Tests     Lumbar     Left   Negative passive SLR and slump test.     Right   Negative passive SLR and slump test.     Left Pelvic Girdle/Sacrum   Negative: active SLR test.     Right Pelvic Girdle/Sacrum   Negative: active SLR test.     Left Hip   Negative LATRICIA and FADIR. Right Hip   Negative LATRICIA and FADIR.      Additional Tests Details  L piriformis flexibility deficits  (+) 90-90 SLR B/L             Diagnosis: low back pain with flexion preference   Precautions: hx CABG, CHF   Primary impairments: lumbar extension dysfunction, L piriformis flexibility deficits, and B/L hamstring flexibility deficits   *asterisks by exercise = given for HEP    10/30       Manuals        L LE LAD        Lumbar CPA mobilizations                                There Ex        Rec bike        SKTC *  10" x 5 ea       DKTC        Seated flexion stretch *  10" x 5       Seated flex/rot stretches on ball        Piriformis stretch supine *  30" x 3       90-90 HS stretch        LTR on ball        Hip flexor stretch supine                Neuro Re-Ed        Supine core brace        Dead bug isometric        Sit to stand taps                                                                                Re-evaluation             Ther Act/Gait                                         Modalities

## 2023-11-01 ENCOUNTER — OFFICE VISIT (OUTPATIENT)
Dept: PHYSICAL THERAPY | Facility: REHABILITATION | Age: 83
End: 2023-11-01
Payer: COMMERCIAL

## 2023-11-01 DIAGNOSIS — M48.061 SPINAL STENOSIS OF LUMBAR REGION, UNSPECIFIED WHETHER NEUROGENIC CLAUDICATION PRESENT: ICD-10-CM

## 2023-11-01 DIAGNOSIS — M54.16 LUMBAR RADICULOPATHY: ICD-10-CM

## 2023-11-01 DIAGNOSIS — M47.816 LUMBAR SPONDYLOSIS: Primary | ICD-10-CM

## 2023-11-01 PROCEDURE — 97110 THERAPEUTIC EXERCISES: CPT | Performed by: PHYSICAL THERAPIST

## 2023-11-01 PROCEDURE — 97140 MANUAL THERAPY 1/> REGIONS: CPT | Performed by: PHYSICAL THERAPIST

## 2023-11-01 NOTE — PROGRESS NOTES
Daily Note     Today's date: 2023  Patient name: Jefry Alvarez  : 1940  MRN: [id number]  Referring provider: Elpidio Cassidy DO  Dx:   Encounter Diagnosis     ICD-10-CM    1. Lumbar spondylosis  M47.816       2. Spinal stenosis of lumbar region, unspecified whether neurogenic claudication present  M48.061       3. Lumbar radiculopathy  M54.16           Start Time: 1620  Stop Time: 1703  Total time in clinic (min): 43 minutes    Subjective: Patient reports he did okay with the initial HEP      Objective: See treatment diary below      Assessment: Tolerated treatment well with symptom centralization in response to L LE long axis distraction. Initiated seated flexion and rotation stretches on physioball. Patient continues to demonstrate flexion preference. Patient demonstrated fatigue post treatment, exhibited good technique with therapeutic exercises, and would benefit from continued PT      Plan: Continue per plan of care.       Diagnosis: low back pain with flexion preference   Precautions: hx CABG, CHF   Primary impairments: lumbar extension dysfunction, L piriformis flexibility deficits, and B/L hamstring flexibility deficits   *asterisks by exercise = given for HEP    10/30 11/1      Manuals        L LE LAD   8'      Lumbar CPA mobilizations   12' (grade II)                              There Ex        Rec bike   L1 x 6'      SKTC *  10" x 5 ea       DKTC        Seated flexion stretch *  10" x 5  10" x 5      Seated flex/rot stretches on ball   10" x 5 ea      Piriformis stretch supine *  30" x 3       90-90 HS stretch        LTR on ball        Hip flexor stretch supine                Neuro Re-Ed        Supine core brace        Dead bug isometric        Sit to stand taps                                                                                Re-evaluation             Ther Act/Gait                                         Modalities

## 2023-11-09 ENCOUNTER — OFFICE VISIT (OUTPATIENT)
Dept: PHYSICAL THERAPY | Facility: REHABILITATION | Age: 83
End: 2023-11-09
Payer: COMMERCIAL

## 2023-11-09 DIAGNOSIS — M48.061 SPINAL STENOSIS OF LUMBAR REGION, UNSPECIFIED WHETHER NEUROGENIC CLAUDICATION PRESENT: ICD-10-CM

## 2023-11-09 DIAGNOSIS — M47.816 LUMBAR SPONDYLOSIS: Primary | ICD-10-CM

## 2023-11-09 DIAGNOSIS — M54.16 LUMBAR RADICULOPATHY: ICD-10-CM

## 2023-11-09 PROCEDURE — 97140 MANUAL THERAPY 1/> REGIONS: CPT | Performed by: PHYSICAL THERAPIST

## 2023-11-09 PROCEDURE — 97110 THERAPEUTIC EXERCISES: CPT | Performed by: PHYSICAL THERAPIST

## 2023-11-09 NOTE — PROGRESS NOTES
Daily Note     Today's date: 2023  Patient name: Mary Bourne  : 1940  MRN: [id number]  Referring provider: Warden Aisha DO  Dx:   Encounter Diagnosis     ICD-10-CM    1. Lumbar spondylosis  M47.816       2. Spinal stenosis of lumbar region, unspecified whether neurogenic claudication present  M48.061       3. Lumbar radiculopathy  M54.16           Start Time: 1705  Stop Time: 1755  Total time in clinic (min): 50 minutes    Subjective: Patient reports the stretches at home are going well and he has been compliant with HEP. He feels a little better since starting PT      Objective: See treatment diary below      Assessment: Tolerated treatment well with positive response to manual therapy and emphasis on mobility. Updated HEP to include hamstring stretch and provided illustrated handout. Reduced symptom intensity with flexion preference stretches. Patient demonstrated fatigue post treatment, exhibited good technique with therapeutic exercises, and would benefit from continued PT      Plan: Continue per plan of care.       Diagnosis: low back pain with flexion preference   Precautions: hx CABG, CHF   Primary impairments: lumbar extension dysfunction, L piriformis flexibility deficits, and B/L hamstring flexibility deficits   *asterisks by exercise = given for HEP    10/30 11/1 11/9     Manuals        L LE LAD   8'  5'     Lumbar CPA mobilizations   12' (grade II)  15' (grade II)                             There Ex        Rec bike   L1 x 6'  L1 x 8'     SKTC *  10" x 5 ea       DKTC        Seated flexion stretch *  10" x 5  10" x 5      Seated flex/rot stretches on ball   10" x 5 ea  10" x 5 ea     Piriformis stretch supine *  30" x 3   HEP     90-90 HS stretch *    30" x 3     LTR on ball    5" x 10     Hip flexor stretch supine                Neuro Re-Ed        Supine core brace        Dead bug isometric        Sit to stand taps Re-evaluation             Ther Act/Gait                                         Modalities

## 2023-11-16 ENCOUNTER — OFFICE VISIT (OUTPATIENT)
Dept: PHYSICAL THERAPY | Facility: REHABILITATION | Age: 83
End: 2023-11-16
Payer: COMMERCIAL

## 2023-11-16 DIAGNOSIS — M47.816 LUMBAR SPONDYLOSIS: Primary | ICD-10-CM

## 2023-11-16 DIAGNOSIS — M48.061 SPINAL STENOSIS OF LUMBAR REGION, UNSPECIFIED WHETHER NEUROGENIC CLAUDICATION PRESENT: ICD-10-CM

## 2023-11-16 DIAGNOSIS — M54.16 LUMBAR RADICULOPATHY: ICD-10-CM

## 2023-11-16 PROCEDURE — 97140 MANUAL THERAPY 1/> REGIONS: CPT | Performed by: PHYSICAL THERAPIST

## 2023-11-16 PROCEDURE — 97112 NEUROMUSCULAR REEDUCATION: CPT | Performed by: PHYSICAL THERAPIST

## 2023-11-16 PROCEDURE — 97110 THERAPEUTIC EXERCISES: CPT | Performed by: PHYSICAL THERAPIST

## 2023-11-16 NOTE — PROGRESS NOTES
Daily Note     Today's date: 2023  Patient name: Noel Vargas  : 1940  MRN: [id number]  Referring provider: Maddie Castellanos DO  Dx:   Encounter Diagnosis     ICD-10-CM    1. Lumbar spondylosis  M47.816       2. Spinal stenosis of lumbar region, unspecified whether neurogenic claudication present  M48.061       3. Lumbar radiculopathy  M54.16           Start Time: 1200  Stop Time: 1248  Total time in clinic (min): 48 minutes    Subjective: Patient reports he is doing okay overall. He feels the flexion stretches help with his leg pain however he still gets the pain if he walks for too long. He had some bilateral calf pain with walking his dog recently      Objective: See treatment diary below      Assessment: Tolerated treatment well. Patient challenged with sit to stand without UE support however able to complete with foam pad on chair for increased height. Updated HEP to include sit to stand. He continues to respond favorably to flexion preference stretches. Patient demonstrated fatigue post treatment, exhibited good technique with therapeutic exercises, and would benefit from continued PT      Plan: Continue per plan of care.       Diagnosis: low back pain with flexion preference   Precautions: hx CABG, CHF   Primary impairments: lumbar extension dysfunction, L piriformis flexibility deficits, and B/L hamstring flexibility deficits   *asterisks by exercise = given for HEP    10/30 11/1 11/9 11/16    Manuals        L LE LAD   8'  5'  5'    Lumbar CPA mobilizations   12' (grade II)  15' (grade II)  13' (grade II)                            There Ex        Rec bike   L1 x 6'  L1 x 8'  L1 x 8'    SKTC *  10" x 5 ea       DKTC        Seated flexion stretch *  10" x 5  10" x 5      Seated flex/rot stretches on ball   10" x 5 ea  10" x 5 ea  10" x 5 ea    Piriformis stretch supine *  30" x 3   HEP     90-90 HS stretch *    30" x 3  HEP    LTR on ball    5" x 10     Hip flexor stretch supine     30" x 3 Neuro Re-Ed        Supine core brace        Dead bug isometric        Sit to stand slow foam *     X 15                                                                            Re-evaluation             Ther Act/Gait                                         Modalities

## 2023-11-20 ENCOUNTER — OFFICE VISIT (OUTPATIENT)
Dept: PHYSICAL THERAPY | Facility: REHABILITATION | Age: 83
End: 2023-11-20
Payer: COMMERCIAL

## 2023-11-20 DIAGNOSIS — M48.061 SPINAL STENOSIS OF LUMBAR REGION, UNSPECIFIED WHETHER NEUROGENIC CLAUDICATION PRESENT: ICD-10-CM

## 2023-11-20 DIAGNOSIS — M54.16 LUMBAR RADICULOPATHY: ICD-10-CM

## 2023-11-20 DIAGNOSIS — M47.816 LUMBAR SPONDYLOSIS: Primary | ICD-10-CM

## 2023-11-20 PROCEDURE — 97110 THERAPEUTIC EXERCISES: CPT | Performed by: PHYSICAL THERAPIST

## 2023-11-20 PROCEDURE — 97140 MANUAL THERAPY 1/> REGIONS: CPT | Performed by: PHYSICAL THERAPIST

## 2023-11-20 PROCEDURE — 97112 NEUROMUSCULAR REEDUCATION: CPT | Performed by: PHYSICAL THERAPIST

## 2023-11-20 NOTE — PROGRESS NOTES
Daily Note     Today's date: 2023  Patient name: Rianna Ward  : 1940  MRN: [id number]  Referring provider: Tom Maldonado DO  Dx:   Encounter Diagnosis     ICD-10-CM    1. Lumbar spondylosis  M47.816       2. Spinal stenosis of lumbar region, unspecified whether neurogenic claudication present  M48.061       3. Lumbar radiculopathy  M54.16           Start Time: 1400  Stop Time: 1445  Total time in clinic (min): 45 minutes    Subjective: Patient reports he continues to have some discomfort in his legs but he does not have the pain he had initially which he is pleased with. He continues to get relief with the flexion preference stretches at home      Objective: See treatment diary below      Assessment: Tolerated treatment well with continued positive response to flexion preference stretches. Updated HEP to include hip flexor stretch in supine. Patient demonstrated fatigue post treatment, exhibited good technique with therapeutic exercises, and would benefit from continued PT. Reassess next visit and consider discharge to independent HEP      Plan: Continue per plan of care.       Diagnosis: low back pain with flexion preference   Precautions: hx CABG, CHF   Primary impairments: lumbar extension dysfunction, L piriformis flexibility deficits, and B/L hamstring flexibility deficits   *asterisks by exercise = given for HEP    10/30 11/1 11/9 11/16 11/20   Manuals        L LE LAD   8'  5'  5'  5'   Lumbar CPA mobilizations   12' (grade II)  15' (grade II)  13' (grade II)  10' (grade II)                           There Ex        Rec bike   L1 x 6'  L1 x 8'  L1 x 8'  L1 x 8'   SKTC *  10" x 5 ea       DKTC        Seated flexion stretch *  10" x 5  10" x 5      Seated flex/rot stretches on ball   10" x 5 ea  10" x 5 ea  10" x 5 ea  10" x 5 ea   Piriformis stretch supine *  30" x 3   HEP     90-90 HS stretch *    30" x 3  HEP    LTR on ball    5" x 10     Hip flexor stretch supine     30" x 3  30" x 3 Neuro Re-Ed        Supine core brace        Dead bug isometric        Sit to stand slow foam *     X 15  X 15                                                                           Re-evaluation             Ther Act/Gait                                         Modalities

## 2023-11-29 ENCOUNTER — EVALUATION (OUTPATIENT)
Dept: PHYSICAL THERAPY | Facility: REHABILITATION | Age: 83
End: 2023-11-29
Payer: COMMERCIAL

## 2023-11-29 DIAGNOSIS — M48.061 SPINAL STENOSIS OF LUMBAR REGION, UNSPECIFIED WHETHER NEUROGENIC CLAUDICATION PRESENT: ICD-10-CM

## 2023-11-29 DIAGNOSIS — M54.16 LUMBAR RADICULOPATHY: ICD-10-CM

## 2023-11-29 DIAGNOSIS — M47.816 LUMBAR SPONDYLOSIS: Primary | ICD-10-CM

## 2023-11-29 PROCEDURE — 97112 NEUROMUSCULAR REEDUCATION: CPT | Performed by: PHYSICAL THERAPIST

## 2023-11-29 PROCEDURE — 97110 THERAPEUTIC EXERCISES: CPT | Performed by: PHYSICAL THERAPIST

## 2023-11-29 NOTE — PROGRESS NOTES
PT Re-Evaluation     Today's date: 2023  Patient name: Siri Headings  : 1940  MRN: [id number]  Referring provider: Andreia Ro DO  Dx:   Encounter Diagnosis     ICD-10-CM    1. Lumbar spondylosis  M47.816 PT plan of care cert/re-cert      2. Spinal stenosis of lumbar region, unspecified whether neurogenic claudication present  M48.061 PT plan of care cert/re-cert      3. Lumbar radiculopathy  M54.16 PT plan of care cert/re-cert          Start Time: 1200  Stop Time: 1255  Total time in clinic (min): 55 minutes    Assessment  Assessment details: Patient is a 82 y.o. male presenting to reexamination with chief complaint of lumbar and radicular L LE pain. Patient exhibits good progress toward objective and functional goals at time of reexamination. Patient exhibits improvements with sleep quality, transitions, symptom management with flexion stretches, and walking tolerance since initiating PT however continues to have limitations compared to prior level of function. Remaining functional limitations include blowing leaves and starting standing/walking. Plan to place patient on hold with updated HEP pending physician follow-up and instructed patient to contact clinic after appointment to discuss plan.     Impairments: abnormal muscle firing, abnormal or restricted ROM, abnormal movement, activity intolerance, impaired physical strength and pain with function     Prognosis: good    Goals  Impairment Goals: 4-6 weeks  - Patient to decrease pain to 0/10 - MET  - Patient to demonstrate proper core activation and body mechanics during functional activities - MET    Functional Goals: by discharge  - Patient to discharge to independent HEP - PROGRESSING  - Patient to return to prior level of function - PROGRESSING  - Patient to tolerate standing and walking with appropriate flexion stretches - PARTIALLY MET  - Patient to tolerate blowing leaves without increased pain or difficulty - NOT TESTED  - Patient to improve sleep quality - MET    Plan  Plan details: Patient on hold pending physician follow-up  Patient would benefit from: skilled physical therapy  Planned modality interventions: cryotherapy, TENS and thermotherapy: hydrocollator packs  Planned therapy interventions: flexibility, home exercise program, joint mobilization, manual therapy, neuromuscular re-education, patient education, strengthening, stretching, therapeutic activities, therapeutic exercise and functional ROM exercises  Treatment plan discussed with: patient        Subjective Evaluation    History of Present Illness  Mechanism of injury: HISTORY OF PRESENT ILLNESS: Patient reports he has improved since beginning PT. He is managing symptoms with the flexion stretches and reports improved walking tolerance. He still has some bilateral leg pain with starting walking which improves with duration of walking. No sensory changes or B/B changes.   PRIOR TREATMENT: chiropractic with some relief  AGGRAVATING FACTORS: WB, starting standing/walking  EASING FACTORS: sitting, flexion stretches, heat  WORK: retired  IMAGING: lumbar x-rays revealed moderate to severe degenerative changes L2-S1  FUNCTIONAL LIMITATIONS: blowing leaves and starting standing/walking  IMPROVEMENTS: sleep quality, transitions, symptom management with flexion stretches, and walking tolerance  SUBJECTIVE FUNCTIONAL LEVEL: 75%  PATIENT GOAL: be able to do more activity for a longer period of time and to improve my sleeping - PARTIALLY MET  Pain  Current pain ratin  At best pain ratin  At worst pain ratin  Location: low back  Quality: sharp          Objective     Neurological Testing     Sensation     Lumbar   Left   Intact: light touch    Right   Intact: light touch    Reflexes   Left   Patellar (L4): normal (2+)  Achilles (S1): absent (0)    Right   Patellar (L4): normal (2+)  Achilles (S1): absent (0)    Active Range of Motion     Lumbar   Flexion:  Restriction level: moderate  Extension:  Restriction level: moderate  Left lateral flexion:  Restriction level: minimal  Right lateral flexion:  Restriction level: minimal  Left rotation:  Restriction level: minimal  Right rotation:  Restriction level: minimal    Additional Active Range of Motion Details  No familiar symptom reproduction with lumbar AROM    Strength/Myotome Testing     Left Hip   Planes of Motion   Flexion: 5  Abduction: 5    Right Hip   Planes of Motion   Flexion: 5  Abduction: 5    Left Knee   Extension: 5    Right Knee   Extension: 5    Left Ankle/Foot   Dorsiflexion: 5  Eversion: 5  Great toe extension: 5    Right Ankle/Foot   Dorsiflexion: 5  Eversion: 5  Great toe extension: 5    Tests     Additional Tests Details  B/L piriformis flexibility deficits  (+) 90-90 SLR B/L      Flowsheet Rows      Flowsheet Row Most Recent Value   PT/OT G-Codes    Current Score 58   Projected Score 67               Diagnosis: low back pain with flexion preference   Precautions: hx CABG, CHF   Primary impairments: lumbar extension dysfunction, L piriformis flexibility deficits, and B/L hamstring flexibility deficits   *asterisks by exercise = given for HEP    11/29 11/1 11/9 11/16 11/20   Manuals        L LE LAD   8'  5'  5'  5'   Lumbar CPA mobilizations   12' (grade II)  15' (grade II)  13' (grade II)  10' (grade II)                           There Ex        Rec bike  L1 x 8'  L1 x 6'  L1 x 8'  L1 x 8'  L1 x 8'   SKTC *        DKTC        Seated flexion stretch *   10" x 5      Seated flex/rot stretches on ball   10" x 5 ea  10" x 5 ea  10" x 5 ea  10" x 5 ea   Piriformis stretch supine *    HEP     90-90 HS stretch *    30" x 3  HEP    LTR on ball    5" x 10     Hip flexor stretch supine *     30" x 3  30" x 3           Neuro Re-Ed        Supine core brace        Dead bug isometric        Sit to stand slow foam *     X 15  X 15   Supine SLR *  reviewed       S/L hip abd *  reviewed Re-evaluation  CM           Ther Act/Gait                                      Modalities

## 2023-12-01 NOTE — PROGRESS NOTES
Assessment:  1. Lumbar radiculopathy    2. Lumbar spondylosis    3. Spinal stenosis of lumbar region, unspecified whether neurogenic claudication present        Plan:  I will order an MRI of the lumbar spine without contrast for further evaluation of the patient's pain  May consider a trial of gabapentin in the future  Will avoid NSAIDs secondary to anticoagulation  Patient may continue Tylenol as needed and should not exceed more than 4000 mg 24 hours  Continue with home exercise program as taught by physical therapy  Follow-up after imaging or sooner if needed      History of Present Illness: The patient is a 82 y.o. male with a history of CAD status post stent placement, ICD, CHF on chronic anticoagulation last seen on 10/23/2023  who presents for a follow up office visit in regards to chronic lumbosacral back pain that radiates into the lateral aspect of the left lower extremity to the ankle. He denies right-sided symptoms, bowel or bladder incontinence or saddle anesthesia. Patient has been participating in physical therapy for his low back and completed from October 30, 2023 through November 29, 2023. He does still continue with his home exercise program as taught by physical therapy on a daily basis. Has noticed improvement of his pain. He does feel his pain worsens at night. He rates his pain a 2 out of 10 on the numeric pain rating scale. The pain is intermittent at night and it is described as sharp and shooting    I have personally reviewed and/or updated the patient's past medical history, past surgical history, family history, social history, current medications, allergies, and vital signs today. Review of Systems:    Review of Systems   Respiratory:  Negative for shortness of breath. Cardiovascular:  Negative for chest pain. Gastrointestinal:  Negative for constipation, diarrhea, nausea and vomiting. Musculoskeletal:  Positive for gait problem.  Negative for arthralgias, joint swelling and myalgias. Skin:  Negative for rash. Neurological:  Negative for dizziness, seizures and weakness. All other systems reviewed and are negative.         Past Medical History:   Diagnosis Date    Bleeding hemorrhoids     Choledocholithiasis     COVID-19 05/23/2022    Symptoms onset 5/20/22    Difficulty breathing     Diverticulosis     Ileus ([address])     Lymphadenopathy of head and neck 09/29/2020    Pacemaker        Past Surgical History:   Procedure Laterality Date    APPENDECTOMY      CHOLECYSTECTOMY LAPAROSCOPIC      CORONARY ANGIOPLASTY      CORONARY ANGIOPLASTY WITH STENT PLACEMENT      CORONARY ARTERY BYPASS GRAFT  1999    ERCP      KNEE SURGERY Right 2023    TONSILLECTOMY         Family History   Problem Relation Age of Onset    Lung cancer Mother     Coronary artery disease Father     Diabetes Brother     Lung cancer Family        Social History     Occupational History    Occupation: retired   Tobacco Use    Smoking status: Never    Smokeless tobacco: Never   Substance and Sexual Activity    Alcohol use: Not Currently     Comment: social    Drug use: No    Sexual activity: Not on file         Current Outpatient Medications:     acetaminophen (TYLENOL) 500 mg tablet, Take 500 mg by mouth, Disp: , Rfl:     Alirocumab (Praluent) 75 MG/ML SOAJ, Inject 1 mL under the skin every 14 (fourteen) days, Disp: , Rfl:     ascorbic acid (VITAMIN C) 500 MG tablet, Take 500 mg by mouth daily, Disp: , Rfl:     aspirin (ECOTRIN LOW STRENGTH) 81 mg EC tablet, Take 1 tablet by mouth daily, Disp: , Rfl:     Cholecalciferol (VITAMIN D) 2000 units CAPS, Take by mouth, Disp: , Rfl:     Coenzyme Q10 (CO Q 10) 100 MG CAPS, Take by mouth, Disp: , Rfl:     furosemide (LASIX) 20 mg tablet, , Disp: , Rfl:     furosemide (LASIX) 40 mg tablet, Take by mouth 2 (two) times a day Patient takes 1/2 tab BID, Disp: , Rfl:     isosorbide mononitrate (IMDUR) 60 mg 24 hr tablet, Take 1 tablet by mouth daily, Disp: , Rfl:     losartan (COZAAR) 25 mg tablet, Take 0.5 tablets by mouth daily, Disp: , Rfl:     Magnesium 400 MG TABS, Take by mouth, Disp: , Rfl:     metoprolol succinate (TOPROL-XL) 50 mg 24 hr tablet, 2 (two) times a day 1 in the morning & 1 at dinner, Disp: , Rfl:     Multiple Vitamins-Minerals (MULTIVITAMIN ADULT PO), Take 1 capsule by mouth, Disp: , Rfl:     nitroglycerin (NITROSTAT) 0.4 mg SL tablet, Place 1 tablet under the tongue every 5 (five) minutes as needed, Disp: , Rfl:     rivaroxaban (XARELTO) 20 mg tablet, Take 1 tablet (20 mg total) by mouth daily with dinner, Disp: 28 tablet, Rfl: 0    amoxicillin (AMOXIL) 500 MG tablet, 4 TABLETS BY MOUTH 1 HOUR BEFORE DENTIST (Patient not taking: Reported on 12/4/2023), Disp: , Rfl:     TURMERIC PO, Take 1 Dose by mouth, Disp: , Rfl:     Allergies   Allergen Reactions    Atorvastatin Cough     Per pt has had a problem with other statins also, does not remember names    Bee Venom     Diphenhydramine Other (See Comments)    Iodinated Contrast Media Other (See Comments)    Lisinopril Other (See Comments)     cough    Perflutren Lipid Microsphere     Perflutren Lipid Microspheres Other (See Comments)     severe back lower back spasm    Ranolazine      Alters mood. Rosuvastatin Myalgia    Sacubitril-Valsartan Other (See Comments)     "severe back pain"    Simvastatin Myalgia       Physical Exam:    /75   Pulse 66   Ht 6' 1" (1.854 m)   Wt 114 kg (251 lb)   BMI 33.12 kg/m²     Constitutional:normal, well developed, well nourished, alert, in no distress and non-toxic and no overt pain behavior.   Eyes:anicteric  HEENT:grossly intact  Neck:supple, symmetric, trachea midline and no masses   Pulmonary:even and unlabored  Cardiovascular:No edema or pitting edema present  Skin:Normal without rashes or lesions and well hydrated  Psychiatric:Mood and affect appropriate  Neurologic:Cranial Nerves II-XII grossly intact  Musculoskeletal: Slightly intelligent gait but steady without assistive devices      Imaging  MRI lumbar spine without contrast    (Results Pending)         Orders Placed This Encounter   Procedures    MRI lumbar spine without contrast

## 2023-12-04 ENCOUNTER — OFFICE VISIT (OUTPATIENT)
Dept: PAIN MEDICINE | Facility: CLINIC | Age: 83
End: 2023-12-04
Payer: COMMERCIAL

## 2023-12-04 ENCOUNTER — OFFICE VISIT (OUTPATIENT)
Dept: FAMILY MEDICINE CLINIC | Facility: CLINIC | Age: 83
End: 2023-12-04
Payer: COMMERCIAL

## 2023-12-04 VITALS
DIASTOLIC BLOOD PRESSURE: 70 MMHG | TEMPERATURE: 98.9 F | HEIGHT: 73 IN | OXYGEN SATURATION: 97 % | BODY MASS INDEX: 33.93 KG/M2 | SYSTOLIC BLOOD PRESSURE: 130 MMHG | WEIGHT: 256 LBS | HEART RATE: 72 BPM

## 2023-12-04 VITALS
WEIGHT: 251 LBS | BODY MASS INDEX: 33.27 KG/M2 | HEIGHT: 73 IN | DIASTOLIC BLOOD PRESSURE: 75 MMHG | HEART RATE: 66 BPM | SYSTOLIC BLOOD PRESSURE: 122 MMHG

## 2023-12-04 DIAGNOSIS — M48.061 SPINAL STENOSIS OF LUMBAR REGION, UNSPECIFIED WHETHER NEUROGENIC CLAUDICATION PRESENT: ICD-10-CM

## 2023-12-04 DIAGNOSIS — M54.16 LUMBAR RADICULOPATHY: Primary | ICD-10-CM

## 2023-12-04 DIAGNOSIS — R22.2 MASS OF SKIN OF BACK: Primary | ICD-10-CM

## 2023-12-04 DIAGNOSIS — M47.816 LUMBAR SPONDYLOSIS: ICD-10-CM

## 2023-12-04 DIAGNOSIS — I77.810 AORTIC ROOT DILATATION (HCC): ICD-10-CM

## 2023-12-04 PROCEDURE — 99214 OFFICE O/P EST MOD 30 MIN: CPT | Performed by: NURSE PRACTITIONER

## 2023-12-04 RX ORDER — FUROSEMIDE 20 MG/1
TABLET ORAL
COMMUNITY
Start: 2023-11-28

## 2023-12-05 NOTE — ASSESSMENT & PLAN NOTE
Ultrasound was ordered to confirm that the affected area is in fact a lipoma.   If the area is confirmed as a lipoma the patient will be referred to general surgery for possible removal.

## 2023-12-05 NOTE — PROGRESS NOTES
Name: Yasir Calderón      : 1940      MRN: [id number]  Encounter Provider: MP Joyner  Encounter Date: 2023   Encounter department: [facility] [address]     1. Mass of skin of back  Assessment & Plan:  Ultrasound was ordered to confirm that the affected area is in fact a lipoma. If the area is confirmed as a lipoma the patient will be referred to general surgery for possible removal.    Orders:  -     US superficial lump (non extremity); Future; Expected date: 2023    2. Aortic root dilatation [facility])  Assessment & Plan:  Patient continues to follow with cardiology for this. Depression Screening and Follow-up Plan: Patient was screened for depression during today's encounter. They screened negative with a PHQ-2 score of 0. Subjective      Mass of left shoulder: Patient reports over the past days he has noted a lump on his left posterior shoulder. He denies any pain in the area and reports that the only reason that he knew it was there was that his wife brought it to his attention. He denies any drainage from the area or change in size since he noted the area. Aortic root dilation: This was noted on patient's most recent echocardiogram.  Patient does have regular follow-up with [facility] cardiology regarding this and his blood pressure is noted to be at goal in the office tonight. Review of Systems   Constitutional:  Negative for chills and fever. HENT:  Negative for ear pain and sore throat. Eyes:  Negative for pain and visual disturbance. Respiratory:  Negative for cough, chest tightness, shortness of breath and wheezing. Cardiovascular:  Negative for chest pain, palpitations and leg swelling. Gastrointestinal:  Negative for abdominal pain, constipation, diarrhea, nausea and vomiting. Endocrine: Negative for cold intolerance and heat intolerance. Genitourinary:  Negative for decreased urine volume, dysuria and hematuria. Musculoskeletal:  Negative for arthralgias, back pain and myalgias. Skin:  Negative for color change and rash. Allergic/Immunologic: Negative for environmental allergies. Neurological:  Negative for dizziness, seizures, syncope, weakness, light-headedness, numbness and headaches. Hematological:  Negative for adenopathy. Psychiatric/Behavioral:  Negative for confusion. The patient is not nervous/anxious. All other systems reviewed and are negative.       Current Outpatient Medications on File Prior to Visit   Medication Sig   • acetaminophen (TYLENOL) 500 mg tablet Take 500 mg by mouth   • Alirocumab (Praluent) 75 MG/ML SOAJ Inject 1 mL under the skin every 14 (fourteen) days   • ascorbic acid (VITAMIN C) 500 MG tablet Take 500 mg by mouth daily   • aspirin (ECOTRIN LOW STRENGTH) 81 mg EC tablet Take 1 tablet by mouth daily   • Cholecalciferol (VITAMIN D) 2000 units CAPS Take by mouth   • Coenzyme Q10 (CO Q 10) 100 MG CAPS Take by mouth   • furosemide (LASIX) 20 mg tablet    • furosemide (LASIX) 40 mg tablet Take by mouth 2 (two) times a day Patient takes 1/2 tab BID   • isosorbide mononitrate (IMDUR) 60 mg 24 hr tablet Take 1 tablet by mouth daily   • losartan (COZAAR) 25 mg tablet Take 0.5 tablets by mouth daily   • Magnesium 400 MG TABS Take by mouth   • metoprolol succinate (TOPROL-XL) 50 mg 24 hr tablet 2 (two) times a day 1 in the morning & 1 at dinner   • Multiple Vitamins-Minerals (MULTIVITAMIN ADULT PO) Take 1 capsule by mouth   • nitroglycerin (NITROSTAT) 0.4 mg SL tablet Place 1 tablet under the tongue every 5 (five) minutes as needed   • rivaroxaban (XARELTO) 20 mg tablet Take 1 tablet (20 mg total) by mouth daily with dinner   • TURMERIC PO Take 1 Dose by mouth   • amoxicillin (AMOXIL) 500 MG tablet 4 TABLETS BY MOUTH 1 HOUR BEFORE DENTIST (Patient not taking: Reported on 12/4/2023)   • [DISCONTINUED] methocarbamol (ROBAXIN) 500 mg tablet Take 1 tablet (500 mg total) by mouth 3 (three) times a day as needed for muscle spasms (Patient not taking: Reported on 10/23/2023)   • [DISCONTINUED] traZODone (DESYREL) 50 mg tablet Take 2 tablets (100 mg total) by mouth daily at bedtime       Objective     /70 (BP Location: Right arm, Patient Position: Sitting, Cuff Size: Adult)   Pulse 72   Temp 98.9 °F (37.2 °C) (Tympanic)   Ht 6' 1" (1.854 m)   Wt 116 kg (256 lb)   SpO2 97%   BMI 33.78 kg/m²     Physical Exam  Vitals and nursing note reviewed. Constitutional:       General: He is not in acute distress. Appearance: Normal appearance. He is not ill-appearing. HENT:      Head: Normocephalic. Eyes:      Conjunctiva/sclera: Conjunctivae normal.   Cardiovascular:      Rate and Rhythm: Normal rate and regular rhythm. Pulses: Normal pulses. Carotid pulses are 2+ on the right side and 2+ on the left side. Radial pulses are 2+ on the right side and 2+ on the left side. Posterior tibial pulses are 2+ on the right side and 2+ on the left side. Heart sounds: Normal heart sounds. No murmur heard. Pulmonary:      Effort: Pulmonary effort is normal. No respiratory distress. Breath sounds: Normal breath sounds. No decreased breath sounds, wheezing, rhonchi or rales. Abdominal:      General: Abdomen is flat. Bowel sounds are normal. There is no distension. Palpations: Abdomen is soft. Tenderness: There is no abdominal tenderness. There is no guarding. Musculoskeletal:         General: Normal range of motion. Cervical back: Normal range of motion. Right lower leg: No edema. Left lower leg: No edema. Skin:     General: Skin is warm and dry. Capillary Refill: Capillary refill takes less than 2 seconds. Comments: Fixed, nontender, nonfluctuant, round, golf ball sized, flesh-colored mass noted on the patient's left upper back/left posterior shoulder. No surrounding erythema, warmth to touch, or edema was noted.   Mass is consistent with possible lipoma. Neurological:      General: No focal deficit present. Mental Status: He is alert and oriented to person, place, and time. Psychiatric:         Mood and Affect: Mood normal.         Behavior: Behavior normal.         Thought Content:  Thought content normal.         Judgment: Judgment normal.       MP Kirby

## 2023-12-20 ENCOUNTER — OFFICE VISIT (OUTPATIENT)
Dept: FAMILY MEDICINE CLINIC | Facility: CLINIC | Age: 83
End: 2023-12-20
Payer: COMMERCIAL

## 2023-12-20 VITALS
SYSTOLIC BLOOD PRESSURE: 140 MMHG | BODY MASS INDEX: 33.93 KG/M2 | OXYGEN SATURATION: 99 % | DIASTOLIC BLOOD PRESSURE: 80 MMHG | RESPIRATION RATE: 20 BRPM | WEIGHT: 256 LBS | HEIGHT: 73 IN | HEART RATE: 76 BPM

## 2023-12-20 DIAGNOSIS — I50.42 CHRONIC COMBINED SYSTOLIC AND DIASTOLIC CONGESTIVE HEART FAILURE (HCC): ICD-10-CM

## 2023-12-20 DIAGNOSIS — I25.5 CARDIOMYOPATHY, ISCHEMIC: ICD-10-CM

## 2023-12-20 DIAGNOSIS — I50.42 HYPERTENSIVE HEART DISEASE WITH CHRONIC COMBINED SYSTOLIC AND DIASTOLIC CONGESTIVE HEART FAILURE (HCC): ICD-10-CM

## 2023-12-20 DIAGNOSIS — I11.0 HYPERTENSIVE HEART DISEASE WITH CHRONIC COMBINED SYSTOLIC AND DIASTOLIC CONGESTIVE HEART FAILURE (HCC): ICD-10-CM

## 2023-12-20 DIAGNOSIS — I25.10 3-VESSEL CAD: Primary | ICD-10-CM

## 2023-12-20 DIAGNOSIS — Z95.810 ICD (IMPLANTABLE CARDIOVERTER-DEFIBRILLATOR) IN PLACE: ICD-10-CM

## 2023-12-20 DIAGNOSIS — N43.3 HYDROCELE, UNSPECIFIED HYDROCELE TYPE: ICD-10-CM

## 2023-12-20 PROBLEM — R79.89 ELEVATED TROPONIN: Status: ACTIVE | Noted: 2023-12-05

## 2023-12-20 PROBLEM — M75.92 LESION OF LEFT SHOULDER: Status: ACTIVE | Noted: 2023-12-05

## 2023-12-20 PROCEDURE — 99214 OFFICE O/P EST MOD 30 MIN: CPT | Performed by: FAMILY MEDICINE

## 2023-12-20 NOTE — ASSESSMENT & PLAN NOTE
Wt Readings from Last 3 Encounters:   12/20/23 116 kg (256 lb)   12/04/23 116 kg (256 lb)   12/04/23 114 kg (251 lb)     Patient will be following with cardiology.  Ejection fraction had decreased significantly.  We did review about Entresto, he was on this previously.  In January, I have listed that he had severe back pain from taking the Entresto.  Recommend follow-up with cardiology about that.

## 2023-12-20 NOTE — PROGRESS NOTES
Name: Jose Coronado      : 1940      MRN: [id number]  Encounter Provider: Tray Ocasio MD  Encounter Date: 2023   [facility]    Assessment & Plan     1. 3-vessel CAD  Assessment & Plan:  Patient with continued issues with CAD.  Recent MI.  Follow-up with cardiology as scheduled.  Has had reduction in EF as well.      2. Cardiomyopathy, ischemic  Assessment & Plan:  Ejection fraction seems to have dropped some from his recent past.  Follow-up with cardiology.  This is likely a significant cause of fatigue for him.  Medications per cardiology.  We briefly discussed Entresto, as he mentioned that that was 1 the hospital had talked about.    Recommend avoid gas power equipment till he talks to cardiology.      3. Chronic combined systolic and diastolic congestive heart failure (HCC)  Assessment & Plan:  Wt Readings from Last 3 Encounters:   23 116 kg (256 lb)   23 116 kg (256 lb)   23 114 kg (251 lb)     Patient will be following with cardiology.  Ejection fraction had decreased significantly.  We did review about Entresto, he was on this previously.  In January, I have listed that he had severe back pain from taking the Entresto.  Recommend follow-up with cardiology about that.            4. 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)  Assessment & Plan:  Wt Readings from Last 3 Encounters:   23 116 kg (256 lb)   23 116 kg (256 lb)   23 114 kg (251 lb)     Blood pressure today is minimally elevated, though he reports his home blood pressures are quite low.  Recommend follow-up with cardiology to discuss further options.  There was discussion in the hospital about him starting on spironolactone, but he has not done that yet as he would prefer to talk to cardiology as they discussed in the hospital.  Again, follow with cardiology recommended.            5.  ICD (implantable cardioverter-defibrillator) in place  Assessment & Plan:  Patient still has the implantable cardiac defibrillator in place.  Follow-up with cardiology as scheduled.      6. Hydrocele, unspecified hydrocele type  Assessment & Plan:  Patient did have the ultrasound of the testicles, showing small hydroceles bilaterally.  This would account for slight enlargement in the scrotum.  Patient reports it is no longer bothering him.             Subjective      Chief Complaint   Patient presents with   • Follow-up     6 months follow up       Patient is here to follow-up on multiple issues.    Recently had myocardial infarction.  This was extremely fun for him.  Patient developed some pressure and heaviness and pain in the morning.  Lasted throughout the day.  Presented to the emergency room by personal vehicle.  They did admit him for MI and treated for that.    Ejection fraction was approximately 24% in the hospital.  They did recommend Aldactone, but his cardiologist recommended that he wait and follow-up with them first.  He has not made that adjustment yet.  There was a discussion about Entresto as well.    BP this /60.    Reviewed about physical activity, and waiting to talk to cardio about this.          Review of Systems   Constitutional:  Positive for fatigue.   HENT: Negative.     Respiratory: Negative.     Cardiovascular:  Positive for chest pain ([facility] admission).   Musculoskeletal:  Positive for back pain (improving with home exercise/stretching.).       Current Outpatient Medications on File Prior to Visit   Medication Sig   • acetaminophen (TYLENOL) 500 mg tablet Take 500 mg by mouth   • Alirocumab (Praluent) 75 MG/ML SOAJ Inject 1 mL under the skin every 14 (fourteen) days   • ascorbic acid (VITAMIN C) 500 MG tablet Take 500 mg by mouth daily   • aspirin (ECOTRIN LOW STRENGTH) 81 mg EC tablet Take 1 tablet by mouth daily   • Cholecalciferol (VITAMIN D) 2000 units CAPS Take by  "mouth   • Coenzyme Q10 (CO Q 10) 100 MG CAPS Take by mouth   • furosemide (LASIX) 20 mg tablet    • furosemide (LASIX) 40 mg tablet Take by mouth 2 (two) times a day Patient takes 1/2 tab BID   • isosorbide mononitrate (IMDUR) 60 mg 24 hr tablet Take 1 tablet by mouth daily   • losartan (COZAAR) 25 mg tablet Take 0.5 tablets by mouth daily   • Magnesium 400 MG TABS Take by mouth   • metoprolol succinate (TOPROL-XL) 50 mg 24 hr tablet 2 (two) times a day 1 in the morning & 1 at dinner   • Multiple Vitamins-Minerals (MULTIVITAMIN ADULT PO) Take 1 capsule by mouth   • nitroglycerin (NITROSTAT) 0.4 mg SL tablet Place 1 tablet under the tongue every 5 (five) minutes as needed   • rivaroxaban (XARELTO) 20 mg tablet Take 1 tablet (20 mg total) by mouth daily with dinner   • TURMERIC PO Take 1 Dose by mouth   • amoxicillin (AMOXIL) 500 MG tablet 4 TABLETS BY MOUTH 1 HOUR BEFORE DENTIST (Patient not taking: Reported on 12/4/2023)       Objective     /80 (BP Location: Left arm, Patient Position: Sitting, Cuff Size: Large)   Pulse 76   Resp 20   Ht 6' 1\" (1.854 m)   Wt 116 kg (256 lb)   SpO2 99%   BMI 33.78 kg/m²     Physical Exam  Vitals and nursing note reviewed.   Constitutional:       Appearance: He is well-developed.   HENT:      Head: Normocephalic and atraumatic.   Cardiovascular:      Rate and Rhythm: Normal rate and regular rhythm.      Pulses:           Carotid pulses are 2+ on the right side and 2+ on the left side.     Heart sounds: Normal heart sounds. No murmur heard.     No friction rub. No gallop.   Pulmonary:      Effort: Pulmonary effort is normal. No respiratory distress.      Breath sounds: Normal breath sounds. No wheezing or rales.   Musculoskeletal:      Cervical back: Normal range of motion and neck supple.       Tray Ocasio MD    "

## 2023-12-20 NOTE — LETTER
2023     Zeke Cantu Jr., MD  [address]  [address]  [place] [address]    Patient: Jose Coronado   YOB: 1940   Date of Visit: 2023       Dear Dr. Cantu:    Thank you for referring Jose Coronado to me for evaluation. Below are my notes for this consultation.    Thank you for taking care of Mr. Coronado.  Please see my note for today.  I did recommend that he speak with you about what is going on.  With regard to the Entresto, I believe he had tried that in January and had significant back pain.  I will leave the discussion of that to you then as well.  I did recommend that he stop using power equipment until he speaks with you.    The CT scan that he did showed thoracic aneurysm, therefore I have asked him to follow-up with you about that.  There did not appear to be significant other findings that I could see on the report.    Thank you in advance.    If you have questions, please do not hesitate to call me. I look forward to following your patient along with you.         Sincerely,        Tray Ocasio MD        CC: No Recipients    Tray Ocasio MD  2023  4:13 PM  Incomplete  Name: Jose Coronado      : 1940      MRN: [id number]  Encounter Provider: Tray Ocasio MD  Encounter Date: 2023   Encounter department: [facility] PRIMARY CARE    Assessment & Plan     1. 3-vessel CAD  Assessment & Plan:  Patient with continued issues with CAD.  Recent MI.  Follow-up with cardiology as scheduled.  Has had reduction in EF as well.      2. Cardiomyopathy, ischemic  Assessment & Plan:  Ejection fraction seems to have dropped some from his recent past.  Follow-up with cardiology.  This is likely a significant cause of fatigue for him.  Medications per cardiology.  We briefly discussed Entresto, as he mentioned that that was 1 the hospital had talked about.    Recommend avoid gas power equipment till he talks to  cardiology.      3. Chronic combined systolic and diastolic congestive heart failure (HCC)  Assessment & Plan:  Wt Readings from Last 3 Encounters:   12/20/23 116 kg (256 lb)   12/04/23 116 kg (256 lb)   12/04/23 114 kg (251 lb)     Patient will be following with cardiology.  Ejection fraction had decreased significantly.  We did review about Entresto, he was on this previously.  In January, I have listed that he had severe back pain from taking the Entresto.  Recommend follow-up with cardiology about that.            4. 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)  Assessment & Plan:  Wt Readings from Last 3 Encounters:   12/20/23 116 kg (256 lb)   12/04/23 116 kg (256 lb)   12/04/23 114 kg (251 lb)     Blood pressure today is minimally elevated, though he reports his home blood pressures are quite low.  Recommend follow-up with cardiology to discuss further options.  There was discussion in the hospital about him starting on spironolactone, but he has not done that yet as he would prefer to talk to cardiology as they discussed in the hospital.  Again, follow with cardiology recommended.            5. ICD (implantable cardioverter-defibrillator) in place  Assessment & Plan:  Patient still has the implantable cardiac defibrillator in place.  Follow-up with cardiology as scheduled.      6. Hydrocele, unspecified hydrocele type  Assessment & Plan:  Patient did have the ultrasound of the testicles, showing small hydroceles bilaterally.  This would account for slight enlargement in the scrotum.  Patient reports it is no longer bothering him.             Subjective      Chief Complaint   Patient presents with   • Follow-up     6 months follow up       Patient is here to follow-up on multiple issues.    Recently had myocardial infarction.  This was extremely fun for him.  Patient developed some pressure and heaviness and pain in the morning.  Lasted throughout the  day.  Presented to the emergency room by personal vehicle.  They did admit him for MI and treated for that.    Ejection fraction was approximately 24% in the hospital.  They did recommend Aldactone, but his cardiologist recommended that he wait and follow-up with them first.  He has not made that adjustment yet.  There was a discussion about Entresto as well.    BP this /60.    Reviewed about physical activity, and waiting to talk to cardio about this.          Review of Systems   Constitutional:  Positive for fatigue.   HENT: Negative.     Respiratory: Negative.     Cardiovascular:  Positive for chest pain ([facility] admission).   Musculoskeletal:  Positive for back pain (improving with home exercise/stretching.).       Current Outpatient Medications on File Prior to Visit   Medication Sig   • acetaminophen (TYLENOL) 500 mg tablet Take 500 mg by mouth   • Alirocumab (Praluent) 75 MG/ML SOAJ Inject 1 mL under the skin every 14 (fourteen) days   • ascorbic acid (VITAMIN C) 500 MG tablet Take 500 mg by mouth daily   • aspirin (ECOTRIN LOW STRENGTH) 81 mg EC tablet Take 1 tablet by mouth daily   • Cholecalciferol (VITAMIN D) 2000 units CAPS Take by mouth   • Coenzyme Q10 (CO Q 10) 100 MG CAPS Take by mouth   • furosemide (LASIX) 20 mg tablet    • furosemide (LASIX) 40 mg tablet Take by mouth 2 (two) times a day Patient takes 1/2 tab BID   • isosorbide mononitrate (IMDUR) 60 mg 24 hr tablet Take 1 tablet by mouth daily   • losartan (COZAAR) 25 mg tablet Take 0.5 tablets by mouth daily   • Magnesium 400 MG TABS Take by mouth   • metoprolol succinate (TOPROL-XL) 50 mg 24 hr tablet 2 (two) times a day 1 in the morning & 1 at dinner   • Multiple Vitamins-Minerals (MULTIVITAMIN ADULT PO) Take 1 capsule by mouth   • nitroglycerin (NITROSTAT) 0.4 mg SL tablet Place 1 tablet under the tongue every 5 (five) minutes as needed   • rivaroxaban (XARELTO) 20 mg tablet Take 1 tablet (20 mg total) by mouth daily with dinner  "  • TURMERIC PO Take 1 Dose by mouth   • amoxicillin (AMOXIL) 500 MG tablet 4 TABLETS BY MOUTH 1 HOUR BEFORE DENTIST (Patient not taking: Reported on 12/4/2023)       Objective     /80 (BP Location: Left arm, Patient Position: Sitting, Cuff Size: Large)   Pulse 76   Resp 20   Ht 6' 1\" (1.854 m)   Wt 116 kg (256 lb)   SpO2 99%   BMI 33.78 kg/m²     Physical Exam  Vitals and nursing note reviewed.   Constitutional:       Appearance: He is well-developed.   HENT:      Head: Normocephalic and atraumatic.   Cardiovascular:      Rate and Rhythm: Normal rate and regular rhythm.      Pulses:           Carotid pulses are 2+ on the right side and 2+ on the left side.     Heart sounds: Normal heart sounds. No murmur heard.     No friction rub. No gallop.   Pulmonary:      Effort: Pulmonary effort is normal. No respiratory distress.      Breath sounds: Normal breath sounds. No wheezing or rales.   Musculoskeletal:      Cervical back: Normal range of motion and neck supple.       Tray Ocasio MD       "

## 2023-12-20 NOTE — ASSESSMENT & PLAN NOTE
Patient with continued issues with CAD.  Recent MI.  Follow-up with cardiology as scheduled.  Has had reduction in EF as well.

## 2023-12-20 NOTE — ASSESSMENT & PLAN NOTE
Patient still has the implantable cardiac defibrillator in place.  Follow-up with cardiology as scheduled.

## 2023-12-20 NOTE — ASSESSMENT & PLAN NOTE
Ejection fraction seems to have dropped some from his recent past.  Follow-up with cardiology.  This is likely a significant cause of fatigue for him.  Medications per cardiology.  We briefly discussed Entresto, as he mentioned that that was 1 the hospital had talked about.    Recommend avoid gas power equipment till he talks to cardiology.

## 2023-12-20 NOTE — ASSESSMENT & PLAN NOTE
Patient did have an ultrasound done at [facility] recently with his admission for CHF, cardiomyopathy, CAD.  They felt this may have been an abscess in the past.  Patient feels that it is better at this point.  No change for the moment.

## 2023-12-20 NOTE — PATIENT INSTRUCTIONS
1. 3-vessel CAD  Assessment & Plan:  Patient with continued issues with CAD.  Recent MI.  Follow-up with cardiology as scheduled.  Has had reduction in EF as well.      2. Cardiomyopathy, ischemic  Assessment & Plan:  Ejection fraction seems to have dropped some from his recent past.  Follow-up with cardiology.  This is likely a significant cause of fatigue for him.  Medications per cardiology.  We briefly discussed Entresto, as he mentioned that that was 1 the hospital had talked about.    Recommend avoid gas power equipment till he talks to cardiology.      3. Chronic combined systolic and diastolic congestive heart failure (HCC)  Assessment & Plan:  Wt Readings from Last 3 Encounters:   12/20/23 116 kg (256 lb)   12/04/23 116 kg (256 lb)   12/04/23 114 kg (251 lb)     Patient will be following with cardiology.  Ejection fraction had decreased significantly.  We did review about Entresto, he was on this previously.  In January, I have listed that he had severe back pain from taking the Entresto.  Recommend follow-up with cardiology about that.            4. 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)  Assessment & Plan:  Wt Readings from Last 3 Encounters:   12/20/23 116 kg (256 lb)   12/04/23 116 kg (256 lb)   12/04/23 114 kg (251 lb)     Blood pressure today is minimally elevated, though he reports his home blood pressures are quite low.  Recommend follow-up with cardiology to discuss further options.  There was discussion in the hospital about him starting on spironolactone, but he has not done that yet as he would prefer to talk to cardiology as they discussed in the hospital.  Again, follow with cardiology recommended.            5. ICD (implantable cardioverter-defibrillator) in place  Assessment & Plan:  Patient still has the implantable cardiac defibrillator in place.  Follow-up with cardiology as scheduled.      6. Hydrocele, unspecified hydrocele  type  Assessment & Plan:  Patient did have the ultrasound of the testicles, showing small hydroceles bilaterally.  This would account for slight enlargement in the scrotum.  Patient reports it is no longer bothering him.          COVID 19 Instructions    Jose Coronado was advised to limit contact with others to essential tasks such as getting food, medications, and medical care.    Proper handwashing reviewed, and Hand sanitzer when washing is not available.    If the patient develops symptoms of COVID 19, the patient should call the office as soon as possible.    It is strongly recommended that Flu Vaccinations be obtained.      Virtual Visits:  AmWell: This works on smart phones (any phone with Internet browsing capability).  You should get a text message when the provider is ready to see you.  Click on the link in the text message, and the call should start.  You will need to type in your name, and allow camera and microphone access.  This is HIPPA compliant, and secure.      If you have not already done so, get immunized to COVID 19.      We are committed to getting you vaccinated as soon as possible and will be closely following CDC and [facility] guidelines as they are released and revised.  Please refer to our COVID-19 vaccine webpage for the most up to date information on the vaccine and our distribution efforts.    This site will also have the most up to date recommendations for COVID booster vaccine.    [email or web address]    Call [phone] ([phone]), option 7    You can also visit https://www.vaccines.gov/ to find vaccines in your area.    OUR LOCATION:    [facility]  [address] [place] [address], [address]  [place], [place], [address]  [phone]  Fax: [phone]    Lab services, Rheumatology, and OB/GYN are at this location as well.    Thank you for your inquiry about the RSV vaccine.  As you can see below, the CDC has recommended that you  discuss this with your Primary Care provider and decide if the vaccine is right for you.  This discussion can be accomplished at your next office visit.  The vaccine is currently available at your local pharmacy if you choose to get it prior to your next office visit.     Respiratory syncytial (sin-\[facility]\""-magaly) virus, or RSV, is a common respiratory virus that usually causes mild, cold-like symptoms. Most people recover in a week or two, but RSV can be serious. Infants and older adults are more likely to develop severe RSV and need hospitalization. Vaccines are available to protect older adults from severe RSV. Monoclonal antibody products are available to protect infants and young children from severe RSV.    CDC Recommendations  Adults 60 years old and over  Adults 60 years of age and older may receive a single dose of RSV vaccine using shared clinical decision-making.    Infants and young children  1 dose of nirsevimab for all infants younger than 8 months born during or entering their first RSV season.  1 dose of nirsevimab for infants and children 8-19 months old who are at increased risk for severe RSV disease and entering their second RSV season.  Note: A different monoclonal antibody, palivizumab, is limited to children under 24 months of age with certain conditions that place them at high risk for severe RSV disease. It must be given once a month during RSV season. Please see AAP guidelines for palivizumab.    Pregnant people  1 dose of maternal RSV vaccine during weeks 32 through 36 of pregnancy, administered immediately before or during RSV season. Abrysvo is the only RSV vaccine recommended during pregnancy.    If you have any questions about RSV or the products above, talk to your healthcare provider.

## 2023-12-20 NOTE — ASSESSMENT & PLAN NOTE
Wt Readings from Last 3 Encounters:   12/20/23 116 kg (256 lb)   12/04/23 116 kg (256 lb)   12/04/23 114 kg (251 lb)     Blood pressure today is minimally elevated, though he reports his home blood pressures are quite low.  Recommend follow-up with cardiology to discuss further options.  There was discussion in the hospital about him starting on spironolactone, but he has not done that yet as he would prefer to talk to cardiology as they discussed in the hospital.  Again, follow with cardiology recommended.

## 2023-12-20 NOTE — ASSESSMENT & PLAN NOTE
Patient did have the ultrasound of the testicles, showing small hydroceles bilaterally.  This would account for slight enlargement in the scrotum.  Patient reports it is no longer bothering him.

## 2024-01-24 ENCOUNTER — RA CDI HCC (OUTPATIENT)
Dept: OTHER | Facility: HOSPITAL | Age: 84
End: 2024-01-24

## 2024-01-24 NOTE — PROGRESS NOTES
I11.0  HCC coding opportunities          Chart Reviewed number of suggestions sent to Provider: 1     Patients Insurance     Medicare Insurance: Medicare

## 2024-01-31 ENCOUNTER — OFFICE VISIT (OUTPATIENT)
Dept: FAMILY MEDICINE CLINIC | Facility: CLINIC | Age: 84
End: 2024-01-31
Payer: COMMERCIAL

## 2024-01-31 VITALS
WEIGHT: 254 LBS | DIASTOLIC BLOOD PRESSURE: 70 MMHG | HEART RATE: 80 BPM | RESPIRATION RATE: 20 BRPM | OXYGEN SATURATION: 99 % | BODY MASS INDEX: 33.66 KG/M2 | HEIGHT: 73 IN | SYSTOLIC BLOOD PRESSURE: 138 MMHG

## 2024-01-31 DIAGNOSIS — Z00.00 ENCOUNTER FOR ANNUAL WELLNESS VISIT (AWV) IN MEDICARE PATIENT: ICD-10-CM

## 2024-01-31 DIAGNOSIS — I25.10 3-VESSEL CAD: ICD-10-CM

## 2024-01-31 DIAGNOSIS — Z95.810 ICD (IMPLANTABLE CARDIOVERTER-DEFIBRILLATOR) IN PLACE: ICD-10-CM

## 2024-01-31 DIAGNOSIS — R26.9 ABNORMAL GAIT: Primary | ICD-10-CM

## 2024-01-31 DIAGNOSIS — I50.42 HYPERTENSIVE HEART DISEASE WITH CHRONIC COMBINED SYSTOLIC AND DIASTOLIC CONGESTIVE HEART FAILURE (HCC): ICD-10-CM

## 2024-01-31 DIAGNOSIS — Z23 NEED FOR COVID-19 VACCINE: ICD-10-CM

## 2024-01-31 DIAGNOSIS — I48.0 PAROXYSMAL ATRIAL FIBRILLATION (HCC): ICD-10-CM

## 2024-01-31 DIAGNOSIS — I11.0 HYPERTENSIVE HEART DISEASE WITH CHRONIC COMBINED SYSTOLIC AND DIASTOLIC CONGESTIVE HEART FAILURE (HCC): ICD-10-CM

## 2024-01-31 DIAGNOSIS — I50.42 CHRONIC COMBINED SYSTOLIC AND DIASTOLIC CONGESTIVE HEART FAILURE (HCC): ICD-10-CM

## 2024-01-31 DIAGNOSIS — Z23 NEED FOR ZOSTER VACCINE: ICD-10-CM

## 2024-01-31 DIAGNOSIS — E78.2 MIXED HYPERLIPIDEMIA: ICD-10-CM

## 2024-01-31 DIAGNOSIS — Z23 NEED FOR INFLUENZA VACCINATION: ICD-10-CM

## 2024-01-31 DIAGNOSIS — E03.9 ACQUIRED HYPOTHYROIDISM: ICD-10-CM

## 2024-01-31 DIAGNOSIS — I77.810 AORTIC ROOT DILATATION (HCC): ICD-10-CM

## 2024-01-31 DIAGNOSIS — Z29.11 NEED FOR RSV VACCINATION: ICD-10-CM

## 2024-01-31 DIAGNOSIS — Z23 NEED FOR VACCINATION FOR STREP PNEUMONIAE: ICD-10-CM

## 2024-01-31 PROCEDURE — 99214 OFFICE O/P EST MOD 30 MIN: CPT | Performed by: FAMILY MEDICINE

## 2024-01-31 PROCEDURE — G0439 PPPS, SUBSEQ VISIT: HCPCS | Performed by: FAMILY MEDICINE

## 2024-01-31 RX ORDER — SPIRONOLACTONE 25 MG/1
25 TABLET ORAL DAILY
COMMUNITY
Start: 2023-12-07 | End: 2024-12-06

## 2024-01-31 RX ORDER — PREDNISONE 50 MG/1
TABLET ORAL
COMMUNITY
Start: 2023-12-12

## 2024-01-31 NOTE — ASSESSMENT & PLAN NOTE
Wt Readings from Last 3 Encounters:   01/31/24 115 kg (254 lb)   12/20/23 116 kg (256 lb)   12/04/23 116 kg (256 lb)   Following with cardiology.  Weight appears to be fairly consistent.  No specific changes.  Consider cardiac rehab.

## 2024-01-31 NOTE — PATIENT INSTRUCTIONS
Medicare Preventive Visit Patient Instructions  Thank you for completing your Welcome to Medicare Visit or Medicare Annual Wellness Visit today. Your next wellness visit will be due in one year (1/31/2025).  The screening/preventive services that you may require over the next 5-10 years are detailed below. Some tests may not apply to you based off risk factors and/or age. Screening tests ordered at today's visit but not completed yet may show as past due. Also, please note that scanned in results may not display below.  Preventive Screenings:  Service Recommendations Previous Testing/Comments   Colorectal Cancer Screening  Colonoscopy    Fecal Occult Blood Test (FOBT)/Fecal Immunochemical Test (FIT)  Fecal DNA/Cologuard Test  Flexible Sigmoidoscopy Age: 45-75 years old   Colonoscopy: every 10 years (May be performed more frequently if at higher risk)  OR  FOBT/FIT: every 1 year  OR  Cologuard: every 3 years  OR  Sigmoidoscopy: every 5 years  Screening may be recommended earlier than age 45 if at higher risk for colorectal cancer. Also, an individualized decision between you and your healthcare provider will decide whether screening between the ages of 76-85 would be appropriate. Colonoscopy: 05/04/2022  FOBT/FIT: Not on file  Cologuard: Not on file  Sigmoidoscopy: Not on file    Screening Current     Prostate Cancer Screening Individualized decision between patient and health care provider in men between ages of 55-69   Medicare will cover every 12 months beginning on the day after your 50th birthday PSA: No results in last 5 years     Screening Not Indicated     Hepatitis C Screening Once for adults born between 1945 and 1965  More frequently in patients at high risk for Hepatitis C Hep C Antibody: Not on file        Diabetes Screening 1-2 times per year if you're at risk for diabetes or have pre-diabetes Fasting glucose: No results in last 5 years (No results in last 5 years)  A1C: No results in last 5 years (No  results in last 5 years)  Screening Current   Cholesterol Screening Once every 5 years if you don't have a lipid disorder. May order more often based on risk factors. Lipid panel: 12/06/2023  Screening Not Indicated  History Lipid Disorder      Other Preventive Screenings Covered by Medicare:  Abdominal Aortic Aneurysm (AAA) Screening: covered once if your at risk. You're considered to be at risk if you have a family history of AAA or a male between the age of 65-75 who smoking at least 100 cigarettes in your lifetime.  Lung Cancer Screening: covers low dose CT scan once per year if you meet all of the following conditions: (1) Age 55-77; (2) No signs or symptoms of lung cancer; (3) Current smoker or have quit smoking within the last 15 years; (4) You have a tobacco smoking history of at least 20 pack years (packs per day x number of years you smoked); (5) You get a written order from a healthcare provider.  Glaucoma Screening: covered annually if you're considered high risk: (1) You have diabetes OR (2) Family history of glaucoma OR (3)  aged 50 and older OR (4)  American aged 65 and older  Osteoporosis Screening: covered every 2 years if you meet one of the following conditions: (1) Have a vertebral abnormality; (2) On glucocorticoid therapy for more than 3 months; (3) Have primary hyperparathyroidism; (4) On osteoporosis medications and need to assess response to drug therapy.  HIV Screening: covered annually if you're between the age of 15-65. Also covered annually if you are younger than 15 and older than 65 with risk factors for HIV infection. For pregnant patients, it is covered up to 3 times per pregnancy.    Immunizations:  Immunization Recommendations   Influenza Vaccine Annual influenza vaccination during flu season is recommended for all persons aged >= 6 months who do not have contraindications   Pneumococcal Vaccine   * Pneumococcal conjugate vaccine = PCV13 (Prevnar 13), PCV15  (Vaxneuvance), PCV20 (Prevnar 20)  * Pneumococcal polysaccharide vaccine = PPSV23 (Pneumovax) Adults 19-63 yo with certain risk factors or if 65+ yo  If never received any pneumonia vaccine: recommend Prevnar 20 (PCV20)  Give PCV20 if previously received 1 dose of PCV13 or PPSV23   Hepatitis B Vaccine 3 dose series if at intermediate or high risk (ex: diabetes, end stage renal disease, liver disease)   Respiratory syncytial virus (RSV) Vaccine - COVERED BY MEDICARE PART D  * RSVPreF3 (Arexvy) CDC recommends that adults 60 years of age and older may receive a single dose of RSV vaccine using shared clinical decision-making (SCDM)   Tetanus (Td) Vaccine - COST NOT COVERED BY MEDICARE PART B Following completion of primary series, a booster dose should be given every 10 years to maintain immunity against tetanus. Td may also be given as tetanus wound prophylaxis.   Tdap Vaccine - COST NOT COVERED BY MEDICARE PART B Recommended at least once for all adults. For pregnant patients, recommended with each pregnancy.   Shingles Vaccine (Shingrix) - COST NOT COVERED BY MEDICARE PART B  2 shot series recommended in those 19 years and older who have or will have weakened immune systems or those 50 years and older     Health Maintenance Due:      Topic Date Due    Colorectal Cancer Screening  05/04/2027     Immunizations Due:      Topic Date Due    COVID-19 Vaccine (1) Never done    Pneumococcal Vaccine: 65+ Years (1 - PCV) Never done    Influenza Vaccine (1) 09/01/2023     Advance Directives   What are advance directives?  Advance directives are legal documents that state your wishes and plans for medical care. These plans are made ahead of time in case you lose your ability to make decisions for yourself. Advance directives can apply to any medical decision, such as the treatments you want, and if you want to donate organs.   What are the types of advance directives?  There are many types of advance directives, and each state  has rules about how to use them. You may choose a combination of any of the following:  Living will:  This is a written record of the treatment you want. You can also choose which treatments you do not want, which to limit, and which to stop at a certain time. This includes surgery, medicine, IV fluid, and tube feedings.   Durable power of  for healthcare (DPAHC):  This is a written record that states who you want to make healthcare choices for you when you are unable to make them for yourself. This person, called a proxy, is usually a family member or a friend. You may choose more than 1 proxy.  Do not resuscitate (DNR) order:  A DNR order is used in case your heart stops beating or you stop breathing. It is a request not to have certain forms of treatment, such as CPR. A DNR order may be included in other types of advance directives.  Medical directive:  This covers the care that you want if you are in a coma, near death, or unable to make decisions for yourself. You can list the treatments you want for each condition. Treatment may include pain medicine, surgery, blood transfusions, dialysis, IV or tube feedings, and a ventilator (breathing machine).  Values history:  This document has questions about your views, beliefs, and how you feel and think about life. This information can help others choose the care that you would choose.  Why are advance directives important?  An advance directive helps you control your care. Although spoken wishes may be used, it is better to have your wishes written down. Spoken wishes can be misunderstood, or not followed. Treatments may be given even if you do not want them. An advance directive may make it easier for your family to make difficult choices about your care.   Weight Management   Why it is important to manage your weight:  Being overweight increases your risk of health conditions such as heart disease, high blood pressure, type 2 diabetes, and certain types of  cancer. It can also increase your risk for osteoarthritis, sleep apnea, and other respiratory problems. Aim for a slow, steady weight loss. Even a small amount of weight loss can lower your risk of health problems.  How to lose weight safely:  A safe and healthy way to lose weight is to eat fewer calories and get regular exercise. You can lose up about 1 pound a week by decreasing the number of calories you eat by 500 calories each day.   Healthy meal plan for weight management:  A healthy meal plan includes a variety of foods, contains fewer calories, and helps you stay healthy. A healthy meal plan includes the following:  Eat whole-grain foods more often.  A healthy meal plan should contain fiber. Fiber is the part of grains, fruits, and vegetables that is not broken down by your body. Whole-grain foods are healthy and provide extra fiber in your diet. Some examples of whole-grain foods are whole-wheat breads and pastas, oatmeal, brown rice, and bulgur.  Eat a variety of vegetables every day.  Include dark, leafy greens such as spinach, kale, rancho greens, and mustard greens. Eat yellow and orange vegetables such as carrots, sweet potatoes, and winter squash.   Eat a variety of fruits every day.  Choose fresh or canned fruit (canned in its own juice or light syrup) instead of juice. Fruit juice has very little or no fiber.  Eat low-fat dairy foods.  Drink fat-free (skim) milk or 1% milk. Eat fat-free yogurt and low-fat cottage cheese. Try low-fat cheeses such as mozzarella and other reduced-fat cheeses.  Choose meat and other protein foods that are low in fat.  Choose beans or other legumes such as split peas or lentils. Choose fish, skinless poultry (chicken or turkey), or lean cuts of red meat (beef or pork). Before you cook meat or poultry, cut off any visible fat.   Use less fat and oil.  Try baking foods instead of frying them. Add less fat, such as margarine, sour cream, regular salad dressing and  mayonnaise to foods. Eat fewer high-fat foods. Some examples of high-fat foods include french fries, doughnuts, ice cream, and cakes.  Eat fewer sweets.  Limit foods and drinks that are high in sugar. This includes candy, cookies, regular soda, and sweetened drinks.  Exercise:  Exercise at least 30 minutes per day on most days of the week. Some examples of exercise include walking, biking, dancing, and swimming. You can also fit in more physical activity by taking the stairs instead of the elevator or parking farther away from stores. Ask your healthcare provider about the best exercise plan for you.      © Copyright [employer] 2018 Information is for End User's use only and may not be sold, redistributed or otherwise used for commercial purposes. All illustrations and images included in CareNotes® are the copyrighted property of [employer]. or [employer]    1. Abnormal gait  Assessment & Plan:  Gait does seem to be somewhat abnormal, again on the basis of nerve versus muscle versus arthritis.  Confounded by heart disease.  Recommend follow-up with cardiology to discuss the possibility of cardiac rehab versus PT/OT.      2. Paroxysmal atrial fibrillation (HCC)  Assessment & Plan:  No specific change at the moment.  On metoprolol for rate control, does have ICD.  Currently on Xarelto.    Orders:  -     TSH, 3rd generation; Future; Expected date: 07/31/2024  -     CBC and differential; Future; Expected date: 07/31/2024    3. Chronic combined systolic and diastolic congestive heart failure (HCC)  Assessment & Plan:  Wt Readings from Last 3 Encounters:   01/31/24 115 kg (254 lb)   12/20/23 116 kg (256 lb)   12/04/23 116 kg (256 lb)   Following with cardiology.  Weight appears to be fairly consistent.  No specific changes.  Consider cardiac rehab.              4. 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)  Assessment & Plan:  Wt Readings from Last  3 Encounters:   01/31/24 115 kg (254 lb)   12/20/23 116 kg (256 lb)   12/04/23 116 kg (256 lb)     Blood pressure still reasonable.  No changes.            Orders:  -     TSH, 3rd generation; Future; Expected date: 07/31/2024  -     CBC and differential; Future; Expected date: 07/31/2024    5. Mixed hyperlipidemia  Assessment & Plan:  Had labs in December. Due in 6 months.      Orders:  -     Cholesterol, total; Future; Expected date: 07/31/2024  -     Comprehensive metabolic panel; Future; Expected date: 07/31/2024  -     HDL cholesterol; Future; Expected date: 07/31/2024  -     LDL cholesterol, direct; Future; Expected date: 07/31/2024    6. Acquired hypothyroidism  Assessment & Plan:  Due for check in the future.      7. ICD (implantable cardioverter-defibrillator) in place  Assessment & Plan:  Follow-up with cardiology.  Has ICD.      8. 3-vessel CAD  Assessment & Plan:  Patient does have heart disease.  Following with cardiology.  Nothing specific at the moment.  Does have some weakness at times, unsure if it is related to heart versus other, i.e. muscle.  Could consider going to cardiac rehab as he does have significant issues with concerns for heart disease.      9. Aortic root dilatation (HCC)  Assessment & Plan:  Following with cardiology.      10. Encounter for annual wellness visit (AWV) in Medicare patient  Comments:  Reviewed health maintenance, vaccination recommendations, advance directives.    11. Need for RSV vaccination  Comments:  Recommend that he consider getting the RSV vaccine at local pharmacy.    12. Need for vaccination for Strep pneumoniae  Comments:  Recommend Prevnar vaccine    13. Need for influenza vaccination  Comments:  Recommend flu vaccine yearly.    14. Need for COVID-19 vaccine  Comments:  Recommend consider COVID-vaccine series.    15. Need for zoster vaccine  Comments:  Recommend Shingrix.        COVID 19 Instructions    Jose Coronado was advised to limit contact with others  to essential tasks such as getting food, medications, and medical care.    Proper handwashing reviewed, and Hand sanitzer when washing is not available.    If the patient develops symptoms of COVID 19, the patient should call the office as soon as possible.    It is strongly recommended that Flu Vaccinations be obtained.      Virtual Visits:  Ayanna: This works on smart phones (any phone with Internet browsing capability).  You should get a text message when the provider is ready to see you.  Click on the link in the text message, and the call should start.  You will need to type in your name, and allow camera and microphone access.  This is HIPPA compliant, and secure.      If you have not already done so, get immunized to COVID 19.      We are committed to getting you vaccinated as soon as possible and will be closely following CDC and [facility] guidelines as they are released and revised.  Please refer to our COVID-19 vaccine webpage for the most up to date information on the vaccine and our distribution efforts.    This site will also have the most up to date recommendations for COVID booster vaccine.    [email or web address]    Call [phone] ([phone]), option 7    You can also visit https://www.vaccines.gov/ to find vaccines in your area.    OUR LOCATION:    [facility]  [address] [place] [address], [address]  [place], [place], [address] [phone]  Fax: [phone]    Lab services, Rheumatology, and OB/GYN are at this location as well.  Thank you for your inquiry about the RSV vaccine.  As you can see below, the CDC has recommended that you discuss this with your Primary Care provider and decide if the vaccine is right for you.  This discussion can be accomplished at your next office visit.  The vaccine is currently available at your local pharmacy if you choose to get it prior to your next office visit.     Respiratory syncytial ([facility]) virus,  or RSV, is a common respiratory virus that usually causes mild, cold-like symptoms. Most people recover in a week or two, but RSV can be serious. Infants and older adults are more likely to develop severe RSV and need hospitalization. Vaccines are available to protect older adults from severe RSV. Monoclonal antibody products are available to protect infants and young children from severe RSV.    CDC Recommendations  Adults 60 years old and over  Adults 60 years of age and older may receive a single dose of RSV vaccine using shared clinical decision-making.    Infants and young children  1 dose of nirsevimab for all infants younger than 8 months born during or entering their first RSV season.  1 dose of nirsevimab for infants and children 8-19 months old who are at increased risk for severe RSV disease and entering their second RSV season.  Note: A different monoclonal antibody, palivizumab, is limited to children under 24 months of age with certain conditions that place them at high risk for severe RSV disease. It must be given once a month during RSV season. Please see AAP guidelines for palivizumab.    Pregnant people  1 dose of maternal RSV vaccine during weeks 32 through 36 of pregnancy, administered immediately before or during RSV season. Abrysvo is the only RSV vaccine recommended during pregnancy.    If you have any questions about RSV or the products above, talk to your healthcare provider.

## 2024-01-31 NOTE — ASSESSMENT & PLAN NOTE
Gait does seem to be somewhat abnormal, again on the basis of nerve versus muscle versus arthritis.  Confounded by heart disease.  Recommend follow-up with cardiology to discuss the possibility of cardiac rehab versus PT/OT.

## 2024-01-31 NOTE — ASSESSMENT & PLAN NOTE
Patient does have heart disease.  Following with cardiology.  Nothing specific at the moment.  Does have some weakness at times, unsure if it is related to heart versus other, i.e. muscle.  Could consider going to cardiac rehab as he does have significant issues with concerns for heart disease.

## 2024-01-31 NOTE — ASSESSMENT & PLAN NOTE
No specific change at the moment.  On metoprolol for rate control, does have ICD.  Currently on Xarelto.

## 2024-01-31 NOTE — ASSESSMENT & PLAN NOTE
Wt Readings from Last 3 Encounters:   01/31/24 115 kg (254 lb)   12/20/23 116 kg (256 lb)   12/04/23 116 kg (256 lb)     Blood pressure still reasonable.  No changes.

## 2024-01-31 NOTE — PROGRESS NOTES
Assessment and Plan:     1. Abnormal gait  Assessment & Plan:  Gait does seem to be somewhat abnormal, again on the basis of nerve versus muscle versus arthritis.  Confounded by heart disease.  Recommend follow-up with cardiology to discuss the possibility of cardiac rehab versus PT/OT.      2. Paroxysmal atrial fibrillation (HCC)  Assessment & Plan:  No specific change at the moment.  On metoprolol for rate control, does have ICD.  Currently on Xarelto.    Orders:  -     TSH, 3rd generation; Future; Expected date: 07/31/2024  -     CBC and differential; Future; Expected date: 07/31/2024    3. Chronic combined systolic and diastolic congestive heart failure (HCC)  Assessment & Plan:  Wt Readings from Last 3 Encounters:   01/31/24 115 kg (254 lb)   12/20/23 116 kg (256 lb)   12/04/23 116 kg (256 lb)   Following with cardiology.  Weight appears to be fairly consistent.  No specific changes.  Consider cardiac rehab.              4. 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)  Assessment & Plan:  Wt Readings from Last 3 Encounters:   01/31/24 115 kg (254 lb)   12/20/23 116 kg (256 lb)   12/04/23 116 kg (256 lb)     Blood pressure still reasonable.  No changes.            Orders:  -     TSH, 3rd generation; Future; Expected date: 07/31/2024  -     CBC and differential; Future; Expected date: 07/31/2024    5. Mixed hyperlipidemia  Assessment & Plan:  Had labs in December. Due in 6 months.      Orders:  -     Cholesterol, total; Future; Expected date: 07/31/2024  -     Comprehensive metabolic panel; Future; Expected date: 07/31/2024  -     HDL cholesterol; Future; Expected date: 07/31/2024  -     LDL cholesterol, direct; Future; Expected date: 07/31/2024    6. Acquired hypothyroidism  Assessment & Plan:  Due for check in the future.      7. ICD (implantable cardioverter-defibrillator) in place  Assessment & Plan:  Follow-up with cardiology.  Has ICD.      8.  3-vessel CAD  Assessment & Plan:  Patient does have heart disease.  Following with cardiology.  Nothing specific at the moment.  Does have some weakness at times, unsure if it is related to heart versus other, i.e. muscle.  Could consider going to cardiac rehab as he does have significant issues with concerns for heart disease.      9. Aortic root dilatation (HCC)  Assessment & Plan:  Following with cardiology.      10. Encounter for annual wellness visit (AWV) in Medicare patient  Comments:  Reviewed health maintenance, vaccination recommendations, advance directives.    11. Need for RSV vaccination  Comments:  Recommend that he consider getting the RSV vaccine at local pharmacy.    12. Need for vaccination for Strep pneumoniae  Comments:  Recommend Prevnar vaccine    13. Need for influenza vaccination  Comments:  Recommend flu vaccine yearly.    14. Need for COVID-19 vaccine  Comments:  Recommend consider COVID-vaccine series.    15. Need for zoster vaccine  Comments:  Recommend Shingrix.           Preventive health issues were discussed with patient, and age appropriate screening tests were ordered as noted in patient's After Visit Summary.  Personalized health advice and appropriate referrals for health education or preventive services given if needed, as noted in patient's After Visit Summary.  Chief Complaint   Patient presents with   • Medicare Wellness Visit        History of Present Illness:     Patient presents for a Medicare Wellness Visit    Patient here for multiple issues.           Patient Care Team:  Tray Ocasio MD as PCP - General (Family Medicine)  Tray Ocasio MD as PCP - PCP-[facility] ([facility])  Tray Ocasio MD as PCP - PCP-[facility] ([facility])     Review of Systems:     Review of Systems     Problem List:     Patient Active Problem List   Diagnosis   • 3-vessel CAD   • Acute low back pain without sciatica   • Cardiomyopathy, ischemic   • Calculus of gallbladder  and bile duct with acute on chronic cholecystitis   • CHF (congestive heart failure) (HCC)   • Disc degeneration, lumbar   • Diverticulosis   • Hyperlipidemia   • Hyperglycemia   • Hemorrhoids   • Hypertensive heart disease with congestive heart failure (HCC)   • Hypothyroidism   • History of non-ST elevation myocardial infarction (NSTEMI)   • Obese   • Atrial fibrillation (HCC)   • Prostate nodule   • S/P PTCA (percutaneous transluminal coronary angioplasty)   • Spinal stenosis   • Chronic maxillary sinusitis   • Renal cyst   • Cataract   • Osteoarthritis   • Thoracic back pain   • Anticoagulated by anticoagulation treatment   • Rectal bleeding   • Stable angina   • Colon polyp   • SOB (shortness of breath)   • Bronchitis   • ICD (implantable cardioverter-defibrillator) in place   • Statin intolerance   • Hydrocele   • Anxiety   • Insomnia   • Left sided sciatica   • Lumbar spondylosis   • Lumbar radiculopathy   • Aortic root dilatation (HCC)   • Mass of skin of back   • Elevated troponin   • Lesion of left shoulder   • Abnormal gait      Past Medical and Surgical History:     Past Medical History:   Diagnosis Date   • Bleeding hemorrhoids    • Choledocholithiasis    • COVID-19 05/23/2022    Symptoms onset 5/20/22   • Difficulty breathing    • Diverticulosis    • Ileus ([facility])    • Lymphadenopathy of head and neck 09/29/2020   • Pacemaker      Past Surgical History:   Procedure Laterality Date   • APPENDECTOMY     • CHOLECYSTECTOMY LAPAROSCOPIC     • CORONARY ANGIOPLASTY     • CORONARY ANGIOPLASTY WITH STENT PLACEMENT     • CORONARY ARTERY BYPASS GRAFT  1999   • ERCP     • KNEE SURGERY Right 2023   • TONSILLECTOMY        Family History:     Family History   Problem Relation Age of Onset   • Lung cancer Mother    • Coronary artery disease Father    • Diabetes Brother    • Lung cancer Family       Social History:     Social History     Socioeconomic History   • Marital status: /Civil Union     Spouse name: None    • Number of children: None   • Years of education: None   • Highest education level: None   Occupational History   • Occupation: retired   Tobacco Use   • Smoking status: Never   • Smokeless tobacco: Never   Substance and Sexual Activity   • Alcohol use: Not Currently     Comment: social   • Drug use: No   • Sexual activity: None   Other Topics Concern   • None   Social History Narrative   • None     Social Determinants of Health     Financial Resource Strain: Low Risk  (1/31/2024)    Overall Financial Resource Strain (CARDIA)    • Difficulty of Paying Living Expenses: Not hard at all   Food Insecurity: No Food Insecurity (12/5/2023)    Received from [facility]    Hunger Vital Sign    • Worried About Running Out of Food in the Last Year: Never true    • Ran Out of Food in the Last Year: Never true   Transportation Needs: No Transportation Needs (1/31/2024)    PRAPARE - Transportation    • Lack of Transportation (Medical): No    • Lack of Transportation (Non-Medical): No   Physical Activity: Not on file   Stress: Not on file   Social Connections: Not on file   Intimate Partner Violence: Not At Risk (12/5/2023)    Received from [facility]    Humiliation, Afraid, Rape, and Kick questionnaire    • Fear of Current or Ex-Partner: No    • Emotionally Abused: No    • Physically Abused: No    • Sexually Abused: No   Housing Stability: Low Risk  (12/5/2023)    Received from [facility]    Housing Stability Vital Sign    • Unable to Pay for Housing in the Last Year: No    • Number of Places Lived in the Last Year: 1    • Unstable Housing in the Last Year: No      Medications and Allergies:     Current Outpatient Medications   Medication Sig Dispense Refill   • acetaminophen (TYLENOL) 500 mg tablet Take 500 mg by mouth     • Alirocumab (Praluent) 75 MG/ML SOAJ Inject 1 mL under the skin every 14 (fourteen) days     • ascorbic acid (VITAMIN C) 500 MG tablet Take 500 mg by  mouth daily     • aspirin (ECOTRIN LOW STRENGTH) 81 mg EC tablet Take 1 tablet by mouth daily     • Cholecalciferol (VITAMIN D) 2000 units CAPS Take by mouth     • Coenzyme Q10 (CO Q 10) 100 MG CAPS Take by mouth     • furosemide (LASIX) 20 mg tablet      • furosemide (LASIX) 40 mg tablet Take by mouth 2 (two) times a day Patient takes 1/2 tab BID     • isosorbide mononitrate (IMDUR) 60 mg 24 hr tablet Take 1 tablet by mouth daily     • losartan (COZAAR) 25 mg tablet Take 0.5 tablets by mouth daily     • Magnesium 400 MG TABS Take by mouth     • metoprolol succinate (TOPROL-XL) 50 mg 24 hr tablet 2 (two) times a day 1 in the morning & 1 at dinner     • Multiple Vitamins-Minerals (MULTIVITAMIN ADULT PO) Take 1 capsule by mouth     • nitroglycerin (NITROSTAT) 0.4 mg SL tablet Place 1 tablet under the tongue every 5 (five) minutes as needed     • predniSONE 50 mg tablet Take 13 hours, 7 hours and 1 hours prior to test     • rivaroxaban (XARELTO) 20 mg tablet Take 1 tablet (20 mg total) by mouth daily with dinner 28 tablet 0   • spironolactone (ALDACTONE) 25 mg tablet Take 25 mg by mouth daily     • TURMERIC PO Take 1 Dose by mouth     • amoxicillin (AMOXIL) 500 MG tablet 4 TABLETS BY MOUTH 1 HOUR BEFORE DENTIST (Patient not taking: Reported on 12/4/2023)       No current facility-administered medications for this visit.     Allergies   Allergen Reactions   • Other Other (See Comments)     Pain in back after contrast used for echos, Definity   • Atorvastatin Cough     Per pt has had a problem with other statins also, does not remember names   • Bee Venom    • Diphenhydramine Other (See Comments)   • Iodinated Contrast Media Other (See Comments)   • Lisinopril Other (See Comments)     cough   • Perflutren Lipid Microsphere    • Perflutren Lipid Microspheres Other (See Comments)     severe back lower back spasm   • Ranolazine      Alters mood.   • Rosuvastatin Myalgia   • Sacubitril-Valsartan Other (See Comments)      "\"severe back pain\"   • Simvastatin Myalgia      Immunizations:     Immunization History   Administered Date(s) Administered   • Influenza, seasonal, injectable 1940   • TD (adult) Preservative Free 01/01/2007      Health Maintenance:         Topic Date Due   • Colorectal Cancer Screening  05/04/2027         Topic Date Due   • COVID-19 Vaccine (1) Never done   • Pneumococcal Vaccine: 65+ Years (1 - PCV) Never done   • Influenza Vaccine (1) 09/01/2023      Medicare Screening Tests and Risk Assessments:     Jose is here for his Subsequent Wellness visit.     Health Risk Assessment:   Patient rates overall health as good. Patient feels that their physical health rating is much better. Patient is very satisfied with their life. Eyesight was rated as same. Hearing was rated as same. Patient feels that their emotional and mental health rating is much better. Patients states they are sometimes angry. Patient states they are often unusually tired/fatigued. Pain experienced in the last 7 days has been none. Patient states that he has experienced no weight loss or gain in last 6 months.     Depression Screening:   PHQ-2 Score: 0      Fall Risk Screening:   In the past year, patient has experienced: no history of falling in past year      Home Safety:  Patient has trouble with stairs inside or outside of their home. Patient has working smoke alarms and has working carbon monoxide detector. Home safety hazards include: none. Patient has difficulty getting up and down the stairs as far as his lower extremities being somewhat weaker than he would like.  He does not navigate the stairs, but it takes him quite a bit longer than it used to.    Nutrition:   Current diet is No Added Salt.     Medications:   Patient is currently taking over-the-counter supplements. OTC medications include: see medication list. Patient is able to manage medications.     Activities of Daily Living (ADLs)/Instrumental Activities of Daily Living " (IADLs):   Walk and transfer into and out of bed and chair?: Yes  Dress and groom yourself?: Yes    Bathe or shower yourself?: Yes    Feed yourself? Yes  Do your laundry/housekeeping?: Yes  Manage your money, pay your bills and track your expenses?: Yes  Make your own meals?: Yes    Do your own shopping?: Yes    Previous Hospitalizations:   Any hospitalizations or ED visits within the last 12 months?: Yes    How many hospitalizations have you had in the last year?: 1-2    Advance Care Planning:   Living will: No    Durable POA for healthcare: No    Advanced directive: No    Advanced directive counseling given: Yes    ACP document given: Yes      Cognitive Screening:   Provider or family/friend/caregiver concerned regarding cognition?: No    PREVENTIVE SCREENINGS      Cardiovascular Screening:    General: Screening Not Indicated and History Lipid Disorder      Diabetes Screening:     General: Screening Current      Colorectal Cancer Screening:     General: Screening Current      Prostate Cancer Screening:    General: Screening Not Indicated      Osteoporosis Screening:    General: Screening Not Indicated      Abdominal Aortic Aneurysm (AAA) Screening:        General: Screening Not Indicated      Lung Cancer Screening:     General: Screening Not Indicated      Hepatitis C Screening:    General: Screening Not Indicated    Screening, Brief Intervention, and Referral to Treatment (SBIRT)    Screening  Typical number of drinks in a day: 0  Typical number of drinks in a week: 0  Interpretation: Low risk drinking behavior.    Single Item Drug Screening:  How often have you used an illegal drug (including marijuana) or a prescription medication for non-medical reasons in the past year? never    Single Item Drug Screen Score: 0  Interpretation: Negative screen for possible drug use disorder    No results found.     Physical Exam:     /70 (BP Location: Left arm, Patient Position: Sitting, Cuff Size: Large)   Pulse 80    "Resp 20   Ht 6' 1\" (1.854 m)   Wt 115 kg (254 lb)   SpO2 99%   BMI 33.51 kg/m²     Physical Exam     Tray Ocasio MD  "

## 2024-02-19 ENCOUNTER — OFFICE VISIT (OUTPATIENT)
Dept: FAMILY MEDICINE CLINIC | Facility: CLINIC | Age: 84
End: 2024-02-19
Payer: COMMERCIAL

## 2024-02-19 VITALS
SYSTOLIC BLOOD PRESSURE: 124 MMHG | BODY MASS INDEX: 33.51 KG/M2 | HEART RATE: 108 BPM | WEIGHT: 254 LBS | TEMPERATURE: 98.7 F | DIASTOLIC BLOOD PRESSURE: 76 MMHG | OXYGEN SATURATION: 96 %

## 2024-02-19 DIAGNOSIS — I11.0 HYPERTENSIVE HEART DISEASE WITH CHRONIC COMBINED SYSTOLIC AND DIASTOLIC CONGESTIVE HEART FAILURE (HCC): ICD-10-CM

## 2024-02-19 DIAGNOSIS — J40 BRONCHITIS: ICD-10-CM

## 2024-02-19 DIAGNOSIS — R09.81 CONGESTED NOSE: Primary | ICD-10-CM

## 2024-02-19 DIAGNOSIS — I48.0 PAROXYSMAL ATRIAL FIBRILLATION (HCC): ICD-10-CM

## 2024-02-19 DIAGNOSIS — I50.42 CHRONIC COMBINED SYSTOLIC AND DIASTOLIC CONGESTIVE HEART FAILURE (HCC): ICD-10-CM

## 2024-02-19 DIAGNOSIS — I50.42 HYPERTENSIVE HEART DISEASE WITH CHRONIC COMBINED SYSTOLIC AND DIASTOLIC CONGESTIVE HEART FAILURE (HCC): ICD-10-CM

## 2024-02-19 LAB
SARS-COV-2 AG UPPER RESP QL IA: NEGATIVE
VALID CONTROL: NORMAL

## 2024-02-19 PROCEDURE — 99214 OFFICE O/P EST MOD 30 MIN: CPT | Performed by: FAMILY MEDICINE

## 2024-02-19 PROCEDURE — 87811 SARS-COV-2 COVID19 W/OPTIC: CPT | Performed by: FAMILY MEDICINE

## 2024-02-19 RX ORDER — AZITHROMYCIN 250 MG/1
TABLET, FILM COATED ORAL
Qty: 6 TABLET | Refills: 0 | Status: SHIPPED | OUTPATIENT
Start: 2024-02-19 | End: 2024-02-24

## 2024-02-19 RX ORDER — ALBUTEROL SULFATE 90 UG/1
2 AEROSOL, METERED RESPIRATORY (INHALATION) EVERY 4 HOURS PRN
Qty: 6.7 G | Refills: 0 | Status: SHIPPED | OUTPATIENT
Start: 2024-02-19

## 2024-02-19 RX ORDER — BENZONATATE 200 MG/1
200 CAPSULE ORAL 3 TIMES DAILY PRN
Qty: 20 CAPSULE | Refills: 0 | Status: SHIPPED | OUTPATIENT
Start: 2024-02-19

## 2024-02-19 NOTE — PATIENT INSTRUCTIONS
1. Congested nose  -     POCT Rapid Covid Ag    2. Bronchitis  Assessment & Plan:  Recommend azithromycin, albuterol, Tessalon.  Should help reduce some of the irritation that is going on.      Orders:  -     POCT Rapid Covid Ag  -     azithromycin (ZITHROMAX) 250 mg tablet; Take 2 tablets on day 1, then 1 tablet daily days 2 through 5  -     albuterol (Proventil HFA) 90 mcg/act inhaler; Inhale 2 puffs every 4 (four) hours as needed for wheezing or shortness of breath (Cough)  -     benzonatate (TESSALON) 200 MG capsule; Take 1 capsule (200 mg total) by mouth 3 (three) times a day as needed for cough    3. Paroxysmal atrial fibrillation (HCC)  Assessment & Plan:  Heart rate slightly irregular today, but overall doing well.  No changes.  Continue warfarin.  Avoid anti-inflammatories.  Try to avoid decongestants where possible.      4. Chronic combined systolic and diastolic congestive heart failure (HCC)  Assessment & Plan:  Wt Readings from Last 3 Encounters:   02/19/24 115 kg (254 lb)   01/31/24 115 kg (254 lb)   12/20/23 116 kg (256 lb)     Stable at the moment.  No current signs of CHF.            5. 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)  Assessment & Plan:  Wt Readings from Last 3 Encounters:   02/19/24 115 kg (254 lb)   01/31/24 115 kg (254 lb)   12/20/23 116 kg (256 lb)     Blood pressure doing quite well today.  No change.  Pulse rate is elevated, likely secondary to the irritation with coughing.                COVID 19 Instructions    Jose Coronado was advised to limit contact with others to essential tasks such as getting food, medications, and medical care.    Proper handwashing reviewed, and Hand sanitzer when washing is not available.    If the patient develops symptoms of COVID 19, the patient should call the office as soon as possible.    It is strongly recommended that Flu Vaccinations be obtained.      Virtual Visits:  Ayanna: This  works on smart phones (any phone with Internet browsing capability).  You should get a text message when the provider is ready to see you.  Click on the link in the text message, and the call should start.  You will need to type in your name, and allow camera and microphone access.  This is HIPPA compliant, and secure.      If you have not already done so, get immunized to COVID 19.      We are committed to getting you vaccinated as soon as possible and will be closely following CDC and [facility] guidelines as they are released and revised.  Please refer to our COVID-19 vaccine webpage for the most up to date information on the vaccine and our distribution efforts.    This site will also have the most up to date recommendations for COVID booster vaccine.    [email or web address]    Call [phone] ([phone]), option 7    You can also visit https://www.vaccines.gov/ to find vaccines in your area.    OUR LOCATION:    [facility]  [address] [place] [address], [address]  [place], [place], [address] [phone]  Fax: [phone]    Lab services, Rheumatology, and OB/GYN are at this location as well.

## 2024-02-19 NOTE — ASSESSMENT & PLAN NOTE
Wt Readings from Last 3 Encounters:   02/19/24 115 kg (254 lb)   01/31/24 115 kg (254 lb)   12/20/23 116 kg (256 lb)     Blood pressure doing quite well today.  No change.  Pulse rate is elevated, likely secondary to the irritation with coughing.

## 2024-02-19 NOTE — ASSESSMENT & PLAN NOTE
Wt Readings from Last 3 Encounters:   02/19/24 115 kg (254 lb)   01/31/24 115 kg (254 lb)   12/20/23 116 kg (256 lb)     Stable at the moment.  No current signs of CHF.

## 2024-02-19 NOTE — PROGRESS NOTES
Answers submitted by the patient for this visit:  Cough Questionnaire (Submitted on 2024)  Chief Complaint: Cough  Chronicity: new  Onset: in the past 7 days  Frequency: every few minutes  Cough characteristics: non-productive  chest pain: No  chills: No  ear congestion: No  ear pain: No  fever: No  headaches: No  heartburn: No  hemoptysis: No  myalgias: No  nasal congestion: Yes  postnasal drip: Yes  rash: No  rhinorrhea: Yes  shortness of breath: Yes  sore throat: Yes  sweats: No  weight loss: No  wheezing: No  Name: Jose Coronado      : 1940      MRN: [id number]  Encounter Provider: Tray Ocasio MD  Encounter Date: 2024   Encounter department: [facility]    Assessment & Plan     1. Congested nose  -     POCT Rapid Covid Ag    2. Bronchitis  Assessment & Plan:  Recommend azithromycin, albuterol, Tessalon.  Should help reduce some of the irritation that is going on.      Orders:  -     POCT Rapid Covid Ag  -     azithromycin (ZITHROMAX) 250 mg tablet; Take 2 tablets on day 1, then 1 tablet daily days 2 through 5  -     albuterol (Proventil HFA) 90 mcg/act inhaler; Inhale 2 puffs every 4 (four) hours as needed for wheezing or shortness of breath (Cough)  -     benzonatate (TESSALON) 200 MG capsule; Take 1 capsule (200 mg total) by mouth 3 (three) times a day as needed for cough    3. Paroxysmal atrial fibrillation (HCC)  Assessment & Plan:  Heart rate slightly irregular today, but overall doing well.  No changes.  Continue warfarin.  Avoid anti-inflammatories.  Try to avoid decongestants where possible.      4. Chronic combined systolic and diastolic congestive heart failure (HCC)  Assessment & Plan:  Wt Readings from Last 3 Encounters:   24 115 kg (254 lb)   24 115 kg (254 lb)   23 116 kg (256 lb)     Stable at the moment.  No current signs of CHF.            5. Hypertensive heart disease with chronic combined systolic and diastolic congestive  heart failure (HCC)  Assessment & Plan:  Wt Readings from Last 3 Encounters:   02/19/24 115 kg (254 lb)   01/31/24 115 kg (254 lb)   12/20/23 116 kg (256 lb)     Blood pressure doing quite well today.  No change.  Pulse rate is elevated, likely secondary to the irritation with coughing.                   Subjective      Chief Complaint   Patient presents with   • Cough     Pt states he did not take a covid test at home.  mgb   • Nasal Congestion     Sneezing all sx's x 5 days.  mgb       Please see chief complaint.  Patient has been having increasing cough over the last several days.  Did COVID test in the office today.  At least 5 days of symptoms.  Some chest tightness.  Increased cough.  Started as sore throat, then sneezing, then cough, and the cough has been increasing.  Dry hacking cough.        Cough  This is a new problem. The current episode started in the past 7 days. The problem occurs every few minutes. The cough is Non-productive. Associated symptoms include nasal congestion, postnasal drip, rhinorrhea, a sore throat and shortness of breath. Pertinent negatives include no chest pain, chills, ear congestion, ear pain, fever, headaches, heartburn, hemoptysis, myalgias, rash, sweats, weight loss or wheezing.     Review of Systems   Constitutional:  Negative for chills, fever and weight loss.   HENT:  Positive for postnasal drip, rhinorrhea and sore throat. Negative for ear pain.    Respiratory:  Positive for cough and shortness of breath. Negative for hemoptysis and wheezing.    Cardiovascular:  Negative for chest pain.   Gastrointestinal:  Negative for heartburn.   Musculoskeletal:  Negative for myalgias.   Skin:  Negative for rash.   Neurological:  Negative for headaches.       Current Outpatient Medications on File Prior to Visit   Medication Sig   • acetaminophen (TYLENOL) 500 mg tablet Take 500 mg by mouth   • Alirocumab (Praluent) 75 MG/ML SOAJ Inject 1 mL under the skin every 14 (fourteen) days   •  ascorbic acid (VITAMIN C) 500 MG tablet Take 500 mg by mouth daily   • aspirin (ECOTRIN LOW STRENGTH) 81 mg EC tablet Take 1 tablet by mouth daily   • Cholecalciferol (VITAMIN D) 2000 units CAPS Take by mouth   • Coenzyme Q10 (CO Q 10) 100 MG CAPS Take by mouth   • furosemide (LASIX) 20 mg tablet    • furosemide (LASIX) 40 mg tablet Take by mouth 2 (two) times a day Patient takes 1/2 tab BID   • isosorbide mononitrate (IMDUR) 60 mg 24 hr tablet Take 1 tablet by mouth daily   • losartan (COZAAR) 25 mg tablet Take 0.5 tablets by mouth daily   • Magnesium 400 MG TABS Take by mouth   • metoprolol succinate (TOPROL-XL) 50 mg 24 hr tablet 2 (two) times a day 1 in the morning & 1 at dinner   • Multiple Vitamins-Minerals (MULTIVITAMIN ADULT PO) Take 1 capsule by mouth   • rivaroxaban (XARELTO) 20 mg tablet Take 1 tablet (20 mg total) by mouth daily with dinner   • spironolactone (ALDACTONE) 25 mg tablet Take 25 mg by mouth daily   • TURMERIC PO Take 1 Dose by mouth   • amoxicillin (AMOXIL) 500 MG tablet 4 TABLETS BY MOUTH 1 HOUR BEFORE DENTIST (Patient not taking: Reported on 12/4/2023)   • nitroglycerin (NITROSTAT) 0.4 mg SL tablet Place 1 tablet under the tongue every 5 (five) minutes as needed (Patient not taking: Reported on 2/19/2024)   • predniSONE 50 mg tablet Take 13 hours, 7 hours and 1 hours prior to test (Patient not taking: Reported on 2/19/2024)       Objective     /76 (BP Location: Left arm, Patient Position: Sitting, Cuff Size: Large)   Pulse (!) 108   Temp 98.7 °F (37.1 °C) (Temporal)   Wt 115 kg (254 lb)   SpO2 96%   BMI 33.51 kg/m²     Physical Exam  Tray Ocasio MD     15

## 2024-02-19 NOTE — ASSESSMENT & PLAN NOTE
Recommend azithromycin, albuterol, Tessalon.  Should help reduce some of the irritation that is going on.

## 2024-03-04 ENCOUNTER — OFFICE VISIT (OUTPATIENT)
Dept: FAMILY MEDICINE CLINIC | Facility: CLINIC | Age: 84
End: 2024-03-04
Payer: COMMERCIAL

## 2024-03-04 VITALS
SYSTOLIC BLOOD PRESSURE: 108 MMHG | HEIGHT: 73 IN | BODY MASS INDEX: 33.66 KG/M2 | HEART RATE: 50 BPM | WEIGHT: 254 LBS | DIASTOLIC BLOOD PRESSURE: 62 MMHG

## 2024-03-04 DIAGNOSIS — I48.0 PAROXYSMAL ATRIAL FIBRILLATION (HCC): ICD-10-CM

## 2024-03-04 DIAGNOSIS — I25.2 HISTORY OF NON-ST ELEVATION MYOCARDIAL INFARCTION (NSTEMI): ICD-10-CM

## 2024-03-04 DIAGNOSIS — I50.42 HYPERTENSIVE HEART DISEASE WITH CHRONIC COMBINED SYSTOLIC AND DIASTOLIC CONGESTIVE HEART FAILURE (HCC): ICD-10-CM

## 2024-03-04 DIAGNOSIS — I50.42 CHRONIC COMBINED SYSTOLIC AND DIASTOLIC CONGESTIVE HEART FAILURE (HCC): ICD-10-CM

## 2024-03-04 DIAGNOSIS — Z95.810 ICD (IMPLANTABLE CARDIOVERTER-DEFIBRILLATOR) IN PLACE: ICD-10-CM

## 2024-03-04 DIAGNOSIS — I25.5 CARDIOMYOPATHY, ISCHEMIC: Primary | ICD-10-CM

## 2024-03-04 DIAGNOSIS — I73.9 PERIPHERAL ARTERY DISEASE (HCC): ICD-10-CM

## 2024-03-04 DIAGNOSIS — I11.0 HYPERTENSIVE HEART DISEASE WITH CHRONIC COMBINED SYSTOLIC AND DIASTOLIC CONGESTIVE HEART FAILURE (HCC): ICD-10-CM

## 2024-03-04 PROCEDURE — G2211 COMPLEX E/M VISIT ADD ON: HCPCS | Performed by: FAMILY MEDICINE

## 2024-03-04 PROCEDURE — 99214 OFFICE O/P EST MOD 30 MIN: CPT | Performed by: FAMILY MEDICINE

## 2024-03-04 NOTE — ASSESSMENT & PLAN NOTE
Patient has been having some issues with regard to his lower extremities.  Has problems with fatigue, aches, weakness.  Question of peripheral artery disease given his coronary artery disease history.  Check AUGUSTA.  If that is negative, would look at more questions related to degenerative disc disease.

## 2024-03-04 NOTE — ASSESSMENT & PLAN NOTE
Patient does have history of cardiomyopathy.  Would recommend follow-up with cardiology.  No specific change at this point.  He is looking for second opinion, so I have entered a referral.

## 2024-03-04 NOTE — PROGRESS NOTES
Name: Jose Coronado      : 1940      MRN: [id number]  Encounter Provider: Tray Ocasio MD  Encounter Date: 3/4/2024   Encounter department: [facility]    Assessment & Plan     1. Cardiomyopathy, ischemic  Assessment & Plan:  Patient does have history of cardiomyopathy.  Would recommend follow-up with cardiology.  No specific change at this point.  He is looking for second opinion, so I have entered a referral.    Orders:  -     Ambulatory Referral to Cardiology; Future    2. Chronic combined systolic and diastolic congestive heart failure (HCC)  Assessment & Plan:  Wt Readings from Last 3 Encounters:   24 115 kg (254 lb)   24 115 kg (254 lb)   24 115 kg (254 lb)       Patient does have history of CHF.  On multiple medications, including metoprolol, losartan.  Unfortunately, he is intolerant of Entresto, with it giving him severe problems before with back pain.  Continue with current medications, including isosorbide.        Orders:  -     Ambulatory Referral to Cardiology; Future    3. History of non-ST elevation myocardial infarction (NSTEMI)  Assessment & Plan:  History of prior MI.  Follow-up with cardiology.    Orders:  -     Ambulatory Referral to Cardiology; Future    4. 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)  Assessment & Plan:  Wt Readings from Last 3 Encounters:   24 115 kg (254 lb)   24 115 kg (254 lb)   24 115 kg (254 lb)       Blood pressure today was fantastic.          Orders:  -     Ambulatory Referral to Cardiology; Future    5. ICD (implantable cardioverter-defibrillator) in place  Assessment & Plan:  Patient does have ICD in place.  Follow-up with electrophysiology clinic.    Orders:  -     Ambulatory Referral to Cardiology; Future    6. Paroxysmal atrial fibrillation (HCC)  Assessment & Plan:  Heart rate is irregular.    Orders:  -     Ambulatory Referral  to Cardiology; Future    7. Peripheral artery disease (HCC)  Assessment & Plan:  Patient has been having some issues with regard to his lower extremities.  Has problems with fatigue, aches, weakness.  Question of peripheral artery disease given his coronary artery disease history.  Check AUGUSTA.  If that is negative, would look at more questions related to degenerative disc disease.    Orders:  -     VAS AUGUSTA & waveform analysis, multiple levels; Future; Expected date: 03/04/2024           Subjective      Chief Complaint   Patient presents with    Follow-up     Two week f/u on cough did finish medication he was rx recently.   Pt also would like to discuss referral for [facility] cardio would like to leave [facility]         Patient is here to follow-up on multiple issues.    Cough seems to be resolved at this point.  No particular issues.    Patient would also like to switch to [facility] for second opinion for cardiology.  He has been having increasing problems with shortness of breath, easily fatigued.  He did not know if there was anything that he could do about that.  Has been following with [facility] cardiology for a while.          Review of Systems   Constitutional: Negative.    HENT: Negative.     Eyes: Negative.    Respiratory: Negative.     Cardiovascular: Negative.    Gastrointestinal: Negative.    Endocrine: Negative.    Genitourinary: Negative.    Musculoskeletal: Negative.    Skin: Negative.    Allergic/Immunologic: Negative.    Neurological: Negative.    Hematological: Negative.    Psychiatric/Behavioral: Negative.         Current Outpatient Medications on File Prior to Visit   Medication Sig    acetaminophen (TYLENOL) 500 mg tablet Take 500 mg by mouth    Alirocumab (Praluent) 75 MG/ML SOAJ Inject 1 mL under the skin every 14 (fourteen) days    ascorbic acid (VITAMIN C) 500 MG tablet Take 500 mg by mouth daily    aspirin (ECOTRIN LOW STRENGTH) 81 mg EC tablet Take 1 tablet by mouth daily    Cholecalciferol (VITAMIN  "D) 2000 units CAPS Take by mouth    Coenzyme Q10 (CO Q 10) 100 MG CAPS Take by mouth    furosemide (LASIX) 20 mg tablet     furosemide (LASIX) 40 mg tablet Take by mouth 2 (two) times a day Patient takes 1/2 tab BID    isosorbide mononitrate (IMDUR) 60 mg 24 hr tablet Take 1 tablet by mouth daily    losartan (COZAAR) 25 mg tablet Take 0.5 tablets by mouth daily    Magnesium 400 MG TABS Take by mouth    metoprolol succinate (TOPROL-XL) 50 mg 24 hr tablet 2 (two) times a day 1 in the morning & 1 at dinner    Multiple Vitamins-Minerals (MULTIVITAMIN ADULT PO) Take 1 capsule by mouth    rivaroxaban (XARELTO) 20 mg tablet Take 1 tablet (20 mg total) by mouth daily with dinner    spironolactone (ALDACTONE) 25 mg tablet Take 25 mg by mouth daily    TURMERIC PO Take 1 Dose by mouth    amoxicillin (AMOXIL) 500 MG tablet 4 TABLETS BY MOUTH 1 HOUR BEFORE DENTIST (Patient not taking: Reported on 12/4/2023)    nitroglycerin (NITROSTAT) 0.4 mg SL tablet Place 1 tablet under the tongue every 5 (five) minutes as needed (Patient not taking: Reported on 2/19/2024)    predniSONE 50 mg tablet Take 13 hours, 7 hours and 1 hours prior to test (Patient not taking: Reported on 2/19/2024)    [DISCONTINUED] albuterol (Proventil HFA) 90 mcg/act inhaler Inhale 2 puffs every 4 (four) hours as needed for wheezing or shortness of breath (Cough)    [DISCONTINUED] benzonatate (TESSALON) 200 MG capsule Take 1 capsule (200 mg total) by mouth 3 (three) times a day as needed for cough       Objective     /62 (BP Location: Right arm, Patient Position: Sitting, Cuff Size: Adult)   Pulse (!) 50   Ht 6' 1\" (1.854 m)   Wt 115 kg (254 lb)   BMI 33.51 kg/m²     Physical Exam  Vitals and nursing note reviewed.   Constitutional:       Appearance: Normal appearance. He is well-developed.   HENT:      Head: Normocephalic and atraumatic.   Cardiovascular:      Rate and Rhythm: Normal rate. Rhythm irregular.      Pulses:           Carotid pulses are 2+ on " the right side and 2+ on the left side.     Heart sounds: Normal heart sounds. No murmur heard.     No friction rub. No gallop.   Pulmonary:      Effort: Pulmonary effort is normal. No respiratory distress.      Breath sounds: Normal breath sounds. No wheezing or rales.   Musculoskeletal:      Cervical back: Normal range of motion and neck supple.   Neurological:      Mental Status: He is alert.       Tray Ocasio MD

## 2024-03-04 NOTE — ASSESSMENT & PLAN NOTE
Wt Readings from Last 3 Encounters:   03/04/24 115 kg (254 lb)   02/19/24 115 kg (254 lb)   01/31/24 115 kg (254 lb)       Patient does have history of CHF.  On multiple medications, including metoprolol, losartan.  Unfortunately, he is intolerant of Entresto, with it giving him severe problems before with back pain.  Continue with current medications, including isosorbide.

## 2024-03-04 NOTE — ASSESSMENT & PLAN NOTE
Wt Readings from Last 3 Encounters:   03/04/24 115 kg (254 lb)   02/19/24 115 kg (254 lb)   01/31/24 115 kg (254 lb)       Blood pressure today was fantastic.

## 2024-03-04 NOTE — PATIENT INSTRUCTIONS
1. Cardiomyopathy, ischemic  Assessment & Plan:  Patient does have history of cardiomyopathy.  Would recommend follow-up with cardiology.  No specific change at this point.  He is looking for second opinion, so I have entered a referral.    Orders:  -     Ambulatory Referral to Cardiology; Future    2. Chronic combined systolic and diastolic congestive heart failure (HCC)  Assessment & Plan:  Wt Readings from Last 3 Encounters:   03/04/24 115 kg (254 lb)   02/19/24 115 kg (254 lb)   01/31/24 115 kg (254 lb)       Patient does have history of CHF.  On multiple medications, including metoprolol, losartan.  Unfortunately, he is intolerant of Entresto, with it giving him severe problems before with back pain.  Continue with current medications, including isosorbide.        Orders:  -     Ambulatory Referral to Cardiology; Future    3. History of non-ST elevation myocardial infarction (NSTEMI)  Assessment & Plan:  History of prior MI.  Follow-up with cardiology.    Orders:  -     Ambulatory Referral to Cardiology; Future    4. 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)  Assessment & Plan:  Wt Readings from Last 3 Encounters:   03/04/24 115 kg (254 lb)   02/19/24 115 kg (254 lb)   01/31/24 115 kg (254 lb)       Blood pressure today was fantastic.          Orders:  -     Ambulatory Referral to Cardiology; Future    5. ICD (implantable cardioverter-defibrillator) in place  Assessment & Plan:  Patient does have ICD in place.  Follow-up with electrophysiology clinic.    Orders:  -     Ambulatory Referral to Cardiology; Future    6. Paroxysmal atrial fibrillation (HCC)  Assessment & Plan:  Heart rate is irregular.    Orders:  -     Ambulatory Referral to Cardiology; Future    7. Peripheral artery disease (HCC)  Assessment & Plan:  Patient has been having some issues with regard to his lower extremities.  Has problems with fatigue, aches, weakness.  Question of  peripheral artery disease given his coronary artery disease history.  Check AUGUSTA.  If that is negative, would look at more questions related to degenerative disc disease.    Orders:  -     VAS AUGUSTA & waveform analysis, multiple levels; Future; Expected date: 03/04/2024        COVID 19 Instructions    Jose Coronado was advised to limit contact with others to essential tasks such as getting food, medications, and medical care.    Proper handwashing reviewed, and Hand sanitzer when washing is not available.    If the patient develops symptoms of COVID 19, the patient should call the office as soon as possible.    It is strongly recommended that Flu Vaccinations be obtained.      Virtual Visits:  AmWell: This works on smart phones (any phone with Internet browsing capability).  You should get a text message when the provider is ready to see you.  Click on the link in the text message, and the call should start.  You will need to type in your name, and allow camera and microphone access.  This is HIPPA compliant, and secure.      If you have not already done so, get immunized to COVID 19.      We are committed to getting you vaccinated as soon as possible and will be closely following CDC and [facility] guidelines as they are released and revised.  Please refer to our COVID-19 vaccine webpage for the most up to date information on the vaccine and our distribution efforts.    This site will also have the most up to date recommendations for COVID booster vaccine.    https://[email or web address].org/covid-19/protect-[email or web address]-[email or web address]-vaccine    Call [phone] ([phone]), option 7    You can also visit https://www.vaccines.gov/ to find vaccines in your area.    OUR LOCATION:    [facility]  [address] [place] [address], [address]  [place], [place], [address]  [phone]  Fax: [phone]    Lab services, Rheumatology, and OB/GYN are at this location as well.

## 2024-03-07 ENCOUNTER — TELEPHONE (OUTPATIENT)
Age: 84
End: 2024-03-07

## 2024-03-07 NOTE — TELEPHONE ENCOUNTER
Pt wife called into inform that when the pt was seen last week by Dr. Ocasio referred the pt to cardiologist. They called in to get appt and were told the whole system is change now and therefore she is requesting if Dr. Ocasio can help the pt to get in an cardiologist appt instead. If yes kindly call back the patient with appt details. Thanks

## 2024-03-07 NOTE — TELEPHONE ENCOUNTER
Lmom that Dr. Ocasio is ok for pt to schedule a eval with Dr. Garcia after his cardiac test is done on 3/13.

## 2024-03-07 NOTE — TELEPHONE ENCOUNTER
Per Benita at Dr. Garcia's office there phones are in order, the calls are routed to the call center. She said their April schedule is not out yet and she is asking pt's to call back in a week. The pt has Cardiac testing on 3/13 pt results will be in by the time he schedules. Are you ok with them waiting a week to schedule?

## 2024-03-13 ENCOUNTER — HOSPITAL ENCOUNTER (OUTPATIENT)
Dept: NON INVASIVE DIAGNOSTICS | Facility: HOSPITAL | Age: 84
Discharge: HOME | End: 2024-03-13
Payer: COMMERCIAL

## 2024-03-13 DIAGNOSIS — I73.9 PERIPHERAL ARTERY DISEASE (HCC): ICD-10-CM

## 2024-03-13 PROCEDURE — 93923 UPR/LXTR ART STDY 3+ LVLS: CPT | Performed by: SURGERY

## 2024-03-13 PROCEDURE — 93923 UPR/LXTR ART STDY 3+ LVLS: CPT

## 2024-04-01 NOTE — PROGRESS NOTES
Heart Failure Consult Note - Jose Coronado 83 y.o. male MRN: [id number]    @ Encounter: [id number]      Assessment/Plan:    Patient Active Problem List    Diagnosis Date Noted    Peripheral artery disease (HCC) 03/04/2024    Abnormal gait 01/31/2024    Elevated troponin 12/05/2023    Lesion of left shoulder 12/05/2023    Aortic root dilatation (HCC) 12/04/2023    Mass of skin of back 12/04/2023    Lumbar radiculopathy 10/23/2023    Left sided sciatica 08/24/2023    Lumbar spondylosis 08/24/2023    Insomnia 08/09/2023    Anxiety 04/06/2023    Hydrocele 03/28/2023    Bronchitis 11/17/2022    Statin intolerance 11/14/2022    SOB (shortness of breath) 11/07/2022    Colon polyp 05/09/2022    Rectal bleeding 04/22/2022    Stable angina 03/15/2021    ICD (implantable cardioverter-defibrillator) in place 02/18/2021    Anticoagulated by anticoagulation treatment 12/01/2020    Cataract 02/25/2020    Osteoarthritis 02/25/2020    Thoracic back pain 02/25/2020    Renal cyst 07/13/2018    Chronic maxillary sinusitis 05/16/2018    Acute low back pain without sciatica 10/26/2017    Cardiomyopathy, ischemic 07/25/2017    CHF (congestive heart failure) (HCC) 09/26/2016    Atrial fibrillation (HCC) 09/26/2016    Hyperlipidemia 04/25/2016    S/P PTCA (percutaneous transluminal coronary angioplasty) 02/02/2016    Hyperglycemia 11/20/2014    History of non-ST elevation myocardial infarction (NSTEMI) 11/20/2014    Prostate nodule 11/20/2014    Calculus of gallbladder and bile duct with acute on chronic cholecystitis 06/20/2013    Disc degeneration, lumbar 06/20/2013    Diverticulosis 06/20/2013    Obese 06/20/2013    Spinal stenosis 06/20/2013    3-vessel CAD 12/03/2012    Hemorrhoids 12/03/2012    Hypertensive heart disease with congestive heart failure (HCC) 12/03/2012    Hypothyroidism 12/03/2012       # Chronic HFrEF, Stage C, NYHA  Etiology: iCM    Weight: 254 lbs  BNP:     Studies- personally reviewed by me    NM stress 12/6/23:  large infarct involving inferolateral- posterolateral wall    Echo 1/9/23- [facility]  LVEF: 25-30%  LVIDd:  RV: normal  Other: aortic root 4.4 cm    Echo 11/29/22:  LVEF: 35-40%  LVEDd: 6.3 cm  Aorta 4.4 cm    TTE 11/11/20: LVEF: 30-35%  TTE 2/27/19: LVEF: 45-50%  TTE 4/6/18: LVEF: 35-40%  TTE 8/18/17: EF: 35-40%  TTE 11/20/15: EF: 55%    Neurohormonal Blockade:  --Beta-Blocker: Toprol XL 50 mg BID  --ACEi, ARB or ARNi: losartan 12.5 mg daily (states he had back pain with Entresto)  Imdur 60 mg daily  --Aldosterone Receptor Blocker: spironolactone 25 mg daily  --SGLT2-I:   --Diuretic: furosemide 20 mg BID    Sudden Cardiac Death Risk Reduction:  --ICD 6/20/19- [facility]    Electrical Resynchronization:  --Candidacy for BiV device:    Advanced Therapies (if appropriate):  --Inotrope:  --LVAD/Transplant Candidacy:    # atrial flutter- ablation at [facility] 4/22/21  AC: Xarelto 20 mg daily  Rate: Toprol XL 50 mg BID  Rhythm:    # PAD  Duplex 3/13/24: right: AUGUSTA 0.86, left: AUGUSTA 0.78    # CAD w/ hx of CABG and redo sternotomy for pseudoaneurysm rupture 2015  Also with multiple PCI  Rx: toprol  Angina: Imdur 60 mg daily  [facility]  normal LM, 100% prox LAD, patent LIMA  LCx: 100% proximal after small OM2. Large OM3 sub-branchs fill faintly via collaterals.   RCA: 100% proximal occlusion, distal RCA fills faintly via collaterals from the LAD  LV function: EF 40-45% with inferobasal and postero-lateral hypokinesis.  Mitral Regurgitation: None   Bypass Grafts:  1. Widely patent and large LIMA graft to the distal LAD with some collaterals to distal RCA and OM3.  2. Chronically occluded SVG to RCA.  3. Occluded SVG to large OM3 with distal vessel filling via collaterals from LAD   PCI of SVG to OM3 attempted. 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 Failed attempt at PCI of SVG-OM3.      In Dec was admitted, told he had a mild heart attack. He is tired all the time, has trouble breathing. Legs feel week. Also gets angina.     Past Medical History:   Diagnosis Date    Bleeding hemorrhoids     Choledocholithiasis     COVID-19 05/23/2022    Symptoms onset 5/20/22    Difficulty breathing     Diverticulosis     Ileus (HCC)     Lymphadenopathy of head and neck 09/29/2020    Pacemaker        Review of Systems   Constitutional:  Positive for fatigue. Negative for activity change, appetite change and unexpected weight change.   HENT:  Negative for congestion and nosebleeds.    Eyes: Negative.    Respiratory:  Positive for chest tightness and shortness of breath. Negative for cough.    Cardiovascular:  Negative for chest pain, palpitations and leg swelling.   Gastrointestinal:  Negative for abdominal distention.   Endocrine: Negative.    Genitourinary: Negative.    Musculoskeletal: Negative.    Skin: Negative.    Neurological:  Negative for dizziness, syncope and weakness.   Hematological: Negative.    Psychiatric/Behavioral: Negative.           Allergies   Allergen Reactions    Other Other (See Comments)     Pain in back after contrast used for echos, Definity    Atorvastatin Cough     Per pt has had a problem with other statins also, does not remember names    Bee Venom     Diphenhydramine Other (See Comments)    Iodinated Contrast Media Other (See Comments)    Lisinopril Other (See Comments)     cough    Perflutren Lipid Microsphere     Perflutren  "Lipid Microspheres Other (See Comments)     severe back lower back spasm    Ranolazine      Alters mood.    Rosuvastatin Myalgia    Sacubitril-Valsartan Other (See Comments)     \"severe back pain\"    Simvastatin Myalgia     .    Current Outpatient Medications:     acetaminophen (TYLENOL) 500 mg tablet, Take 500 mg by mouth, Disp: , Rfl:     Alirocumab (Praluent) 75 MG/ML SOAJ, Inject 1 mL under the skin every 14 (fourteen) days, Disp: , Rfl:     ascorbic acid (VITAMIN C) 500 MG tablet, Take 500 mg by mouth daily, Disp: , Rfl:     aspirin (ECOTRIN LOW STRENGTH) 81 mg EC tablet, Take 1 tablet by mouth daily, Disp: , Rfl:     Cholecalciferol (VITAMIN D) 2000 units CAPS, Take by mouth, Disp: , Rfl:     Coenzyme Q10 (CO Q 10) 100 MG CAPS, Take by mouth, Disp: , Rfl:     furosemide (LASIX) 20 mg tablet, , Disp: , Rfl:     furosemide (LASIX) 40 mg tablet, Take by mouth 2 (two) times a day Patient takes 1/2 tab BID, Disp: , Rfl:     isosorbide mononitrate (IMDUR) 60 mg 24 hr tablet, Take 1 tablet by mouth daily, Disp: , Rfl:     losartan (COZAAR) 25 mg tablet, Take 0.5 tablets by mouth daily, Disp: , Rfl:     Magnesium 400 MG TABS, Take by mouth, Disp: , Rfl:     metoprolol succinate (TOPROL-XL) 50 mg 24 hr tablet, 2 (two) times a day 1 in the morning & 1 at dinner, Disp: , Rfl:     Multiple Vitamins-Minerals (MULTIVITAMIN ADULT PO), Take 1 capsule by mouth, Disp: , Rfl:     rivaroxaban (XARELTO) 20 mg tablet, Take 1 tablet (20 mg total) by mouth daily with dinner, Disp: 28 tablet, Rfl: 0    spironolactone (ALDACTONE) 25 mg tablet, Take 25 mg by mouth daily, Disp: , Rfl:     TURMERIC PO, Take 1 Dose by mouth, Disp: , Rfl:     amoxicillin (AMOXIL) 500 MG tablet, 4 TABLETS BY MOUTH 1 HOUR BEFORE DENTIST (Patient not taking: Reported on 12/4/2023), Disp: , Rfl:     nitroglycerin (NITROSTAT) 0.4 mg SL tablet, Place 1 tablet under the tongue every 5 (five) minutes as needed (Patient not taking: Reported on 2/19/2024), Disp: , " Rfl:     predniSONE 50 mg tablet, Take 13 hours, 7 hours and 1 hours prior to test (Patient not taking: Reported on 2/19/2024), Disp: , Rfl:     Social History     Socioeconomic History    Marital status: /Civil Union     Spouse name: Not on file    Number of children: Not on file    Years of education: Not on file    Highest education level: Not on file   Occupational History    Occupation: retired   Tobacco Use    Smoking status: Never    Smokeless tobacco: Never   Substance and Sexual Activity    Alcohol use: Not Currently     Comment: social    Drug use: No    Sexual activity: Not on file   Other Topics Concern    Not on file   Social History Narrative    Not on file     Social Determinants of Health     Financial Resource Strain: Low Risk  (1/31/2024)    Overall Financial Resource Strain (CARDIA)     Difficulty of Paying Living Expenses: Not hard at all   Food Insecurity: No Food Insecurity (12/5/2023)    Received from [facility]    Hunger Vital Sign     Worried About Running Out of Food in the Last Year: Never true     Ran Out of Food in the Last Year: Never true   Transportation Needs: No Transportation Needs (1/31/2024)    PRAPARE - Transportation     Lack of Transportation (Medical): No     Lack of Transportation (Non-Medical): No   Physical Activity: Not on file   Stress: Not on file   Social Connections: Not on file   Intimate Partner Violence: Not At Risk (12/5/2023)    Received from [facility]    Humiliation, Afraid, Rape, and Kick questionnaire     Fear of Current or Ex-Partner: No     Emotionally Abused: No     Physically Abused: No     Sexually Abused: No   Housing Stability: Low Risk  (12/5/2023)    Received from [facility]    Housing Stability Vital Sign     Unable to Pay for Housing in the Last Year: No     Number of Places Lived in the Last Year: 1     Unstable Housing in the Last Year: No       Family History   Problem Relation Age of  "Onset    Lung cancer Mother     Coronary artery disease Father     Diabetes Brother     Lung cancer Family        Physical Exam:    Vitals: Blood pressure 112/50, pulse 70, height 6' 1\" (1.854 m), weight 115 kg (254 lb), SpO2 98%., Body mass index is 33.51 kg/m².,   Wt Readings from Last 3 Encounters:   04/02/24 115 kg (254 lb)   03/04/24 115 kg (254 lb)   02/19/24 115 kg (254 lb)       Physical Exam    Labs & Results:    Lab Results   Component Value Date    WBC 6.1 05/16/2022    HGB 13.9 02/10/2023    HCT 40.9 02/10/2023    MCV 83.5 05/16/2022     05/16/2022     Lab Results   Component Value Date    SODIUM 137 12/05/2023    K 3.8 12/05/2023     12/05/2023    CO2 27 12/05/2023    BUN 24 12/05/2023    CREATININE 0.74 12/05/2023    GLUC 108 (H) 12/05/2023    CALCIUM 9.5 12/05/2023     Lab Results   Component Value Date    NTBNP 1,272 (H) 11/08/2022      Lab Results   Component Value Date    CHOLESTEROL 110 06/12/2023    CHOLESTEROL 192 08/12/2022     Lab Results   Component Value Date    HDL 36 (L) 06/12/2023    HDL 34 (L) 08/12/2022     Lab Results   Component Value Date    TRIG 118 08/12/2022     No results found for: \"NONHDLC\"    EKG personally reviewed by Miguel Ángel Butler DO.     Counseling / Coordination of Care  Time spent today 40 minutes.  Greater than 50% of total time was spent with the patient and / or family counseling and / or coordination of care.  We discussed diagnoses, most recent studies and any changes in treatment    Thank you for the opportunity to participate in the care of this patient.    MIGUEL ÁNGEL BUTLER D.O.  DIRECTOR OF HEART FAILURE/ PULMONARY HYPERTENSION  MEDICAL DIRECTOR OF LVAD PROGRAM  [facility] [address]    "

## 2024-04-02 ENCOUNTER — OFFICE VISIT (OUTPATIENT)
Dept: CARDIOLOGY CLINIC | Facility: CLINIC | Age: 84
End: 2024-04-02
Payer: COMMERCIAL

## 2024-04-02 VITALS
HEART RATE: 70 BPM | DIASTOLIC BLOOD PRESSURE: 50 MMHG | OXYGEN SATURATION: 98 % | SYSTOLIC BLOOD PRESSURE: 112 MMHG | HEIGHT: 73 IN | BODY MASS INDEX: 33.66 KG/M2 | WEIGHT: 254 LBS

## 2024-04-02 DIAGNOSIS — I73.9 PERIPHERAL ARTERY DISEASE (HCC): ICD-10-CM

## 2024-04-02 DIAGNOSIS — I20.89 STABLE ANGINA: ICD-10-CM

## 2024-04-02 DIAGNOSIS — I77.810 AORTIC ROOT DILATATION (HCC): ICD-10-CM

## 2024-04-02 DIAGNOSIS — I25.5 CARDIOMYOPATHY, ISCHEMIC: ICD-10-CM

## 2024-04-02 DIAGNOSIS — Z95.810 ICD (IMPLANTABLE CARDIOVERTER-DEFIBRILLATOR) IN PLACE: ICD-10-CM

## 2024-04-02 DIAGNOSIS — I50.42 HYPERTENSIVE HEART DISEASE WITH CHRONIC COMBINED SYSTOLIC AND DIASTOLIC CONGESTIVE HEART FAILURE (HCC): ICD-10-CM

## 2024-04-02 DIAGNOSIS — I50.42 CHRONIC COMBINED SYSTOLIC AND DIASTOLIC CONGESTIVE HEART FAILURE (HCC): ICD-10-CM

## 2024-04-02 DIAGNOSIS — I11.0 HYPERTENSIVE HEART DISEASE WITH CONGESTIVE HEART FAILURE, UNSPECIFIED HEART FAILURE TYPE (HCC): ICD-10-CM

## 2024-04-02 DIAGNOSIS — I25.2 HISTORY OF NON-ST ELEVATION MYOCARDIAL INFARCTION (NSTEMI): ICD-10-CM

## 2024-04-02 DIAGNOSIS — I48.0 PAROXYSMAL ATRIAL FIBRILLATION (HCC): Primary | ICD-10-CM

## 2024-04-02 DIAGNOSIS — I11.0 HYPERTENSIVE HEART DISEASE WITH CHRONIC COMBINED SYSTOLIC AND DIASTOLIC CONGESTIVE HEART FAILURE (HCC): ICD-10-CM

## 2024-04-02 PROCEDURE — 99205 OFFICE O/P NEW HI 60 MIN: CPT | Performed by: INTERNAL MEDICINE

## 2024-04-02 NOTE — PATIENT INSTRUCTIONS
Starting Jardiance 10 mg daily    Look up [employer] Reduced trial    Cardiac rehab referral    We discussed CCM

## 2024-04-15 ENCOUNTER — CLINICAL SUPPORT (OUTPATIENT)
Dept: CARDIAC REHAB | Facility: HOSPITAL | Age: 84
End: 2024-04-15
Attending: INTERNAL MEDICINE
Payer: COMMERCIAL

## 2024-04-15 DIAGNOSIS — I50.9 CONGESTIVE HEART FAILURE, UNSPECIFIED HF CHRONICITY, UNSPECIFIED HEART FAILURE TYPE (HCC): Primary | ICD-10-CM

## 2024-04-15 DIAGNOSIS — I50.42 CHRONIC COMBINED SYSTOLIC AND DIASTOLIC CONGESTIVE HEART FAILURE (HCC): ICD-10-CM

## 2024-04-15 PROCEDURE — 93797 PHYS/QHP OP CAR RHAB WO ECG: CPT

## 2024-04-15 NOTE — PROGRESS NOTES
CARDIAC REHABILITATION ASSESSMENT AND INDIVIDUALIZED TREATMENT PLAN  INITIAL           Today's date: 4/15/2024   # of Exercise Sessions Completed: 1-evaluation  Patient name: Jose Coronado      : 1940  Age: 83 y.o.       MRN: [id number]  Referring Physician: Miguel Ángel Garcia DO  Cardiologist: Dr. Garcia  Provider: Chidi  Clinician: Tessa Bansal MS, CEP          Dr. Garcia,   Please review the following patient assessment for Jose to begin cardiac rehabilitation for treatment of CHF.  Please verify you agree with the outlined plan of care and exercise prescription by signing with attached order.    Thank You.      Comments: Mr. Coronado is ready to start his cardiac rehabilitation program and is here for his evaluation today. His biggest complaint is shortness of breath with exertion and heaviness in his legs. EF is 25-30% on echo from  and has discussed ICD with Dr. Garcia. He reports Dr. Garcia wanted him to try cardiac rehab before making this decision.        Dx:   Encounter Diagnosis   Name Primary?    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 Failed attempt at PCI of SVG-OM3.      In Dec was admitted, told he had a mild heart attack. He is tired all the time, has trouble breathing. Legs feel week. Also gets angina.           ASSESSMENT    Medical History:   Past Medical History:   Diagnosis Date    Bleeding hemorrhoids     Choledocholithiasis     COVID-19 2022    Symptoms onset 24    Difficulty breathing     Diverticulosis      Ileus (HCC)     Lymphadenopathy of head and neck 09/29/2020    Pacemaker        Family History:  Family History   Problem Relation Age of Onset    Lung cancer Mother     Coronary artery disease Father     Diabetes Brother     Lung cancer Family        Allergies:   Other, Atorvastatin, Bee venom, Diphenhydramine, Iodinated contrast media, Lisinopril, Perflutren lipid microsphere, Perflutren lipid microspheres, Ranolazine, Rosuvastatin, Sacubitril-valsartan, and Simvastatin    Current Medications:   Current Outpatient Medications   Medication Sig Dispense Refill    acetaminophen (TYLENOL) 500 mg tablet Take 500 mg by mouth      Alirocumab (Praluent) 75 MG/ML SOAJ Inject 1 mL under the skin every 14 (fourteen) days      amoxicillin (AMOXIL) 500 MG tablet 4 TABLETS BY MOUTH 1 HOUR BEFORE DENTIST (Patient not taking: Reported on 12/4/2023)      ascorbic acid (VITAMIN C) 500 MG tablet Take 500 mg by mouth daily      aspirin (ECOTRIN LOW STRENGTH) 81 mg EC tablet Take 1 tablet by mouth daily      Cholecalciferol (VITAMIN D) 2000 units CAPS Take by mouth      Coenzyme Q10 (CO Q 10) 100 MG CAPS Take by mouth      Empagliflozin (Jardiance) 10 MG TABS tablet Take 1 tablet (10 mg total) by mouth every morning 30 tablet 5    furosemide (LASIX) 20 mg tablet       furosemide (LASIX) 40 mg tablet Take by mouth 2 (two) times a day Patient takes 1/2 tab BID      isosorbide mononitrate (IMDUR) 60 mg 24 hr tablet Take 1 tablet by mouth daily      losartan (COZAAR) 25 mg tablet Take 0.5 tablets by mouth daily      Magnesium 400 MG TABS Take by mouth      metoprolol succinate (TOPROL-XL) 50 mg 24 hr tablet 2 (two) times a day 1 in the morning & 1 at dinner      Multiple Vitamins-Minerals (MULTIVITAMIN ADULT PO) Take 1 capsule by mouth      nitroglycerin (NITROSTAT) 0.4 mg SL tablet Place 1 tablet under the tongue every 5 (five) minutes as needed (Patient not taking: Reported on 2/19/2024)      predniSONE 50 mg tablet Take 13 hours, 7  hours and 1 hours prior to test (Patient not taking: Reported on 2/19/2024)      rivaroxaban (XARELTO) 20 mg tablet Take 1 tablet (20 mg total) by mouth daily with dinner 28 tablet 0    spironolactone (ALDACTONE) 25 mg tablet Take 25 mg by mouth daily      TURMERIC PO Take 1 Dose by mouth       No current facility-administered medications for this visit.       Medication compliance: Yes   Comments: Pt reports to be compliant with medications    Physical Limitations: B/L knee pain    Fall Risk: Low   Comments: Ambulates with a steady gait with no assist device    Cultural needs: n/a      CAD Risk Factors:  Cholesterol: Yes  HTN: Yes  DM: No  Obesity: Yes   Inactivity: Yes      EXERCISE ASSESSMENT:     Initial Fitness Assessment:   Submaximal TM ETT:  Resting:  BP: 102/62, Exercise:  BP: 112/68, METs:  3.1, ECG Summary: NSR, paced with freq PVCs, NSVT noted, Symptoms: none, and Test terminated at:  RPE 6      Functional Status Prior to Diagnosis for Treatment:   Occupation: retired  Recreation/Physical Activity: none  ADL’s: Capable of performing light to moderate ADLs  [place]: No limitations  Home exercise equipment:  none  Home exercise: no regular aerobic exercise   Other: n/a    Current Functional Status:  Occupation: retired  Recreation/Physical Activity: none  ADL’s:Capable of performing light ADLs only limited by Dyspnea  Weakness  [place]: No limitations  Home exercise: none  Other: n/a    Functional Capacity Screening Tool:  [facility] Activity Status Index:  5.72 METs      PSYCHOSOCIAL ASSESSMENT:    Depression screening:  PHQ-9 = 3    Interpretation:  1-4 = Minimal Depression  Anxiety screening:  CHAUNCEY-7 = 0    Interpretation: 0-4  = Not anxious    Pt self-report of depression and anxiety   Patient reports they are coping well with good social support and denies depression or anxiety  Reports sufficient emotional support       Self-reported stress level:  4   Stressors: none identified  Stress Management  "Tools: practice Relaxation Techniques, keep a positive mindset, enjoy a hobby, and spend time with family    Quality of Life Screen:  (Higher score indicates disease impact on QOL)  [facility] COOP score: 23/40        Social Support:   spouse  Community/Social Activities: none     Psychosocial Assessment as it relates to rehabilitation:   Patient denies issues with his/her family or home life that may affect their rehabilitation efforts.       NUTRITION ASSESSMENT:    Weight:    Wt Readings from Last 1 Encounters:   04/02/24 115 kg (254 lb)        Height:   Ht Readings from Last 1 Encounters:   04/02/24 6' 1\" (1.854 m)       Rate Your Plate Score: 62/81    Diabetes: N/A  A1c: n/a    last measured: n/a    Lipid management: Discussed diet and lipid management and Last lipid profile 12/6/2023  Chol 96    HDL 30  LDL 35    Current Dietary Habits:  Trying to follow heart healthy eating plan, low salt, low fat    Drug/Alcohol Use:   No      OTHER CORE COMPONENT ASSESSMENT:    Tobacco Use:     N/A:  Patient is a non-smoker   N/A: Pt has a remote history of smoking    Anginal Symptoms:  chest pressure   NTG use: No prescription        INDIVIDUALIZED TREATMENT PLAN      Patient will attend 35 monitored exercise sessions beginning 4/18/2024.    See outlined plan of care below for specific patient goals in each component of care.        EXERCISE GOAL and PLAN      SMART Goals:   10% improvement in functional capacity based on max METs achieved in initial fitness assessment  reduced dyspnea with physical activity    improved DASI score by 10%  increased exercise capacity by 40% based on peak METs tolerated in cardiac rehab exercise session  maintain > 150 minutes per week of moderate intensity exercise    Patient Specific EXERCISE GOALS:       resume ADLs with increased strength, attend rehab regularly, decrease sitting time, and start a walking program, be able to walk further with less shortness of breath and leg " heaviness    Progress toward SMART and personal Exercise goals:  set goals at evaluation today    Plan for next 30 days:    education on home exercise guidelines, home exercise 30+ mins 2 days opposite CR, and will begin attending CR sessions regularly 3x/week to increase strength and stamina with exercise    The patient was counseled on exercise guidelines to achieve a minimum of 150 mins/wk of moderate intensity (RPE 4-6)   exercise and encouraged to add 1-2 days of exercise on opposite days of cardiac rehab as tolerated.     Current Aerobic Exercise Prescription:      Frequency: 3 days/week   Supplement with home exercise 2+ days/wk as tolerated       Minutes: 20 - 40         METS: 2.0-2.5            HR: RHR +30-40bpm   RPE: 4-6         Modalities: Treadmill, UBE, Lifecycle, NuStep, and Recumbent bike     Exercise workloads will be progressed gradually as tolerated, within limits of patient's ability, and according to the patient's   response to the exercise program.      Aerobic Exercise Prescription - 30 day goal:   Frequency: 3 days/week of cardiac rehab       Supplement with home exercise 2+ days/wk as tolerated    Minutes: 40       >150 mins/wk of moderate intensity exercise   METS: 2.5   HR: 30 beats above resting     RPE: 4-6   Modalities: Treadmill, UBE, Lifecycle, NuStep, and Recumbent bike    Strength trainin-3 days / week  12-15 repetitions  1-2 sets per modality   Will be added following 2-3 weeks of monitored exercise sessions   Modalities: Pull Downs, Lateral Raise, Arm Extension, Arm Curl, Sit to Stands, Front Raises, and Calf Raises    Home Exercise: none    Group and Individual Education: benefit of exercise for CAD risk factors, home exercise guidelines, AHA guidelines to achieve >150 mins/wk of moderate exercise, and RPE scale     Readiness to change: Preparation:  (Getting ready to change)       NUTRITION GOALS AND PLAN    Weight control:    Starting weight: 252 lb   Current weight:  "    Nutritional   Reviewed patient's Rate your Plate. Discussed key elements of heart healthy eating. Reviewed patient goals for dietary modifications and their clinical implications.  Reviewed most recent lipid profile.     SMART Goals:   LDL <100, HDL >40, TRG <150, CHOL <200, Improved Rate Your Plate score  >64, 2.5-5%  wt loss, eat 6 or more servings of grain products per day, eat whole grain breads, brown rice and whole grain cereals, eat 5 or more servings of fruits and vegetables a day, eat 2 or more servings of low fat milk or yogurt a day, eat no more than 6oz of meat per day, eat red meat once a week or less, eat poultry without the skin, eat meatless meals twice a week or more, choose 1% or skim milk, rarely eat cheese or choose reduced fat or skim, and use \"light\" tub margarine on bread, potatoes and vegetables    Patient Specific NUTRITION GOALS:     Continue current eating plan low salt and low fat    Patient's progress toward SMART and personal Nutrition goals:   Is following heart healthy eating plan with low salt and fat intake, will be given education on healthy eating and label reading    Plan for next 30 days:   group class: Reading Food Labels, group Class: Heart Healthy Eating, increase intake of whole grains, increase daily intake of fruits and vegetables, limit meat intake to less than 6oz per day, eat red meat once a week or less, choose lean red meat, reduce intake and /or  rarely eat processed meats , and eat poultry without the skin    Measurable goals were based Rate Your Plate Dietary Self-Assessment. These are the areas in which the patient could score higher on the assessment.  Goals include recommendations for a heart healthy diet based on American Heart Association.    Group and Individual Education:   heart healthy eating principles  weight loss and management strategies  low sodium diet  maintaining hydration  nutrition for  lipid management  portion control    Readiness to " change: Preparation:  (Getting ready to change)       PSYCHOSOCIAL ASSESSMENT AND PLAN    Psychosocial Assessment as it relates to rehabilitation:   Patient denies issues with his/her family or home life that may affect their rehabilitation efforts.     SMART Goals:     Feelings in [place] Score < 3, Physical Fitness in [place] Score < 3, Daily Activity in [place] Score < 3, Overall Health in [place] Score < 3, Change in Health in [place] Score < 3 ,  Increased interest and pleasure in doing things, and feel less tired with more energy    Patient Specific PSYCHOCOSOCIAL GOALS:     spend time with family    Patient's progress toward SMART and personal Psychosocial goals:   [facility]does not have specific psychosocial goal-no concerns with depression or anxiety at this time. Would like more energy    Plan for next 30 days:   Class: Stress and Your Health, Class: Relaxation, Practice relaxation techniques, Exercise, and Keep a positive mindset    Group and Individual Education: signs/sxs of depression, benefits of a positive support system, stress management techniques, benefits of enrolling in [employer], and depression and CAD    Information to utilize [employer] was provided as well as contact information for counseling through  Behavioral Health and group psychotherapy groups available.    Readiness to change: Maintenance: (Maintaining the behavior change)      OTHER CORE COMPONENTS GOALS and PLAN      Blood Pressure will be monitored throughout the program and cardiologist will be notified of elevated trends.    Pt will be encouraged to monitor home BP if advised by cardiologist.      SMART Goals:   consistent, controlled resting BP < 130/80, reduced angina, and medication compliance    Patient Specific CORE COMPONENT GOALS:    reduced dietary sodium <2000mg, assess daily wt to report an increase greater than 3lbs in a day or 5lbs in a week, and medication  compliance    Progress toward SMART and personal Core Component goals:    Goals set at evaluation today    Plan for next 30 days:   group class: Understanding Heart Disease, group class: Common Heart Medications, medication compliance, avoid processed foods, engage in regular exercise, eliminate salt shaker at the table, check labels for sodium content, and monitor home BP    Group and Individual Education:  understanding high blood pressure and it's relationship to CAD, components of blood pressure management, and low sodium diet and heart failure    Readiness to change: Action:  (Changing behavior)

## 2024-04-18 ENCOUNTER — CLINICAL SUPPORT (OUTPATIENT)
Dept: CARDIAC REHAB | Facility: HOSPITAL | Age: 84
End: 2024-04-18
Attending: INTERNAL MEDICINE
Payer: COMMERCIAL

## 2024-04-18 DIAGNOSIS — I50.9 CONGESTIVE HEART FAILURE, UNSPECIFIED HF CHRONICITY, UNSPECIFIED HEART FAILURE TYPE (HCC): Primary | ICD-10-CM

## 2024-04-18 PROCEDURE — 93798 PHYS/QHP OP CAR RHAB W/ECG: CPT

## 2024-04-19 ENCOUNTER — CLINICAL SUPPORT (OUTPATIENT)
Dept: CARDIAC REHAB | Facility: HOSPITAL | Age: 84
End: 2024-04-19
Attending: INTERNAL MEDICINE
Payer: COMMERCIAL

## 2024-04-19 DIAGNOSIS — I50.9 CONGESTIVE HEART FAILURE, UNSPECIFIED HF CHRONICITY, UNSPECIFIED HEART FAILURE TYPE (HCC): Primary | ICD-10-CM

## 2024-04-19 PROCEDURE — 93798 PHYS/QHP OP CAR RHAB W/ECG: CPT

## 2024-04-22 DIAGNOSIS — I25.5 CARDIOMYOPATHY, ISCHEMIC: ICD-10-CM

## 2024-04-22 DIAGNOSIS — I11.0 HYPERTENSIVE HEART DISEASE WITH CONGESTIVE HEART FAILURE, UNSPECIFIED HEART FAILURE TYPE (HCC): ICD-10-CM

## 2024-04-22 NOTE — TELEPHONE ENCOUNTER
Medication: Jardiance     Dose/Frequency: 10 mg    Quantity: 90    Pharmacy: [facility]    Office:   [] PCP/Provider -   [x] Speciality/Provider -     Does the patient have enough for 3 days?   [x] Yes   [] No - Send as HP to POD     [facility] called requesting the 90 day supply

## 2024-04-23 ENCOUNTER — CLINICAL SUPPORT (OUTPATIENT)
Dept: CARDIAC REHAB | Facility: HOSPITAL | Age: 84
End: 2024-04-23
Attending: INTERNAL MEDICINE
Payer: COMMERCIAL

## 2024-04-23 DIAGNOSIS — I50.9 CONGESTIVE HEART FAILURE, UNSPECIFIED HF CHRONICITY, UNSPECIFIED HEART FAILURE TYPE (HCC): Primary | ICD-10-CM

## 2024-04-23 PROCEDURE — 93798 PHYS/QHP OP CAR RHAB W/ECG: CPT

## 2024-04-24 DIAGNOSIS — I11.0 HYPERTENSIVE HEART DISEASE WITH CONGESTIVE HEART FAILURE, UNSPECIFIED HEART FAILURE TYPE (HCC): ICD-10-CM

## 2024-04-24 DIAGNOSIS — I25.5 CARDIOMYOPATHY, ISCHEMIC: ICD-10-CM

## 2024-04-24 NOTE — TELEPHONE ENCOUNTER
Reason for call: Pharmacy called and stated they need 90 day instead of 30  [x] Refill   [] Prior Auth  [] Other:     Office:   [x] PCP/Provider -   [] Specialty/Provider -     Medication:     Empagliflozin (Jardiance) 10 MG TABS tablet       Dose/Frequency: Take 1 tablet (10 mg total) by mouth every morning,     Quantity: 30 tablet     Pharmacy: [facility] #[id number] - [place] - [address] [phone]     Does the patient have enough for 3 days?   [x] Yes   [] No - Send as HP to POD

## 2024-04-25 ENCOUNTER — CLINICAL SUPPORT (OUTPATIENT)
Dept: CARDIAC REHAB | Facility: HOSPITAL | Age: 84
End: 2024-04-25
Attending: INTERNAL MEDICINE
Payer: COMMERCIAL

## 2024-04-25 DIAGNOSIS — I11.0 HYPERTENSIVE HEART DISEASE WITH CONGESTIVE HEART FAILURE, UNSPECIFIED HEART FAILURE TYPE (HCC): ICD-10-CM

## 2024-04-25 DIAGNOSIS — I25.5 CARDIOMYOPATHY, ISCHEMIC: ICD-10-CM

## 2024-04-25 DIAGNOSIS — I50.9 CONGESTIVE HEART FAILURE, UNSPECIFIED HF CHRONICITY, UNSPECIFIED HEART FAILURE TYPE (HCC): Primary | ICD-10-CM

## 2024-04-25 PROCEDURE — 93798 PHYS/QHP OP CAR RHAB W/ECG: CPT

## 2024-04-25 NOTE — TELEPHONE ENCOUNTER
Reason for call:   [x] Refill   [] Prior Auth  [x] Other: NOT A DUPLICATE. Was sent to wrong pharmacy    Office:   [] PCP/Provider -   [x] Specialty/Provider - cardio    Medication: Empagliflozin (Jardiance) 10 MG TABS tablet     Dose/Frequency:     Take 1 tablet (10 mg total) by mouth every morning       Quantity: #90    Pharmacy: [facility] [address] [phone]     Does the patient have enough for 3 days?   [] Yes   [x] No - Send as HP to POD

## 2024-04-26 ENCOUNTER — CLINICAL SUPPORT (OUTPATIENT)
Dept: CARDIAC REHAB | Facility: HOSPITAL | Age: 84
End: 2024-04-26
Attending: INTERNAL MEDICINE
Payer: COMMERCIAL

## 2024-04-26 DIAGNOSIS — I50.9 CONGESTIVE HEART FAILURE, UNSPECIFIED HF CHRONICITY, UNSPECIFIED HEART FAILURE TYPE (HCC): Primary | ICD-10-CM

## 2024-04-26 PROCEDURE — 93798 PHYS/QHP OP CAR RHAB W/ECG: CPT

## 2024-04-26 NOTE — TELEPHONE ENCOUNTER
Per patient message sent to incorrect pharmacy. Reordered and sent to [facility]

## 2024-05-03 ENCOUNTER — CLINICAL SUPPORT (OUTPATIENT)
Dept: CARDIAC REHAB | Facility: HOSPITAL | Age: 84
End: 2024-05-03
Attending: INTERNAL MEDICINE
Payer: COMMERCIAL

## 2024-05-03 DIAGNOSIS — I50.9 CONGESTIVE HEART FAILURE, UNSPECIFIED HF CHRONICITY, UNSPECIFIED HEART FAILURE TYPE (HCC): Primary | ICD-10-CM

## 2024-05-03 PROCEDURE — 93798 PHYS/QHP OP CAR RHAB W/ECG: CPT

## 2024-05-07 ENCOUNTER — CLINICAL SUPPORT (OUTPATIENT)
Dept: CARDIAC REHAB | Facility: HOSPITAL | Age: 84
End: 2024-05-07
Attending: INTERNAL MEDICINE
Payer: COMMERCIAL

## 2024-05-07 DIAGNOSIS — I50.9 CONGESTIVE HEART FAILURE, UNSPECIFIED HF CHRONICITY, UNSPECIFIED HEART FAILURE TYPE (HCC): Primary | ICD-10-CM

## 2024-05-07 PROCEDURE — 93798 PHYS/QHP OP CAR RHAB W/ECG: CPT

## 2024-05-09 ENCOUNTER — CLINICAL SUPPORT (OUTPATIENT)
Dept: CARDIAC REHAB | Facility: HOSPITAL | Age: 84
End: 2024-05-09
Attending: INTERNAL MEDICINE
Payer: COMMERCIAL

## 2024-05-09 DIAGNOSIS — I50.9 CONGESTIVE HEART FAILURE, UNSPECIFIED HF CHRONICITY, UNSPECIFIED HEART FAILURE TYPE (HCC): Primary | ICD-10-CM

## 2024-05-09 PROCEDURE — 93798 PHYS/QHP OP CAR RHAB W/ECG: CPT

## 2024-05-10 ENCOUNTER — CLINICAL SUPPORT (OUTPATIENT)
Dept: CARDIAC REHAB | Facility: HOSPITAL | Age: 84
End: 2024-05-10
Attending: INTERNAL MEDICINE
Payer: COMMERCIAL

## 2024-05-10 DIAGNOSIS — I50.9 CONGESTIVE HEART FAILURE, UNSPECIFIED HF CHRONICITY, UNSPECIFIED HEART FAILURE TYPE (HCC): Primary | ICD-10-CM

## 2024-05-10 PROCEDURE — 93798 PHYS/QHP OP CAR RHAB W/ECG: CPT

## 2024-05-14 NOTE — PROGRESS NOTES
CARDIAC REHABILITATION ASSESSMENT AND INDIVIDUALIZED TREATMENT PLAN  30 DAY          Today's date: 2024   # of Exercise Sessions Completed: 10  Patient name: Jose Coronado      : 1940  Age: 83 y.o.       MRN: [id number]  Referring Physician: Miguel Ángel Garcia DO  Cardiologist: Dr. Garcia  Provider: Chidi  Clinician: Tessa Bansal MS, CEP          Dr. Garcia,   Please review the following patient assessment for Jose to continue cardiac rehabilitation for treatment of CHF.  Please verify you agree with the outlined plan of care and exercise prescription by signing with attached order.    Thank You.      Comments: Mr. Coronado has started his cardiac rehabilitation program and is doing well so far. He is progressing his exercise program appropriately and feels he is getting stronger with regular exercise program. His biggest complaint is shortness of breath with exertion and heaviness in his legs. EF is 25-30% on echo from  and has discussed ICD with Dr. Garcia. He reports Dr. Garcia wanted him to try cardiac rehab before making this decision.        Dx:   Encounter Diagnosis   Name Primary?    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 Failed attempt at PCI of SVG-OM3.      In Dec was admitted, told he had a mild heart attack. He is tired all the time, has trouble breathing. Legs feel week. Also gets angina.           ASSESSMENT    Medical History:   Past Medical History:   Diagnosis Date    Bleeding hemorrhoids      Choledocholithiasis     COVID-19 05/23/2022    Symptoms onset 5/20/22    Difficulty breathing     Diverticulosis     Ileus (HCC)     Lymphadenopathy of head and neck 09/29/2020    Pacemaker        Family History:  Family History   Problem Relation Age of Onset    Lung cancer Mother     Coronary artery disease Father     Diabetes Brother     Lung cancer Family        Allergies:   Other, Atorvastatin, Bee venom, Diphenhydramine, Iodinated contrast media, Lisinopril, Perflutren lipid microsphere, Perflutren lipid microspheres, Ranolazine, Rosuvastatin, Sacubitril-valsartan, and Simvastatin    Current Medications:   Current Outpatient Medications   Medication Sig Dispense Refill    acetaminophen (TYLENOL) 500 mg tablet Take 500 mg by mouth      Alirocumab (Praluent) 75 MG/ML SOAJ Inject 1 mL under the skin every 14 (fourteen) days      amoxicillin (AMOXIL) 500 MG tablet 4 TABLETS BY MOUTH 1 HOUR BEFORE DENTIST (Patient not taking: Reported on 12/4/2023)      ascorbic acid (VITAMIN C) 500 MG tablet Take 500 mg by mouth daily      aspirin (ECOTRIN LOW STRENGTH) 81 mg EC tablet Take 1 tablet by mouth daily      Cholecalciferol (VITAMIN D) 2000 units CAPS Take by mouth      Coenzyme Q10 (CO Q 10) 100 MG CAPS Take by mouth      Empagliflozin (Jardiance) 10 MG TABS tablet Take 1 tablet (10 mg total) by mouth every morning 90 tablet 1    furosemide (LASIX) 20 mg tablet       furosemide (LASIX) 40 mg tablet Take by mouth 2 (two) times a day Patient takes 1/2 tab BID      isosorbide mononitrate (IMDUR) 60 mg 24 hr tablet Take 1 tablet by mouth daily      losartan (COZAAR) 25 mg tablet Take 0.5 tablets by mouth daily      Magnesium 400 MG TABS Take by mouth      metoprolol succinate (TOPROL-XL) 50 mg 24 hr tablet 2 (two) times a day 1 in the morning & 1 at dinner      Multiple Vitamins-Minerals (MULTIVITAMIN ADULT PO) Take 1 capsule by mouth      nitroglycerin (NITROSTAT) 0.4 mg SL tablet Place 1 tablet under the tongue every  5 (five) minutes as needed (Patient not taking: Reported on 2/19/2024)      predniSONE 50 mg tablet Take 13 hours, 7 hours and 1 hours prior to test (Patient not taking: Reported on 2/19/2024)      rivaroxaban (XARELTO) 20 mg tablet Take 1 tablet (20 mg total) by mouth daily with dinner 28 tablet 0    spironolactone (ALDACTONE) 25 mg tablet Take 25 mg by mouth daily      TURMERIC PO Take 1 Dose by mouth       No current facility-administered medications for this visit.       Medication compliance: Yes   Comments: Pt reports to be compliant with medications    Physical Limitations: B/L knee pain    Fall Risk: Low   Comments: Ambulates with a steady gait with no assist device    Cultural needs: n/a      CAD Risk Factors:  Cholesterol: Yes  HTN: Yes  DM: No  Obesity: Yes   Inactivity: Yes      EXERCISE ASSESSMENT:     Initial Fitness Assessment:   Submaximal TM ETT:  Resting:  BP: 102/62, Exercise:  BP: 112/68, METs:  3.1, ECG Summary: NSR, paced with freq PVCs, NSVT noted, Symptoms: none, and Test terminated at:  RPE 6      Functional Status Prior to Diagnosis for Treatment:   Occupation: retired  Recreation/Physical Activity: none  ADL’s: Capable of performing light to moderate ADLs  [place]: No limitations  Home exercise equipment:  none  Home exercise: no regular aerobic exercise   Other: n/a    Current Functional Status:  Occupation: retired  Recreation/Physical Activity: none  ADL’s:Capable of performing light ADLs only limited by Dyspnea  Weakness  [place]: No limitations  Home exercise: none  Other: n/a    Functional Capacity Screening Tool:  [facility] Activity Status Index:  5.72 METs      PSYCHOSOCIAL ASSESSMENT:    Depression screening:  PHQ-9 = 3    Interpretation:  1-4 = Minimal Depression  Anxiety screening:  CHAUNCEY-7 = 0    Interpretation: 0-4  = Not anxious    Pt self-report of depression and anxiety   Patient reports they are coping well with good social support and denies depression or  "anxiety  Reports sufficient emotional support. Will not need to reassess PHQ-9 every 30 days.      Self-reported stress level:  4   Stressors: none identified  Stress Management Tools: practice Relaxation Techniques, keep a positive mindset, enjoy a hobby, and spend time with family    Quality of Life Screen:  (Higher score indicates disease impact on QOL)  [facility] COOP score: 23/40        Social Support:   spouse  Community/Social Activities: none     Psychosocial Assessment as it relates to rehabilitation:   Patient denies issues with his/her family or home life that may affect their rehabilitation efforts.       NUTRITION ASSESSMENT:    Weight:    Wt Readings from Last 1 Encounters:   04/02/24 115 kg (254 lb)        Height:   Ht Readings from Last 1 Encounters:   04/02/24 6' 1\" (1.854 m)       Rate Your Plate Score: 62/81    Diabetes: N/A  A1c: n/a    last measured: n/a    Lipid management: Discussed diet and lipid management and Last lipid profile 12/6/2023  Chol 96    HDL 30  LDL 35    Current Dietary Habits:  Trying to follow heart healthy eating plan, low salt, low fat    Drug/Alcohol Use:   No      OTHER CORE COMPONENT ASSESSMENT:    Tobacco Use:     N/A:  Patient is a non-smoker   N/A: Pt has a remote history of smoking    Anginal Symptoms:  chest pressure   NTG use: No prescription        INDIVIDUALIZED TREATMENT PLAN      Patient will attend 35 monitored exercise sessions beginning 4/18/2024.    See outlined plan of care below for specific patient goals in each component of care.        EXERCISE GOAL and PLAN      SMART Goals:   10% improvement in functional capacity based on max METs achieved in initial fitness assessment  reduced dyspnea with physical activity    improved DASI score by 10%  increased exercise capacity by 40% based on peak METs tolerated in cardiac rehab exercise session  maintain > 150 minutes per week of moderate intensity exercise    Patient Specific EXERCISE GOALS:     "   resume ADLs with increased strength, attend rehab regularly, decrease sitting time, and start a walking program, be able to walk further with less shortness of breath and leg heaviness    Progress toward SMART and personal Exercise goals: Jsoe is attending his cardiac rehab sessions regularly 3x/week, he is increasing his exercise times and intensities as he becomes stronger and builds endurance, he is showing increase in functional capacity by increasing daily MET levels, he is increasing his walking time on treadmill and has not been reporting shortness of breath with walking in rehab    Plan for next 30 days: will continue to attend CR sessions regularly 3x/week and progress exercise times and intensities as he tolerates, will plan to begin home walking program on non rehab days 2-3x/week to help work towards increased walking distance with less shortness of breath and leg heaviness.    The patient was counseled on exercise guidelines to achieve a minimum of 150 mins/wk of moderate intensity (RPE 4-6)   exercise and encouraged to add 1-2 days of exercise on opposite days of cardiac rehab as tolerated.     Current Aerobic Exercise Prescription:      Frequency: 3 days/week   Supplement with home exercise 2+ days/wk as tolerated       Minutes: 40         METS: 2.1-2.8           HR: RHR +30-40bpm   RPE: 4-6         Modalities: Treadmill, UBE, Lifecycle, NuStep, and Recumbent bike     Exercise workloads will be progressed gradually as tolerated, within limits of patient's ability, and according to the patient's   response to the exercise program.      Aerobic Exercise Prescription - 30 day goal:   Frequency: 3 days/week of cardiac rehab       Supplement with home exercise 2+ days/wk as tolerated    Minutes: 40-50       >150 mins/wk of moderate intensity exercise   METS: 2.1-3.2   HR: 30 beats above resting     RPE: 4-6   Modalities: Treadmill, UBE, Lifecycle, NuStep, and Recumbent bike    Strength trainin-3  "days / week  12-15 repetitions  1-2 sets per modality   Will be added following 2-3 weeks of monitored exercise sessions   Modalities: Pull Downs, Lateral Raise, Arm Extension, Arm Curl, Sit to Stands, Front Raises, and Calf Raises    Home Exercise: none    Group and Individual Education: benefit of exercise for CAD risk factors, home exercise guidelines, AHA guidelines to achieve >150 mins/wk of moderate exercise, and RPE scale     Readiness to change: Preparation:  (Getting ready to change)       NUTRITION GOALS AND PLAN    Weight control:    Starting weight: 252 lb   Current weight: 256 lb    Nutritional   Reviewed patient's Rate your Plate. Discussed key elements of heart healthy eating. Reviewed patient goals for dietary modifications and their clinical implications.  Reviewed most recent lipid profile.     SMART Goals:   LDL <100, HDL >40, TRG <150, CHOL <200, Improved Rate Your Plate score  >64, 2.5-5%  wt loss, eat 6 or more servings of grain products per day, eat whole grain breads, brown rice and whole grain cereals, eat 5 or more servings of fruits and vegetables a day, eat 2 or more servings of low fat milk or yogurt a day, eat no more than 6oz of meat per day, eat red meat once a week or less, eat poultry without the skin, eat meatless meals twice a week or more, choose 1% or skim milk, rarely eat cheese or choose reduced fat or skim, and use \"light\" tub margarine on bread, potatoes and vegetables    Patient Specific NUTRITION GOALS:     Continue current eating plan low salt and low fat    Patient's progress toward SMART and personal Nutrition goals:   Is following heart healthy eating plan with low salt and fat intake, attended group education classes on healthy eating and label reading    Plan for next 30 days:   increase intake of whole grains, increase daily intake of fruits and vegetables, limit meat intake to less than 6oz per day, eat red meat once a week or less, choose lean red meat, reduce " intake and /or  rarely eat processed meats , and eat poultry without the skin    Measurable goals were based Rate Your Plate Dietary Self-Assessment. These are the areas in which the patient could score higher on the assessment.  Goals include recommendations for a heart healthy diet based on American Heart Association.    Group and Individual Education:   heart healthy eating principles  weight loss and management strategies  low sodium diet  maintaining hydration  nutrition for  lipid management  portion control  group class: Heart Healthy Eating  group class:  Label Reading    Readiness to change: Action      PSYCHOSOCIAL ASSESSMENT AND PLAN    Psychosocial Assessment as it relates to rehabilitation:   Patient denies issues with his/her family or home life that may affect their rehabilitation efforts.     SMART Goals:     Feelings in [place] Score < 3, Physical Fitness in [place] Score < 3, Daily Activity in [place] Score < 3, Overall Health in [place] Score < 3, Change in Health in [place] Score < 3 ,  Increased interest and pleasure in doing things, and feel less tired with more energy    Patient Specific PSYCHOCOSOCIAL GOALS:     spend time with family    Patient's progress toward SMART and personal Psychosocial goals:   does not have specific psychosocial goal-no concerns with depression or anxiety at this time. Would like more energy. He enjoys time with his family and has been traveling recently.    Plan for next 30 days:   Class: Stress and Your Health, Class: Relaxation, Practice relaxation techniques, Exercise, and Keep a positive mindset    Group and Individual Education: signs/sxs of depression, benefits of a positive support system, stress management techniques, benefits of enrolling in [place], and depression and CAD    Information to utilize [employer] was provided as well as contact information for counseling through  Behavioral Health and group psychotherapy groups  available.    Readiness to change: Maintenance: (Maintaining the behavior change)      OTHER CORE COMPONENTS GOALS and PLAN      Blood Pressure will be monitored throughout the program and cardiologist will be notified of elevated trends.    Pt will be encouraged to monitor home BP if advised by cardiologist.      SMART Goals:   consistent, controlled resting BP < 130/80, reduced angina, and medication compliance    Patient Specific CORE COMPONENT GOALS:    reduced dietary sodium <2000mg, assess daily wt to report an increase greater than 3lbs in a day or 5lbs in a week, and medication compliance    Progress toward SMART and personal Core Component goals:    Has decreased salt intake to help manage CHF and BP, increasing exercise time weekly with goal of 150-200 min/week of aerobic exercise, taking medications as prescribed    Plan for next 30 days:   group class: Understanding Heart Disease, group class: Common Heart Medications, medication compliance, avoid processed foods, engage in regular exercise, eliminate salt shaker at the table, check labels for sodium content, and monitor home BP    Group and Individual Education:  understanding high blood pressure and it's relationship to CAD, components of blood pressure management, and low sodium diet and heart failure    Readiness to change: Action:  (Changing behavior)

## 2024-05-21 ENCOUNTER — CLINICAL SUPPORT (OUTPATIENT)
Dept: CARDIAC REHAB | Facility: HOSPITAL | Age: 84
End: 2024-05-21
Attending: INTERNAL MEDICINE
Payer: COMMERCIAL

## 2024-05-21 DIAGNOSIS — I50.9 CONGESTIVE HEART FAILURE, UNSPECIFIED HF CHRONICITY, UNSPECIFIED HEART FAILURE TYPE (HCC): Primary | ICD-10-CM

## 2024-05-21 PROCEDURE — 93798 PHYS/QHP OP CAR RHAB W/ECG: CPT

## 2024-05-23 ENCOUNTER — CLINICAL SUPPORT (OUTPATIENT)
Dept: CARDIAC REHAB | Facility: HOSPITAL | Age: 84
End: 2024-05-23
Attending: INTERNAL MEDICINE
Payer: COMMERCIAL

## 2024-05-23 DIAGNOSIS — I50.9 CONGESTIVE HEART FAILURE, UNSPECIFIED HF CHRONICITY, UNSPECIFIED HEART FAILURE TYPE (HCC): Primary | ICD-10-CM

## 2024-05-23 PROCEDURE — 93798 PHYS/QHP OP CAR RHAB W/ECG: CPT

## 2024-05-24 ENCOUNTER — CLINICAL SUPPORT (OUTPATIENT)
Dept: CARDIAC REHAB | Facility: HOSPITAL | Age: 84
End: 2024-05-24
Attending: INTERNAL MEDICINE
Payer: COMMERCIAL

## 2024-05-24 DIAGNOSIS — I50.9 CONGESTIVE HEART FAILURE, UNSPECIFIED HF CHRONICITY, UNSPECIFIED HEART FAILURE TYPE (HCC): Primary | ICD-10-CM

## 2024-05-24 PROCEDURE — 93798 PHYS/QHP OP CAR RHAB W/ECG: CPT

## 2024-05-28 ENCOUNTER — CLINICAL SUPPORT (OUTPATIENT)
Dept: CARDIAC REHAB | Facility: HOSPITAL | Age: 84
End: 2024-05-28
Attending: INTERNAL MEDICINE
Payer: COMMERCIAL

## 2024-05-28 DIAGNOSIS — I50.9 CONGESTIVE HEART FAILURE, UNSPECIFIED HF CHRONICITY, UNSPECIFIED HEART FAILURE TYPE (HCC): Primary | ICD-10-CM

## 2024-05-28 PROCEDURE — 93798 PHYS/QHP OP CAR RHAB W/ECG: CPT

## 2024-05-30 ENCOUNTER — CLINICAL SUPPORT (OUTPATIENT)
Dept: CARDIAC REHAB | Facility: HOSPITAL | Age: 84
End: 2024-05-30
Attending: INTERNAL MEDICINE
Payer: COMMERCIAL

## 2024-05-30 DIAGNOSIS — I50.9 CONGESTIVE HEART FAILURE, UNSPECIFIED HF CHRONICITY, UNSPECIFIED HEART FAILURE TYPE (HCC): Primary | ICD-10-CM

## 2024-05-30 PROCEDURE — 93798 PHYS/QHP OP CAR RHAB W/ECG: CPT

## 2024-05-31 ENCOUNTER — CLINICAL SUPPORT (OUTPATIENT)
Dept: CARDIAC REHAB | Facility: HOSPITAL | Age: 84
End: 2024-05-31
Attending: INTERNAL MEDICINE
Payer: COMMERCIAL

## 2024-05-31 DIAGNOSIS — I50.9 CONGESTIVE HEART FAILURE, UNSPECIFIED HF CHRONICITY, UNSPECIFIED HEART FAILURE TYPE (HCC): Primary | ICD-10-CM

## 2024-05-31 PROCEDURE — 93798 PHYS/QHP OP CAR RHAB W/ECG: CPT

## 2024-06-04 ENCOUNTER — APPOINTMENT (OUTPATIENT)
Dept: CARDIAC REHAB | Facility: HOSPITAL | Age: 84
End: 2024-06-04
Attending: INTERNAL MEDICINE
Payer: COMMERCIAL

## 2024-06-04 ENCOUNTER — OFFICE VISIT (OUTPATIENT)
Dept: PAIN MEDICINE | Facility: CLINIC | Age: 84
End: 2024-06-04
Payer: COMMERCIAL

## 2024-06-04 ENCOUNTER — TELEPHONE (OUTPATIENT)
Age: 84
End: 2024-06-04

## 2024-06-04 VITALS
HEART RATE: 70 BPM | BODY MASS INDEX: 33.66 KG/M2 | DIASTOLIC BLOOD PRESSURE: 80 MMHG | HEIGHT: 73 IN | WEIGHT: 254 LBS | SYSTOLIC BLOOD PRESSURE: 141 MMHG

## 2024-06-04 DIAGNOSIS — M47.816 LUMBAR SPONDYLOSIS: ICD-10-CM

## 2024-06-04 DIAGNOSIS — M54.16 LUMBAR RADICULOPATHY: Primary | ICD-10-CM

## 2024-06-04 DIAGNOSIS — M51.36 DISC DEGENERATION, LUMBAR: ICD-10-CM

## 2024-06-04 PROCEDURE — G2211 COMPLEX E/M VISIT ADD ON: HCPCS | Performed by: NURSE PRACTITIONER

## 2024-06-04 PROCEDURE — 99214 OFFICE O/P EST MOD 30 MIN: CPT | Performed by: NURSE PRACTITIONER

## 2024-06-04 RX ORDER — METHYLPREDNISOLONE 4 MG/1
TABLET ORAL
Qty: 1 EACH | Refills: 0 | Status: SHIPPED | OUTPATIENT
Start: 2024-06-04

## 2024-06-04 RX ORDER — GABAPENTIN 100 MG/1
CAPSULE ORAL
Qty: 90 CAPSULE | Refills: 1 | Status: SHIPPED | OUTPATIENT
Start: 2024-06-04

## 2024-06-04 NOTE — PROGRESS NOTES
Heart Failure Office Note - Jose Coronado 83 y.o. male MRN: [id number]    @ Encounter: [id number]      Assessment/Plan:    Patient Active Problem List    Diagnosis Date Noted    Peripheral artery disease (HCC) 03/04/2024    Abnormal gait 01/31/2024    Elevated troponin 12/05/2023    Lesion of left shoulder 12/05/2023    Aortic root dilatation (HCC) 12/04/2023    Mass of skin of back 12/04/2023    Lumbar radiculopathy 10/23/2023    Left sided sciatica 08/24/2023    Lumbar spondylosis 08/24/2023    Insomnia 08/09/2023    Anxiety 04/06/2023    Hydrocele 03/28/2023    Bronchitis 11/17/2022    Statin intolerance 11/14/2022    SOB (shortness of breath) 11/07/2022    Colon polyp 05/09/2022    Rectal bleeding 04/22/2022    Stable angina 03/15/2021    ICD (implantable cardioverter-defibrillator) in place 02/18/2021    Anticoagulated by anticoagulation treatment 12/01/2020    Cataract 02/25/2020    Osteoarthritis 02/25/2020    Thoracic back pain 02/25/2020    Renal cyst 07/13/2018    Chronic maxillary sinusitis 05/16/2018    Acute low back pain without sciatica 10/26/2017    Cardiomyopathy, ischemic 07/25/2017    CHF (congestive heart failure) (HCC) 09/26/2016    Atrial fibrillation (HCC) 09/26/2016    Hyperlipidemia 04/25/2016    S/P PTCA (percutaneous transluminal coronary angioplasty) 02/02/2016    Hyperglycemia 11/20/2014    History of non-ST elevation myocardial infarction (NSTEMI) 11/20/2014    Prostate nodule 11/20/2014    Calculus of gallbladder and bile duct with acute on chronic cholecystitis 06/20/2013    Disc degeneration, lumbar 06/20/2013    Diverticulosis 06/20/2013    Obese 06/20/2013    Spinal stenosis 06/20/2013    3-vessel CAD 12/03/2012    Hemorrhoids 12/03/2012    Hypertensive heart disease with congestive heart failure (HCC) 12/03/2012    Hypothyroidism 12/03/2012       # Chronic HFrEF, Stage C, NYHA  Etiology: iCM    Weight: 255 lbs  BNP:     Studies- personally reviewed by me    NM stress 12/6/23:  large infarct involving inferolateral- posterolateral wall    Echo 1/9/23- [facility]  LVEF: 25-30%  LVIDd:  RV: normal  Other: aortic root 4.4 cm    Echo 11/29/22:  LVEF: 35-40%  LVEDd: 6.3 cm  Aorta 4.4 cm    TTE 11/11/20: LVEF: 30-35%  TTE 2/27/19: LVEF: 45-50%  TTE 4/6/18: LVEF: 35-40%  TTE 8/18/17: EF: 35-40%  TTE 11/20/15: EF: 55%    Neurohormonal Blockade:  --Beta-Blocker: Toprol XL 50 mg BID  --ACEi, ARB or ARNi: losartan 12.5 mg daily (states he had back pain with Entresto)  Imdur 60 mg daily  --Aldosterone Receptor Blocker: spironolactone 25 mg daily  --SGLT2-I: Jardiance 10 mg daily  --Diuretic: furosemide 20 mg BID    Sudden Cardiac Death Risk Reduction:  --ICD 6/20/19- [facility]    Electrical Resynchronization:  --Candidacy for BiV device:    Advanced Therapies (if appropriate):  --Inotrope:  --LVAD/Transplant Candidacy:    # atrial flutter- ablation at [facility] 4/22/21  AC: Xarelto 20 mg daily  Rate: Toprol XL 50 mg BID  Rhythm:    # PAD  Duplex 3/13/24: right: AUGUSTA 0.86, left: AUGUSTA 0.78    # CAD w/ hx of CABG and redo sternotomy for pseudoaneurysm rupture 2015  Also with multiple PCI  Rx: toprol  Angina: Imdur 60 mg daily  [facility]  normal LM, 100% prox LAD, patent LIMA  LCx: 100% proximal after small OM2. Large OM3 sub-branchs fill faintly via collaterals.   RCA: 100% proximal occlusion, distal RCA fills faintly via collaterals from the LAD  LV function: EF 40-45% with inferobasal and postero-lateral hypokinesis.  Mitral Regurgitation: None   Bypass Grafts:  1. Widely patent and large LIMA graft to the distal LAD with some collaterals to distal RCA and OM3.  2. Chronically occluded SVG to RCA.  3. Occluded SVG to large OM3 with distal vessel filling via collaterals from LAD   PCI of SVG to OM3 attempted. 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 Failed attempt at PCI of SVG-OM3.      In Dec was admitted, told he had a mild heart attack. He is tired all the time, has trouble breathing. Legs feel week. Also gets angina.     Interim:  Added Jardiance 10 mg daily  Sent to cardiac rehab  Having low back problems  He feels cardiac rehab helped  Past Medical History:   Diagnosis Date    Bleeding hemorrhoids     Choledocholithiasis     COVID-19 05/23/2022    Symptoms onset 5/20/22    Difficulty breathing     Diverticulosis     Ileus (HCC)     Lymphadenopathy of head and neck 09/29/2020    Pacemaker        Review of Systems   Constitutional:  Positive for fatigue. Negative for activity change, appetite change and unexpected weight change.   HENT:  Negative for congestion and nosebleeds.    Eyes: Negative.    Respiratory:  Positive for shortness of breath. Negative for cough and chest tightness.    Cardiovascular:  Negative for chest pain, palpitations and leg swelling.   Gastrointestinal:  Negative for abdominal distention.   Endocrine: Negative.    Genitourinary: Negative.    Musculoskeletal: Negative.    Skin: Negative.    Neurological:  Negative for dizziness, syncope and weakness.   Hematological: Negative.    Psychiatric/Behavioral: Negative.           Allergies   Allergen Reactions    Other Other (See Comments)     Pain in back after contrast used for echos, Definity    Atorvastatin Cough      "Per pt has had a problem with other statins also, does not remember names    Bee Venom     Diphenhydramine Other (See Comments)    Iodinated Contrast Media Other (See Comments)    Lisinopril Other (See Comments)     cough    Perflutren Lipid Microsphere     Perflutren Lipid Microspheres Other (See Comments)     severe back lower back spasm    Ranolazine      Alters mood.    Rosuvastatin Myalgia    Sacubitril-Valsartan Other (See Comments)     \"severe back pain\"    Simvastatin Myalgia     .    Current Outpatient Medications:     acetaminophen (TYLENOL) 500 mg tablet, Take 500 mg by mouth, Disp: , Rfl:     Alirocumab (Praluent) 75 MG/ML SOAJ, Inject 1 mL under the skin every 14 (fourteen) days, Disp: , Rfl:     ascorbic acid (VITAMIN C) 500 MG tablet, Take 500 mg by mouth daily, Disp: , Rfl:     aspirin (ECOTRIN LOW STRENGTH) 81 mg EC tablet, Take 1 tablet by mouth daily, Disp: , Rfl:     Cholecalciferol (VITAMIN D) 2000 units CAPS, Take by mouth, Disp: , Rfl:     Coenzyme Q10 (CO Q 10) 100 MG CAPS, Take by mouth, Disp: , Rfl:     Empagliflozin (Jardiance) 10 MG TABS tablet, Take 1 tablet (10 mg total) by mouth every morning, Disp: 90 tablet, Rfl: 1    furosemide (LASIX) 20 mg tablet, Take 20 mg by mouth 2 (two) times a day, Disp: , Rfl:     gabapentin (NEURONTIN) 100 mg capsule, Take 1 PO HS x 5 days, then 2 PO HS x 5 days, then 3 PO HS, Disp: 90 capsule, Rfl: 1    isosorbide mononitrate (IMDUR) 60 mg 24 hr tablet, Take 1 tablet by mouth daily, Disp: , Rfl:     losartan (COZAAR) 25 mg tablet, Take 0.5 tablets by mouth daily, Disp: , Rfl:     Magnesium 400 MG TABS, Take by mouth, Disp: , Rfl:     metoprolol succinate (TOPROL-XL) 50 mg 24 hr tablet, 2 (two) times a day 1 in the morning & 1 at dinner, Disp: , Rfl:     Multiple Vitamins-Minerals (MULTIVITAMIN ADULT PO), Take 1 capsule by mouth, Disp: , Rfl:     rivaroxaban (XARELTO) 20 mg tablet, Take 1 tablet (20 mg total) by mouth daily with dinner, Disp: 28 tablet, " Rfl: 0    spironolactone (ALDACTONE) 25 mg tablet, Take 25 mg by mouth daily, Disp: , Rfl:     TURMERIC PO, Take 1 Dose by mouth, Disp: , Rfl:     amoxicillin (AMOXIL) 500 MG tablet, 4 TABLETS BY MOUTH 1 HOUR BEFORE DENTIST (Patient not taking: Reported on 12/4/2023), Disp: , Rfl:     furosemide (LASIX) 40 mg tablet, Take by mouth 2 (two) times a day Patient takes 1/2 tab BID (Patient not taking: Reported on 6/5/2024), Disp: , Rfl:     methylPREDNISolone 4 MG tablet therapy pack, Use as directed on package (Patient not taking: Reported on 6/5/2024), Disp: 1 each, Rfl: 0    nitroglycerin (NITROSTAT) 0.4 mg SL tablet, Place 1 tablet under the tongue every 5 (five) minutes as needed (Patient not taking: Reported on 2/19/2024), Disp: , Rfl:     Social History     Socioeconomic History    Marital status: /Civil Union     Spouse name: Not on file    Number of children: Not on file    Years of education: Not on file    Highest education level: Not on file   Occupational History    Occupation: retired   Tobacco Use    Smoking status: Never    Smokeless tobacco: Never   Substance and Sexual Activity    Alcohol use: Not Currently     Comment: social    Drug use: No    Sexual activity: Not on file   Other Topics Concern    Not on file   Social History Narrative    Not on file     Social Determinants of Health     Financial Resource Strain: Low Risk  (1/31/2024)    Overall Financial Resource Strain (CARDIA)     Difficulty of Paying Living Expenses: Not hard at all   Food Insecurity: No Food Insecurity (12/5/2023)    [facility]    Hunger Vital Sign     Worried About Running Out of Food in the Last Year: Never true     Ran Out of Food in the Last Year: Never true   Transportation Needs: No Transportation Needs (1/31/2024)    PRAPARE - Transportation     Lack of Transportation (Medical): No     Lack of Transportation (Non-Medical): No   Physical Activity: Not on  "file   Stress: Not on file   Social Connections: Not on file   Intimate Partner Violence: Not At Risk (12/5/2023)    [facility]    Humiliation, Afraid, Rape, and Kick questionnaire     Fear of Current or Ex-Partner: No     Emotionally Abused: No     Physically Abused: No     Sexually Abused: No   Housing Stability: Low Risk  (12/5/2023)    [facility]    Housing Stability Vital Sign     Unable to Pay for Housing in the Last Year: No     Number of Places Lived in the Last Year: 1     Unstable Housing in the Last Year: No       Family History   Problem Relation Age of Onset    Lung cancer Mother     Coronary artery disease Father     Diabetes Brother     Lung cancer Family        Physical Exam:    Vitals: Blood pressure 142/88, pulse 79, height 6' 1\" (1.854 m), weight 116 kg (255 lb), SpO2 97%., Body mass index is 33.64 kg/m².,   Wt Readings from Last 3 Encounters:   06/05/24 116 kg (255 lb)   06/04/24 115 kg (254 lb)   04/02/24 115 kg (254 lb)       Physical Exam  Constitutional:       Appearance: He is well-developed.   HENT:      Head: Normocephalic and atraumatic.   Eyes:      Pupils: Pupils are equal, round, and reactive to light.   Neck:      Vascular: No JVD.   Cardiovascular:      Rate and Rhythm: Normal rate and regular rhythm.      Heart sounds: No murmur heard.  Pulmonary:      Effort: Pulmonary effort is normal. No respiratory distress.      Breath sounds: Normal breath sounds.   Abdominal:      General: There is no distension.      Palpations: Abdomen is soft.      Tenderness: There is no abdominal tenderness.   Musculoskeletal:         General: No edema. Normal range of motion.      Cervical back: Normal range of motion.   Skin:     General: Skin is warm and dry.      Findings: No rash.   Neurological:      Mental Status: He is alert and oriented to person, place, and time. " "  Psychiatric:         Mood and Affect: Mood and affect normal.         Labs & Results:    Lab Results   Component Value Date    WBC 6.1 05/16/2022    HGB 13.9 02/10/2023    HCT 40.9 02/10/2023    MCV 83.5 05/16/2022     05/16/2022     Lab Results   Component Value Date    SODIUM 137 12/05/2023    K 3.8 12/05/2023     12/05/2023    CO2 27 12/05/2023    BUN 24 12/05/2023    CREATININE 0.74 12/05/2023    GLUC 108 (H) 12/05/2023    CALCIUM 9.5 12/05/2023     Lab Results   Component Value Date    NTBNP 1,272 (H) 11/08/2022      Lab Results   Component Value Date    CHOLESTEROL 110 06/12/2023    CHOLESTEROL 192 08/12/2022     Lab Results   Component Value Date    HDL 36 (L) 06/12/2023    HDL 34 (L) 08/12/2022     Lab Results   Component Value Date    TRIG 118 08/12/2022     No results found for: \"NONHDLC\"    EKG personally reviewed by Miguel Ángel Butler DO.     Counseling / Coordination of Care  I have spent a total time of 35 minutes on 06/05/24 in caring for this patient including Diagnostic results, Prognosis, Risks and benefits of tx options, and Impressions.      Thank you for the opportunity to participate in the care of this patient.    MIGUEL ÁNGEL BUTLER D.O.  DIRECTOR OF HEART FAILURE/ PULMONARY HYPERTENSION  MEDICAL DIRECTOR OF LVAD PROGRAM  [facility]    "

## 2024-06-04 NOTE — PROGRESS NOTES
Assessment:  1. Lumbar radiculopathy    2. Disc degeneration, lumbar    3. Lumbar spondylosis        Plan:  I will order CT of the lumbar spine  I will order a Medrol Dosepak to address the inflammatory component of the patient's pain  Patient will initiate gabapentin 100 mg nightly and titrate up to 300 mg nightly for pain complaints.  Patient was educated on medications most common side effects which include dizziness, drowsiness, and swelling of the hands and feet.  Patient was instructed to call the office with any adverse medication side effects. The patient is also aware that if they would like to come off of the medication, they need to let us know as suddenly stopping the medication puts them at risk to have a seizure.  Will avoid NSAIDs secondary to cardiac history and anticoagulation  Continue with cardiac rehab as directed by cardiology  Patient may continue Tylenol as needed  Follow-up after imaging or sooner if needed    History of Present Illness:    The patient is a 83 y.o. male with a history of CAD status post stent placement, ICD, CHF and chronic anticoagulation last seen on 12/04/2023  who presents for a follow up office visit in regards to chronic left-sided low back pain that will radiate into the lateral aspect of the left lower extremity to the ankle.  He denies right-sided symptoms, bowel or bladder incontinence or saddle anesthesia.  Patient did complete physical therapy for lumbar complaints in November 2023 which did improve his pain however it has returned.  An MRI of the lumbar spine was ordered after last office visit however never completed stating that he had trouble getting the MRI scheduled secondary to his ICD.  He is using Tylenol without any relief.  He is unable to take NSAIDs secondary to anticoagulation on Xarelto.    The patient rates his pain a 10 out of 10 on the numeric pain rating scale.  Pain is constant throughout the day and is described as sharp    I have personally  reviewed and/or updated the patient's past medical history, past surgical history, family history, social history, current medications, allergies, and vital signs today.       Review of Systems:    Review of Systems   Respiratory:  Negative for shortness of breath.    Cardiovascular:  Negative for chest pain.   Gastrointestinal:  Negative for constipation, diarrhea, nausea and vomiting.   Musculoskeletal:  Positive for back pain and gait problem. Negative for arthralgias, joint swelling and myalgias.   Skin:  Negative for rash.   Neurological:  Negative for dizziness, seizures and weakness.   All other systems reviewed and are negative.        Past Medical History:   Diagnosis Date    Bleeding hemorrhoids     Choledocholithiasis     COVID-19 05/23/2022    Symptoms onset 5/20/22    Difficulty breathing     Diverticulosis     Ileus (HCC)     Lymphadenopathy of head and neck 09/29/2020    Pacemaker        Past Surgical History:   Procedure Laterality Date    APPENDECTOMY      CHOLECYSTECTOMY LAPAROSCOPIC      CORONARY ANGIOPLASTY      CORONARY ANGIOPLASTY WITH STENT PLACEMENT      CORONARY ARTERY BYPASS GRAFT  1999    ERCP      KNEE SURGERY Right 2023    TONSILLECTOMY         Family History   Problem Relation Age of Onset    Lung cancer Mother     Coronary artery disease Father     Diabetes Brother     Lung cancer Family        Social History     Occupational History    Occupation: retired   Tobacco Use    Smoking status: Never    Smokeless tobacco: Never   Substance and Sexual Activity    Alcohol use: Not Currently     Comment: social    Drug use: No    Sexual activity: Not on file         Current Outpatient Medications:     acetaminophen (TYLENOL) 500 mg tablet, Take 500 mg by mouth, Disp: , Rfl:     Alirocumab (Praluent) 75 MG/ML SOAJ, Inject 1 mL under the skin every 14 (fourteen) days, Disp: , Rfl:     ascorbic acid (VITAMIN C) 500 MG tablet, Take 500 mg by mouth daily, Disp: , Rfl:     aspirin (ECOTRIN LOW  STRENGTH) 81 mg EC tablet, Take 1 tablet by mouth daily, Disp: , Rfl:     Cholecalciferol (VITAMIN D) 2000 units CAPS, Take by mouth, Disp: , Rfl:     Coenzyme Q10 (CO Q 10) 100 MG CAPS, Take by mouth, Disp: , Rfl:     Empagliflozin (Jardiance) 10 MG TABS tablet, Take 1 tablet (10 mg total) by mouth every morning, Disp: 90 tablet, Rfl: 1    furosemide (LASIX) 20 mg tablet, , Disp: , Rfl:     furosemide (LASIX) 40 mg tablet, Take by mouth 2 (two) times a day Patient takes 1/2 tab BID, Disp: , Rfl:     gabapentin (NEURONTIN) 100 mg capsule, Take 1 PO HS x 5 days, then 2 PO HS x 5 days, then 3 PO HS, Disp: 90 capsule, Rfl: 1    isosorbide mononitrate (IMDUR) 60 mg 24 hr tablet, Take 1 tablet by mouth daily, Disp: , Rfl:     losartan (COZAAR) 25 mg tablet, Take 0.5 tablets by mouth daily, Disp: , Rfl:     Magnesium 400 MG TABS, Take by mouth, Disp: , Rfl:     methylPREDNISolone 4 MG tablet therapy pack, Use as directed on package, Disp: 1 each, Rfl: 0    metoprolol succinate (TOPROL-XL) 50 mg 24 hr tablet, 2 (two) times a day 1 in the morning & 1 at dinner, Disp: , Rfl:     Multiple Vitamins-Minerals (MULTIVITAMIN ADULT PO), Take 1 capsule by mouth, Disp: , Rfl:     rivaroxaban (XARELTO) 20 mg tablet, Take 1 tablet (20 mg total) by mouth daily with dinner, Disp: 28 tablet, Rfl: 0    spironolactone (ALDACTONE) 25 mg tablet, Take 25 mg by mouth daily, Disp: , Rfl:     TURMERIC PO, Take 1 Dose by mouth, Disp: , Rfl:     amoxicillin (AMOXIL) 500 MG tablet, 4 TABLETS BY MOUTH 1 HOUR BEFORE DENTIST (Patient not taking: Reported on 12/4/2023), Disp: , Rfl:     nitroglycerin (NITROSTAT) 0.4 mg SL tablet, Place 1 tablet under the tongue every 5 (five) minutes as needed (Patient not taking: Reported on 2/19/2024), Disp: , Rfl:     predniSONE 50 mg tablet, Take 13 hours, 7 hours and 1 hours prior to test (Patient not taking: Reported on 2/19/2024), Disp: , Rfl:     Allergies   Allergen Reactions    Other Other (See Comments)      "Pain in back after contrast used for echos, Definity    Atorvastatin Cough     Per pt has had a problem with other statins also, does not remember names    Bee Venom     Diphenhydramine Other (See Comments)    Iodinated Contrast Media Other (See Comments)    Lisinopril Other (See Comments)     cough    Perflutren Lipid Microsphere     Perflutren Lipid Microspheres Other (See Comments)     severe back lower back spasm    Ranolazine      Alters mood.    Rosuvastatin Myalgia    Sacubitril-Valsartan Other (See Comments)     \"severe back pain\"    Simvastatin Myalgia       Physical Exam:    /80   Pulse 70   Ht 6' 1\" (1.854 m)   Wt 115 kg (254 lb)   BMI 33.51 kg/m²     Constitutional:normal, well developed, well nourished, alert, in no distress and non-toxic and no overt pain behavior.  Eyes:anicteric  HEENT:grossly intact  Neck:supple, symmetric, trachea midline and no masses   Pulmonary:even and unlabored  Cardiovascular:No edema or pitting edema present  Skin:Normal without rashes or lesions and well hydrated  Psychiatric:Mood and affect appropriate  Neurologic:Cranial Nerves II-XII grossly intact  Musculoskeletal:antalgic gait.  Left lumbar paraspinal musculature nontender to palpation.  Left SI joint minimally tender to palpation.  Bilateral lower extremity strength 5 out of 5 in the muscle groups.  Left GTB nontender to palpation.  Negative straight leg raise bilaterally.  Negative Roland's test on the left      Imaging  CT spine lumbar wo contrast    (Results Pending)         Orders Placed This Encounter   Procedures    CT spine lumbar wo contrast       "

## 2024-06-04 NOTE — TELEPHONE ENCOUNTER
S/w pt who states he has been having Left lower back pain that radiates to his anterior thigh to below his knee and around to his calf that is intermittent and stabbing. Lower back pain is constant and throbbing and worse with getting up and down. Pt has had this pain before, but it improved with PT. Pt denies recent injury that he remembers. He has been going to cardiac rehab.     Pt advised to continue with Tylenol 1000 mg q 8 hrs with no more than 3000 mg /24 hrs and try ice and or heat 20 mins off and on and to keep OVS fro 2 pm today.    Pt advised is pain is intolerable or has loss of B&B to go directly to ER.     KH to advise otherwise

## 2024-06-04 NOTE — TELEPHONE ENCOUNTER
Caller: Yohana pt's wife    Doctor: Chele    Reason for call: pt's wife called asking if he would be able to get some time of help for his pain today.  Possible injection or chiropractor?  Pt has an appt today at 2    Pt pain is 12/10    Call back#: [phone]

## 2024-06-05 ENCOUNTER — OFFICE VISIT (OUTPATIENT)
Dept: CARDIOLOGY CLINIC | Facility: CLINIC | Age: 84
End: 2024-06-05
Payer: COMMERCIAL

## 2024-06-05 VITALS
OXYGEN SATURATION: 97 % | BODY MASS INDEX: 33.8 KG/M2 | DIASTOLIC BLOOD PRESSURE: 88 MMHG | HEART RATE: 79 BPM | WEIGHT: 255 LBS | SYSTOLIC BLOOD PRESSURE: 142 MMHG | HEIGHT: 73 IN

## 2024-06-05 DIAGNOSIS — I25.5 CARDIOMYOPATHY, ISCHEMIC: ICD-10-CM

## 2024-06-05 DIAGNOSIS — I48.0 PAROXYSMAL ATRIAL FIBRILLATION (HCC): ICD-10-CM

## 2024-06-05 DIAGNOSIS — I25.10 3-VESSEL CAD: Primary | ICD-10-CM

## 2024-06-05 DIAGNOSIS — I11.0 HYPERTENSIVE HEART DISEASE WITH CONGESTIVE HEART FAILURE, UNSPECIFIED HEART FAILURE TYPE (HCC): ICD-10-CM

## 2024-06-05 PROCEDURE — 99214 OFFICE O/P EST MOD 30 MIN: CPT | Performed by: INTERNAL MEDICINE

## 2024-06-06 ENCOUNTER — APPOINTMENT (EMERGENCY)
Dept: RADIOLOGY | Facility: HOSPITAL | Age: 84
End: 2024-06-06
Payer: COMMERCIAL

## 2024-06-06 ENCOUNTER — APPOINTMENT (OUTPATIENT)
Dept: CARDIAC REHAB | Facility: HOSPITAL | Age: 84
End: 2024-06-06
Attending: INTERNAL MEDICINE
Payer: COMMERCIAL

## 2024-06-06 ENCOUNTER — HOSPITAL ENCOUNTER (EMERGENCY)
Facility: HOSPITAL | Age: 84
Discharge: HOME | End: 2024-06-06
Attending: EMERGENCY MEDICINE
Payer: COMMERCIAL

## 2024-06-06 VITALS
HEART RATE: 68 BPM | RESPIRATION RATE: 18 BRPM | TEMPERATURE: 98.6 F | OXYGEN SATURATION: 94 % | DIASTOLIC BLOOD PRESSURE: 86 MMHG | SYSTOLIC BLOOD PRESSURE: 141 MMHG

## 2024-06-06 DIAGNOSIS — M47.816 DEGENERATIVE ARTHRITIS OF LUMBAR SPINE: ICD-10-CM

## 2024-06-06 DIAGNOSIS — G89.29 ACUTE EXACERBATION OF CHRONIC LOW BACK PAIN: Primary | ICD-10-CM

## 2024-06-06 DIAGNOSIS — M54.50 ACUTE EXACERBATION OF CHRONIC LOW BACK PAIN: Primary | ICD-10-CM

## 2024-06-06 PROCEDURE — 99284 EMERGENCY DEPT VISIT MOD MDM: CPT | Performed by: EMERGENCY MEDICINE

## 2024-06-06 PROCEDURE — 72131 CT LUMBAR SPINE W/O DYE: CPT

## 2024-06-06 PROCEDURE — 96372 THER/PROPH/DIAG INJ SC/IM: CPT

## 2024-06-06 PROCEDURE — 99284 EMERGENCY DEPT VISIT MOD MDM: CPT

## 2024-06-06 RX ORDER — LIDOCAINE 50 MG/G
1 PATCH TOPICAL DAILY
Qty: 10 PATCH | Refills: 0 | Status: SHIPPED | OUTPATIENT
Start: 2024-06-06 | End: 2024-06-16

## 2024-06-06 RX ORDER — LIDOCAINE 50 MG/G
2 PATCH TOPICAL ONCE
Status: DISCONTINUED | OUTPATIENT
Start: 2024-06-06 | End: 2024-06-06 | Stop reason: HOSPADM

## 2024-06-06 RX ORDER — ACETAMINOPHEN 325 MG/1
975 TABLET ORAL ONCE
Status: COMPLETED | OUTPATIENT
Start: 2024-06-06 | End: 2024-06-06

## 2024-06-06 RX ORDER — KETOROLAC TROMETHAMINE 30 MG/ML
15 INJECTION, SOLUTION INTRAMUSCULAR; INTRAVENOUS ONCE
Status: COMPLETED | OUTPATIENT
Start: 2024-06-06 | End: 2024-06-06

## 2024-06-06 RX ADMIN — LIDOCAINE 2 PATCH: 50 PATCH CUTANEOUS at 16:54

## 2024-06-06 RX ADMIN — KETOROLAC TROMETHAMINE 15 MG: 30 INJECTION, SOLUTION INTRAMUSCULAR; INTRAVENOUS at 17:00

## 2024-06-06 RX ADMIN — ACETAMINOPHEN 975 MG: 325 TABLET, FILM COATED ORAL at 16:54

## 2024-06-06 NOTE — TELEPHONE ENCOUNTER
DUANE  S/w pt's wife, she said pt is in severe low back pain with no relief with oral steroids or Gabapentin, pt actually believes since taking the medications his pain worsened. RN advised that I was able to move his CT scan up to Monday morning, pt's wife said they can not wait that long and she is going to call the ambulance and have him taken to the ER for evaluation/imaging.

## 2024-06-06 NOTE — TELEPHONE ENCOUNTER
Caller: Yohana pt's wife    Doctor: Chele    Reason for call: pt is in excruciating pain and would like to know if anything can be done?  Cat scan is scheduled for 6/13.  Can cat scan be changed to stat?    Call back#: [phone]

## 2024-06-06 NOTE — ED PROVIDER NOTES
History  Chief Complaint   Patient presents with    Back Pain     Worsening back pain x2 weeks. Trying PT without relief     HPI    Patient is an 83-year-old male with past medical history of CAD, pacemaker insertion, ischemic cardiomyopathy, CHF, HLD, HTN, AF, presenting to the ED for evaluation of back pain. Patient states that he has had chronic back pain for years and is currently following with pain management. Started PT in 2023 for back pain. States that it helps a bit. Takes Tylenol for pain control. However, over the last several days, has been feeling worsening low back pain, particularly on the L side. No trauma. States that beginning a few days ago, pain is now radiating from L low back to the R low back. Pain does not radiate to the lower extremities. Denies any saddle anesthesia, urinary or bowel incontinence, fevers, chills. States that he ambulates with a walker. Reports that pain is worse with twisting of the torso and with bending. Denies abdominal pain, chest pain, SOB, urinary complaints.    Prior to Admission Medications   Prescriptions Last Dose Informant Patient Reported? Taking?   Alirocumab (Praluent) 75 MG/ML SOAJ  Self Yes No   Sig: Inject 1 mL under the skin every 14 (fourteen) days   Cholecalciferol (VITAMIN D) 2000 units CAPS  Self Yes No   Sig: Take by mouth   Coenzyme Q10 (CO Q 10) 100 MG CAPS  Self Yes No   Sig: Take by mouth   Empagliflozin (Jardiance) 10 MG TABS tablet   No No   Sig: Take 1 tablet (10 mg total) by mouth every morning   Magnesium 400 MG TABS  Self Yes No   Sig: Take by mouth   Multiple Vitamins-Minerals (MULTIVITAMIN ADULT PO)  Self Yes No   Sig: Take 1 capsule by mouth   TURMERIC PO  Self Yes No   Sig: Take 1 Dose by mouth   acetaminophen (TYLENOL) 500 mg tablet  Self Yes No   Sig: Take 500 mg by mouth   amoxicillin (AMOXIL) 500 MG tablet  Self Yes No   Si TABLETS BY MOUTH 1 HOUR BEFORE DENTIST   Patient not taking: Reported on 2023   ascorbic  acid (VITAMIN C) 500 MG tablet  Self Yes No   Sig: Take 500 mg by mouth daily   aspirin (ECOTRIN LOW STRENGTH) 81 mg EC tablet  Self Yes No   Sig: Take 1 tablet by mouth daily   furosemide (LASIX) 20 mg tablet   Yes No   Sig: Take 20 mg by mouth 2 (two) times a day   furosemide (LASIX) 40 mg tablet  Self Yes No   Sig: Take by mouth 2 (two) times a day Patient takes 1/2 tab BID   Patient not taking: Reported on 2024   gabapentin (NEURONTIN) 100 mg capsule   No No   Sig: Take 1 PO HS x 5 days, then 2 PO HS x 5 days, then 3 PO HS   isosorbide mononitrate (IMDUR) 60 mg 24 hr tablet  Self Yes No   Sig: Take 1 tablet by mouth daily   losartan (COZAAR) 25 mg tablet  Self Yes No   Sig: Take 0.5 tablets by mouth daily   methylPREDNISolone 4 MG tablet therapy pack   No No   Sig: Use as directed on package   Patient not taking: Reported on 2024   metoprolol succinate (TOPROL-XL) 50 mg 24 hr tablet  Self Yes No   Si (two) times a day 1 in the morning & 1 at dinner   nitroglycerin (NITROSTAT) 0.4 mg SL tablet  Self Yes No   Sig: Place 1 tablet under the tongue every 5 (five) minutes as needed   Patient not taking: Reported on 2024   rivaroxaban (XARELTO) 20 mg tablet  Self No No   Sig: Take 1 tablet (20 mg total) by mouth daily with dinner   spironolactone (ALDACTONE) 25 mg tablet   Yes No   Sig: Take 25 mg by mouth daily      Facility-Administered Medications: None       Past Medical History:   Diagnosis Date    Bleeding hemorrhoids     Choledocholithiasis     COVID-19 2022    Symptoms onset 24    Difficulty breathing     Diverticulosis     Ileus (HCC)     Lymphadenopathy of head and neck 2020    Pacemaker        Past Surgical History:   Procedure Laterality Date    APPENDECTOMY      CHOLECYSTECTOMY LAPAROSCOPIC      CORONARY ANGIOPLASTY      CORONARY ANGIOPLASTY WITH STENT PLACEMENT      CORONARY ARTERY BYPASS GRAFT      ERCP      KNEE SURGERY Right     TONSILLECTOMY         Family  History   Problem Relation Age of Onset    Lung cancer Mother     Coronary artery disease Father     Diabetes Brother     Lung cancer Family      I have reviewed and agree with the history as documented.    E-Cigarette/Vaping     E-Cigarette/Vaping Substances     Social History     Tobacco Use    Smoking status: Never    Smokeless tobacco: Never   Substance Use Topics    Alcohol use: Not Currently     Comment: social    Drug use: No        Review of Systems   All other systems reviewed and are negative.      Physical Exam  ED Triage Vitals   Temperature Pulse Respirations Blood Pressure SpO2   06/06/24 1555 06/06/24 1555 06/06/24 1555 06/06/24 1557 06/06/24 1555   98.6 °F (37 °C) 68 18 141/86 94 %      Temp Source Heart Rate Source Patient Position - Orthostatic VS BP Location FiO2 (%)   06/06/24 1555 06/06/24 1555 -- -- --   Oral Monitor         Pain Score       06/06/24 1555       10 - Worst Possible Pain             Orthostatic Vital Signs  Vitals:    06/06/24 1555 06/06/24 1557   BP:  141/86   Pulse: 68        Physical Exam  Vitals and nursing note reviewed.   Constitutional:       General: He is not in acute distress.     Appearance: Normal appearance. He is well-developed and normal weight. He is not ill-appearing, toxic-appearing or diaphoretic.   HENT:      Head: Normocephalic and atraumatic.      Right Ear: External ear normal.      Left Ear: External ear normal.      Nose: Nose normal. No congestion.      Mouth/Throat:      Mouth: Mucous membranes are moist.      Pharynx: Oropharynx is clear.   Eyes:      Extraocular Movements: Extraocular movements intact.      Conjunctiva/sclera: Conjunctivae normal.   Cardiovascular:      Rate and Rhythm: Normal rate and regular rhythm.      Pulses: Normal pulses.      Heart sounds: Normal heart sounds. No murmur heard.  Pulmonary:      Effort: Pulmonary effort is normal. No respiratory distress.      Breath sounds: Normal breath sounds. No wheezing, rhonchi or rales.    Chest:      Chest wall: No tenderness.   Abdominal:      General: Abdomen is flat.      Palpations: Abdomen is soft.      Tenderness: There is no abdominal tenderness. There is no guarding.   Musculoskeletal:         General: No swelling.      Cervical back: Normal, normal range of motion and neck supple.      Thoracic back: Normal.      Lumbar back: Tenderness (L and R SI joint area) present. No swelling, edema, deformity, signs of trauma or bony tenderness. Normal range of motion.      Right lower leg: No edema.      Left lower leg: No edema.   Skin:     General: Skin is warm and dry.      Capillary Refill: Capillary refill takes less than 2 seconds.      Findings: No erythema.   Neurological:      General: No focal deficit present.      Mental Status: He is alert and oriented to person, place, and time.      Cranial Nerves: No cranial nerve deficit.      Sensory: No sensory deficit.      Motor: No weakness.      Comments: Lower extremity strength 5/5 bilateral       Psychiatric:         Mood and Affect: Mood normal.         ED Medications  Medications   lidocaine (LIDODERM) 5 % patch 2 patch (2 patches Topical Medication Applied 6/6/24 1654)   acetaminophen (TYLENOL) tablet 975 mg (975 mg Oral Given 6/6/24 1654)   ketorolac (TORADOL) injection 15 mg (15 mg Intramuscular Given 6/6/24 1700)       Diagnostic Studies  Results Reviewed       None                   CT lumbar spine without contrast   Final Result by Rufino Garcia MD (06/06 1636)      No acute osseous abnormality lumbar spine.      Multilevel degenerative changes of lumbar spine with transitional lumbosacral anatomy (sacralized L5 vertebral body), congenitally short pedicles, varying degrees of canal stenosis (severe L2-L3 and L3-L4) and foraminal narrowing (severe bilateral    L3-L4), as detailed above. Consider consultation with spine surgery. Considerations related to the patient's age and/or comorbidities may be used to alter these  recommendations.      The study was marked in EPIC for immediate notification.               Workstation performed: [id number]               Procedures  Procedures      ED Course  ED Course as of 06/06/24 1710   Thu Jun 06, 2024   1612 Patient noted to be on anticoagulation; will provide 1 dose of IM Toradol for pain control. Kidney function normal as of December 2023.   1641 IMPRESSION:     No acute osseous abnormality lumbar spine.     Multilevel degenerative changes of lumbar spine with transitional lumbosacral anatomy (sacralized L5 vertebral body), congenitally short pedicles, varying degrees of canal stenosis (severe L2-L3 and L3-L4) and foraminal narrowing (severe bilateral   L3-L4), as detailed above. Consider consultation with spine surgery. Considerations related to the patient's age and/or comorbidities may be used to alter these recommendations.     The study was marked in EPIC for immediate notification.     1641 CT findings discussed with the patient. Patient remains neurologically intact. Will refer him to neurosurgery for further evaluation. Rx Lidocaine patch provided. Will avoid muscle relaxants due to age and fall risk. To continue Tylenol therapy and follow up with his pain specialist.    I reviewed all testing with the patient:  I gave oral return precautions for what to return for in addition to the written return precautions.   The patient verbalized understanding of the discharge instructions and warnings that would necessitate return to the Emergency Department.  I specifically highlighted areas of special concern regarding the written and verbal discharge instructions and return precautions.    All questions were answered prior to discharge.         Patient is an 83 year old male presenting to the ED for evaluation of worsening lower back pain. History and clinical exam documented above. DDX low back strain, exacerbation of chronic back pain, sacroiliitis. No suspicion for acute spinal cord  dysfunction based on normal neurological examination and lack of red flag signs/symptoms. Patient does have a scheduled CT scan that was ordered by his pain provider to evaluate if there is any clinical pathology or bony abnormality that is responsible for his pain. Will do CT L spine w/o contrast, as well as Tylenol, Toradol IM, Lidocaine patch.    See ED course for discussion about CT result, re-evaluation, and ultimate disposition.                                Medical Decision Making  Amount and/or Complexity of Data Reviewed  Radiology: ordered.    Risk  OTC drugs.  Prescription drug management.          Disposition  Final diagnoses:   Acute exacerbation of chronic low back pain   Degenerative arthritis of lumbar spine     Time reflects when diagnosis was documented in both MDM as applicable and the Disposition within this note       Time User Action Codes Description Comment    6/6/2024  4:42 PM Damien Bah [M54.50,  G89.29] Acute exacerbation of chronic low back pain     6/6/2024  4:43 PM Damien Bah [M47.816] Degenerative arthritis of lumbar spine           ED Disposition       ED Disposition   Discharge    Condition   Stable    Date/Time   Thu Jun 6, 2024  4:43 PM    Comment   Jose Coronado discharge to home/self care.                   Follow-up Information       Follow up With Specialties Details Why Contact Info    Tray Ocasio MD Family Medicine Schedule an appointment as soon as possible for a visit in 1 week  [address] [place] [id number] [phone]              Patient's Medications   Discharge Prescriptions    LIDOCAINE (LIDODERM) 5 %    Apply 1 patch topically over 12 hours daily for 10 doses Remove & Discard patch within 12 hours or as directed by MD       Start Date: 6/6/2024  End Date: 6/16/2024       Order Dose: 1 patch       Quantity: 10 patch    Refills: 0         PDMP Review         Value Time User    PDMP Reviewed  Yes 4/5/2023  3:42 PM Tray  Deborah Ocasio MD             ED Provider  Attending physically available and evaluated Jose Coronado. I managed the patient along with the ED Attending.    Electronically Signed by           Damien Bah DO  06/06/24 7535

## 2024-06-06 NOTE — TELEPHONE ENCOUNTER
If pain is that severe, agree with ED recommendation. I feel like Monday for CT is very reasonable otherwise. Thank you for helping the patient expedite the imaging.

## 2024-06-06 NOTE — DISCHARGE INSTRUCTIONS
Please follow up with neurosurgery referral as directed to discuss degenerative spine changes.     Please continue taking Tylenol 1000 mg every 8 hours for pain relief. Can also use Lidocaine patches and change every 12 hours.    Return to the ED with any new/concerning issues.

## 2024-06-07 ENCOUNTER — VBI (OUTPATIENT)
Dept: FAMILY MEDICINE CLINIC | Facility: CLINIC | Age: 84
End: 2024-06-07

## 2024-06-07 ENCOUNTER — APPOINTMENT (OUTPATIENT)
Dept: CARDIAC REHAB | Facility: HOSPITAL | Age: 84
End: 2024-06-07
Attending: INTERNAL MEDICINE
Payer: COMMERCIAL

## 2024-06-07 ENCOUNTER — TELEPHONE (OUTPATIENT)
Age: 84
End: 2024-06-07

## 2024-06-07 NOTE — TELEPHONE ENCOUNTER
S/w pt and advised of same. Pt verbalized understanding and appreciation.     Pt started gabapentin 100 mg on 6/5 and is still taking it daily for 5 days.

## 2024-06-07 NOTE — TELEPHONE ENCOUNTER
Patient was seen in the ER due to a back injury. He is being recommended to have back surgery. Spouse is asking for a recommendation for a neurosurgeon for operate on his back. Please advise

## 2024-06-07 NOTE — TELEPHONE ENCOUNTER
Patient's wife called to follow up on her last call and would like a call back today if possible.

## 2024-06-07 NOTE — TELEPHONE ENCOUNTER
06/07/24 11:08 AM    Patient contacted post ED visit, first outreach attempt made. Message was left for patient to return a call to the [facility] Department at [facility]: Phone [phone].    Thank you.  [facility] Garcia  [facility]

## 2024-06-07 NOTE — TELEPHONE ENCOUNTER
Looks like physician in ER went over CT results per their note and recommended surgical evaluation. Would recommend this as he does have some severe central stenosis at L2-3 and L3-4. Is he taking the gabapentin as well?

## 2024-06-07 NOTE — TELEPHONE ENCOUNTER
06/07/24 11:29 AM    Patient contacted post ED visit, [facility] department spoke with patient/caregiver and outreach was successful.    Thank you.  [facility]

## 2024-06-07 NOTE — TELEPHONE ENCOUNTER
S/w pt and wife (verbal consent) who states that pt was seen in ER and had CT done. Can [facility] review and advise?    Pt had relief yesterday after Toradol IM dose but pain came back this am and is the same as before ER visit. Pt is taking tylenol q 8 hrs and will try ice and or heat. Pt cannot take NSAIDS d/t Eliquis.    Pt aware of NS referral from ER and will contact.    Please advise any further recs.

## 2024-06-10 ENCOUNTER — TELEPHONE (OUTPATIENT)
Dept: PULMONOLOGY | Facility: HOSPITAL | Age: 84
End: 2024-06-10

## 2024-06-11 ENCOUNTER — APPOINTMENT (OUTPATIENT)
Dept: CARDIAC REHAB | Facility: HOSPITAL | Age: 84
End: 2024-06-11
Attending: INTERNAL MEDICINE
Payer: COMMERCIAL

## 2024-06-13 ENCOUNTER — APPOINTMENT (OUTPATIENT)
Dept: CARDIAC REHAB | Facility: HOSPITAL | Age: 84
End: 2024-06-13
Attending: INTERNAL MEDICINE
Payer: COMMERCIAL

## 2024-06-14 ENCOUNTER — APPOINTMENT (OUTPATIENT)
Dept: CARDIAC REHAB | Facility: HOSPITAL | Age: 84
End: 2024-06-14
Attending: INTERNAL MEDICINE
Payer: COMMERCIAL

## 2024-06-18 ENCOUNTER — APPOINTMENT (OUTPATIENT)
Dept: CARDIAC REHAB | Facility: HOSPITAL | Age: 84
End: 2024-06-18
Attending: INTERNAL MEDICINE
Payer: COMMERCIAL

## 2024-06-20 ENCOUNTER — APPOINTMENT (OUTPATIENT)
Dept: CARDIAC REHAB | Facility: HOSPITAL | Age: 84
End: 2024-06-20
Attending: INTERNAL MEDICINE
Payer: COMMERCIAL

## 2024-06-21 ENCOUNTER — APPOINTMENT (OUTPATIENT)
Dept: CARDIAC REHAB | Facility: HOSPITAL | Age: 84
End: 2024-06-21
Attending: INTERNAL MEDICINE
Payer: COMMERCIAL

## 2024-06-21 DIAGNOSIS — I11.0 HYPERTENSIVE HEART DISEASE WITH CONGESTIVE HEART FAILURE, UNSPECIFIED HEART FAILURE TYPE (HCC): Primary | ICD-10-CM

## 2024-06-21 RX ORDER — ALIROCUMAB 75 MG/ML
1 INJECTION, SOLUTION SUBCUTANEOUS
Qty: 6 ML | Refills: 1 | Status: SHIPPED | OUTPATIENT
Start: 2024-06-21

## 2024-06-21 NOTE — TELEPHONE ENCOUNTER
Reason for call:   [x] Refill   [] Prior Auth  [] Other:     Office:   [] PCP/Provider -   [x] Specialty/Provider - Cardio    Medication:     Alirocumab (Praluent) 75 MG/ML SOAJ       Dose/Frequency:     1 mL, Subcutaneous, Every 14 days       Quantity: 6 ml    Pharmacy: [facility] - [place] [facility] - [facility]      Does the patient have enough for 3 days?   [x] Yes   [] No - Send as HP to POD

## 2024-06-24 NOTE — ED ATTENDING ATTESTATION
6/6/2024  I, Ovidio Nguyen MD, saw and evaluated the patient. I have discussed the patient with the resident/non-physician practitioner and agree with the resident's/non-physician practitioner's findings, Plan of Care, and MDM as documented in the resident's/non-physician practitioner's note, except where noted. All available labs and Radiology studies were reviewed.  I was present for key portions of any procedure(s) performed by the resident/non-physician practitioner and I was immediately available to provide assistance.       At this point I agree with the current assessment done in the Emergency Department.  I have conducted an independent evaluation of this patient a history and physical is as follows:    ED Course     Patient presents for evaluation due to acute on chronic low back pain.  He states that over the last 2 days, he has had worsening pain in his low back that is primarily on the left side but radiates to the right.  He denies any trauma, numbness, weakness, or loss of bowel/bladder control.  He denies any fevers.  No additional complaints. A/P: Low back pain.  Given acute worsening, concern for possible compression fracture.  Will treat pain and will CT L-spine.    Critical Care Time  Procedures

## 2024-06-25 ENCOUNTER — CLINICAL SUPPORT (OUTPATIENT)
Dept: CARDIAC REHAB | Facility: HOSPITAL | Age: 84
End: 2024-06-25
Attending: INTERNAL MEDICINE
Payer: COMMERCIAL

## 2024-06-25 DIAGNOSIS — I50.9 CONGESTIVE HEART FAILURE, UNSPECIFIED HF CHRONICITY, UNSPECIFIED HEART FAILURE TYPE (HCC): Primary | ICD-10-CM

## 2024-06-25 PROCEDURE — 93798 PHYS/QHP OP CAR RHAB W/ECG: CPT

## 2024-06-27 ENCOUNTER — APPOINTMENT (OUTPATIENT)
Dept: CARDIAC REHAB | Facility: HOSPITAL | Age: 84
End: 2024-06-27
Attending: INTERNAL MEDICINE
Payer: COMMERCIAL

## 2024-06-28 ENCOUNTER — CLINICAL SUPPORT (OUTPATIENT)
Dept: CARDIAC REHAB | Facility: HOSPITAL | Age: 84
End: 2024-06-28
Attending: INTERNAL MEDICINE
Payer: COMMERCIAL

## 2024-06-28 DIAGNOSIS — I50.9 CONGESTIVE HEART FAILURE, UNSPECIFIED HF CHRONICITY, UNSPECIFIED HEART FAILURE TYPE (HCC): Primary | ICD-10-CM

## 2024-06-28 PROCEDURE — 93798 PHYS/QHP OP CAR RHAB W/ECG: CPT

## 2024-07-02 ENCOUNTER — CLINICAL SUPPORT (OUTPATIENT)
Dept: CARDIAC REHAB | Facility: HOSPITAL | Age: 84
End: 2024-07-02
Attending: INTERNAL MEDICINE
Payer: COMMERCIAL

## 2024-07-02 DIAGNOSIS — I50.9 CONGESTIVE HEART FAILURE, UNSPECIFIED HF CHRONICITY, UNSPECIFIED HEART FAILURE TYPE (HCC): Primary | ICD-10-CM

## 2024-07-02 PROCEDURE — 93798 PHYS/QHP OP CAR RHAB W/ECG: CPT

## 2024-07-05 ENCOUNTER — CLINICAL SUPPORT (OUTPATIENT)
Dept: CARDIAC REHAB | Facility: HOSPITAL | Age: 84
End: 2024-07-05
Attending: INTERNAL MEDICINE
Payer: COMMERCIAL

## 2024-07-05 DIAGNOSIS — I50.9 CONGESTIVE HEART FAILURE, UNSPECIFIED HF CHRONICITY, UNSPECIFIED HEART FAILURE TYPE (HCC): Primary | ICD-10-CM

## 2024-07-05 PROCEDURE — 93798 PHYS/QHP OP CAR RHAB W/ECG: CPT

## 2024-07-09 ENCOUNTER — CLINICAL SUPPORT (OUTPATIENT)
Dept: CARDIAC REHAB | Facility: HOSPITAL | Age: 84
End: 2024-07-09
Attending: INTERNAL MEDICINE
Payer: COMMERCIAL

## 2024-07-09 ENCOUNTER — CONSULT (OUTPATIENT)
Age: 84
End: 2024-07-09

## 2024-07-09 VITALS
HEIGHT: 73 IN | OXYGEN SATURATION: 96 % | RESPIRATION RATE: 16 BRPM | SYSTOLIC BLOOD PRESSURE: 118 MMHG | TEMPERATURE: 97.9 F | BODY MASS INDEX: 33.66 KG/M2 | DIASTOLIC BLOOD PRESSURE: 82 MMHG | HEART RATE: 79 BPM | WEIGHT: 254 LBS

## 2024-07-09 DIAGNOSIS — I50.9 CONGESTIVE HEART FAILURE, UNSPECIFIED HF CHRONICITY, UNSPECIFIED HEART FAILURE TYPE (HCC): Primary | ICD-10-CM

## 2024-07-09 DIAGNOSIS — M47.816 DEGENERATIVE ARTHRITIS OF LUMBAR SPINE: ICD-10-CM

## 2024-07-09 DIAGNOSIS — G89.29 ACUTE EXACERBATION OF CHRONIC LOW BACK PAIN: ICD-10-CM

## 2024-07-09 DIAGNOSIS — M54.50 ACUTE EXACERBATION OF CHRONIC LOW BACK PAIN: ICD-10-CM

## 2024-07-09 PROCEDURE — 93798 PHYS/QHP OP CAR RHAB W/ECG: CPT

## 2024-07-09 NOTE — PROGRESS NOTES
Office Note - Neurosurgery   Jose Coronado 83 y.o. male MRN: [id number]      Assessment and Plan:    This is an 83-year-old male presenting for hospital follow-up after experiencing low back and left lower extremity pain.    CT of his lumbar spine demonstrates diffuse spondylosis with degeneration from L2-S1.  There is multilevel disc vacuum phenomena.  The patient does not have an MRI for review.    I had a long discussion with the patient about his symptoms as well as imaging findings.  The patient is doing well and all of his symptoms have resolved.  I do not believe he requires any further intervention.  I advised patient to reach out if he had recurrence of symptoms.  He is to follow-up with pain management.  I will see him on appearing basis.    History, physical examination and diagnostic tests were reviewed and questions answered. Diagnosis, care plan and treatment options were discussed. The patient understand instructions and will follow up as directed.    Follow-up: PRN    I spent approximately 30 minutes with the patient. This time was dedicated to acquiring the patient's history, performing physical examination, reviewing pertinent imaging and discussing the treatment plan.    Diagnoses and all orders for this visit:    Acute exacerbation of chronic low back pain  -     Ambulatory Referral to Neurosurgery    Degenerative arthritis of lumbar spine  -     Ambulatory Referral to Neurosurgery        Subjective/Objective     Chief Complaint    Jose Coronado is a 83 y.o. male presenting for surgical evaluation resolved low back pain and left lower extremity pain.    HPI    Pain had no inciting event. He reports left low back pain which radiated into the left posterolateral thigh to the lateral leg and foot. Went to the ER and was then referred to pain management. Was treated with injection and OTCs. Pain significantly improved after the injection. Taking Gabapentin currently. Currently, he has no pain at  all.       ROS  ROS personally reviewed and updated.    Review of Systems   Cardiovascular:         D-fib placed    Hx of 3 cardiac stent   Musculoskeletal:  Positive for back pain (mild right side back pain).        Gabapentin for about 4 wks now, 200 mg @ HS  Pain located on left LBP into left leg subsided   Neurological:  Positive for weakness (bi/legs).   Hematological:  Bruises/bleeds easily (xarelto).   Psychiatric/Behavioral: Negative.         Family History    Family History   Problem Relation Age of Onset    Lung cancer Mother     Coronary artery disease Father     Diabetes Brother     Lung cancer Family        Social History    Social History     Socioeconomic History    Marital status: /Civil Union     Spouse name: Not on file    Number of children: Not on file    Years of education: Not on file    Highest education level: Not on file   Occupational History    Occupation: retired   Tobacco Use    Smoking status: Never    Smokeless tobacco: Never   Substance and Sexual Activity    Alcohol use: Not Currently     Comment: social    Drug use: No    Sexual activity: Not on file   Other Topics Concern    Not on file   Social History Narrative    Not on file     Social Determinants of Health     Financial Resource Strain: Low Risk  (1/31/2024)    Overall Financial Resource Strain (CARDIA)     Difficulty of Paying Living Expenses: Not hard at all   Food Insecurity: No Food Insecurity (12/5/2023)    [facility]    Hunger Vital Sign     Worried About Running Out of Food in the Last Year: Never true     Ran Out of Food in the Last Year: Never true   Transportation Needs: No Transportation Needs (1/31/2024)    PRAPARE - Transportation     Lack of Transportation (Medical): No     Lack of Transportation (Non-Medical): No   Physical Activity: Not on file   Stress: Not on file   Social Connections: Unknown (6/18/2024)    Received from [facility]     How often do you feel lonely or isolated from those around you? (Adult - for ages 18 years and over): Not on file   Intimate Partner Violence: Not At Risk (12/5/2023)    [facility]    Humiliation, Afraid, Rape, and Kick questionnaire     Fear of Current or Ex-Partner: No     Emotionally Abused: No     Physically Abused: No     Sexually Abused: No   Housing Stability: Not on file       Past Medical History    Past Medical History:   Diagnosis Date    Bleeding hemorrhoids     Choledocholithiasis     COVID-19 05/23/2022    Symptoms onset 5/20/22    Difficulty breathing     Diverticulosis     Ileus (HCC)     Lymphadenopathy of head and neck 09/29/2020    Pacemaker        Surgical History    Past Surgical History:   Procedure Laterality Date    APPENDECTOMY      CHOLECYSTECTOMY LAPAROSCOPIC      CORONARY ANGIOPLASTY      CORONARY ANGIOPLASTY WITH STENT PLACEMENT      CORONARY ARTERY BYPASS GRAFT  1999    ERCP      KNEE SURGERY Right 2023    TONSILLECTOMY         Medications      Current Outpatient Medications:     gabapentin (NEURONTIN) 100 mg capsule, Take 1 PO HS x 5 days, then 2 PO HS x 5 days, then 3 PO HS, Disp: 90 capsule, Rfl: 1    acetaminophen (TYLENOL) 500 mg tablet, Take 500 mg by mouth, Disp: , Rfl:     Alirocumab (Praluent) 75 MG/ML SOAJ, Inject 1 mL under the skin every 14 (fourteen) days, Disp: 6 mL, Rfl: 1    amoxicillin (AMOXIL) 500 MG tablet, 4 TABLETS BY MOUTH 1 HOUR BEFORE DENTIST (Patient not taking: Reported on 12/4/2023), Disp: , Rfl:     ascorbic acid (VITAMIN C) 500 MG tablet, Take 500 mg by mouth daily, Disp: , Rfl:     aspirin (ECOTRIN LOW STRENGTH) 81 mg EC tablet, Take 1 tablet by mouth daily, Disp: , Rfl:     Cholecalciferol (VITAMIN D) 2000 units CAPS, Take by mouth, Disp: , Rfl:     Coenzyme Q10 (CO Q 10) 100 MG CAPS, Take by mouth, Disp: , Rfl:     Empagliflozin (Jardiance) 10 MG TABS tablet, Take 1 tablet (10  mg total) by mouth every morning, Disp: 90 tablet, Rfl: 1    furosemide (LASIX) 20 mg tablet, Take 20 mg by mouth 2 (two) times a day, Disp: , Rfl:     isosorbide mononitrate (IMDUR) 60 mg 24 hr tablet, Take 1 tablet by mouth daily, Disp: , Rfl:     lidocaine (Lidoderm) 5 %, Apply 1 patch topically over 12 hours daily for 10 doses Remove & Discard patch within 12 hours or as directed by MD (Patient not taking: Reported on 7/9/2024), Disp: 10 patch, Rfl: 0    losartan (COZAAR) 25 mg tablet, Take 0.5 tablets by mouth daily, Disp: , Rfl:     Magnesium 400 MG TABS, Take by mouth, Disp: , Rfl:     methylPREDNISolone 4 MG tablet therapy pack, Use as directed on package (Patient not taking: Reported on 6/5/2024), Disp: 1 each, Rfl: 0    metoprolol succinate (TOPROL-XL) 50 mg 24 hr tablet, 2 (two) times a day 1 in the morning & 1 at dinner, Disp: , Rfl:     Multiple Vitamins-Minerals (MULTIVITAMIN ADULT PO), Take 1 capsule by mouth, Disp: , Rfl:     nitroglycerin (NITROSTAT) 0.4 mg SL tablet, Place 1 tablet under the tongue every 5 (five) minutes as needed (Patient not taking: Reported on 2/19/2024), Disp: , Rfl:     rivaroxaban (XARELTO) 20 mg tablet, Take 1 tablet (20 mg total) by mouth daily with dinner, Disp: 28 tablet, Rfl: 0    spironolactone (ALDACTONE) 25 mg tablet, Take 25 mg by mouth daily, Disp: , Rfl:     TURMERIC PO, Take 1 Dose by mouth, Disp: , Rfl:     Allergies    Allergies   Allergen Reactions    Other Other (See Comments)     Pain in back after contrast used for echos, Definity    Atorvastatin Cough     Per pt has had a problem with other statins also, does not remember names    Bee Venom     Diphenhydramine Other (See Comments)    Iodinated Contrast Media Other (See Comments)    Lisinopril Other (See Comments)     cough    Perflutren Lipid Microsphere     Perflutren Lipid Microspheres Other (See Comments)     severe back lower back spasm    Ranolazine      Alters mood.    Rosuvastatin Myalgia     "Sacubitril-Valsartan Other (See Comments)     \"severe back pain\"    Simvastatin Myalgia       The following portions of the patient's history were reviewed and updated as appropriate: allergies, current medications, past family history, past medical history, past social history, past surgical history, and problem list.    Investigations    I personally reviewed the CT results with the patient:      Physical Exam    Vitals:  Blood pressure 118/82, pulse 79, temperature 97.9 °F (36.6 °C), temperature source Temporal, resp. rate 16, height 6' 1\" (1.854 m), weight 115 kg (254 lb), SpO2 96%.,Body mass index is 33.51 kg/m².    General:  Normal, well developed, not in distress/pain     Skin:   No issues, no rashes noted     Musculoskeletal:   5/5 strength throughout all muscle groups  No tenderness to palpation of the spine       Neurologic Exam:  Awake and alert  Oriented x3  Speech clear and fluent  BROWER   Sensation to light touch and pin prick intact throughout  No smith's  No clonus  2+ patellar reflexes     Gait:   normal gait  "

## 2024-07-09 NOTE — PROGRESS NOTES
CARDIAC REHABILITATION ASSESSMENT AND INDIVIDUALIZED TREATMENT PLAN  90 DAY          Today's date: 2024   # of Exercise Sessions Completed: 21  Patient name: Jose Coronado      : 1940  Age: 83 y.o.       MRN: [id number]  Referring Physician: Miguel Ángel Garcia DO  Cardiologist: Dr. Garcia  Provider: Chidi  Clinician: Keyla Martinez, MS, CEP        Dr. Garcia,   Please review the following patient assessment for Jose to continue cardiac rehabilitation for treatment of CHF.  Please verify you agree with the outlined plan of care and exercise prescription by signing with attached order.    Thank You.      Comments: Jose has attended 21 sessions of CR in the past 90 days. He missed 2-3 weeks d/t back pain but has been able to return to rehab and complete his exercise regimen. Jose has no cardiac complaints at this time.         Dx:   Encounter Diagnosis   Name Primary?    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 Failed attempt at PCI of SVG-OM3.      In Dec was admitted, told he had a mild heart attack. He is tired all the time, has trouble breathing. Legs feel week. Also gets angina.           ASSESSMENT    Medical History:   Past Medical History:   Diagnosis Date    Bleeding hemorrhoids     Choledocholithiasis     COVID-19 2022    Symptoms onset 22    Difficulty breathing     Diverticulosis     Ileus (HCC)     Lymphadenopathy of head and neck 2020    Pacemaker        Family History:  Family  History   Problem Relation Age of Onset    Lung cancer Mother     Coronary artery disease Father     Diabetes Brother     Lung cancer Family        Allergies:   Other, Atorvastatin, Bee venom, Diphenhydramine, Iodinated contrast media, Lisinopril, Perflutren lipid microsphere, Perflutren lipid microspheres, Ranolazine, Rosuvastatin, Sacubitril-valsartan, and Simvastatin    Current Medications:   Current Outpatient Medications   Medication Sig Dispense Refill    acetaminophen (TYLENOL) 500 mg tablet Take 500 mg by mouth      Alirocumab (Praluent) 75 MG/ML SOAJ Inject 1 mL under the skin every 14 (fourteen) days 6 mL 1    amoxicillin (AMOXIL) 500 MG tablet 4 TABLETS BY MOUTH 1 HOUR BEFORE DENTIST (Patient not taking: Reported on 12/4/2023)      ascorbic acid (VITAMIN C) 500 MG tablet Take 500 mg by mouth daily      aspirin (ECOTRIN LOW STRENGTH) 81 mg EC tablet Take 1 tablet by mouth daily      Cholecalciferol (VITAMIN D) 2000 units CAPS Take by mouth      Coenzyme Q10 (CO Q 10) 100 MG CAPS Take by mouth      Empagliflozin (Jardiance) 10 MG TABS tablet Take 1 tablet (10 mg total) by mouth every morning 90 tablet 1    furosemide (LASIX) 20 mg tablet Take 20 mg by mouth 2 (two) times a day      furosemide (LASIX) 40 mg tablet Take by mouth 2 (two) times a day Patient takes 1/2 tab BID (Patient not taking: Reported on 6/5/2024)      gabapentin (NEURONTIN) 100 mg capsule Take 1 PO HS x 5 days, then 2 PO HS x 5 days, then 3 PO HS 90 capsule 1    isosorbide mononitrate (IMDUR) 60 mg 24 hr tablet Take 1 tablet by mouth daily      lidocaine (Lidoderm) 5 % Apply 1 patch topically over 12 hours daily for 10 doses Remove & Discard patch within 12 hours or as directed by MD 10 patch 0    losartan (COZAAR) 25 mg tablet Take 0.5 tablets by mouth daily      Magnesium 400 MG TABS Take by mouth      methylPREDNISolone 4 MG tablet therapy pack Use as directed on package (Patient not taking: Reported on 6/5/2024) 1 each 0    metoprolol  succinate (TOPROL-XL) 50 mg 24 hr tablet 2 (two) times a day 1 in the morning & 1 at dinner      Multiple Vitamins-Minerals (MULTIVITAMIN ADULT PO) Take 1 capsule by mouth      nitroglycerin (NITROSTAT) 0.4 mg SL tablet Place 1 tablet under the tongue every 5 (five) minutes as needed (Patient not taking: Reported on 2/19/2024)      rivaroxaban (XARELTO) 20 mg tablet Take 1 tablet (20 mg total) by mouth daily with dinner 28 tablet 0    spironolactone (ALDACTONE) 25 mg tablet Take 25 mg by mouth daily      TURMERIC PO Take 1 Dose by mouth       No current facility-administered medications for this visit.       Medication compliance: Yes   Comments: Pt reports to be compliant with medications    Physical Limitations: B/L knee pain    Fall Risk: Low   Comments: Ambulates with a steady gait with no assist device    Cultural needs: n/a      CAD Risk Factors:  Cholesterol: Yes  HTN: Yes  DM: No  Obesity: Yes   Inactivity: Yes      EXERCISE ASSESSMENT:     Initial Fitness Assessment:   Submaximal TM ETT:  Resting:  BP: 102/62, Exercise:  BP: 112/68, METs:  3.1, ECG Summary: NSR, paced with freq PVCs, NSVT noted, Symptoms: none, and Test terminated at:  RPE 6      Functional Status Prior to Diagnosis for Treatment:   Occupation: retired  Recreation/Physical Activity: none  ADL’s: Capable of performing light to moderate ADLs  [place]: No limitations  Home exercise equipment:  none  Home exercise: no regular aerobic exercise   Other: n/a    Current Functional Status:  Occupation: retired  Recreation/Physical Activity: none  ADL’s:Capable of performing light ADLs only limited by Dyspnea  Weakness  [place]: No limitations  Home exercise: none  Other: n/a    Functional Capacity Screening Tool:  [facility] Activity Status Index:  5.72 METs      PSYCHOSOCIAL ASSESSMENT:    Depression screening:  PHQ-9 = 3    Interpretation:  1-4 = Minimal Depression  Anxiety screening:  CHAUNCEY-7 = 0    Interpretation: 0-4  = Not anxious    Pt  "self-report of depression and anxiety   Patient reports they are coping well with good social support and denies depression or anxiety  Reports sufficient emotional support. Will not need to reassess PHQ-9 every 30 days.      Self-reported stress level:  4   Stressors: none identified  Stress Management Tools: practice Relaxation Techniques, keep a positive mindset, enjoy a hobby, and spend time with family    Quality of Life Screen:  (Higher score indicates disease impact on QOL)  [facility] COOP score: 23/40        Social Support:   spouse  Community/Social Activities: none     Psychosocial Assessment as it relates to rehabilitation:   Patient denies issues with his/her family or home life that may affect their rehabilitation efforts.       NUTRITION ASSESSMENT:    Weight:    Wt Readings from Last 1 Encounters:   06/05/24 116 kg (255 lb)        Height:   Ht Readings from Last 1 Encounters:   06/05/24 6' 1\" (1.854 m)       Rate Your Plate Score: 62/81    Diabetes: N/A  A1c: n/a    last measured: n/a    Lipid management: Discussed diet and lipid management and Last lipid profile 12/6/2023  Chol 96    HDL 30  LDL 35    Current Dietary Habits:  Trying to follow heart healthy eating plan, low salt, low fat    Drug/Alcohol Use:   No      OTHER CORE COMPONENT ASSESSMENT:    Tobacco Use:     N/A:  Patient is a non-smoker   N/A: Pt has a remote history of smoking    Anginal Symptoms:  chest pressure   NTG use: No prescription        INDIVIDUALIZED TREATMENT PLAN      Patient will continue to attend up to 35 monitored exercise sessions 3x/wk.    See outlined plan of care below for specific patient goals in each component of care.        EXERCISE GOAL and PLAN      SMART Goals:   10% improvement in functional capacity based on max METs achieved in initial fitness assessment  reduced dyspnea with physical activity    improved DASI score by 10%  increased exercise capacity by 40% based on peak METs tolerated in cardiac " rehab exercise session  maintain > 150 minutes per week of moderate intensity exercise    Patient Specific EXERCISE GOALS:       resume ADLs with increased strength, attend rehab regularly, decrease sitting time, and start a walking program, be able to walk further with less shortness of breath and leg heaviness    Progress toward SMART and personal Exercise goals: Jose is attending his cardiac rehab sessions regularly 3x/week, he is increasing his exercise times and intensities as he becomes stronger and builds endurance, he is showing increase in functional capacity by increasing daily MET levels, he is increasing his walking time on treadmill and has not been reporting shortness of breath with walking in rehab.     Plan for next 30 days: will continue to attend CR sessions regularly 3x/week and progress exercise times and intensities as he tolerates, will plan to begin home walking program on non rehab days 2-3x/week to help work towards increased walking distance with less shortness of breath and leg heaviness.    The patient was counseled on exercise guidelines to achieve a minimum of 150 mins/wk of moderate intensity (RPE 4-6)   exercise and encouraged to add 1-2 days of exercise on opposite days of cardiac rehab as tolerated.     Current Aerobic Exercise Prescription:      Frequency: 3 days/week   Supplement with home exercise 2+ days/wk as tolerated       Minutes: 40         METS: 2.4-2.8           HR: RHR +30-40bpm   RPE: 4-6         Modalities: Treadmill, UBE, Lifecycle, NuStep, and Recumbent bike     Exercise workloads will be progressed gradually as tolerated, within limits of patient's ability, and according to the patient's   response to the exercise program.      Aerobic Exercise Prescription - 30 day goal:   Frequency: 3 days/week of cardiac rehab       Supplement with home exercise 2+ days/wk as tolerated    Minutes: 40-50       >150 mins/wk of moderate intensity exercise   METS: 2.6-3.2   HR: 30  "beats above resting     RPE: 4-6   Modalities: Treadmill, UBE, Lifecycle, NuStep, and Recumbent bike    Strength trainin-3 days / week  12-15 repetitions  1-2 sets per modality    Modalities: Pull Downs, Lateral Raise, Arm Extension, Arm Curl, Sit to Stands, Front Raises, and Calf Raises    Home Exercise: none    Group and Individual Education: benefit of exercise for CAD risk factors, home exercise guidelines, AHA guidelines to achieve >150 mins/wk of moderate exercise, RPE scale, and Group class: Risk Factors for Heart Disease     Readiness to change: Action:  (Changing behavior)      NUTRITION GOALS AND PLAN    Weight control:    Starting weight: 252 lb   Current weight: 256.8 lb    Nutritional   Reviewed patient's Rate your Plate. Discussed key elements of heart healthy eating. Reviewed patient goals for dietary modifications and their clinical implications.  Reviewed most recent lipid profile.     SMART Goals:   LDL <100, HDL >40, TRG <150, CHOL <200, Improved Rate Your Plate score  >64, 2.5-5%  wt loss, eat 6 or more servings of grain products per day, eat whole grain breads, brown rice and whole grain cereals, eat 5 or more servings of fruits and vegetables a day, eat 2 or more servings of low fat milk or yogurt a day, eat no more than 6oz of meat per day, eat red meat once a week or less, eat poultry without the skin, eat meatless meals twice a week or more, choose 1% or skim milk, rarely eat cheese or choose reduced fat or skim, and use \"light\" tub margarine on bread, potatoes and vegetables    Patient Specific NUTRITION GOALS:     Continue current eating plan low salt and low fat    Patient's progress toward SMART and personal Nutrition goals:   Is following heart healthy eating plan with low salt and fat intake, attended group education classes on healthy eating and label reading . Patient reports he is also working to lose weight as he has gained about 4.5 lbs in the last 3 months.     Plan for next " 30 days:   increase intake of whole grains, increase daily intake of fruits and vegetables, limit meat intake to less than 6oz per day, eat red meat once a week or less, choose lean red meat, reduce intake and /or  rarely eat processed meats , and eat poultry without the skin    Measurable goals were based Rate Your Plate Dietary Self-Assessment. These are the areas in which the patient could score higher on the assessment.  Goals include recommendations for a heart healthy diet based on American Heart Association.    Group and Individual Education:   heart healthy eating principles  weight loss and management strategies  low sodium diet  maintaining hydration  nutrition for  lipid management  portion control  group class: Heart Healthy Eating  group class:  Label Reading    Readiness to change: Action      PSYCHOSOCIAL ASSESSMENT AND PLAN    Psychosocial Assessment as it relates to rehabilitation:   Patient denies issues with his/her family or home life that may affect their rehabilitation efforts.     SMART Goals:     Feelings in [place] Score < 3, Physical Fitness in [place] Score < 3, Daily Activity in [place] Score < 3, Overall Health in [place] Score < 3, Change in Health in [place] Score < 3 ,  Increased interest and pleasure in doing things, and feel less tired with more energy    Patient Specific PSYCHOCOSOCIAL GOALS:     spend time with family    Patient's progress toward SMART and personal Psychosocial goals:   does not have specific psychosocial goal-no concerns with depression or anxiety at this time. Would like more energy. He enjoys time with his family and has been traveling recently.    Plan for next 30 days:   Class: Stress and Your Health, Class: Relaxation, Practice relaxation techniques, Exercise, and Keep a positive mindset    Group and Individual Education: signs/sxs of depression, benefits of a positive support system, stress management techniques, benefits of enrolling in [facility]  [facility], and depression and CAD    Information to utilize [facility] was provided as well as contact information for counseling through  [facility] and group psychotherapy groups available.    Readiness to change: Maintenance: (Maintaining the behavior change)      OTHER CORE COMPONENTS GOALS and PLAN      Blood Pressure will be monitored throughout the program and cardiologist will be notified of elevated trends.    Pt will be encouraged to monitor home BP if advised by cardiologist.      SMART Goals:   consistent, controlled resting BP < 130/80, reduced angina, and medication compliance    Patient Specific CORE COMPONENT GOALS:    reduced dietary sodium <2000mg, assess daily wt to report an increase greater than 3lbs in a day or 5lbs in a week, and medication compliance    Progress toward SMART and personal Core Component goals:    Has decreased salt intake to help manage CHF and BP, increasing exercise time weekly with goal of 150-200 min/week of aerobic exercise, taking medications as prescribed    Plan for next 30 days:   group class: Understanding Heart Disease, group class: Common Heart Medications, medication compliance, avoid processed foods, engage in regular exercise, eliminate salt shaker at the table, check labels for sodium content, and monitor home BP    Group and Individual Education:  understanding high blood pressure and it's relationship to CAD, components of blood pressure management, low sodium diet and heart failure, and group class:  Common Heart Medications    Readiness to change: Action:  (Changing behavior)

## 2024-07-11 ENCOUNTER — CLINICAL SUPPORT (OUTPATIENT)
Dept: CARDIAC REHAB | Facility: HOSPITAL | Age: 84
End: 2024-07-11
Attending: INTERNAL MEDICINE
Payer: COMMERCIAL

## 2024-07-11 DIAGNOSIS — I50.9 CONGESTIVE HEART FAILURE, UNSPECIFIED HF CHRONICITY, UNSPECIFIED HEART FAILURE TYPE (HCC): Primary | ICD-10-CM

## 2024-07-11 PROCEDURE — 93798 PHYS/QHP OP CAR RHAB W/ECG: CPT

## 2024-07-12 ENCOUNTER — CLINICAL SUPPORT (OUTPATIENT)
Dept: CARDIAC REHAB | Facility: HOSPITAL | Age: 84
End: 2024-07-12
Attending: INTERNAL MEDICINE
Payer: COMMERCIAL

## 2024-07-12 DIAGNOSIS — I50.9 CONGESTIVE HEART FAILURE, UNSPECIFIED HF CHRONICITY, UNSPECIFIED HEART FAILURE TYPE (HCC): Primary | ICD-10-CM

## 2024-07-12 PROCEDURE — 93798 PHYS/QHP OP CAR RHAB W/ECG: CPT

## 2024-07-16 ENCOUNTER — CLINICAL SUPPORT (OUTPATIENT)
Dept: CARDIAC REHAB | Facility: HOSPITAL | Age: 84
End: 2024-07-16
Attending: INTERNAL MEDICINE
Payer: COMMERCIAL

## 2024-07-16 DIAGNOSIS — I50.9 CONGESTIVE HEART FAILURE, UNSPECIFIED HF CHRONICITY, UNSPECIFIED HEART FAILURE TYPE (HCC): Primary | ICD-10-CM

## 2024-07-16 PROCEDURE — 93798 PHYS/QHP OP CAR RHAB W/ECG: CPT

## 2024-07-18 ENCOUNTER — CLINICAL SUPPORT (OUTPATIENT)
Dept: CARDIAC REHAB | Facility: HOSPITAL | Age: 84
End: 2024-07-18
Attending: INTERNAL MEDICINE
Payer: COMMERCIAL

## 2024-07-18 DIAGNOSIS — I50.9 CONGESTIVE HEART FAILURE, UNSPECIFIED HF CHRONICITY, UNSPECIFIED HEART FAILURE TYPE (HCC): Primary | ICD-10-CM

## 2024-07-18 PROCEDURE — 93798 PHYS/QHP OP CAR RHAB W/ECG: CPT

## 2024-07-19 ENCOUNTER — CLINICAL SUPPORT (OUTPATIENT)
Dept: CARDIAC REHAB | Facility: HOSPITAL | Age: 84
End: 2024-07-19
Attending: INTERNAL MEDICINE
Payer: COMMERCIAL

## 2024-07-19 DIAGNOSIS — I50.9 CONGESTIVE HEART FAILURE, UNSPECIFIED HF CHRONICITY, UNSPECIFIED HEART FAILURE TYPE (HCC): Primary | ICD-10-CM

## 2024-07-19 PROCEDURE — 93798 PHYS/QHP OP CAR RHAB W/ECG: CPT

## 2024-07-23 ENCOUNTER — CLINICAL SUPPORT (OUTPATIENT)
Dept: CARDIAC REHAB | Facility: HOSPITAL | Age: 84
End: 2024-07-23
Attending: INTERNAL MEDICINE
Payer: COMMERCIAL

## 2024-07-23 DIAGNOSIS — I50.9 CONGESTIVE HEART FAILURE, UNSPECIFIED HF CHRONICITY, UNSPECIFIED HEART FAILURE TYPE (HCC): Primary | ICD-10-CM

## 2024-07-23 PROCEDURE — 93798 PHYS/QHP OP CAR RHAB W/ECG: CPT

## 2024-07-25 ENCOUNTER — CLINICAL SUPPORT (OUTPATIENT)
Dept: CARDIAC REHAB | Facility: HOSPITAL | Age: 84
End: 2024-07-25
Attending: INTERNAL MEDICINE
Payer: COMMERCIAL

## 2024-07-25 DIAGNOSIS — I50.9 CONGESTIVE HEART FAILURE, UNSPECIFIED HF CHRONICITY, UNSPECIFIED HEART FAILURE TYPE (HCC): Primary | ICD-10-CM

## 2024-07-25 PROCEDURE — 93798 PHYS/QHP OP CAR RHAB W/ECG: CPT

## 2024-07-26 ENCOUNTER — CLINICAL SUPPORT (OUTPATIENT)
Dept: CARDIAC REHAB | Facility: HOSPITAL | Age: 84
End: 2024-07-26
Attending: INTERNAL MEDICINE
Payer: COMMERCIAL

## 2024-07-26 DIAGNOSIS — I50.9 CONGESTIVE HEART FAILURE, UNSPECIFIED HF CHRONICITY, UNSPECIFIED HEART FAILURE TYPE (HCC): Primary | ICD-10-CM

## 2024-07-26 PROCEDURE — 93798 PHYS/QHP OP CAR RHAB W/ECG: CPT

## 2024-07-30 ENCOUNTER — APPOINTMENT (OUTPATIENT)
Dept: CARDIAC REHAB | Facility: HOSPITAL | Age: 84
End: 2024-07-30
Attending: INTERNAL MEDICINE
Payer: COMMERCIAL

## 2024-08-01 ENCOUNTER — CLINICAL SUPPORT (OUTPATIENT)
Dept: CARDIAC REHAB | Facility: HOSPITAL | Age: 84
End: 2024-08-01
Attending: INTERNAL MEDICINE
Payer: COMMERCIAL

## 2024-08-01 DIAGNOSIS — I50.9 CONGESTIVE HEART FAILURE, UNSPECIFIED HF CHRONICITY, UNSPECIFIED HEART FAILURE TYPE (HCC): Primary | ICD-10-CM

## 2024-08-01 PROCEDURE — 93798 PHYS/QHP OP CAR RHAB W/ECG: CPT

## 2024-08-02 ENCOUNTER — CLINICAL SUPPORT (OUTPATIENT)
Dept: CARDIAC REHAB | Facility: HOSPITAL | Age: 84
End: 2024-08-02
Attending: INTERNAL MEDICINE
Payer: COMMERCIAL

## 2024-08-02 DIAGNOSIS — I50.9 CONGESTIVE HEART FAILURE, UNSPECIFIED HF CHRONICITY, UNSPECIFIED HEART FAILURE TYPE (HCC): Primary | ICD-10-CM

## 2024-08-02 PROCEDURE — 93798 PHYS/QHP OP CAR RHAB W/ECG: CPT

## 2024-08-05 ENCOUNTER — RA CDI HCC (OUTPATIENT)
Dept: OTHER | Facility: HOSPITAL | Age: 84
End: 2024-08-05

## 2024-08-06 ENCOUNTER — CLINICAL SUPPORT (OUTPATIENT)
Dept: CARDIAC REHAB | Facility: HOSPITAL | Age: 84
End: 2024-08-06
Attending: INTERNAL MEDICINE
Payer: COMMERCIAL

## 2024-08-06 DIAGNOSIS — I50.9 CONGESTIVE HEART FAILURE, UNSPECIFIED HF CHRONICITY, UNSPECIFIED HEART FAILURE TYPE (HCC): Primary | ICD-10-CM

## 2024-08-06 PROCEDURE — 93798 PHYS/QHP OP CAR RHAB W/ECG: CPT

## 2024-08-06 NOTE — PROGRESS NOTES
CARDIAC REHABILITATION ASSESSMENT AND INDIVIDUALIZED TREATMENT PLAN  120 DAY          Today's date: 2024   # of Exercise Sessions Completed: 32  Patient name: Jose Coronado      : 1940  Age: 83 y.o.       MRN: [id number]  Referring Physician: Miguel Ángel Garcia DO  Cardiologist: Dr. Garcia  Provider: Chidi  Clinician: Tessa Bansal, MS, CEP      Comments: Jose has attended 32 sessions of CR in the past 120 days. He will be completing his rehab program within the next 30 days.      Dx:   Encounter Diagnosis   Name Primary?    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 Failed attempt at PCI of SVG-OM3.      In Dec was admitted, told he had a mild heart attack. He is tired all the time, has trouble breathing. Legs feel week. Also gets angina.           ASSESSMENT    Medical History:   Past Medical History:   Diagnosis Date    Bleeding hemorrhoids     Choledocholithiasis     COVID-19 2022    Symptoms onset 22    Difficulty breathing     Diverticulosis     Ileus (HCC)     Lymphadenopathy of head and neck 2020    Pacemaker        Family History:  Family History   Problem Relation Age of Onset    Lung cancer Mother     Coronary artery disease Father     Diabetes Brother     Lung cancer Family        Allergies:   Other, Atorvastatin, Bee venom, Diphenhydramine, Iodinated contrast media, Lisinopril, Perflutren lipid microsphere, Perflutren lipid microspheres, Ranolazine, Rosuvastatin, Sacubitril-valsartan,  and Simvastatin    Current Medications:   Current Outpatient Medications   Medication Sig Dispense Refill    acetaminophen (TYLENOL) 500 mg tablet Take 500 mg by mouth      Alirocumab (Praluent) 75 MG/ML SOAJ Inject 1 mL under the skin every 14 (fourteen) days 6 mL 1    amoxicillin (AMOXIL) 500 MG tablet 4 TABLETS BY MOUTH 1 HOUR BEFORE DENTIST (Patient not taking: Reported on 12/4/2023)      ascorbic acid (VITAMIN C) 500 MG tablet Take 500 mg by mouth daily      aspirin (ECOTRIN LOW STRENGTH) 81 mg EC tablet Take 1 tablet by mouth daily      Cholecalciferol (VITAMIN D) 2000 units CAPS Take by mouth      Coenzyme Q10 (CO Q 10) 100 MG CAPS Take by mouth      Empagliflozin (Jardiance) 10 MG TABS tablet Take 1 tablet (10 mg total) by mouth every morning 90 tablet 1    furosemide (LASIX) 20 mg tablet Take 20 mg by mouth 2 (two) times a day      gabapentin (NEURONTIN) 100 mg capsule Take 1 PO HS x 5 days, then 2 PO HS x 5 days, then 3 PO HS 90 capsule 1    isosorbide mononitrate (IMDUR) 60 mg 24 hr tablet Take 1 tablet by mouth daily      lidocaine (Lidoderm) 5 % Apply 1 patch topically over 12 hours daily for 10 doses Remove & Discard patch within 12 hours or as directed by MD (Patient not taking: Reported on 7/9/2024) 10 patch 0    losartan (COZAAR) 25 mg tablet Take 0.5 tablets by mouth daily      Magnesium 400 MG TABS Take by mouth      methylPREDNISolone 4 MG tablet therapy pack Use as directed on package (Patient not taking: Reported on 6/5/2024) 1 each 0    metoprolol succinate (TOPROL-XL) 50 mg 24 hr tablet 2 (two) times a day 1 in the morning & 1 at dinner      Multiple Vitamins-Minerals (MULTIVITAMIN ADULT PO) Take 1 capsule by mouth      nitroglycerin (NITROSTAT) 0.4 mg SL tablet Place 1 tablet under the tongue every 5 (five) minutes as needed (Patient not taking: Reported on 2/19/2024)      rivaroxaban (XARELTO) 20 mg tablet Take 1 tablet (20 mg total) by mouth daily with dinner 28 tablet 0    spironolactone  (ALDACTONE) 25 mg tablet Take 25 mg by mouth daily      TURMERIC PO Take 1 Dose by mouth       No current facility-administered medications for this visit.       Medication compliance: Yes   Comments: Pt reports to be compliant with medications    Physical Limitations: B/L knee pain    Fall Risk: Low   Comments: Ambulates with a steady gait with no assist device    Cultural needs: n/a      CAD Risk Factors:  Cholesterol: Yes  HTN: Yes  DM: No  Obesity: Yes   Inactivity: Yes      EXERCISE ASSESSMENT:     Initial Fitness Assessment:   Submaximal TM ETT:  Resting:  BP: 102/62, Exercise:  BP: 112/68, METs:  3.1, ECG Summary: NSR, paced with freq PVCs, NSVT noted, Symptoms: none, and Test terminated at:  RPE 6      Functional Status Prior to Diagnosis for Treatment:   Occupation: retired  Recreation/Physical Activity: none  ADL’s: Capable of performing light to moderate ADLs  [place]: No limitations  Home exercise equipment:  none  Home exercise: no regular aerobic exercise   Other: n/a    Current Functional Status:  Occupation: retired  Recreation/Physical Activity: none  ADL’s:Capable of performing light ADLs only limited by Dyspnea  Weakness  [place]: No limitations  Home exercise: none  Other: n/a    Functional Capacity Screening Tool:  [facility] Activity Status Index:  5.72 METs      PSYCHOSOCIAL ASSESSMENT:    Depression screening:  PHQ-9 = 3    Interpretation:  1-4 = Minimal Depression  Anxiety screening:  CHAUNCEY-7 = 0    Interpretation: 0-4  = Not anxious    Pt self-report of depression and anxiety   Patient reports they are coping well with good social support and denies depression or anxiety  Reports sufficient emotional support. Will not need to reassess PHQ-9 every 30 days.      Self-reported stress level:  4   Stressors: none identified  Stress Management Tools: practice Relaxation Techniques, keep a positive mindset, enjoy a hobby, and spend time with family    Quality of Life Screen:  (Higher score  "indicates disease impact on QOL)  [facility] COOP score: 23/40        Social Support:   spouse  Community/Social Activities: none     Psychosocial Assessment as it relates to rehabilitation:   Patient denies issues with his/her family or home life that may affect their rehabilitation efforts.       NUTRITION ASSESSMENT:    Weight:  256.4 lb       Height:   Ht Readings from Last 1 Encounters:   07/09/24 6' 1\" (1.854 m)       Rate Your Plate Score: 62/81    Diabetes: N/A  A1c: n/a    last measured: n/a    Lipid management: Discussed diet and lipid management and Last lipid profile 12/6/2023  Chol 96    HDL 30  LDL 35    Current Dietary Habits:  Trying to follow heart healthy eating plan, low salt, low fat    Drug/Alcohol Use:   No      OTHER CORE COMPONENT ASSESSMENT:    Tobacco Use:     N/A:  Patient is a non-smoker   N/A: Pt has a remote history of smoking    Anginal Symptoms:  chest pressure   NTG use: No prescription        INDIVIDUALIZED TREATMENT PLAN      Patient will continue to attend up to 35 monitored exercise sessions 3x/wk.    See outlined plan of care below for specific patient goals in each component of care.        EXERCISE GOAL and PLAN      SMART Goals:   10% improvement in functional capacity based on max METs achieved in initial fitness assessment  reduced dyspnea with physical activity    improved DASI score by 10%  increased exercise capacity by 40% based on peak METs tolerated in cardiac rehab exercise session  maintain > 150 minutes per week of moderate intensity exercise    Patient Specific EXERCISE GOALS:       resume ADLs with increased strength, attend rehab regularly, decrease sitting time, and start a walking program, be able to walk further with less shortness of breath and leg heaviness    Progress toward SMART and personal Exercise goals: Jose is attending his cardiac rehab sessions regularly 3x/week, he is increasing his exercise times and intensities as he becomes stronger and " builds endurance, he is showing increase in functional capacity by increasing daily MET levels, he is increasing his walking time on treadmill and has not been reporting shortness of breath with walking in rehab.     Plan for next 30 days: will continue to attend CR sessions regularly 3x/week and progress exercise times and intensities as he tolerates, will plan to begin home walking program on non rehab days 2-3x/week to help work towards increased walking distance with less shortness of breath and leg heaviness.    The patient was counseled on exercise guidelines to achieve a minimum of 150 mins/wk of moderate intensity (RPE 4-6)   exercise and encouraged to add 1-2 days of exercise on opposite days of cardiac rehab as tolerated.     Current Aerobic Exercise Prescription:      Frequency: 3 days/week   Supplement with home exercise 2+ days/wk as tolerated       Minutes: 40-45         METS: 2.6-2.8           HR: RHR +30-40bpm   RPE: 4-6         Modalities: Treadmill, UBE, Lifecycle, NuStep, and Recumbent bike     Exercise workloads will be progressed gradually as tolerated, within limits of patient's ability, and according to the patient's   response to the exercise program.      Aerobic Exercise Prescription - 30 day goal:   Frequency: 3 days/week of cardiac rehab       Supplement with home exercise 2+ days/wk as tolerated    Minutes: 40-50       >150 mins/wk of moderate intensity exercise   METS: 2.6-3.2   HR: 30 beats above resting     RPE: 4-6   Modalities: Treadmill, UBE, Lifecycle, NuStep, and Recumbent bike    Strength trainin-3 days / week  12-15 repetitions  1-2 sets per modality    Modalities: Pull Downs, Lateral Raise, Arm Extension, Arm Curl, Sit to Stands, Front Raises, and Calf Raises    Home Exercise: none    Group and Individual Education: benefit of exercise for CAD risk factors, home exercise guidelines, AHA guidelines to achieve >150 mins/wk of moderate exercise, RPE scale, and Group class:  "Risk Factors for Heart Disease     Readiness to change: Action:  (Changing behavior)      NUTRITION GOALS AND PLAN    Weight control:    Starting weight: 252 lb   Current weight: 256.4 lb    Nutritional   Reviewed patient's Rate your Plate. Discussed key elements of heart healthy eating. Reviewed patient goals for dietary modifications and their clinical implications.  Reviewed most recent lipid profile.     SMART Goals:   LDL <100, HDL >40, TRG <150, CHOL <200, Improved Rate Your Plate score  >64, 2.5-5%  wt loss, eat 6 or more servings of grain products per day, eat whole grain breads, brown rice and whole grain cereals, eat 5 or more servings of fruits and vegetables a day, eat 2 or more servings of low fat milk or yogurt a day, eat no more than 6oz of meat per day, eat red meat once a week or less, eat poultry without the skin, eat meatless meals twice a week or more, choose 1% or skim milk, rarely eat cheese or choose reduced fat or skim, and use \"light\" tub margarine on bread, potatoes and vegetables    Patient Specific NUTRITION GOALS:     Continue current eating plan low salt and low fat    Patient's progress toward SMART and personal Nutrition goals:   Is following heart healthy eating plan with low salt and fat intake, attended group education classes on healthy eating and label reading . Patient reports he is also working to lose weight as he has gained about 4.5 lbs in the last 3 months.     Plan for next 30 days:   increase intake of whole grains, increase daily intake of fruits and vegetables, limit meat intake to less than 6oz per day, eat red meat once a week or less, choose lean red meat, reduce intake and /or  rarely eat processed meats , and eat poultry without the skin    Measurable goals were based Rate Your Plate Dietary Self-Assessment. These are the areas in which the patient could score higher on the assessment.  Goals include recommendations for a heart healthy diet based on American Heart " Association.    Group and Individual Education:   heart healthy eating principles  weight loss and management strategies  low sodium diet  maintaining hydration  nutrition for  lipid management  portion control  group class: Heart Healthy Eating  group class:  Label Reading    Readiness to change: Action      PSYCHOSOCIAL ASSESSMENT AND PLAN    Psychosocial Assessment as it relates to rehabilitation:   Patient denies issues with his/her family or home life that may affect their rehabilitation efforts.     SMART Goals:     Feelings in [place] Score < 3, Physical Fitness in [place] Score < 3, Daily Activity in [place] Score < 3, Overall Health in [place] Score < 3, Change in Health in [place] Score < 3 ,  Increased interest and pleasure in doing things, and feel less tired with more energy    Patient Specific PSYCHOCOSOCIAL GOALS:     spend time with family    Patient's progress toward SMART and personal Psychosocial goals:   does not have specific psychosocial goal-no concerns with depression or anxiety at this time. Would like more energy. He enjoys time with his family and has been traveling recently.    Plan for next 30 days:   Class: Stress and Your Health, Class: Relaxation, Practice relaxation techniques, Exercise, and Keep a positive mindset    Group and Individual Education: signs/sxs of depression, benefits of a positive support system, stress management techniques, benefits of enrolling in [facility], and depression and CAD    Information to utilize [employer] was provided as well as contact information for counseling through  Behavioral Health and group psychotherapy groups available.    Readiness to change: Maintenance: (Maintaining the behavior change)      OTHER CORE COMPONENTS GOALS and PLAN      Blood Pressure will be monitored throughout the program and cardiologist will be notified of elevated trends.    Pt will be encouraged to monitor home BP if advised by cardiologist.      SMART  Goals:   consistent, controlled resting BP < 130/80, reduced angina, and medication compliance    Patient Specific CORE COMPONENT GOALS:    reduced dietary sodium <2000mg, assess daily wt to report an increase greater than 3lbs in a day or 5lbs in a week, and medication compliance    Progress toward SMART and personal Core Component goals:    Has decreased salt intake to help manage CHF and BP, increasing exercise time weekly with goal of 150-200 min/week of aerobic exercise, taking medications as prescribed    Plan for next 30 days:   group class: Understanding Heart Disease, group class: Common Heart Medications, medication compliance, avoid processed foods, engage in regular exercise, eliminate salt shaker at the table, check labels for sodium content, and monitor home BP    Group and Individual Education:  understanding high blood pressure and it's relationship to CAD, components of blood pressure management, low sodium diet and heart failure, and group class:  Common Heart Medications    Readiness to change: Action:  (Changing behavior)

## 2024-08-07 ENCOUNTER — RA CDI HCC (OUTPATIENT)
Dept: OTHER | Facility: HOSPITAL | Age: 84
End: 2024-08-07

## 2024-08-08 ENCOUNTER — TELEPHONE (OUTPATIENT)
Age: 84
End: 2024-08-08

## 2024-08-08 ENCOUNTER — CLINICAL SUPPORT (OUTPATIENT)
Dept: CARDIAC REHAB | Facility: HOSPITAL | Age: 84
End: 2024-08-08
Attending: INTERNAL MEDICINE
Payer: COMMERCIAL

## 2024-08-08 DIAGNOSIS — I50.9 CONGESTIVE HEART FAILURE, UNSPECIFIED HF CHRONICITY, UNSPECIFIED HEART FAILURE TYPE (HCC): Primary | ICD-10-CM

## 2024-08-08 PROCEDURE — 93798 PHYS/QHP OP CAR RHAB W/ECG: CPT

## 2024-08-08 NOTE — TELEPHONE ENCOUNTER
Pt's wife called and asked if he can  a copy of his labs, but I took the information and faxed them over to [facility].

## 2024-08-09 ENCOUNTER — CLINICAL SUPPORT (OUTPATIENT)
Dept: CARDIAC REHAB | Facility: HOSPITAL | Age: 84
End: 2024-08-09
Attending: INTERNAL MEDICINE
Payer: COMMERCIAL

## 2024-08-09 DIAGNOSIS — I50.9 CONGESTIVE HEART FAILURE, UNSPECIFIED HF CHRONICITY, UNSPECIFIED HEART FAILURE TYPE (HCC): Primary | ICD-10-CM

## 2024-08-09 LAB
ALBUMIN SERPL-MCNC: 4.2 G/DL (ref 3.6–5.1)
ALBUMIN/GLOB SERPL: 1.7 (CALC) (ref 1–2.5)
ALP SERPL-CCNC: 47 U/L (ref 35–144)
ALT SERPL-CCNC: 16 U/L (ref 9–46)
AST SERPL-CCNC: 19 U/L (ref 10–35)
BASOPHILS # BLD AUTO: 38 CELLS/UL (ref 0–200)
BASOPHILS NFR BLD AUTO: 0.8 %
BILIRUB SERPL-MCNC: 0.8 MG/DL (ref 0.2–1.2)
BUN SERPL-MCNC: 21 MG/DL (ref 7–25)
BUN/CREAT SERPL: ABNORMAL (CALC) (ref 6–22)
CALCIUM SERPL-MCNC: 9.2 MG/DL (ref 8.6–10.3)
CHLORIDE SERPL-SCNC: 103 MMOL/L (ref 98–110)
CHOLEST SERPL-MCNC: 115 MG/DL
CO2 SERPL-SCNC: 29 MMOL/L (ref 20–32)
CREAT SERPL-MCNC: 0.74 MG/DL (ref 0.7–1.22)
EOSINOPHIL # BLD AUTO: 221 CELLS/UL (ref 15–500)
EOSINOPHIL NFR BLD AUTO: 4.7 %
ERYTHROCYTE [DISTWIDTH] IN BLOOD BY AUTOMATED COUNT: 13 % (ref 11–15)
GFR/BSA.PRED SERPLBLD CYS-BASED-ARV: 91 ML/MIN/1.73M2
GLOBULIN SER CALC-MCNC: 2.5 G/DL (CALC) (ref 1.9–3.7)
GLUCOSE SERPL-MCNC: 111 MG/DL (ref 65–99)
HCT VFR BLD AUTO: 46.9 % (ref 38.5–50)
HDLC SERPL-MCNC: 37 MG/DL
HGB BLD-MCNC: 16.2 G/DL (ref 13.2–17.1)
LDLC SERPL DIRECT ASSAY-MCNC: 63 MG/DL
LYMPHOCYTES # BLD AUTO: 1645 CELLS/UL (ref 850–3900)
LYMPHOCYTES NFR BLD AUTO: 35 %
MCH RBC QN AUTO: 29.9 PG (ref 27–33)
MCHC RBC AUTO-ENTMCNC: 34.5 G/DL (ref 32–36)
MCV RBC AUTO: 86.7 FL (ref 80–100)
MONOCYTES # BLD AUTO: 456 CELLS/UL (ref 200–950)
MONOCYTES NFR BLD AUTO: 9.7 %
NEUTROPHILS # BLD AUTO: 2341 CELLS/UL (ref 1500–7800)
NEUTROPHILS NFR BLD AUTO: 49.8 %
PLATELET # BLD AUTO: 160 THOUSAND/UL (ref 140–400)
PMV BLD REES-ECKER: 9.4 FL (ref 7.5–12.5)
POTASSIUM SERPL-SCNC: 4.1 MMOL/L (ref 3.5–5.3)
PROT SERPL-MCNC: 6.7 G/DL (ref 6.1–8.1)
RBC # BLD AUTO: 5.41 MILLION/UL (ref 4.2–5.8)
SODIUM SERPL-SCNC: 141 MMOL/L (ref 135–146)
TSH SERPL-ACNC: 7.38 MIU/L (ref 0.4–4.5)
WBC # BLD AUTO: 4.7 THOUSAND/UL (ref 3.8–10.8)

## 2024-08-09 PROCEDURE — 93798 PHYS/QHP OP CAR RHAB W/ECG: CPT

## 2024-08-13 ENCOUNTER — CLINICAL SUPPORT (OUTPATIENT)
Dept: CARDIAC REHAB | Facility: HOSPITAL | Age: 84
End: 2024-08-13
Attending: INTERNAL MEDICINE
Payer: COMMERCIAL

## 2024-08-13 DIAGNOSIS — I50.9 CONGESTIVE HEART FAILURE, UNSPECIFIED HF CHRONICITY, UNSPECIFIED HEART FAILURE TYPE (HCC): Primary | ICD-10-CM

## 2024-08-13 PROCEDURE — 93798 PHYS/QHP OP CAR RHAB W/ECG: CPT

## 2024-08-14 ENCOUNTER — OFFICE VISIT (OUTPATIENT)
Dept: FAMILY MEDICINE CLINIC | Facility: CLINIC | Age: 84
End: 2024-08-14
Payer: COMMERCIAL

## 2024-08-14 VITALS
OXYGEN SATURATION: 96 % | DIASTOLIC BLOOD PRESSURE: 88 MMHG | BODY MASS INDEX: 33.8 KG/M2 | HEART RATE: 65 BPM | SYSTOLIC BLOOD PRESSURE: 140 MMHG | HEIGHT: 73 IN | WEIGHT: 255 LBS | RESPIRATION RATE: 18 BRPM

## 2024-08-14 DIAGNOSIS — I11.0 HYPERTENSIVE HEART DISEASE WITH CHRONIC COMBINED SYSTOLIC AND DIASTOLIC CONGESTIVE HEART FAILURE (HCC): ICD-10-CM

## 2024-08-14 DIAGNOSIS — I25.10 3-VESSEL CAD: ICD-10-CM

## 2024-08-14 DIAGNOSIS — I48.0 PAROXYSMAL ATRIAL FIBRILLATION (HCC): ICD-10-CM

## 2024-08-14 DIAGNOSIS — R73.9 HYPERGLYCEMIA: ICD-10-CM

## 2024-08-14 DIAGNOSIS — I50.42 CHRONIC COMBINED SYSTOLIC AND DIASTOLIC CONGESTIVE HEART FAILURE (HCC): ICD-10-CM

## 2024-08-14 DIAGNOSIS — M48.061 SPINAL STENOSIS OF LUMBAR REGION, UNSPECIFIED WHETHER NEUROGENIC CLAUDICATION PRESENT: ICD-10-CM

## 2024-08-14 DIAGNOSIS — I50.42 HYPERTENSIVE HEART DISEASE WITH CHRONIC COMBINED SYSTOLIC AND DIASTOLIC CONGESTIVE HEART FAILURE (HCC): ICD-10-CM

## 2024-08-14 DIAGNOSIS — E78.2 MIXED HYPERLIPIDEMIA: Primary | ICD-10-CM

## 2024-08-14 DIAGNOSIS — E03.9 ACQUIRED HYPOTHYROIDISM: ICD-10-CM

## 2024-08-14 PROCEDURE — 99214 OFFICE O/P EST MOD 30 MIN: CPT | Performed by: FAMILY MEDICINE

## 2024-08-14 PROCEDURE — G2211 COMPLEX E/M VISIT ADD ON: HCPCS | Performed by: FAMILY MEDICINE

## 2024-08-14 RX ORDER — LEVOTHYROXINE SODIUM 25 UG/1
25 TABLET ORAL DAILY
Qty: 90 TABLET | Refills: 1 | Status: SHIPPED | OUTPATIENT
Start: 2024-08-14

## 2024-08-14 NOTE — ASSESSMENT & PLAN NOTE
Patient did have evaluation with neurosurgery, but as his symptoms had improved after his ER visit, they recommended only as needed follow-up.  Currently has recurrence of symptoms.  Would recommend follow-up with pain management, especially given his CT scan findings.    Ordered pain management follow-up.

## 2024-08-14 NOTE — ASSESSMENT & PLAN NOTE
TSH remains abnormal.  It is high.  Would recommend Synthroid 25 mcg.  Will check labs again in 3 months.

## 2024-08-14 NOTE — ASSESSMENT & PLAN NOTE
Wt Readings from Last 3 Encounters:   08/14/24 116 kg (255 lb)   07/09/24 115 kg (254 lb)   06/05/24 116 kg (255 lb)     Stable. Follow with Cardio.

## 2024-08-14 NOTE — PATIENT INSTRUCTIONS
1. Mixed hyperlipidemia  Assessment & Plan:  Cholesterol doing well.  No specific change.  Recommend check in 6 months.  Continue to follow with cardiology.  Continue with the Praluent.  Orders:  -     Comprehensive metabolic panel; Future; Expected date: 02/14/2025  -     Lipid panel; Future; Expected date: 02/14/2025  -     Comprehensive metabolic panel  -     Lipid panel  2. 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          Orders:  -     Comprehensive metabolic panel; Future; Expected date: 02/14/2025  -     Comprehensive metabolic panel  3. Acquired hypothyroidism  Assessment & Plan:  TSH remains abnormal.  It is high.  Would recommend Synthroid 25 mcg.  Will check labs again in 3 months.  Orders:  -     levothyroxine (Synthroid) 25 mcg tablet; Take 1 tablet (25 mcg total) by mouth daily  -     TSH, 3rd generation; Future; Expected date: 11/14/2024  -     TSH, 3rd generation  4. Chronic combined systolic and diastolic congestive heart failure (HCC)  Assessment & Plan:  Wt Readings from Last 3 Encounters:   08/14/24 116 kg (255 lb)   07/09/24 115 kg (254 lb)   06/05/24 116 kg (255 lb)     Stable. Follow with Cardio.          Orders:  -     Comprehensive metabolic panel; Future; Expected date: 02/14/2025  -     Comprehensive metabolic panel  5. 3-vessel CAD  Assessment & Plan:  Stable. No changes  Follow with cardio.    6. Paroxysmal atrial fibrillation (HCC)  Assessment & Plan:  Continue Xarelto. No changes.    Orders:  -     Comprehensive metabolic panel; Future; Expected date: 02/14/2025  -     Comprehensive metabolic panel  7. Spinal stenosis of lumbar region, unspecified whether neurogenic claudication present  Assessment & Plan:  Patient did have  evaluation with neurosurgery, but as his symptoms had improved after his ER visit, they recommended only as needed follow-up.  Currently has recurrence of symptoms.  Would recommend follow-up with pain management, especially given his CT scan findings.    Ordered pain management follow-up.  Orders:  -     Ambulatory referral to Spine & Pain Management; Future  8. Hyperglycemia  Assessment & Plan:  Minimal elevation in sugar. No changes.      COVID 19 Instructions    Jose Coronado was advised to limit contact with others to essential tasks such as getting food, medications, and medical care.    Proper handwashing reviewed, and Hand sanitzer when washing is not available.    If the patient develops symptoms of COVID 19, the patient should call the office as soon as possible.    It is strongly recommended that Flu Vaccinations be obtained.      Virtual Visits:  Ayanna: This works on smart phones (any phone with Internet browsing capability).  You should get a text message when the provider is ready to see you.  Click on the link in the text message, and the call should start.  You will need to type in your name, and allow camera and microphone access.  This is HIPPA compliant, and secure.      If you have not already done so, get immunized to COVID 19.      We are committed to getting you vaccinated as soon as possible and will be closely following [facility] and [facility] guidelines as they are released and revised.  Please refer to our COVID-19 vaccine webpage for the most up to date information on the vaccine and our distribution efforts.    This site will also have the most up to date recommendations for COVID booster vaccine.    [email or web address]    Call 5-075-FICCOTD ([phone]), option 7    You can also visit https://www.vaccines.gov/ to find vaccines in your area.    OUR LOCATION:    [facility]  [facility], [address]  [place],  [address]  [phone]  Fax: [phone]    Lab services, Rheumatology, and OB/GYN are at this location as well.

## 2024-08-14 NOTE — PROGRESS NOTES
Ambulatory Visit  Name: Jose Coronado      : 1940      MRN: [id number]  Encounter Provider: Tray Ocasio MD  Encounter Date: 2024   [facility]    Assessment & Plan   1. Mixed hyperlipidemia  Assessment & Plan:  Cholesterol doing well.  No specific change.  Recommend check in 6 months.  Continue to follow with cardiology.  Continue with the Praluent.  Orders:  -     Comprehensive metabolic panel; Future; Expected date: 2025  -     Lipid panel; Future; Expected date: 2025  -     Comprehensive metabolic panel  -     Lipid panel  2. 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          Orders:  -     Comprehensive metabolic panel; Future; Expected date: 2025  -     Comprehensive metabolic panel  3. Acquired hypothyroidism  Assessment & Plan:  TSH remains abnormal.  It is high.  Would recommend Synthroid 25 mcg.  Will check labs again in 3 months.  Orders:  -     levothyroxine (Synthroid) 25 mcg tablet; Take 1 tablet (25 mcg total) by mouth daily  -     TSH, 3rd generation; Future; Expected date: 2024  -     TSH, 3rd generation  4. Chronic combined systolic and diastolic congestive heart failure (HCC)  Assessment & Plan:  Wt Readings from Last 3 Encounters:   24 116 kg (255 lb)   24 115 kg (254 lb)   24 116 kg (255 lb)     Stable. Follow with Cardio.          Orders:  -     Comprehensive metabolic panel; Future; Expected date: 2025  -     Comprehensive metabolic panel  5. 3-vessel CAD  Assessment & Plan:  Stable. No changes  Follow with cardio.    6. Paroxysmal atrial fibrillation (HCC)  Assessment & Plan:  Continue Xarelto. No changes.    Orders:  -      Comprehensive metabolic panel; Future; Expected date: 02/14/2025  -     Comprehensive metabolic panel  7. Spinal stenosis of lumbar region, unspecified whether neurogenic claudication present  Assessment & Plan:  Patient did have evaluation with neurosurgery, but as his symptoms had improved after his ER visit, they recommended only as needed follow-up.  Currently has recurrence of symptoms.  Would recommend follow-up with pain management, especially given his CT scan findings.    Ordered pain management follow-up.  Orders:  -     Ambulatory referral to Spine & Pain Management; Future  8. Hyperglycemia  Assessment & Plan:  Minimal elevation in sugar. No changes.       Chief Complaint   Patient presents with   • Follow-up     6 months follow up   • Back Pain   • Leg Pain       History of Present Illness     Patient is here today to follow-up on multiple issues.    He did mention that he has back pain, especially on the left side and near the buttock.  Radiates down into the leg with this.  Previous history of spinal stenosis.  The right somewhat radiates over to the right neck down into the right leg.  Left is the worst currently.  Worse when sleeping at night.  severe pains in the legs when he gets up at night.      Hyperlipidemia: Reviewed labs.  Following with cardiology.  Currently on Praluent injection.    Hyperglycemia: Noted before.  Reviewed blood sugar.    Hypothyroid: Reviewed TSH.  Currently elevated, as it was last year as well.    CAD: Following with cardiology.  No specific changes.    CHF: Again, following with cardiology, Dr. Garcia.  Now on Jardiance.  No specific issues with that so far.            Review of Systems   Constitutional: Negative.    HENT: Negative.     Eyes: Negative.    Respiratory: Negative.     Cardiovascular: Negative.    Gastrointestinal: Negative.    Endocrine: Negative.    Genitourinary: Negative.    Musculoskeletal:  Positive for arthralgias, back pain and gait problem.   Skin:  "Negative.    Allergic/Immunologic: Negative.    Hematological: Negative.    Psychiatric/Behavioral: Negative.         Objective     /88 (BP Location: Left arm, Patient Position: Sitting, Cuff Size: Large)   Pulse 65   Resp 18   Ht 6' 1\" (1.854 m)   Wt 116 kg (255 lb)   SpO2 96%   BMI 33.64 kg/m²     TSH 7.38.  White count 4.7, hemoglobin 16.2, hematocrit 46.9, platelets 160.  Blood sugar 111.  Creatinine 0.74, GFR 91.  Sodium 141, potassium 4.1, calcium 9.2.  AST 19, ALT 16.  Total cholesterol 115, LDL 63, HDL 37.     Physical Exam  Vitals and nursing note reviewed.   Constitutional:       Appearance: Normal appearance. He is well-developed.   HENT:      Head: Normocephalic and atraumatic.   Cardiovascular:      Rate and Rhythm: Normal rate and regular rhythm.      Pulses:           Carotid pulses are 2+ on the right side and 2+ on the left side.     Heart sounds: Normal heart sounds. No murmur heard.     No friction rub. No gallop.   Pulmonary:      Effort: Pulmonary effort is normal. No respiratory distress.      Breath sounds: Normal breath sounds. No wheezing or rales.   Musculoskeletal:      Cervical back: Normal range of motion and neck supple.   Neurological:      Mental Status: He is alert.       Administrative Statements           "

## 2024-08-14 NOTE — ASSESSMENT & PLAN NOTE
Cholesterol doing well.  No specific change.  Recommend check in 6 months.  Continue to follow with cardiology.  Continue with the Praluent.

## 2024-08-14 NOTE — ASSESSMENT & PLAN NOTE
Wt Readings from Last 3 Encounters:   08/14/24 116 kg (255 lb)   07/09/24 115 kg (254 lb)   06/05/24 116 kg (255 lb)     Blood pressure is slightly elevated today at 140/88.

## 2024-08-15 ENCOUNTER — CLINICAL SUPPORT (OUTPATIENT)
Dept: CARDIAC REHAB | Facility: HOSPITAL | Age: 84
End: 2024-08-15
Attending: INTERNAL MEDICINE
Payer: COMMERCIAL

## 2024-08-15 DIAGNOSIS — I50.9 CONGESTIVE HEART FAILURE, UNSPECIFIED HF CHRONICITY, UNSPECIFIED HEART FAILURE TYPE (HCC): Primary | ICD-10-CM

## 2024-08-15 PROCEDURE — 93798 PHYS/QHP OP CAR RHAB W/ECG: CPT

## 2024-08-15 NOTE — PROGRESS NOTES
CARDIAC REHABILITATION ASSESSMENT AND INDIVIDUALIZED TREATMENT PLAN  DISCHARGE          Today's date: 8/15/2024   # of Exercise Sessions Completed: 36  Patient name: Jose Coronado      : 1940  Age: 83 y.o.       MRN: [id number]  Referring Physician: Miguel Ángel Garcia DO  Cardiologist: Dr. Garcia  Provider: Chidi  Clinician: Tessa Bansal, MS, CEP      Comments: Jose has completed his CR program at 36 sessions. He reports he has met his goals and has plans to get home exercise equipment to continue his regular exercise program.      Dx:   Encounter Diagnosis   Name Primary?    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 Failed attempt at PCI of SVG-OM3.      In Dec was admitted, told he had a mild heart attack. He is tired all the time, has trouble breathing. Legs feel week. Also gets angina.           ASSESSMENT    Medical History:   Past Medical History:   Diagnosis Date    Bleeding hemorrhoids     Choledocholithiasis     COVID-19 2022    Symptoms onset 24    Difficulty breathing     Diverticulosis     Ileus (HCC)     Lymphadenopathy of head and neck 2020    Pacemaker        Family History:  Family History   Problem Relation Age of Onset    Lung cancer Mother     Coronary artery disease Father     Diabetes Brother     Lung cancer Family        Allergies:   Other, Atorvastatin, Bee venom, Diphenhydramine, Iodinated contrast media, Lisinopril, Perflutren lipid microsphere, Perflutren lipid  microspheres, Ranolazine, Rosuvastatin, Sacubitril-valsartan, and Simvastatin    Current Medications:   Current Outpatient Medications   Medication Sig Dispense Refill    acetaminophen (TYLENOL) 500 mg tablet Take 500 mg by mouth      Alirocumab (Praluent) 75 MG/ML SOAJ Inject 1 mL under the skin every 14 (fourteen) days 6 mL 1    amoxicillin (AMOXIL) 500 MG tablet 4 TABLETS BY MOUTH 1 HOUR BEFORE DENTIST (Patient not taking: Reported on 12/4/2023)      ascorbic acid (VITAMIN C) 500 MG tablet Take 500 mg by mouth daily      aspirin (ECOTRIN LOW STRENGTH) 81 mg EC tablet Take 1 tablet by mouth daily      Cholecalciferol (VITAMIN D) 2000 units CAPS Take by mouth      Coenzyme Q10 (CO Q 10) 100 MG CAPS Take by mouth      Empagliflozin (Jardiance) 10 MG TABS tablet Take 1 tablet (10 mg total) by mouth every morning 90 tablet 1    furosemide (LASIX) 20 mg tablet Take 20 mg by mouth 2 (two) times a day      isosorbide mononitrate (IMDUR) 60 mg 24 hr tablet Take 1 tablet by mouth daily      levothyroxine (Synthroid) 25 mcg tablet Take 1 tablet (25 mcg total) by mouth daily 90 tablet 1    lidocaine (Lidoderm) 5 % Apply 1 patch topically over 12 hours daily for 10 doses Remove & Discard patch within 12 hours or as directed by MD (Patient not taking: Reported on 7/9/2024) 10 patch 0    losartan (COZAAR) 25 mg tablet Take 0.5 tablets by mouth daily      Magnesium 400 MG TABS Take by mouth      metoprolol succinate (TOPROL-XL) 50 mg 24 hr tablet 2 (two) times a day 1 in the morning & 1 at dinner      Multiple Vitamins-Minerals (MULTIVITAMIN ADULT PO) Take 1 capsule by mouth      nitroglycerin (NITROSTAT) 0.4 mg SL tablet Place 1 tablet under the tongue every 5 (five) minutes as needed (Patient not taking: Reported on 2/19/2024)      rivaroxaban (XARELTO) 20 mg tablet Take 1 tablet (20 mg total) by mouth daily with dinner 28 tablet 0    spironolactone (ALDACTONE) 25 mg tablet Take 25 mg by mouth daily      TURMERIC PO Take 1  Dose by mouth       No current facility-administered medications for this visit.       Medication compliance: Yes   Comments: Pt reports to be compliant with medications    Physical Limitations: B/L knee pain    Fall Risk: Low   Comments: Ambulates with a steady gait with no assist device    Cultural needs: n/a      CAD Risk Factors:  Cholesterol: Yes  HTN: Yes  DM: No  Obesity: Yes   Inactivity: Yes      EXERCISE ASSESSMENT:     Initial Fitness Assessment:   Submaximal TM ETT:  Resting:  BP: 102/62, Exercise:  BP: 112/68, METs:  3.1, ECG Summary: NSR, paced with freq PVCs, NSVT noted, Symptoms: none, and Test terminated at:  RPE 6      Functional Status Prior to Diagnosis for Treatment:   Occupation: retired  Recreation/Physical Activity: none  ADL’s: Capable of performing light to moderate ADLs  [place]: No limitations  Home exercise equipment:  none  Home exercise: no regular aerobic exercise   Other: n/a    Functional Capacity Screening Tool:  [facility] Activity Status Index:  5.72 METs            Post: 5.38 METs    PSYCHOSOCIAL ASSESSMENT:    Depression screening:  PHQ-9 = 3  Post= 3  Interpretation:  1-4 = Minimal Depression  Anxiety screening:  CHAUNCEY-7 = 0  Post=0  Interpretation: 0-4  = Not anxious    Pt self-report of depression and anxiety   Patient reports they are coping well with good social support and denies depression or anxiety  Reports sufficient emotional support. Will not need to reassess PHQ-9 every 30 days.      Self-reported stress level:  4   Stressors: none identified  Stress Management Tools: practice Relaxation Techniques, keep a positive mindset, enjoy a hobby, and spend time with family    Quality of Life Screen:  (Higher score indicates disease impact on QOL)  [facility] COOP score: 23/40     Post: 21     Social Support:   spouse  Community/Social Activities: none     Psychosocial Assessment as it relates to rehabilitation:   Patient denies issues with his/her family or home life that may  "affect their rehabilitation efforts.       NUTRITION ASSESSMENT:    Weight:  255.8 lb       Height:   Ht Readings from Last 1 Encounters:   08/14/24 6' 1\" (1.854 m)       Rate Your Plate Score: 62/81  Post: 63/81    Diabetes: N/A  A1c: n/a    last measured: n/a    Lipid management: Discussed diet and lipid management and Last lipid profile 12/6/2023  Chol 96    HDL 30  LDL 35    Current Dietary Habits:  Trying to follow heart healthy eating plan, low salt, low fat    Drug/Alcohol Use:   No      OTHER CORE COMPONENT ASSESSMENT:    Tobacco Use:     N/A:  Patient is a non-smoker   N/A: Pt has a remote history of smoking    Anginal Symptoms:  chest pressure   NTG use: No prescription        INDIVIDUALIZED TREATMENT PLAN    See outlined plan of care below for specific patient goals in each component of care.        EXERCISE GOAL and PLAN      SMART Goals:   10% improvement in functional capacity based on max METs achieved in initial fitness assessment  reduced dyspnea with physical activity    improved DASI score by 10%  increased exercise capacity by 40% based on peak METs tolerated in cardiac rehab exercise session  maintain > 150 minutes per week of moderate intensity exercise    Patient Specific EXERCISE GOALS:       resume ADLs with increased strength, attend rehab regularly, decrease sitting time, and start a walking program, be able to walk further with less shortness of breath and leg heaviness    Progress toward SMART and personal Exercise goals: Jose has met his goals at completion of his rehab program. He has attended his cardiac rehab sessions regularly 3x/week, he increased his exercise times and intensities and feels stronger and has built endurance, he has shown increase in functional capacity by increasing daily MET levels, he has increased his walking time on treadmill and has not been reporting shortness of breath with walking in rehab. He is limited with walking due to back and leg " pain.    Plan for discharge: Plans to continue regular exercise using home exercise equipment with goal of 150-200 min of aerobic exercise/week  The patient was counseled on exercise guidelines to achieve a minimum of 150 mins/wk of moderate intensity (RPE 4-6)   exercise and encouraged to add 1-2 days of exercise on opposite days of cardiac rehab as tolerated.     Current Aerobic Exercise Prescription:      Frequency: 3 days/week   Supplement with home exercise 2+ days/wk as tolerated       Minutes: 40-45         METS: 2.6-2.8           HR: RHR +30-40bpm   RPE: 4-6         Modalities: Treadmill, UBE, Lifecycle, NuStep, and Recumbent bike     Exercise workloads will be progressed gradually as tolerated, within limits of patient's ability, and according to the patient's   response to the exercise program.      Strength trainin-3 days / week  12-15 repetitions  1-2 sets per modality    Modalities: Pull Downs, Lateral Raise, Arm Extension, Arm Curl, Sit to Stands, Front Raises, and Calf Raises    Home Exercise: none-has plan to get home exercise equipment to maintain regular exercise program    Group and Individual Education: benefit of exercise for CAD risk factors, home exercise guidelines, AHA guidelines to achieve >150 mins/wk of moderate exercise, RPE scale, and Group class: Risk Factors for Heart Disease     Readiness to change: Action:  (Changing behavior)      NUTRITION GOALS AND PLAN    Weight control:    Starting weight: 252 lb   Current weight: 255.8 lb    Nutritional   Reviewed patient's Rate your Plate. Discussed key elements of heart healthy eating. Reviewed patient goals for dietary modifications and their clinical implications.  Reviewed most recent lipid profile.     SMART Goals:   LDL <100, HDL >40, TRG <150, CHOL <200, Improved Rate Your Plate score  >64, 2.5-5%  wt loss, eat 6 or more servings of grain products per day, eat whole grain breads, brown rice and whole grain cereals, eat 5 or more  "servings of fruits and vegetables a day, eat 2 or more servings of low fat milk or yogurt a day, eat no more than 6oz of meat per day, eat red meat once a week or less, eat poultry without the skin, eat meatless meals twice a week or more, choose 1% or skim milk, rarely eat cheese or choose reduced fat or skim, and use \"light\" tub margarine on bread, potatoes and vegetables    Patient Specific NUTRITION GOALS:     Continue current eating plan low salt and low fat    Patient's progress toward SMART and personal Nutrition goals:   Is following heart healthy eating plan with low salt and fat intake, attended group education classes on healthy eating and label reading . Patient reports he is also working to lose weight as he has gained about 4.5 lbs in the last 3 months. Plans to continue working towards weight loss with diet and exercise.      Measurable goals were based Rate Your Plate Dietary Self-Assessment. These are the areas in which the patient could score higher on the assessment.  Goals include recommendations for a heart healthy diet based on American Heart Association.    Group and Individual Education:   heart healthy eating principles  weight loss and management strategies  low sodium diet  maintaining hydration  nutrition for  lipid management  portion control  group class: Heart Healthy Eating  group class:  Label Reading    Readiness to change: Action      PSYCHOSOCIAL ASSESSMENT AND PLAN    Psychosocial Assessment as it relates to rehabilitation:   Patient denies issues with his/her family or home life that may affect their rehabilitation efforts.     SMART Goals:     Feelings in [place] Score < 3, Physical Fitness in [place] Score < 3, Daily Activity in [place] Score < 3, Overall Health in [place] Score < 3, Change in Health in [place] Score < 3 ,  Increased interest and pleasure in doing things, and feel less tired with more energy    Patient Specific PSYCHOCOSOCIAL GOALS:     spend time " with family    Patient's progress toward SMART and personal Psychosocial goals:   does not have specific psychosocial goal-no concerns with depression or anxiety at this time. Would like more energy. He enjoys time with his family and has been traveling recently.        Group and Individual Education: signs/sxs of depression, benefits of a positive support system, stress management techniques, benefits of enrolling in [employer], and depression and CAD    Information to utilize [employer] was provided as well as contact information for counseling through  Behavioral Health and group psychotherapy groups available.    Readiness to change: Maintenance: (Maintaining the behavior change)      OTHER CORE COMPONENTS GOALS and PLAN      Blood Pressure will be monitored throughout the program and cardiologist will be notified of elevated trends.    Pt will be encouraged to monitor home BP if advised by cardiologist.      SMART Goals:   consistent, controlled resting BP < 130/80, reduced angina, and medication compliance    Patient Specific CORE COMPONENT GOALS:    reduced dietary sodium <2000mg, assess daily wt to report an increase greater than 3lbs in a day or 5lbs in a week, and medication compliance    Progress toward SMART and personal Core Component goals:    Has decreased salt intake to help manage CHF and BP, increasing exercise time weekly with goal of 150-200 min/week of aerobic exercise, taking medications as prescribed      Group and Individual Education:  understanding high blood pressure and it's relationship to CAD, components of blood pressure management, low sodium diet and heart failure, and group class:  Common Heart Medications    Readiness to change: Action:  (Changing behavior)

## 2024-08-19 ENCOUNTER — TELEPHONE (OUTPATIENT)
Age: 84
End: 2024-08-19

## 2024-08-19 NOTE — TELEPHONE ENCOUNTER
Patients spouse Yohana called in regards to patient testing positive for Covid today. Spouse stated that patients symptoms started yesterday with fatigue, muscle pain, and a slight sore throat. Spouse would like to know if provider would like patient to come in to be seen.

## 2024-08-19 NOTE — TELEPHONE ENCOUNTER
If he wishes, we can certainly do a virtual visit.  Medications for this would include Paxlovid versus Molnupiravir, would need to see how well he was doing with the symptoms to see if it would make sense to even consider those.

## 2024-08-21 ENCOUNTER — TELEMEDICINE (OUTPATIENT)
Dept: FAMILY MEDICINE CLINIC | Facility: CLINIC | Age: 84
End: 2024-08-21
Payer: COMMERCIAL

## 2024-08-21 DIAGNOSIS — I48.0 PAROXYSMAL ATRIAL FIBRILLATION (HCC): ICD-10-CM

## 2024-08-21 DIAGNOSIS — I50.42 CHRONIC COMBINED SYSTOLIC AND DIASTOLIC CONGESTIVE HEART FAILURE (HCC): ICD-10-CM

## 2024-08-21 DIAGNOSIS — I25.10 3-VESSEL CAD: ICD-10-CM

## 2024-08-21 DIAGNOSIS — I50.42 HYPERTENSIVE HEART DISEASE WITH CHRONIC COMBINED SYSTOLIC AND DIASTOLIC CONGESTIVE HEART FAILURE (HCC): ICD-10-CM

## 2024-08-21 DIAGNOSIS — I11.0 HYPERTENSIVE HEART DISEASE WITH CHRONIC COMBINED SYSTOLIC AND DIASTOLIC CONGESTIVE HEART FAILURE (HCC): ICD-10-CM

## 2024-08-21 DIAGNOSIS — U07.1 COVID-19 VIRUS INFECTION: Primary | ICD-10-CM

## 2024-08-21 PROCEDURE — G2211 COMPLEX E/M VISIT ADD ON: HCPCS | Performed by: FAMILY MEDICINE

## 2024-08-21 PROCEDURE — 99214 OFFICE O/P EST MOD 30 MIN: CPT | Performed by: FAMILY MEDICINE

## 2024-08-21 RX ORDER — MOLNUPIRAVIR 200 MG/1
800 CAPSULE ORAL EVERY 12 HOURS
Qty: 40 CAPSULE | Refills: 0 | Status: SHIPPED | OUTPATIENT
Start: 2024-08-21 | End: 2024-08-26

## 2024-08-21 NOTE — ASSESSMENT & PLAN NOTE
Significant issues with COVID-19.  At this point, we will treat with Molnupiravir given his interactions with Paxlovid and Xarelto, i.e. increased bleeding.  Cannot stop the Xarelto given atrial fibrillation.  Symptomatic treatment with over-the-counter's including Robitussin would be reasonable.  If he has any shortness of breath or other problems, contact the office or go to the emergency room.

## 2024-08-21 NOTE — ASSESSMENT & PLAN NOTE
Patient with heart disease.  Concerns about COVID-19 with exacerbations.  Based on that, I would recommend that he treat COVID-19.  He is Molnupiravir due to medication interactions with Paxlovid.

## 2024-08-21 NOTE — ASSESSMENT & PLAN NOTE
Wt Readings from Last 3 Encounters:   08/14/24 116 kg (255 lb)   07/09/24 115 kg (254 lb)   06/05/24 116 kg (255 lb)       With hypertension, he has an increased risk of problems from COVID-19.  Would recommend treatment.  Will use Molnupiravir secondary to interactions with Xarelto and Paxlovid.

## 2024-08-21 NOTE — PATIENT INSTRUCTIONS
1. COVID-19 virus infection  Assessment & Plan:  Significant issues with COVID-19.  At this point, we will treat with Molnupiravir given his interactions with Paxlovid and Xarelto, i.e. increased bleeding.  Cannot stop the Xarelto given atrial fibrillation.  Symptomatic treatment with over-the-counter's including Robitussin would be reasonable.  If he has any shortness of breath or other problems, contact the office or go to the emergency room.  Orders:  -     molnupiravir (Lagevrio) 200 mg capsule; Take 4 capsules (800 mg total) by mouth every 12 (twelve) hours for 5 days  2. Paroxysmal atrial fibrillation (HCC)  Assessment & Plan:  Patient does have a history of atrial fibrillation.  With the interactions of Paxlovid and anticoagulation, will not use Paxlovid, but rather Molnupiravir.  3. Chronic combined systolic and diastolic congestive heart failure (HCC)  Assessment & Plan:  Wt Readings from Last 3 Encounters:   08/14/24 116 kg (255 lb)   07/09/24 115 kg (254 lb)   06/05/24 116 kg (255 lb)       Increased potential for side effects and problems with COVID-19.  Recommend treatment.  Molnupiravir secondary to medication interactions with Paxlovid.      4. 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)  Assessment & Plan:  Wt Readings from Last 3 Encounters:   08/14/24 116 kg (255 lb)   07/09/24 115 kg (254 lb)   06/05/24 116 kg (255 lb)       With hypertension, he has an increased risk of problems from COVID-19.  Would recommend treatment.  Will use Molnupiravir secondary to interactions with Xarelto and Paxlovid.      5. 3-vessel CAD  Assessment & Plan:  Patient with heart disease.  Concerns about COVID-19 with exacerbations.  Based on that, I would recommend that he treat COVID-19.  He is Molnupiravir due to medication interactions with Paxlovid.      COVID 19 Instructions    Jose Coronado was advised to limit contact with others to essential tasks such  as getting food, medications, and medical care.    Proper handwashing reviewed, and Hand sanitzer when washing is not available.    If the patient develops symptoms of COVID 19, the patient should call the office as soon as possible.    It is strongly recommended that Flu Vaccinations be obtained.      Virtual Visits:  Ayanna: This works on smart phones (any phone with Internet browsing capability).  You should get a text message when the provider is ready to see you.  Click on the link in the text message, and the call should start.  You will need to type in your name, and allow camera and microphone access.  This is HIPPA compliant, and secure.      If you have not already done so, get immunized to COVID 19.      We are committed to getting you vaccinated as soon as possible and will be closely following [facility] and [facility] guidelines as they are released and revised.  Please refer to our COVID-19 vaccine webpage for the most up to date information on the vaccine and our distribution efforts.    This site will also have the most up to date recommendations for COVID booster vaccine.    [email or web address]    Call [phone] ([phone]), option 7    You can also visit https://www.vaccines.gov/ to find vaccines in your area.    OUR LOCATION:    [facility]  [address] [place] [address], [address]  [place], [place], [address] [phone]  Fax: [phone]    Lab services, Rheumatology, and OB/GYN are at this location as well.         Fact Sheet for Patients And Caregivers   Emergency Use Authorization (EUA) Of Molnupiravir For Coronavirus Disease 2019 (COVID-19)   What is the most important information I should know about molnupiravir?   Molnupiravir may cause serious side effects, including:    Molnupiravir may cause harm to your unborn baby. It is not known if molnupiravir will harm your baby if you take molnupiravir during pregnancy.   o Molnupiravir is not  recommended for use in pregnancy.   o Molnupiravir has not been studied in pregnancy. Molnupiravir was studied in pregnant animals only. When molnupiravir was given to pregnant animals, molnupiravir caused harm to their unborn babies.   o You and your healthcare provider may decide that you should take molnupiravir during pregnancy if there are no other COVID-19 treatment options authorized by the FDA that are accessible or clinically appropriate for you.   o If you and your healthcare provider decide that you should take molnupiravir during pregnancy, you and your healthcare provider should discuss the known and potential benefits and the potential risks of taking molnupiravir during pregnancy.     For individuals who are able to become pregnant:    You should use a reliable method of birth control (contraception) consistently and correctly during treatment with molnupiravir and for 4 days after the last dose of molnupiravir. Talk to your healthcare provider about reliable birth control methods.    Before starting treatment with molnupiravir your healthcare provider may do a pregnancy test to see if you are pregnant before starting treatment with molnupiravir.    Tell your healthcare provider right away if you become pregnant or think you may be pregnant during treatment with molnupiravir.     Pregnancy Surveillance Program:    There is a pregnancy surveillance program for individuals who take molnupiravir during pregnancy. The purpose of this program is to collect information about the health of you and your baby. Talk to your healthcare provider about how to take part in this program.    If you take molnupiravir during pregnancy and you agree to participate in the pregnancy surveillance program and allow your healthcare provider to share your information with [email or web address], then your healthcare provider will report your use of molnupiravir during pregnancy to [employer] by calling  "[phone] or [email or web address].     For individuals who are sexually active with partners who are able to become pregnant:    It is not known if molnupiravir can affect sperm. While the risk is regarded as low, animal studies to fully assess the potential for molnupiravir to affect the babies of males treated with molnupiravir have not been completed. A reliable method of birth control (contraception) should be used consistently and correctly during treatment with molnupiravir and for at least 3 months after the last dose. The risk to sperm beyond 3 months is not known. Studies to understand the risk to sperm beyond 3 months are ongoing. Talk to your healthcare provider       about reliable birth control methods. Talk to your healthcare provider if you have questions or concerns about how molnupiravir may affect sperm.     You are being given this fact sheet because your healthcare provider believes it is necessary to provide you with molnupiravir for the treatment of adults with mild-to-moderate coronavirus disease 2019 (COVID-19) with positive results of direct SARS-CoV-2 viral testing, and   who are at high risk for progressing to severe COVID-19 including hospitalization or death, and   for whom other COVID-19 treatment options authorized by the FDA are not accessible or clinically appropriate.   The U.S. Food and Drug Administration (FDA) has issued an Emergency Use Authorization (EUA) to make molnupiravir available during the COVID-19 pandemic (for more details about an EUA please see \"What is an Emergency Use Authorization?\" at the end of this document). Molnupiravir is not an FDA-approved medicine in the [place]. Read this Fact Sheet for information about molnupiravir. Talk to your healthcare provider about your options if you have any questions. It is your choice to take molnupiravir.   What is COVID-19?   COVID-19 is caused by a virus called a coronavirus. You can get COVID-19 through " "close contact with another person who has the virus.   COVID-19 illnesses have ranged from very mild-to-severe, including illness resulting in death. While information so far suggests that most COVID-19 illness is mild, serious illness can happen and may cause some of your other medical conditions to become worse. Older people and people of all ages with severe, long lasting (chronic) medical conditions like heart disease, lung disease and diabetes, for example seem to be at higher risk of being hospitalized for COVID-19.   What is molnupiravir?   Molnupiravir is an investigational medicine used to treat mild-to-moderate COVID-19 in adults:    with positive results of direct SARS-CoV-2 viral testing, and    who are at high risk for progressing to severe COVID-19 including hospitalization or death, and for whom other COVID-19 treatment options authorized by the FDA are not accessible or clinically appropriate.     The FDA has authorized the emergency use of molnupiravir for the treatment of mild-to-moderate COVID-19 in adults under an EUA. For more information on EUA, see the \"What is an Emergency Use Authorization (EUA)?\" section at the end of this Fact Sheet.   Molnupiravir is not authorized:    for use in people less than 18 years of age.    for prevention of COVID-19.    for people needing hospitalization for COVID-19.    for use for longer than 5 consecutive days.     What should I tell my healthcare provider before I take molnupiravir?   Tell your healthcare provider if you:    Have any allergies    Are breastfeeding or plan to breastfeed    Have any serious illnesses    Are taking any medicines (prescription, over-the-counter, vitamins, or herbal products).     How do I take molnupiravir?    Take molnupiravir exactly as your healthcare provider tells you to take it.    Take 4 capsules of molnupiravir every 12 hours (for example, at 8 am and at 8 pm)    Take molnupiravir for 5 days. It is important that you " "complete the full 5 days of treatment with molnupiravir. Do not stop taking molnupiravir before you complete the full 5 days of treatment, even if you feel better.    Take molnupiravir with or without food.    You should stay in isolation for as long as your healthcare provider tells you to. Talk to your healthcare provider if you are not sure about how to properly isolate while you have COVID-19.    Swallow molnupiravir capsules whole. Do not open, break, or crush the capsules. If you cannot swallow capsules whole, tell your healthcare provider.    What to do if you miss a dose: o If it has been less than 10 hours since the missed dose, take it as soon as you remember   o If it has been more than 10 hours since the missed dose, skip the missed dose and take your dose at the next scheduled time.      Do not double the dose of molnupiravir to make up for a missed dose.     What are the important possible side effects of molnupiravir?   Possible side effects of molnupiravir are:    See, \"What is the most important information I should know about molnupiravir?\"    diarrhea    nausea    dizziness     These are not all the possible side effects of molnupiravir. Not many people have taken molnupiravir. Serious and unexpected side effects may happen. This medicine is still being studied, so it is possible that all of the risks are not known at this time.   What other treatment choices are there?   Like molnupiravir, FDA may allow for the emergency use of other medicines to treat people with COVID-19. Go to https://www.fda.gov/emergency-preparedness-and-response/mcm-legal-regulatory-and-policy-framework/emergency-use-authorization for more information.   It is your choice to be treated or not to be treated with molnupiravir. Should you decide not to take it, it will not change your standard medical care.   What if I am breastfeeding?   Breastfeeding is not recommended during treatment with molnupiravir and for 4 days after " the last dose of molnupiravir. If you are breastfeeding or plan to breastfeed, talk to your healthcare provider about your options and specific situation before taking molnupiravir.   How do I report side effects with molnupiravir?   Contact your healthcare provider if you have any side effects that bother you or do not go away.   Report side effects to FDA MedWatch at www.fda.gov/medwatch or call 6-351-IDB-8286 (1- [phone]).   How should I store molnupiravir?    Store molnupiravir capsules at room temperature between 68°F to 77°F (20°C to 25°C).    Keep molnupiravir and all medicines out of the reach of children and pets.     How can I learn more about COVID-19?    Ask your healthcare provider.    Visit www.cdc.gov/COVID19    Contact your local or state public health department.    Call [employer] at [phone] (toll free in the [place]    Visit [email or web address]     What Is an Emergency Use Authorization (EUA)?   The [place] FDA has made molnupiravir available under an emergency access mechanism called an Emergency Use Authorization (EUA) The EUA is supported by a [place] of Health and Human Service (HHS) declaration that circumstances exist to justify emergency use of drugs and biological products during the COVID-19 pandemic.   Molnupiravir for the treatment of mild-to-moderate COVID-19 in adults with positive results of direct SARS-CoV-2 viral testing, who are at high risk for progression to severe COVID-19, including hospitalization or death, and for whom alternative COVID-19 treatment options authorized by FDA are not accessible or clinically appropriate, has not undergone the same type of review as an FDA-approved product. In issuing an EUA under the COVID-19 public health emergency, the FDA has determined, among other things, that based on the total amount of scientific evidence available including data from adequate and well-controlled clinical trials, if available, it is  reasonable to believe that the product may be effective for diagnosing, treating, or preventing COVID-19, or a serious or life-threatening disease or condition caused by COVID- 19; that the known and potential benefits of the product, when used to diagnose, treat, or prevent such disease or condition, outweigh the known and potential risks of such product; and that there are no adequate, approved, and available alternatives.   All of these criteria must be met to allow for the product to be used in the treatment of patients during the COVID-19 pandemic. The EUA for molnupiravir is in effect for the duration of the COVID-19 declaration justifying emergency use of molnupiravir, unless terminated or revoked (after which molnupiravir may no longer be used under the EUA).   For patent information: www.[employer]/research/patent   Copyright © 2021 [employer] [place], [place] and its affiliates.   All rights reserved.   [id number]   Issued: 12/23/2021

## 2024-08-21 NOTE — ASSESSMENT & PLAN NOTE
Wt Readings from Last 3 Encounters:   08/14/24 116 kg (255 lb)   07/09/24 115 kg (254 lb)   06/05/24 116 kg (255 lb)       Increased potential for side effects and problems with COVID-19.  Recommend treatment.  Molnupiravir secondary to medication interactions with Paxlovid.

## 2024-08-21 NOTE — PROGRESS NOTES
Ambulatory Visit  Name: Jose Coronado      : 1940      MRN: [id number]  Encounter Provider: Tray Ocasio MD  Encounter Date: 2024   [facility]    Assessment & Plan   1. COVID-19 virus infection  Assessment & Plan:  Significant issues with COVID-19.  At this point, we will treat with Molnupiravir given his interactions with Paxlovid and Xarelto, i.e. increased bleeding.  Cannot stop the Xarelto given atrial fibrillation.  Symptomatic treatment with over-the-counter's including Robitussin would be reasonable.  If he has any shortness of breath or other problems, contact the office or go to the emergency room.  Orders:  -     molnupiravir (Lagevrio) 200 mg capsule; Take 4 capsules (800 mg total) by mouth every 12 (twelve) hours for 5 days  2. Paroxysmal atrial fibrillation (HCC)  Assessment & Plan:  Patient does have a history of atrial fibrillation.  With the interactions of Paxlovid and anticoagulation, will not use Paxlovid, but rather Molnupiravir.  3. Chronic combined systolic and diastolic congestive heart failure (HCC)  Assessment & Plan:  Wt Readings from Last 3 Encounters:   24 116 kg (255 lb)   24 115 kg (254 lb)   24 116 kg (255 lb)       Increased potential for side effects and problems with COVID-19.  Recommend treatment.  Molnupiravir secondary to medication interactions with Paxlovid.    4. 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)  Assessment & Plan:  Wt Readings from Last 3 Encounters:   24 116 kg (255 lb)   24 115 kg (254 lb)   24 116 kg (255 lb)       With hypertension, he has an increased risk of problems from COVID-19.  Would recommend treatment.  Will use Molnupiravir secondary to interactions with Xarelto and Paxlovid.    5. 3-vessel CAD  Assessment & Plan:  Patient with heart disease.  Concerns about COVID-19 with  exacerbations.  Based on that, I would recommend that he treat COVID-19.  He is Molnupiravir due to medication interactions with Paxlovid.       History of Present Illness   {Disappearing Hyperlinks I Encounters * My Last Note * Since Last Visit * History :59772}  HPI    Review of Systems  {Select to Display PMH (Optional):92627}  Objective   {Disappearing Hyperlinks   Review Vitals * Enter New Vitals * Results Review * Labs * Imaging * Cardiology * Procedures * Lung Cancer Screening :42432}  There were no vitals taken for this visit.    Physical Exam  Administrative Statements {Disappearing Hyperlinks I  Level of Service * PCMH/PCSP:78088}  {Time Spent Statement (Optional):59361}

## 2024-08-21 NOTE — PROGRESS NOTES
COVID-19 Outpatient Progress Note  Name: Jose Coronado      : 1940      MRN: [id number]  Encounter Provider: Tray Ocasio MD  Encounter Date: 2024   Encounter department: [facility]    Assessment & Plan   1. COVID-19 virus infection  Assessment & Plan:  Significant issues with COVID-19.  At this point, we will treat with Molnupiravir given his interactions with Paxlovid and Xarelto, i.e. increased bleeding.  Cannot stop the Xarelto given atrial fibrillation.  Symptomatic treatment with over-the-counter's including Robitussin would be reasonable.  If he has any shortness of breath or other problems, contact the office or go to the emergency room.  Orders:  -     molnupiravir (Lagevrio) 200 mg capsule; Take 4 capsules (800 mg total) by mouth every 12 (twelve) hours for 5 days  2. Paroxysmal atrial fibrillation (HCC)  Assessment & Plan:  Patient does have a history of atrial fibrillation.  With the interactions of Paxlovid and anticoagulation, will not use Paxlovid, but rather Molnupiravir.  3. Chronic combined systolic and diastolic congestive heart failure (HCC)  Assessment & Plan:  Wt Readings from Last 3 Encounters:   24 116 kg (255 lb)   24 115 kg (254 lb)   24 116 kg (255 lb)       Increased potential for side effects and problems with COVID-19.  Recommend treatment.  Molnupiravir secondary to medication interactions with Paxlovid.      4. 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)  Assessment & Plan:  Wt Readings from Last 3 Encounters:   24 116 kg (255 lb)   24 115 kg (254 lb)   24 116 kg (255 lb)       With hypertension, he has an increased risk of problems from COVID-19.  Would recommend treatment.  Will use Molnupiravir secondary to interactions with Xarelto and Paxlovid.      5. 3-vessel CAD  Assessment & Plan:  Patient with heart disease.  Concerns about COVID-19  with exacerbations.  Based on that, I would recommend that he treat COVID-19.  He is Molnupiravir due to medication interactions with Paxlovid.    Disposition:     Patient was advised that they can go back to your normal activities when, for at least 24 hours, both are true: their symptoms are getting better overall and they have not had a fever (and are not using fever-reducing medication).    When going back to your normal activities, take added precaution over the next 5 days, such as taking additional steps for  air, hygiene, masks, physical distancing, and/or testing when you will be around other people indoors. You may still be able to spread the virus that made you sick, even if you are feeling better.    If patient develops a fever or starts to feel worse after they have gone back to normal activities, stay home and away from others again until, for at least 24 hours, both are true: symptoms are improving overall and they have not had a fever (and are not using fever-reducing medication). Then take added precaution for the next 5 days.      Discussed symptom directed medication options with patient.     Patient meets criteria for Molnupiravir and they have been counseled according to EUA documentation provided by the FDA. After discussion, patient agrees to treatment.    Molnupiravir is an investigational medicine used to treat mild-to-moderate COVID-19 in adults with positive results of direct SARS-CoV-2 viral testing, and who are at high risk for progressing to severe COVID-19 including hospitalization or death, and for whom other COVID-19 treatment options authorized by the FDA are not accessible or clinically appropriate.    The FDA has authorized the emergency use of molnupiravir for the treatment of mild-tomoderate COVID-19 in adults under an EUA.    Molnupiravir is not authorized:  - for use in people less than 18 years of age.  - for prevention of COVID-19.  - for people needing  hospitalization for COVID-19.  - for use for longer than 5 consecutive days    What is the most important information I should know about molnupiravir?    Molnupiravir may cause serious side effects, including:    Molnupiravir may cause harm to your unborn baby. It is not known if molnupiravir will harm your baby if you take molnupiravir during pregnancy.  - Molnupiravir is not recommended for use in pregnancy.  - Molnupiravir has not been studied in pregnancy. Molnupiravir was studied in pregnant animals only. When molnupiravir was given to pregnant animals, molnupiravir caused harm to their unborn babies.  - You and your healthcare provider may decide that you should take molnupiravir duringpregnancy if there are no other COVID-19 treatment options authorized by the FDA that are accessible or clinically appropriate for you.  - If you and your healthcare provider decide that you should take molnupiravir during pregnancy, you and your healthcare provider should discuss the known and potential benefits and the potential risks of taking molnupiravir during pregnancy.    For individuals who are able to become pregnant:  - You should use a reliable method of birth control (contraception) consistently and correctly during treatment with molnupiravir and for 4 days after the last dose of molnupiravir. Talk to your healthcare provider about reliable birth control methods.  - Before starting treatment with molnupiravir your healthcare provider may do a pregnancy test to see if you are pregnant before starting treatment with molnupiravir.  - Tell your healthcare provider right away if you become pregnant or think you may be pregnant during treatment with molnupiravir.    Pregnancy Surveillance Program:  - There is a pregnancy surveillance program for individuals who take molnupiravir during pregnancy. The purpose of this program is to collect information about the health of you and your baby. Talk to your healthcare provider  about how to take part in this program.  - If you take molnupiravir during pregnancy and you agree to participate in the pregnancy surveillance program and allow your healthcare provider to share your information with [employer], then your healthcare provider will report your use of molnupiravir during pregnancy to [employer]. by calling [phone] or pregnancyreporting.[employer].    For individuals who are sexually active with partners who are able to become pregnant:  - It is not known if molnupiravir can affect sperm. While the risk is regarded as low, animal studies to fully assess the potential for molnupiravir to affect the babies of males treated with molnupiravir have not been completed. A reliable method of birth control (contraception) should be used consistently and correctly during treatment with molnupiravir and for at least 3 months after the last dose. The risk to sperm beyond 3 months is not known. Studies to understand the risk to sperm beyond 3 months are ongoing. Talk to your healthcare provider about reliable birth control methods. Talk to your healthcare provider if you have questions or concerns about how molnupiravir may affect sperm.    How do I take molnupiravir?    - Take molnupiravir exactly as your healthcare provider tells you to take it.  - Take 4 capsules of molnupiravir every 12 hours (for example, at 8 am and at 8 pm)  - Take molnupiravir for 5 days. It is important that you complete the full 5 days of treatment with molnupiravir. Do not stop taking molnupiravir before you complete the full 5 days of treatment, even if you feel better.  - Take molnupiravir with or without food.  - You should stay in isolation for as long as your healthcare provider tells you to. Talk to your healthcare provider if you are not sure about how to properly isolate while you have COVID-19.  - Swallow molnupiravir capsules whole. Do not open, break, or crush the capsules. If you  cannot swallow capsules whole, tell your healthcare provider.    What to do if you miss a dose:  - If it has been less than 10 hours since the missed dose, take it as soon as you remember  - If it has been more than 10 hours since the missed dose, skip the missed dose and take your dose at the next scheduled time.  - Do not double the dose of molnupiravir to make up for a missed dose.    What are the important possible side effects of molnupiravir?    Possible side effects of molnupiravir are:  - See, “What is the most important information I should know about molnupiravir?”  - Diarrhea  - Nausea  - Dizziness    These are not all the possible side effects of molnupiravir. Not many people have taken molnupiravir. Serious and unexpected side effects may happen. This medicine is still being studied, so it is possible that all of the risks are not known at this time.    What other treatment choices are there?    Like molnupiravir, FDA may allow for the emergency use of other medicines to treat people with COVID-19. Go to https://www.fda.gov/emergency-preparedness-and-response/mcm-legalregulatory-and-policy-framework/emergency-use-authorization for more information.  It is your choice to be treated or not to be treated with molnupiravir. Should you decide not to take it, it will not change your standard medical care.    What if I am breastfeeding?  Breastfeeding is not recommended during treatment with molnupiravir and for 4 days after the last dose of molnupiravir. If you are breastfeeding or plan to breastfeed, talk to your healthcare provider about your options and specific situation before taking molnupiravir.    How do I report side effects with molnupiravir?    Contact your healthcare provider if you have any side effects that bother you or do not go away. Report side effects to FDA MedWatch at www.fda.gov/medwatch or call 2-110-PKV-7274 (1-655.791.6327).    How should I store molnupiravir?  - Store molnupiravir  capsules at room temperature between 68°F to 77°F (20°C to 25°C).  - Keep molnupiravir and all medicines out of the reach of children and pets.    Full fact sheet for patients, parents, and caregivers can be found at: https://www.fda.gov/media/388963/download    I have spent a total time of 40 minutes on the day of the encounter for this patient including discussing diagnostic results, discussing prognosis, risks and benefits of treatment options, instructions for management, patient and family education, importance of treatment compliance, risk factor reductions, impressions, counseling/coordination of care, documenting in the medical record, reviewing/ordering tests, medicine, procedures and obtaining or reviewing history. Wife was on the phone call with patient         Encounter provider: Tray Ocasio MD     Provider located at: [address] [address] [phone]     Recent Visits  Date Type Provider Dept   08/14/24 Office Visit Tray Ocasio MD [facility]   Showing recent visits within past 7 days and meeting all other requirements  Today's Visits  Date Type Provider Dept   08/21/24 Telemedicine Tray Ocasio MD [facility]   Showing today's visits and meeting all other requirements  Future Appointments  No visits were found meeting these conditions.  Showing future appointments within next 150 days and meeting all other requirements    History of Present Illness      This virtual check-in was done via telephone and he agrees to proceed.    Patient agrees to participate in a virtual check in via telephone or video visit instead of presenting to the office to address urgent/immediate medical needs. Patient is aware this is a billable service. He acknowledged consent and understanding of privacy and security of the video platform. The patient has agreed to participate and understands they  can discontinue the visit at any time.    After connecting through Telephone, the patient was identified by name and date of birth. Jose Coronado was informed that this was a telemedicine visit and that the exam was being conducted confidentially over secure lines. My office door was closed. No one else was in the room. Jose Coronado acknowledged consent and understanding of privacy and security of the telemedicine visit. I informed the patient that I have reviewed his record in Epic and presented the opportunity for him to ask any questions regarding the visit today. The patient agreed to participate.     Verification of patient location:  Patient is located in the following state in which I hold an active license: [place]    Subjective:   Jose Coronado is a 83 y.o. male who has been screened for COVID-19. Patient's symptoms include fatigue (legs tired and weak, feels tired overall.), malaise, nasal congestion, sore throat, cough, diarrhea and headache. Patient denies fever, shortness of breath, chest tightness, abdominal pain, nausea and vomiting.     - Date of positive COVID-19 test: 8/19/2024. Type of test: Home antigen. Patient with typical symptoms of COVID-19 and they attest that they were positive on home rapid antigen testing. Image of positive result is not able to be uploaded into their chart.     COVID-19 vaccination status: Not vaccinated    Jose has been staying home and has isolated themselves in his home. He is taking care to not share personal items and is cleaning all surfaces that are touched often, like counters, tabletops, and doorknobs using household cleaning sprays or wipes. He is wearing a mask when he leaves his room.     Monoclonal Antibody Follow-up Symptom Questionnaire  I feel overall: much better  My breathing is: much better  My fever is: better  My fatigue is: somewhat better    Daughter with COVID. He now has some symptoms.  Has had in past.      Lab Results   Component Value Date     SARSCOV2 Negative 12/06/2021    SARSCOV2 Detected (A) 12/15/2020    SARSCORONAVI Not Detected 02/03/2023    SARSCOVAG Negative 02/19/2024    SARSCOVAGH Positive (Patient Reported) (A) 05/23/2022       Review of Systems   Constitutional:  Positive for fatigue (legs tired and weak, feels tired overall.). Negative for fever.   HENT:  Positive for congestion and sore throat.    Respiratory:  Positive for cough. Negative for chest tightness and shortness of breath.    Gastrointestinal:  Positive for diarrhea. Negative for abdominal pain, nausea and vomiting.   Neurological:  Positive for headaches.     Objective     There were no vitals taken for this visit.    Physical Exam  Nursing note reviewed.   Constitutional:       Comments: Virtual phone call.  Unable to fully examine patient.  Video was unavailable.   Neurological:      Mental Status: He is alert.

## 2024-08-21 NOTE — ASSESSMENT & PLAN NOTE
Patient does have a history of atrial fibrillation.  With the interactions of Paxlovid and anticoagulation, will not use Paxlovid, but rather Molnupiravir.

## 2024-09-04 NOTE — PROGRESS NOTES
Assessment:  1. Lumbar radiculopathy    2. Lumbar spondylosis    3. Disc degeneration, lumbar    4. Spinal stenosis of lumbar region, unspecified whether neurogenic claudication present        Plan:  Patient will be scheduled for a left L3 and L4 TFESI to address the inflammatory component the patient's pain.  We will first request permission for patient to hold Xarelto for 3 days prior to procedure.  Complete risks and benefits including bleeding, infection, tissue reaction, nerve injury and allergic reaction were discussed. The patient was agreeable and verbalized an understanding  I will restart gabapentin 300 mg nightly and titrate to 300 mg twice daily for neuropathic complaints.  I advised the patient that if they experience any side effects or issues with the changes in their medication regiment, they should give our office a call to discuss. I also advised the patient not to drive or operate machinery until they see how the changes in the medication regimen affects them. The patient was agreeable and verbalized an understanding.  Will avoid NSAIDs secondary to anticoagulation  Patient may continue Tylenol as needed  Follow-up after procedure or sooner if needed    History of Present Illness:    The patient is a 83 y.o. male with a history of CAD status post stent placement, ICD, CHF and chronic anticoagulation last seen on 06/04/2024  who presents for a follow up office visit in regards to chroniclumbosacral back pain that radiates into the posterior aspect of the left lower extremity.  He denies right-sided symptoms, bowel or bladder incontinence or saddle anesthesia.  Patient has seen neurosurgery since her last office visit who did not recommend surgery as at that time, his symptoms were mostly resolved.  He was taking gabapentin 300 mg nightly but stopped once his pain had improved.    CT of the lumbar spine from June 2024 demonstrates multilevel degenerative disc disease and arthritis with varying  degrees of central and foraminal stenosis most significant at L2-3 and L3-4 where there is severe central stenosis    The patient rates his pain an 8 out of 10 on the numeric pain rating scale.  Pain is intermittent in the morning and at night and is described as throbbing and numbness.  He uses Tylenol without much relief    I have personally reviewed and/or updated the patient's past medical history, past surgical history, family history, social history, current medications, allergies, and vital signs today.       Review of Systems:    Review of Systems   Respiratory:  Positive for shortness of breath.    Cardiovascular:  Negative for chest pain.   Gastrointestinal:  Negative for constipation, diarrhea, nausea and vomiting.   Musculoskeletal:  Positive for back pain and gait problem. Negative for arthralgias, joint swelling and myalgias.   Skin:  Negative for rash.   Neurological:  Negative for dizziness, seizures and weakness.   All other systems reviewed and are negative.        Past Medical History:   Diagnosis Date    Bleeding hemorrhoids     Choledocholithiasis     COVID-19 05/23/2022    Symptoms onset 5/20/22    Difficulty breathing     Diverticulosis     Ileus (HCC)     Lymphadenopathy of head and neck 09/29/2020    Pacemaker        Past Surgical History:   Procedure Laterality Date    APPENDECTOMY      CHOLECYSTECTOMY LAPAROSCOPIC      CORONARY ANGIOPLASTY      CORONARY ANGIOPLASTY WITH STENT PLACEMENT      CORONARY ARTERY BYPASS GRAFT  1999    ERCP      KNEE SURGERY Right 2023    TONSILLECTOMY         Family History   Problem Relation Age of Onset    Lung cancer Mother     Coronary artery disease Father     Diabetes Brother     Lung cancer Family        Social History     Occupational History    Occupation: retired   Tobacco Use    Smoking status: Never    Smokeless tobacco: Never   Vaping Use    Vaping status: Never Used   Substance and Sexual Activity    Alcohol use: Not Currently     Comment: social     Drug use: No    Sexual activity: Not on file         Current Outpatient Medications:     acetaminophen (TYLENOL) 500 mg tablet, Take 500 mg by mouth, Disp: , Rfl:     Alirocumab (Praluent) 75 MG/ML SOAJ, Inject 1 mL under the skin every 14 (fourteen) days, Disp: 6 mL, Rfl: 1    ascorbic acid (VITAMIN C) 500 MG tablet, Take 500 mg by mouth daily, Disp: , Rfl:     aspirin (ECOTRIN LOW STRENGTH) 81 mg EC tablet, Take 1 tablet by mouth daily, Disp: , Rfl:     Cholecalciferol (VITAMIN D) 2000 units CAPS, Take by mouth, Disp: , Rfl:     Coenzyme Q10 (CO Q 10) 100 MG CAPS, Take by mouth, Disp: , Rfl:     Empagliflozin (Jardiance) 10 MG TABS tablet, Take 1 tablet (10 mg total) by mouth every morning, Disp: 90 tablet, Rfl: 1    gabapentin (NEURONTIN) 300 mg capsule, Take 1 PO HS x 5 days, then 1 PO BID, Disp: 60 capsule, Rfl: 1    Magnesium 400 MG TABS, Take by mouth, Disp: , Rfl:     metoprolol succinate (TOPROL-XL) 50 mg 24 hr tablet, 2 (two) times a day 1 in the morning & 1 at dinner, Disp: , Rfl:     Multiple Vitamins-Minerals (MULTIVITAMIN ADULT PO), Take 1 capsule by mouth, Disp: , Rfl:     rivaroxaban (XARELTO) 20 mg tablet, Take 1 tablet (20 mg total) by mouth daily with dinner, Disp: 28 tablet, Rfl: 0    spironolactone (ALDACTONE) 25 mg tablet, Take 25 mg by mouth daily, Disp: , Rfl:     TURMERIC PO, Take 1 Dose by mouth, Disp: , Rfl:     amoxicillin (AMOXIL) 500 MG tablet, 4 TABLETS BY MOUTH 1 HOUR BEFORE DENTIST (Patient not taking: Reported on 12/4/2023), Disp: , Rfl:     furosemide (LASIX) 20 mg tablet, Take 20 mg by mouth 2 (two) times a day, Disp: , Rfl:     isosorbide mononitrate (IMDUR) 60 mg 24 hr tablet, Take 1 tablet by mouth daily, Disp: , Rfl:     levothyroxine (Synthroid) 25 mcg tablet, Take 1 tablet (25 mcg total) by mouth daily, Disp: 90 tablet, Rfl: 1    lidocaine (Lidoderm) 5 %, Apply 1 patch topically over 12 hours daily for 10 doses Remove & Discard patch within 12 hours or as directed by MD  "(Patient not taking: Reported on 7/9/2024), Disp: 10 patch, Rfl: 0    losartan (COZAAR) 25 mg tablet, Take 0.5 tablets by mouth daily, Disp: , Rfl:     nitroglycerin (NITROSTAT) 0.4 mg SL tablet, Place 1 tablet under the tongue every 5 (five) minutes as needed (Patient not taking: Reported on 2/19/2024), Disp: , Rfl:     Allergies   Allergen Reactions    Other Other (See Comments)     Pain in back after contrast used for echos, Definity    Atorvastatin Cough     Per pt has had a problem with other statins also, does not remember names    Bee Venom     Diphenhydramine Other (See Comments)    Iodinated Contrast Media Other (See Comments)    Lisinopril Other (See Comments)     cough    Perflutren Lipid Microsphere     Perflutren Lipid Microspheres Other (See Comments)     severe back lower back spasm    Ranolazine      Alters mood.    Rosuvastatin Myalgia    Sacubitril-Valsartan Other (See Comments)     \"severe back pain\"    Simvastatin Myalgia       Physical Exam:    /77   Pulse 73   Ht 6' 1\" (1.854 m)   Wt 114 kg (251 lb)   BMI 33.12 kg/m²     Constitutional:normal, well developed, well nourished, alert, in no distress and non-toxic and no overt pain behavior.  Eyes:anicteric  HEENT:grossly intact  Neck:supple, symmetric, trachea midline and no masses   Pulmonary:even and unlabored  Cardiovascular:No edema or pitting edema present  Skin:Normal without rashes or lesions and well hydrated  Psychiatric:Mood and affect appropriate  Neurologic:Cranial Nerves II-XII grossly intact  Musculoskeletal:antalgic gait.  Bilateral lower extremity strength 5 out of 5 in all muscle groups.  Positive straight leg raise on the left      Imaging  FL spine and pain procedure    (Results Pending)     CT SPINE LUMBAR WO CONTRAST     INDICATION:   worsening back pain; r/o spinal compression.     Worsening back pain x2 weeks. Trying PT without relief     \Per epic chart review: 83-year-old male with past medical history of CAD, " pacemaker insertion, ischemic cardiomyopathy, CHF, HLD, HTN, AF, presenting to the ED for evaluation of back pain. Patient states that he has had chronic back pain for years and   is currently following with pain management. Started PT in December 2023 for back pain. States that it helps a bit. Takes Tylenol for pain control. However, over the last several days, has been feeling worsening low back pain, particularly on the L side.   No trauma. States that beginning a few days ago, pain is now radiating from L low back to the R low back. Pain does not radiate to the lower extremities. Denies any saddle anesthesia, urinary or bowel incontinence, fevers, chills. States that he   ambulates with a walker. Reports that pain is worse with twisting of the torso and with bending. Denies abdominal pain, chest pain, SOB, urinary complaints.     COMPARISON: Sacroiliac joints and lumbar spine radiographs 9/7/2023     TECHNIQUE:  Contiguous axial images through the lumbar spine were obtained. Sagittal and coronal reconstructions were performed.        Radiation dose length product (DLP) for this visit:  1272.58 mGy-cm .  This examination, like all CT scans performed in the [facility], was performed utilizing techniques to minimize radiation dose exposure, including the use of   iterative reconstruction and automated exposure control.     IMAGE QUALITY:  Diagnostic.     FINDINGS:     ALIGNMENT:  There is a transitional lumbosacral junction - sacralized L5 vertebral body.  Normal alignment of the lumbar spine.  No spondylolysis or spondylolisthesis.     VERTEBRAE:  No acute fracture.  No lytic or blastic lesion.     DEGENERATIVE CHANGES: Multilevel osteophytes, disc height loss, vacuum disc phenomenon, facet arthropathy, and enlarged abutting spinous processes. Severe disc height loss at L2-L3. 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     L1-L2: Diffuse disc bulge, narrowed bilateral subarticular zones. Facet arthropathy, ligamentum flavum thickening. Moderate canal stenosis. Moderate right and mild left foraminal narrowing.     L2-L3: Diffuse disc bulge, narrowed bilateral subarticular zones. Hypertrophic facet arthropathy, ligamentum flavum thickening. Severe canal stenosis. Right and mild left foraminal narrowing.     L3-L4: Diffuse disc bulge, narrowed bilateral subarticular zones. Hypertrophic facet arthropathy, ligamentum flavum thickening. Severe canal stenosis. Severe bilateral foraminal narrowing.     L4-L5: Diffuse disc bulge. Hypertrophic facet arthropathy, ligamental finding. Mild canal stenosis. Moderate bilateral foraminal narrowing.     L5-S1: Sacralized L5 vertebral body. No significant canal stenosis or foraminal narrowing.     PARASPINAL SOFT TISSUES: Moderate diffuse fatty infiltration of paraspinal lumbar musculature.     OTHER: Partially imaged prominent exophytic right renal cyst and diffuse calcified atherosclerotic disease.     IMPRESSION:     No acute osseous abnormality lumbar spine.     Multilevel degenerative changes of lumbar spine with transitional lumbosacral anatomy (sacralized L5 vertebral body), congenitally short pedicles, varying degrees of canal stenosis (severe L2-L3 and L3-L4) and foraminal narrowing (severe bilateral   L3-L4), as detailed above. Consider consultation with spine surgery. Considerations related to the patient's age and/or comorbidities may be used to alter these recommendations.     The study was marked in EPIC for immediate notification.          Orders Placed This Encounter   Procedures    FL spine and pain procedure

## 2024-09-05 ENCOUNTER — TELEPHONE (OUTPATIENT)
Dept: RADIOLOGY | Facility: CLINIC | Age: 84
End: 2024-09-05

## 2024-09-05 ENCOUNTER — OFFICE VISIT (OUTPATIENT)
Dept: PAIN MEDICINE | Facility: CLINIC | Age: 84
End: 2024-09-05
Payer: COMMERCIAL

## 2024-09-05 VITALS
HEIGHT: 73 IN | SYSTOLIC BLOOD PRESSURE: 125 MMHG | HEART RATE: 73 BPM | BODY MASS INDEX: 33.27 KG/M2 | WEIGHT: 251 LBS | DIASTOLIC BLOOD PRESSURE: 77 MMHG

## 2024-09-05 DIAGNOSIS — M48.061 SPINAL STENOSIS OF LUMBAR REGION, UNSPECIFIED WHETHER NEUROGENIC CLAUDICATION PRESENT: ICD-10-CM

## 2024-09-05 DIAGNOSIS — M54.16 LUMBAR RADICULOPATHY: Primary | ICD-10-CM

## 2024-09-05 DIAGNOSIS — M51.36 DISC DEGENERATION, LUMBAR: ICD-10-CM

## 2024-09-05 DIAGNOSIS — M47.816 LUMBAR SPONDYLOSIS: ICD-10-CM

## 2024-09-05 PROCEDURE — G2211 COMPLEX E/M VISIT ADD ON: HCPCS | Performed by: NURSE PRACTITIONER

## 2024-09-05 PROCEDURE — 1159F MED LIST DOCD IN RCRD: CPT | Performed by: NURSE PRACTITIONER

## 2024-09-05 PROCEDURE — 99214 OFFICE O/P EST MOD 30 MIN: CPT | Performed by: NURSE PRACTITIONER

## 2024-09-05 PROCEDURE — 1160F RVW MEDS BY RX/DR IN RCRD: CPT | Performed by: NURSE PRACTITIONER

## 2024-09-05 RX ORDER — GABAPENTIN 300 MG/1
CAPSULE ORAL
Qty: 60 CAPSULE | Refills: 1 | Status: SHIPPED | OUTPATIENT
Start: 2024-09-05

## 2024-09-05 NOTE — TELEPHONE ENCOUNTER
Jose Coronado    1940    Anti-Coagulant:  Xarelto (3 day hold)      Dear Dr. Garcia        The above patient is being considered for a neuraxial injection, such as an epidural steroid injection, nerve root block, etc. For the management of their pain.  However, the patient is currently on an anticoagulant per your order.  The American Society of Regional Anest[employer] Guideline on neuraxial procedures and anti-coagulation indicates that medication should be held as follows:        Rivaroxabn (Xarelto) 3 days prior        Your permission to discontinue this anti-coagulant is necessary in order to proceed with this procedure.  We will instruct the patient to restart their anti-coagulant immediately after the procedure, unless otherwise specified by you.

## 2024-09-26 ENCOUNTER — TELEPHONE (OUTPATIENT)
Age: 84
End: 2024-09-26

## 2024-09-26 NOTE — TELEPHONE ENCOUNTER
Caller: Mariangel (Wife)     Doctor/Office:     Call regarding :  Calling ot reschedule procedure      Call was transferred to: Procedure

## 2024-10-07 ENCOUNTER — TELEPHONE (OUTPATIENT)
Age: 84
End: 2024-10-07

## 2024-10-07 NOTE — TELEPHONE ENCOUNTER
Called back patients wife Yohana- She stated they need to r/s Emeka BENTLEY on 10/21-   They stated that he was out of rhythm and they shocked him.   Patient takes Xarelto  Yohana stated they were told he had to r/s any shots for 30 days after shock.  He was shocked on 10/3    I cancelled patients appointment.   I told them I would speak with you and r/s after confirming if we need to get re send clearance for patient to hold his Xarelto.    Please advise.   Thanks!

## 2024-10-07 NOTE — TELEPHONE ENCOUNTER
Patient is followed by [facility] cardiology electrophysiology, who monitors his heart/ ICD. He is not affiliated  with  EP. Therefore, [facility] C[facility] EP needs to notified for a CC.    Patient last saw Dr Garcia on 6/5/24.   Since then, it is documented on his chart, several encounters with [facility] Cardiology.    Patient was instructed at 6/5/24 OV to schedule next Ov on 12/5 but currently does not have a f/u OV scheduled with Dr Garcia.    Please contact [facility] Card[facility] EP for a CC for patient.    Thank you.

## 2024-10-07 NOTE — TELEPHONE ENCOUNTER
Caller: Jose Muller     Doctor: Chele     Reason for call: Patient calling asking to reschedule  procedure please advise     Call back#: [phone]

## 2024-10-07 NOTE — TELEPHONE ENCOUNTER
Good afternoon! Spoke with patients wife Yohana today who needed to r/s her husbands SHEREE with us.   Patient is currently on Xarelto so we obtained clearance from Dr. Garcia who patient stated is his current cardiologist    Patients wife called today and stated her  is connected to [facility] for his heart and they called him to let him know he was out of rhythm and had him come in to be shocked.    She was also asking if I knew if [facility] had the same program.   I advised her to reach out to your team.     Not sure if you guys were made aware also that this occurred.     Please advise if it is ok to r/s patient for 30 days after shocked on 10/3. This is what patients wife stated they were advised to do.   He will need to be cleared to hold his Xarelto.     Thank you

## 2024-10-09 NOTE — TELEPHONE ENCOUNTER
Caller: patient    Doctor: ANCELMO    Reason for call: calling for updates regarding his procedure.    Inform patient a clearance was faxed to DR Toro         Call back#:

## 2024-10-11 NOTE — TELEPHONE ENCOUNTER
Called patient r/s procedure. Reviewed all instructions by phone upon scheduling   Mailed copy to home

## 2024-10-22 ENCOUNTER — TELEPHONE (OUTPATIENT)
Age: 84
End: 2024-10-22

## 2024-10-22 NOTE — TELEPHONE ENCOUNTER
Caller: Yohana    Doctor: Dr. Garcia     Reason for call: patient wants to switch from [facility] to [facility] providers. I switched patient over to device clinic to monitor his pacemake but, was unsure if patient should be set up w/ EP doctor as well. Please contact patient to advise of any new decisions    Call back#: [phone]

## 2024-10-23 ENCOUNTER — TELEPHONE (OUTPATIENT)
Dept: CARDIOLOGY CLINIC | Facility: CLINIC | Age: 84
End: 2024-10-23

## 2024-10-23 NOTE — TELEPHONE ENCOUNTER
Called patient & spoke with his wife, Yohana,  Who stated patient has a baseline SOB that he is at currently. Stated patient had a cardioversion @ [facility] on 10/3, but a few days after, he was back in A fib.    Denied edema to lower extremities .    Discussed patient's OV for Device in person ICD check on Friday, 10/25 @ 11:30 AM.

## 2024-10-23 NOTE — TELEPHONE ENCOUNTER
----- Message from Dorita KENNEY sent at 10/23/2024  6:25 AM EDT -----  Regarding: hlf  Pts hlf above threshold @ 35. Pt takes lasix, aldactone, xarelto, metoprolol succ, imdur, asa 81mg. Ef: 25-30% (echo 1/9/23).  Thanks,  ~Dorita   NON-BILLABLE LATITUDE TRANSMISSION: PT NEW TO [facility] HAS IN CLINIC APPT 10/25. BATTERY VOLTAGE ADEQUATE (7.5 YRS). AP: 13%. : 6%. ALL AVAILABLE LEAD PARAMETERS WITHIN NORMAL LIMITS. ATR EPISODES W/ AF IN PROGRESS, AF BURDEN: 8%. PT TAKES XARELTO, METOPROLOL SUCC, IMDUR, ASA 81MG. EF: 25-30% (ECHO 1/9/23). HEARTLOGIC HEART FAILURE INDEX ABOVE THRESHOLD @ 35. PT TAKES LASIX, ALDACTONE. TASK TO HF TEAM. NORMAL DEVICE FUNCTION. CH

## 2024-10-25 ENCOUNTER — TELEPHONE (OUTPATIENT)
Dept: PAIN MEDICINE | Facility: CLINIC | Age: 84
End: 2024-10-25

## 2024-10-25 ENCOUNTER — IN-CLINIC DEVICE VISIT (OUTPATIENT)
Dept: CARDIOLOGY CLINIC | Facility: CLINIC | Age: 84
End: 2024-10-25
Payer: COMMERCIAL

## 2024-10-25 DIAGNOSIS — M54.16 LUMBAR RADICULOPATHY: ICD-10-CM

## 2024-10-25 DIAGNOSIS — I25.5 CARDIOMYOPATHY, ISCHEMIC: Primary | ICD-10-CM

## 2024-10-25 DIAGNOSIS — I48.0 PAROXYSMAL ATRIAL FIBRILLATION (HCC): ICD-10-CM

## 2024-10-25 DIAGNOSIS — Z95.810 PRESENCE OF IMPLANTABLE CARDIOVERTER-DEFIBRILLATOR (ICD): ICD-10-CM

## 2024-10-25 PROCEDURE — 93283 PRGRMG EVAL IMPLANTABLE DFB: CPT | Performed by: INTERNAL MEDICINE

## 2024-10-25 RX ORDER — GABAPENTIN 300 MG/1
CAPSULE ORAL
Qty: 90 CAPSULE | Refills: 1 | Status: SHIPPED | OUTPATIENT
Start: 2024-10-25

## 2024-10-25 NOTE — PROGRESS NOTES
Results for orders placed or performed in visit on 10/25/24   Cardiac EP device report    Narrative    [employer] DUAL ICD/ ACTIVE SYSTEM IS MRI CONDITIONAL  Oct 25, 2024 11:35 - Check Atrial LeadOct 04, 2024 07:22 - RA automatic threshold has been in suspension for four days  DEVICE INTERROGATED IN THE [place] OFFICE ([place] FROM [facility]).  BATTERY VOLTAGE ADEQUATE (7.5 YRS).     ALL LEAD PARAMETERS WITHIN NORMAL LIMITS. ATR EPISODES W/AF IN PROGRESS.  PT TAKES ASA, ZARELTO, METOPROLOL SUCC.  EF 25-30% (ECHO [facility] 1/9/2023).  TIME IN AT/AF 9%.  HEARTLOGIC HEART FAILURE INDEX ABOVE THRESHOLD @ 35/6, AND ONGOING.  PT ALSO TAKES FUROSEMIDE, SPIRONOLACTONE. PT C/O SOB.  NO PROGRAMMING CHANGES MADE TO DEVICE PARAMETERS. TASK TO HF TEAM.  NORMAL DEVICE FUNCTION.  PAS

## 2024-10-28 ENCOUNTER — TELEPHONE (OUTPATIENT)
Dept: CARDIOLOGY CLINIC | Facility: CLINIC | Age: 84
End: 2024-10-28

## 2024-10-28 DIAGNOSIS — I11.0 HYPERTENSIVE HEART DISEASE WITH CONGESTIVE HEART FAILURE, UNSPECIFIED HEART FAILURE TYPE (HCC): ICD-10-CM

## 2024-10-28 DIAGNOSIS — I25.5 CARDIOMYOPATHY, ISCHEMIC: ICD-10-CM

## 2024-10-28 NOTE — TELEPHONE ENCOUNTER
Heart Logic Index exceeded limits  Received: 3 days ago  [facility] Clinical Support Staff  Pt is new transfer form [facility].   Heart Logic Index exceeded limits, 35/6, and pt c/o shortness of breath.  Echo next week at [facility], F/U w/Dr Garcia on 12/11/2024.    Meds reviewed with pt, as on chart.  Please review report below.  Thank you.  Trisch        DEVICE INTERROGATED IN THE [place] OFFICE ([place] FROM [facility]).  BATTERY VOLTAGE ADEQUATE (7.5 YRS).     ALL LEAD PARAMETERS WITHIN NORMAL LIMITS. ATR EPISODES W/AF IN PROGRESS.  PT TAKES ASA, ZARELTO, METOPROLOL SUCC.  EF 25-30% (ECHO [facility] 1/9/2023).  TIME IN AT/AF 9%.  HEARTLOGIC HEART FAILURE INDEX ABOVE THRESHOLD @ 35/6, AND ONGOING.  PT ALSO TAKES FUROSEMIDE, SPIRONOLACTONE. PT C/O SOB.  NO PROGRAMMING CHANGES MADE TO DEVICE PARAMETERS. TASK TO HF TEAM.  NORMAL DEVICE FUNCTION.  PAS

## 2024-10-29 RX ORDER — EMPAGLIFLOZIN 10 MG/1
10 TABLET, FILM COATED ORAL EVERY MORNING
Qty: 90 TABLET | Refills: 1 | Status: SHIPPED | OUTPATIENT
Start: 2024-10-29

## 2024-11-04 ENCOUNTER — HOSPITAL ENCOUNTER (OUTPATIENT)
Dept: RADIOLOGY | Facility: CLINIC | Age: 84
Discharge: HOME | End: 2024-11-04
Payer: COMMERCIAL

## 2024-11-04 VITALS
TEMPERATURE: 97.3 F | OXYGEN SATURATION: 94 % | RESPIRATION RATE: 20 BRPM | SYSTOLIC BLOOD PRESSURE: 129 MMHG | HEART RATE: 70 BPM | DIASTOLIC BLOOD PRESSURE: 72 MMHG

## 2024-11-04 DIAGNOSIS — M54.16 LUMBAR RADICULOPATHY: ICD-10-CM

## 2024-11-04 PROCEDURE — 64483 NJX AA&/STRD TFRM EPI L/S 1: CPT | Performed by: ANESTHESIOLOGY

## 2024-11-04 PROCEDURE — A9585 GADOBUTROL INJECTION: HCPCS | Performed by: ANESTHESIOLOGY

## 2024-11-04 PROCEDURE — 64484 NJX AA&/STRD TFRM EPI L/S EA: CPT | Performed by: ANESTHESIOLOGY

## 2024-11-04 RX ORDER — PAPAVERINE HCL 150 MG
15 CAPSULE, EXTENDED RELEASE ORAL ONCE
Status: COMPLETED | OUTPATIENT
Start: 2024-11-04 | End: 2024-11-04

## 2024-11-04 RX ORDER — GADOBUTROL 604.72 MG/ML
2 INJECTION INTRAVENOUS ONCE
Status: COMPLETED | OUTPATIENT
Start: 2024-11-04 | End: 2024-11-04

## 2024-11-04 RX ADMIN — DEXAMETHASONE SODIUM PHOSPHATE 15 MG: 10 INJECTION, SOLUTION INTRAMUSCULAR; INTRAVENOUS at 13:34

## 2024-11-04 RX ADMIN — LIDOCAINE HYDROCHLORIDE 2 ML: 20 INJECTION, SOLUTION EPIDURAL; INFILTRATION; INTRACAUDAL; PERINEURAL at 13:30

## 2024-11-04 RX ADMIN — GADOBUTROL 2 ML: 604.72 INJECTION INTRAVENOUS at 13:33

## 2024-11-04 NOTE — DISCHARGE INSTRUCTIONS
Epidural Steroid Injection   WHAT YOU NEED TO KNOW:   An epidural steroid injection (KRYSTAL) is a procedure to inject steroid medicine into the epidural space. The epidural space is between your spinal cord and vertebrae. Steroids reduce inflammation and fluid buildup in your spine that may be causing pain. You may be given pain medicine along with the steroids.          ACTIVITY  Do not drive or operate machinery today.  No strenuous activity today - bending, lifting, etc.  You may resume normal activites starting tomorrow - start slowly and as tolerated.  You may shower today, but no tub baths or hot tubs.  You may have numbness for several hours from the local anesthetic. Please use caution and common sense, especially with weight-bearing activities.    CARE OF THE INJECTION SITE  If you have soreness or pain, apply ice to the area today (20 minutes on/20 minutes off).  Starting tomorrow, you may use warm, moist heat or ice if needed.  You may have an increase or change in your discomfort for 36-48 hours after your treatment.  Apply ice and continue with any pain medication you have been prescribed.  Notify the [facility] if you have any of the following: redness, drainage, swelling, headache, stiff neck or fever above 100°F.    SPECIAL INSTRUCTIONS  Our office will contact you in approximately 14 days for a progress report.    MEDICATIONS  Continue to take all routine medications.  Our office may have instructed you to hold some medications.    As no general anesthesia was used in today's procedure, you should not experience any side effects related to anesthesia.     If you are diabetic, the steroids used in today's injection may temporarily increase your blood sugar levels after the first few days after your injection. Please keep a close eye on your sugars and alert the doctor who manages your diabetes if your sugars are significantly high from your baseline or you are symptomatic.     If you have a  problem specifically related to your procedure, please call our office at [phone].  Problems not related to your procedure should be directed to your primary care physician.

## 2024-11-04 NOTE — H&P
History of Present Illness: The patient is a 83 y.o. male who presents with complaints of low back and leg pain.    Past Medical History:   Diagnosis Date    Bleeding hemorrhoids     Choledocholithiasis     COVID-19 05/23/2022    Symptoms onset 5/20/22    Difficulty breathing     Diverticulosis     Ileus (HCC)     Lymphadenopathy of head and neck 09/29/2020    Pacemaker        Past Surgical History:   Procedure Laterality Date    APPENDECTOMY      CHOLECYSTECTOMY LAPAROSCOPIC      CORONARY ANGIOPLASTY      CORONARY ANGIOPLASTY WITH STENT PLACEMENT      CORONARY ARTERY BYPASS GRAFT  1999    ERCP      KNEE SURGERY Right 2023    TONSILLECTOMY           Current Outpatient Medications:     acetaminophen (TYLENOL) 500 mg tablet, Take 500 mg by mouth, Disp: , Rfl:     Alirocumab (Praluent) 75 MG/ML SOAJ, Inject 1 mL under the skin every 14 (fourteen) days, Disp: 6 mL, Rfl: 1    amoxicillin (AMOXIL) 500 MG tablet, 4 TABLETS BY MOUTH 1 HOUR BEFORE DENTIST (Patient not taking: Reported on 12/4/2023), Disp: , Rfl:     ascorbic acid (VITAMIN C) 500 MG tablet, Take 500 mg by mouth daily, Disp: , Rfl:     aspirin (ECOTRIN LOW STRENGTH) 81 mg EC tablet, Take 1 tablet by mouth daily, Disp: , Rfl:     Cholecalciferol (VITAMIN D) 2000 units CAPS, Take by mouth, Disp: , Rfl:     Coenzyme Q10 (CO Q 10) 100 MG CAPS, Take by mouth, Disp: , Rfl:     furosemide (LASIX) 20 mg tablet, Take 20 mg by mouth 2 (two) times a day, Disp: , Rfl:     gabapentin (NEURONTIN) 300 mg capsule, Take 1 PO AM and 2 PO HS, Disp: 90 capsule, Rfl: 1    isosorbide mononitrate (IMDUR) 60 mg 24 hr tablet, Take 1 tablet by mouth daily, Disp: , Rfl:     Jardiance 10 MG TABS tablet, TAKE 1 TABLET BY MOUTH EVERY MORNING., Disp: 90 tablet, Rfl: 1    levothyroxine (Synthroid) 25 mcg tablet, Take 1 tablet (25 mcg total) by mouth daily, Disp: 90 tablet, Rfl: 1    lidocaine (Lidoderm) 5 %, Apply 1 patch topically over 12 hours daily for 10 doses Remove & Discard patch  "within 12 hours or as directed by MD (Patient not taking: Reported on 7/9/2024), Disp: 10 patch, Rfl: 0    losartan (COZAAR) 25 mg tablet, Take 0.5 tablets by mouth daily, Disp: , Rfl:     Magnesium 400 MG TABS, Take by mouth, Disp: , Rfl:     metoprolol succinate (TOPROL-XL) 50 mg 24 hr tablet, 2 (two) times a day 1 in the morning & 1 at dinner, Disp: , Rfl:     Multiple Vitamins-Minerals (MULTIVITAMIN ADULT PO), Take 1 capsule by mouth, Disp: , Rfl:     nitroglycerin (NITROSTAT) 0.4 mg SL tablet, Place 1 tablet under the tongue every 5 (five) minutes as needed (Patient not taking: Reported on 2/19/2024), Disp: , Rfl:     rivaroxaban (XARELTO) 20 mg tablet, Take 1 tablet (20 mg total) by mouth daily with dinner, Disp: 28 tablet, Rfl: 0    spironolactone (ALDACTONE) 25 mg tablet, Take 25 mg by mouth daily, Disp: , Rfl:     TURMERIC PO, Take 1 Dose by mouth, Disp: , Rfl:     Allergies   Allergen Reactions    Other Other (See Comments)     Pain in back after contrast used for echos, Definity    Atorvastatin Cough     Per pt has had a problem with other statins also, does not remember names    Bee Venom     Diphenhydramine Other (See Comments)    Iodinated Contrast Media Other (See Comments)    Lisinopril Other (See Comments)     cough    Perflutren Lipid Microsphere     Perflutren Lipid Microspheres Other (See Comments)     severe back lower back spasm    Ranolazine      Alters mood.    Rosuvastatin Myalgia    Sacubitril-Valsartan Other (See Comments)     \"severe back pain\"    Simvastatin Myalgia       Physical Exam:   Vitals:    11/04/24 1304   BP: 119/73   Pulse: 69   Resp: 18   Temp: (!) 97.3 °F (36.3 °C)   SpO2: 97%     General: Awake, Alert, Oriented x 3, Mood and affect appropriate  Respiratory: Respirations even and unlabored  Cardiovascular: Peripheral pulses intact; no edema  Musculoskeletal Exam: Antalgic gait    ASA Score: 3         Assessment:   1. Lumbar radiculopathy        Plan: Left L3 and L4 TFESI    "

## 2024-11-07 ENCOUNTER — TELEPHONE (OUTPATIENT)
Dept: CARDIOLOGY CLINIC | Facility: CLINIC | Age: 84
End: 2024-11-07

## 2024-11-07 NOTE — TELEPHONE ENCOUNTER
Spoke to wife Yohana.  Discussed how Heartlogic heart failure index is intended to recognize early signs fluid accumulation.  States patient has SOB at baseline but not increased.  States patient taking diuretics as prescribed, no change in symptoms.  F/U w/Dr Garcia on 12/11/2024.  Advised patient to continue to monitor for symptoms and call the office in the meantime for concerns or questions.

## 2024-11-12 ENCOUNTER — RA CDI HCC (OUTPATIENT)
Dept: OTHER | Facility: HOSPITAL | Age: 84
End: 2024-11-12

## 2024-11-12 NOTE — PROGRESS NOTES
HCC coding opportunities          Chart Reviewed number of suggestions sent to Provider: 1  I77.810     Patients Insurance     Medicare Insurance: [employer]age

## 2024-11-13 ENCOUNTER — RA CDI HCC (OUTPATIENT)
Dept: RADIOLOGY | Facility: HOSPITAL | Age: 84
End: 2024-11-13

## 2024-11-15 LAB — TSH SERPL-ACNC: 4.53 MIU/L (ref 0.4–4.5)

## 2024-11-18 ENCOUNTER — TELEPHONE (OUTPATIENT)
Dept: PAIN MEDICINE | Facility: CLINIC | Age: 84
End: 2024-11-18

## 2024-11-18 NOTE — TELEPHONE ENCOUNTER
Please advise patient to take 300 mg of gabapentin x 3 days and then patient may discontinue medication completely

## 2024-11-18 NOTE — TELEPHONE ENCOUNTER
Patient Reports    85     %     improvement post injection    Pain Level   0  /10  Wondering if he can stop taking the gabapentin.

## 2024-11-18 NOTE — TELEPHONE ENCOUNTER
S/w pt's wife Yohana (listed on emergency contact list) and advised of same. Wife verbalized understanding.

## 2024-11-20 ENCOUNTER — RESULTS FOLLOW-UP (OUTPATIENT)
Dept: FAMILY MEDICINE CLINIC | Facility: CLINIC | Age: 84
End: 2024-11-20

## 2024-11-21 ENCOUNTER — OFFICE VISIT (OUTPATIENT)
Dept: FAMILY MEDICINE CLINIC | Facility: CLINIC | Age: 84
End: 2024-11-21
Payer: COMMERCIAL

## 2024-11-21 VITALS
OXYGEN SATURATION: 97 % | HEIGHT: 73 IN | DIASTOLIC BLOOD PRESSURE: 66 MMHG | BODY MASS INDEX: 34.06 KG/M2 | SYSTOLIC BLOOD PRESSURE: 100 MMHG | WEIGHT: 257 LBS | HEART RATE: 69 BPM

## 2024-11-21 DIAGNOSIS — L72.3 SEBACEOUS CYST: ICD-10-CM

## 2024-11-21 DIAGNOSIS — E78.2 MIXED HYPERLIPIDEMIA: ICD-10-CM

## 2024-11-21 DIAGNOSIS — I50.42 CHRONIC COMBINED SYSTOLIC AND DIASTOLIC CONGESTIVE HEART FAILURE (HCC): ICD-10-CM

## 2024-11-21 DIAGNOSIS — I50.42 HYPERTENSIVE HEART DISEASE WITH CHRONIC COMBINED SYSTOLIC AND DIASTOLIC CONGESTIVE HEART FAILURE (HCC): ICD-10-CM

## 2024-11-21 DIAGNOSIS — I11.0 HYPERTENSIVE HEART DISEASE WITH CHRONIC COMBINED SYSTOLIC AND DIASTOLIC CONGESTIVE HEART FAILURE (HCC): ICD-10-CM

## 2024-11-21 DIAGNOSIS — M17.0 PRIMARY OSTEOARTHRITIS OF BOTH KNEES: ICD-10-CM

## 2024-11-21 DIAGNOSIS — E03.9 ACQUIRED HYPOTHYROIDISM: ICD-10-CM

## 2024-11-21 DIAGNOSIS — I48.0 PAROXYSMAL ATRIAL FIBRILLATION (HCC): Primary | ICD-10-CM

## 2024-11-21 PROCEDURE — G2211 COMPLEX E/M VISIT ADD ON: HCPCS | Performed by: FAMILY MEDICINE

## 2024-11-21 PROCEDURE — 99214 OFFICE O/P EST MOD 30 MIN: CPT | Performed by: FAMILY MEDICINE

## 2024-11-21 RX ORDER — LEVOTHYROXINE SODIUM 50 UG/1
50 TABLET ORAL DAILY
Qty: 90 TABLET | Refills: 1 | Status: SHIPPED | OUTPATIENT
Start: 2024-11-21

## 2024-11-21 NOTE — ASSESSMENT & PLAN NOTE
TSH is improved, but not at goal.  Would recommend increasing to 50 mcg.  Check in 3 months.  Orders:    levothyroxine (Synthroid) 50 mcg tablet; Take 1 tablet (50 mcg total) by mouth daily    TSH, 3rd generation; Future

## 2024-11-21 NOTE — PATIENT INSTRUCTIONS
1. Paroxysmal atrial fibrillation (HCC)  Assessment & Plan:  Follow with Cardio. Ablasion scheduled.         2. Chronic combined systolic and diastolic congestive heart failure (HCC)  Assessment & Plan:  Wt Readings from Last 3 Encounters:   11/21/24 117 kg (257 lb)   09/05/24 114 kg (251 lb)   08/14/24 116 kg (255 lb)   Stable at the moment.  Following with cardiology in the future.  Weight seems relatively stable, though it is slightly higher than what it was at last visit.  Precautions with sodium intake, as well as water intake.                 3. 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)  Assessment & Plan:  Wt Readings from Last 3 Encounters:   11/21/24 117 kg (257 lb)   09/05/24 114 kg (251 lb)   08/14/24 116 kg (255 lb)     Blood pressure is doing quite well today.  No change.               4. Acquired hypothyroidism  Assessment & Plan:  TSH is improved, but not at goal.  Would recommend increasing to 50 mcg.  Check in 3 months.  Orders:    levothyroxine (Synthroid) 50 mcg tablet; Take 1 tablet (50 mcg total) by mouth daily    TSH, 3rd generation; Future    Orders:  -     levothyroxine (Synthroid) 50 mcg tablet; Take 1 tablet (50 mcg total) by mouth daily  -     TSH, 3rd generation; Future; Expected date: 02/21/2025  -     TSH, 3rd generation  5. Mixed hyperlipidemia  Assessment & Plan:  Cholesterol due for check in the future.  We do that in 3 months.       6. Primary osteoarthritis of both knees  Assessment & Plan:  Patient did have injection with Euflexxa.  Seems to be doing better at this point.       7. Sebaceous cyst  Comments:  Large cyst on anterior chest.  Recommend general surgery to evaluate.  Does not seem infected.  Orders:  -     Ambulatory Referral to General Surgery; Future      COVID 19 Instructions    Jose Coronado was advised to limit contact with others to essential tasks such as getting food, medications, and medical  care.    Proper handwashing reviewed, and Hand sanitzer when washing is not available.    If the patient develops symptoms of COVID 19, the patient should call the office as soon as possible.    It is strongly recommended that Flu Vaccinations be obtained.      Virtual Visits:  Ayanna: This works on smart phones (any phone with Internet browsing capability).  You should get a text message when the provider is ready to see you.  Click on the link in the text message, and the call should start.  You will need to type in your name, and allow camera and microphone access.  This is HIPPA compliant, and secure.      If you have not already done so, get immunized to COVID 19.      We are committed to getting you vaccinated as soon as possible and will be closely following [facility] and [facility] guidelines as they are released and revised.  Please refer to our COVID-19 vaccine webpage for the most up to date information on the vaccine and our distribution efforts.    This site will also have the most up to date recommendations for COVID booster vaccine.    [email or web address]    Call [phone]-[phone] ([phone]), option 7    You can also visit https://www.vaccines.gov/ to find vaccines in your area.    OUR LOCATION:    [facility]  [address] [place] [address], [address]  [place], [place], [address] [phone]  Fax: [phone]    Lab services, Rheumatology, and OB/GYN are at this location as well.

## 2024-11-21 NOTE — RESULT ENCOUNTER NOTE
Tests were not normal, and should follow at  your scheduled Office visit. Please call about this, if [employer] message is not available or not read by patient.

## 2024-11-21 NOTE — ASSESSMENT & PLAN NOTE
Wt Readings from Last 3 Encounters:   11/21/24 117 kg (257 lb)   09/05/24 114 kg (251 lb)   08/14/24 116 kg (255 lb)     Blood pressure is doing quite well today.  No change.

## 2024-11-21 NOTE — ASSESSMENT & PLAN NOTE
Wt Readings from Last 3 Encounters:   11/21/24 117 kg (257 lb)   09/05/24 114 kg (251 lb)   08/14/24 116 kg (255 lb)   Stable at the moment.  Following with cardiology in the future.  Weight seems relatively stable, though it is slightly higher than what it was at last visit.  Precautions with sodium intake, as well as water intake.

## 2024-11-21 NOTE — PROGRESS NOTES
Name: Jose Coronado      : 1940      MRN: [id number]  Encounter Provider: Tray Ocasio MD  Encounter Date: 2024   Encounter department: [facility]  :  Assessment & Plan  Paroxysmal atrial fibrillation (HCC)  Follow with Cardio. Ablasion scheduled.         Chronic combined systolic and diastolic congestive heart failure (HCC)  Wt Readings from Last 3 Encounters:   24 117 kg (257 lb)   24 114 kg (251 lb)   24 116 kg (255 lb)   Stable at the moment.  Following with cardiology in the future.  Weight seems relatively stable, though it is slightly higher than what it was at last visit.  Precautions with sodium intake, as well as water intake.                 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)  Wt Readings from Last 3 Encounters:   24 117 kg (257 lb)   24 114 kg (251 lb)   24 116 kg (255 lb)     Blood pressure is doing quite well today.  No change.               Acquired hypothyroidism  TSH is improved, but not at goal.  Would recommend increasing to 50 mcg.  Check in 3 months.  Orders:    levothyroxine (Synthroid) 50 mcg tablet; Take 1 tablet (50 mcg total) by mouth daily    TSH, 3rd generation; Future    Mixed hyperlipidemia  Cholesterol due for check in the future.  We do that in 3 months.       Primary osteoarthritis of both knees  Patient did have injection with Euflexxa.  Seems to be doing better at this point.       Sebaceous cyst    Orders:    Ambulatory Referral to General Surgery; Future             1. Paroxysmal atrial fibrillation (HCC)  Assessment & Plan:         2. Chronic combined systolic and diastolic congestive heart failure (HCC)  Assessment & Plan:  Wt Readings from Last 3 Encounters:   24 117 kg (257 lb)   24 114 kg (251 lb)   24 116 kg (255 lb)                    3. Hypertensive heart disease with chronic combined systolic and  "diastolic congestive heart failure (HCC)  Assessment & Plan:  Wt Readings from Last 3 Encounters:   11/21/24 117 kg (257 lb)   09/05/24 114 kg (251 lb)   08/14/24 116 kg (255 lb)                    4. Acquired hypothyroidism  Assessment & Plan:         Orders:  -     levothyroxine (Synthroid) 50 mcg tablet; Take 1 tablet (50 mcg total) by mouth daily  -     TSH, 3rd generation; Future; Expected date: 02/21/2025  -     TSH, 3rd generation  5. Mixed hyperlipidemia  Assessment & Plan:         6. Primary osteoarthritis of both knees  Assessment & Plan:         7. Sebaceous cyst  Comments:  Large cyst on anterior chest.  Recommend general surgery to evaluate.  Does not seem infected.  Orders:  -     Ambulatory Referral to General Surgery; Future      Chief Complaint   Patient presents with    Follow-up     3 months follow up         History of Present Illness     Patient is here to follow-up on multiple issues.    He is weaning off gabapentin at this point.    Hypothyroid: Has been on 25 mcg Synthroid for a bit now.  TSH reviewed.  It is better, but still not at goal.    CAD: Still has some shortness of breath issues, but no chest pain or pressure.  This also goes along with the CHF.    Hypertension: Blood pressure reviewed.    Has skin lesion on top of abdomen and at lower aspect of chest incision.  Present for about 6 weeks now.  Seems to be getting bigger.  Started off flesh tone, but now is more reddened.  Clinical photograph taken today.        Review of Systems   Constitutional: Negative.    HENT: Negative.     Respiratory: Negative.     Cardiovascular: Negative.    Gastrointestinal: Negative.    Musculoskeletal: Negative.           Objective   /66 (BP Location: Left arm, Patient Position: Sitting, Cuff Size: Standard)   Pulse 69   Ht 6' 1\" (1.854 m)   Wt 117 kg (257 lb)   SpO2 97%   BMI 33.91 kg/m²      TSH 4.53    Physical Exam  Vitals and nursing note reviewed.   Constitutional:       Appearance: " Normal appearance. He is well-developed.   HENT:      Head: Normocephalic and atraumatic.   Cardiovascular:      Rate and Rhythm: Normal rate and regular rhythm.      Pulses:           Carotid pulses are 2+ on the right side and 2+ on the left side.     Heart sounds: Normal heart sounds. No murmur heard.     No friction rub. No gallop.   Pulmonary:      Effort: Pulmonary effort is normal. No respiratory distress.      Breath sounds: Normal breath sounds. No wheezing or rales.   Musculoskeletal:      Cervical back: Normal range of motion and neck supple.   Skin:         Neurological:      Mental Status: He is alert.

## 2024-11-29 ENCOUNTER — REMOTE DEVICE CLINIC VISIT (OUTPATIENT)
Dept: CARDIOLOGY CLINIC | Facility: CLINIC | Age: 84
End: 2024-11-29
Payer: COMMERCIAL

## 2024-11-29 DIAGNOSIS — Z95.810 PRESENCE OF AUTOMATIC CARDIOVERTER/DEFIBRILLATOR (AICD): Primary | ICD-10-CM

## 2024-11-29 PROCEDURE — 93297 REM INTERROG DEV EVAL ICPMS: CPT | Performed by: INTERNAL MEDICINE

## 2024-11-29 NOTE — PROGRESS NOTES
Results for orders placed or performed in visit on 11/29/24   Cardiac EP device report    Narrative    [employer] DUAL ICD/ ACTIVE SYSTEM IS MRI CONDITIONAL  LATITUDE TRANSMISSION - HEARTLOGIC ONLY: HEARTLOGIC HEART FAILURE INDEX CROSSED (14). RECHECK IN ONE MONTH. AP 11%  8%. ALL AVAILABLE LEAD PARAMETERS WITHIN NORMAL LIMITS. NORMAL DEVICE FUNCTION.--BROCK

## 2024-12-02 ENCOUNTER — RESULTS FOLLOW-UP (OUTPATIENT)
Dept: CARDIOLOGY CLINIC | Facility: CLINIC | Age: 84
End: 2024-12-02

## 2024-12-02 DIAGNOSIS — I11.0 HYPERTENSIVE HEART DISEASE WITH CONGESTIVE HEART FAILURE, UNSPECIFIED HEART FAILURE TYPE (HCC): ICD-10-CM

## 2024-12-03 DIAGNOSIS — I50.22 CHRONIC HFREF (HEART FAILURE WITH REDUCED EJECTION FRACTION) (HCC): Primary | ICD-10-CM

## 2024-12-03 RX ORDER — ALIROCUMAB 75 MG/ML
INJECTION, SOLUTION SUBCUTANEOUS
Qty: 2 ML | Refills: 0 | Status: SHIPPED | OUTPATIENT
Start: 2024-12-03

## 2024-12-04 RX ORDER — METOPROLOL SUCCINATE 50 MG/1
TABLET, EXTENDED RELEASE ORAL
Qty: 180 TABLET | Refills: 3 | Status: SHIPPED | OUTPATIENT
Start: 2024-12-04 | End: 2024-12-05 | Stop reason: SDUPTHER

## 2024-12-05 RX ORDER — METOPROLOL SUCCINATE 50 MG/1
50 TABLET, EXTENDED RELEASE ORAL 2 TIMES DAILY
Qty: 180 TABLET | Refills: 3 | Status: SHIPPED | OUTPATIENT
Start: 2024-12-05

## 2024-12-10 ENCOUNTER — CONSULT (OUTPATIENT)
Dept: SURGERY | Facility: CLINIC | Age: 84
End: 2024-12-10
Payer: COMMERCIAL

## 2024-12-10 VITALS
HEART RATE: 69 BPM | HEIGHT: 72 IN | WEIGHT: 254 LBS | TEMPERATURE: 97.1 F | DIASTOLIC BLOOD PRESSURE: 68 MMHG | BODY MASS INDEX: 34.4 KG/M2 | OXYGEN SATURATION: 96 % | SYSTOLIC BLOOD PRESSURE: 114 MMHG

## 2024-12-10 DIAGNOSIS — L72.3 SEBACEOUS CYST: Primary | ICD-10-CM

## 2024-12-10 PROCEDURE — 99203 OFFICE O/P NEW LOW 30 MIN: CPT | Performed by: SPECIALIST

## 2024-12-10 NOTE — PROGRESS NOTES
Chief Complaint: Sebaceous cyst      History of Present Illness: Patient is an 84-year-old white male with a fairly extensive cardiac history status post CABG, stents, pacer defibrillator Sater etc.  He is on chronic Xarelto therapy.  He is in chronic atrial fibrillation and is scheduled for a cardiac ablation in the very near future.  He presents the office today with his wife Mariangel.    Patient is a fairly long history of a small cyst on his sternum.  It is essentially asymptomatic.  Recently has changed in color and has gotten slightly darker.  There is no drainage.  There is no pain.  He was recently seen in the office of his family physician Dr. Ocasio who kindly referred him to our office in regards to this.        Past Medical History:   Past Medical History:   Diagnosis Date    Bleeding hemorrhoids     Choledocholithiasis     Coronary artery disease     COVID-19 05/23/2022    Symptoms onset 5/20/22    Difficulty breathing     Diverticulosis     Hypertension 1970    Been taking medications for it.    Ileus (HCC)     Lymphadenopathy of head and neck 09/29/2020    Myocardial infarction (HCC)     Pacemaker          Past Surgical History:    Past Surgical History:   Procedure Laterality Date    APPENDECTOMY      CARDIAC VALVE REPLACEMENT      CHOLECYSTECTOMY LAPAROSCOPIC      CORONARY ANGIOPLASTY      CORONARY ANGIOPLASTY WITH STENT PLACEMENT      CORONARY ARTERY BYPASS GRAFT  1999    ERCP      JOINT REPLACEMENT  2/8/23    KNEE SURGERY Right 2023    TONSILLECTOMY           Allergies:    Allergies   Allergen Reactions    Other Other (See Comments)     Pain in back after contrast used for echos, Definity    Atorvastatin Cough     Per pt has had a problem with other statins also, does not remember names    Bee Venom     Diphenhydramine Other (See Comments)    Iodinated Contrast Media Other (See Comments)    Lisinopril Other (See Comments)     cough    Perflutren Lipid Microsphere     Perflutren Lipid Microspheres  "Other (See Comments)     severe back lower back spasm    Ranolazine      Alters mood.    Rosuvastatin Myalgia    Sacubitril-Valsartan Other (See Comments)     \"severe back pain\"    Simvastatin Myalgia         Medications:    Current Outpatient Medications:     acetaminophen (TYLENOL) 500 mg tablet, Take 500 mg by mouth, Disp: , Rfl:     Alirocumab (Praluent) 75 MG/ML SOAJ, Inject 1 mL under the skin every 14 days, Disp: 2 mL, Rfl: 0    amoxicillin-clavulanate (AUGMENTIN) 875-125 mg per tablet, Take 1 tablet by mouth every 12 (twelve) hours for 7 doses, Disp: 7 tablet, Rfl: 0    ascorbic acid (VITAMIN C) 500 MG tablet, Take 500 mg by mouth daily, Disp: , Rfl:     aspirin (ECOTRIN LOW STRENGTH) 81 mg EC tablet, Take 1 tablet by mouth daily, Disp: , Rfl:     Cholecalciferol (VITAMIN D) 2000 units CAPS, Take by mouth, Disp: , Rfl:     Coenzyme Q10 (CO Q 10) 100 MG CAPS, Take by mouth, Disp: , Rfl:     furosemide (LASIX) 20 mg tablet, Take 20 mg by mouth 2 (two) times a day, Disp: , Rfl:     isosorbide mononitrate (IMDUR) 60 mg 24 hr tablet, Take 1 tablet by mouth daily, Disp: , Rfl:     Jardiance 10 MG TABS tablet, TAKE 1 TABLET BY MOUTH EVERY MORNING., Disp: 90 tablet, Rfl: 1    levothyroxine (Synthroid) 50 mcg tablet, Take 1 tablet (50 mcg total) by mouth daily, Disp: 90 tablet, Rfl: 1    losartan (COZAAR) 25 mg tablet, Take 0.5 tablets by mouth daily, Disp: , Rfl:     Magnesium 400 MG TABS, Take by mouth, Disp: , Rfl:     metoprolol succinate (TOPROL-XL) 50 mg 24 hr tablet, Take 1 tablet (50 mg total) by mouth 2 (two) times a day, Disp: 180 tablet, Rfl: 3    Multiple Vitamins-Minerals (MULTIVITAMIN ADULT PO), Take 1 capsule by mouth, Disp: , Rfl:     nitroglycerin (NITROSTAT) 0.4 mg SL tablet, Place 1 tablet under the tongue every 5 (five) minutes as needed, Disp: , Rfl:     rivaroxaban (XARELTO) 20 mg tablet, Take 1 tablet (20 mg total) by mouth daily with dinner, Disp: 28 tablet, Rfl: 0    TURMERIC PO, Take 1 Dose " by mouth, Disp: , Rfl:     amoxicillin (AMOXIL) 500 MG tablet, 4 TABLETS BY MOUTH 1 HOUR BEFORE DENTIST (Patient not taking: Reported on 12/4/2023), Disp: , Rfl:     gabapentin (NEURONTIN) 300 mg capsule, Take 1 PO AM and 2 PO HS (Patient not taking: Reported on 11/21/2024), Disp: 90 capsule, Rfl: 1    lidocaine (Lidoderm) 5 %, Apply 1 patch topically over 12 hours daily for 10 doses Remove & Discard patch within 12 hours or as directed by MD (Patient not taking: Reported on 7/9/2024), Disp: 10 patch, Rfl: 0      Social History:  Social History     Social History     Substance and Sexual Activity   Alcohol Use Not Currently    Comment: social     Social History     Substance and Sexual Activity   Drug Use No     Social History     Tobacco Use   Smoking Status Former    Current packs/day: 0.50    Average packs/day: 0.5 packs/day for 25.0 years (12.5 ttl pk-yrs)    Types: Cigarettes   Smokeless Tobacco Never         Family History:    Family History   Problem Relation Age of Onset    Lung cancer Mother     Cancer Mother     Coronary artery disease Father     Hypertension Father     Diabetes Brother     Lung cancer Family          Review of Systems:    Generalized arthralgias.  Otherwise negative    Vitals:  Vitals:    12/10/24 1138   BP: 114/68   Pulse: 69   Temp: (!) 97.1 °F (36.2 °C)   SpO2: 96%       Physical Exam:  Elderly white male 6 feet tall 250 pounds.  He is awake alert no distress.    Vital signs as above    Chest sternotomy scar well-healed.  Mediastinal chest tube scars noted also.  At the area of the xiphoid is a 1 cm in diameter pink sebaceous cystic lesion.  No fluctuance.  No tenderness.  No obvious cellulitis.      Lab Results: I have personally reviewed pertinent reports. See below.  Imaging: I have personally reviewed pertinent reports.   EKG, Pathology, and Other Studies: I have personally reviewed pertinent reports.     No visits with results within 1 Day(s) from this visit.   Latest known visit  with results is:   Office Visit on 08/14/2024   Component Date Value    TSH 11/14/2024 4.53 (H)          Impression:  Sebaceous cyst sternal area.  Asymptomatic.  The patient asked if it has to be removed and the answer is of course no.  It is asymptomatic.  It is not grossly infected.  It is not fluctuant and does not need to be I&D.   It has changed color slightly and is slightly darker.    Plan:  Short course of antibiotics prophylactically.  Told to notify our office if it changes, starts to drain, starts to get bigger, starts to hurt etc.  Both Radhames and Mariangel understand.  They are very nice people.

## 2024-12-10 NOTE — PROGRESS NOTES
Heart Failure Office Note - Jose Coronado 84 y.o. male MRN: [id number]    @ Encounter: [id number]      Assessment/Plan:    Patient Active Problem List    Diagnosis Date Noted    Peripheral artery disease (HCC) 03/04/2024    Abnormal gait 01/31/2024    Elevated troponin 12/05/2023    Lesion of left shoulder 12/05/2023    Aortic root dilatation (HCC) 12/04/2023    Mass of skin of back 12/04/2023    Lumbar radiculopathy 10/23/2023    Left sided sciatica 08/24/2023    Lumbar spondylosis 08/24/2023    Insomnia 08/09/2023    Anxiety 04/06/2023    Hydrocele 03/28/2023    Bronchitis 11/17/2022    Statin intolerance 11/14/2022    SOB (shortness of breath) 11/07/2022    Colon polyp 05/09/2022    Rectal bleeding 04/22/2022    Stable angina (HCC) 03/15/2021    ICD (implantable cardioverter-defibrillator) in place 02/18/2021    COVID-19 virus infection 12/15/2020    Anticoagulated by anticoagulation treatment 12/01/2020    Cataract 02/25/2020    Osteoarthritis 02/25/2020    Thoracic back pain 02/25/2020    Renal cyst 07/13/2018    Chronic maxillary sinusitis 05/16/2018    Acute low back pain without sciatica 10/26/2017    Cardiomyopathy, ischemic 07/25/2017    CHF (congestive heart failure) (HCC) 09/26/2016    Atrial fibrillation (HCC) 09/26/2016    Hyperlipidemia 04/25/2016    S/P PTCA (percutaneous transluminal coronary angioplasty) 02/02/2016    Hyperglycemia 11/20/2014    History of non-ST elevation myocardial infarction (NSTEMI) 11/20/2014    Prostate nodule 11/20/2014    Calculus of gallbladder and bile duct with acute on chronic cholecystitis 06/20/2013    Disc degeneration, lumbar 06/20/2013    Diverticulosis 06/20/2013    Obese 06/20/2013    Spinal stenosis 06/20/2013    3-vessel CAD 12/03/2012    Hemorrhoids 12/03/2012    Hypertensive heart disease with congestive heart failure (HCC) 12/03/2012    Hypothyroidism 12/03/2012       # Chronic HFimpEF, Stage C, NYHA  Etiology: iCM    Weight: 255 lbs  BNP:     Studies-  personally reviewed by me  Echo 10/29/24:  LVEF: 55%  RV: normal  Ascending aorta 4.5 cm    NM stress 12/6/23: large infarct involving inferolateral- posterolateral wall    Echo 1/9/23- [facility]  LVEF: 25-30%  LVIDd:  RV: normal  Other: aortic root 4.4 cm    Echo 11/29/22:  LVEF: 35-40%  LVEDd: 6.3 cm  Aorta 4.4 cm    TTE 11/11/20: LVEF: 30-35%  TTE 2/27/19: LVEF: 45-50%  TTE 4/6/18: LVEF: 35-40%  TTE 8/18/17: EF: 35-40%  TTE 11/20/15: EF: 55%    Neurohormonal Blockade:  --Beta-Blocker: Toprol XL 50 mg BID  --ACEi, ARB or ARNi: losartan 12.5 mg daily (states he had back pain with Entresto)  Imdur 60 mg daily  --Aldosterone Receptor Blocker: spironolactone 25 mg daily  --SGLT2-I: Jardiance 10 mg daily  --Diuretic: furosemide 20 mg BID up to TID    Sudden Cardiac Death Risk Reduction:  --ICD 6/20/19- [facility]  Interrogation. Heart logic index passed    Electrical Resynchronization:  --Candidacy for BiV device:    Advanced Therapies (if appropriate):  --Inotrope:  --LVAD/Transplant Candidacy:    # PAF/atrial flutter- ablation at [facility] 4/22/21  Cardioversion 10/3/24  AC: Xarelto 20 mg daily  Rate: Toprol XL 50 mg BID  Rhythm:    # PAD  Duplex 3/13/24: right: AUGUSTA 0.86, left: AUGUSTA 0.78    # CAD w/ hx of CABG and redo sternotomy for pseudoaneurysm rupture 2015  Also with multiple PCI  Rx: toprol  Angina: Imdur 60 mg daily  [facility]- [facility]  normal LM, 100% prox LAD, patent LIMA  LCx: 100% proximal after small OM2. Large OM3 sub-branchs fill faintly via collaterals.   RCA: 100% proximal occlusion, distal RCA fills faintly via collaterals from the LAD  LV function: EF 40-45% with inferobasal and postero-lateral hypokinesis.  Mitral Regurgitation: None   Bypass Grafts:  1. Widely patent and large LIMA graft to the distal LAD with some collaterals to distal RCA and OM3.  2. Chronically occluded SVG to RCA.  3. Occluded SVG to large OM3 with distal vessel filling via collaterals from LAD   PCI of SVG to OM3 attempted. The ostial SVG occlusion  could not be crossed     # dilated ascending thoracic aorta  Echo 10/2024: 4.5 cm  CTA chest 12/18/23: 4.4 cm    # dyslipidemia- Praluent (has not tolerated statins)  12/6/23: LDL 35, HDL 30    Today's Plan:  Echo done at [facility] with normal EF read seems unlikely given he was down around 35-40% for years  Continue GDMT- Jardiance, spironolactone, losartan, Toprol for HFrEF  Next Wednesday getting afib ablation  Will be starting steroids for back pain, watch for fluid retention  Discussed possible CCM- we will keep this on a back burner  Lipids at goal on Praluent   --2g sodium diet  Weights daily    HPI:       83 yo male with hx of CAD  s/p CABG in 2000/ multiple stents, 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 Failed attempt at PCI of SVG-OM3.      In Dec was admitted, told he had a mild heart attack. He is tired all the time, has trouble breathing. Legs feel week. Also gets angina.     Interim:  Echo 10/29/24:  LVEF: 55%  RV: normal  Ascending aorta 4.5 cm    In hospital at [facility] October for afib, s/p cardioversion 10/3/24  Getting an ablation of [facility]  He is symptomatic and less tolerant of activity in afib  Past Medical History:   Diagnosis Date    Bleeding hemorrhoids     Choledocholithiasis     Coronary artery disease     COVID-19 05/23/2022    Symptoms onset 5/20/22    Difficulty breathing     Diverticulosis     Hypertension 1970    Been taking medications for it.    Ileus (HCC)     Lymphadenopathy of head and neck 09/29/2020    Myocardial infarction (HCC)     Pacemaker        Review of Systems   Constitutional:  Positive for fatigue. Negative for activity change, appetite change and unexpected weight change.   HENT:  Negative for congestion and nosebleeds.    Eyes: Negative.    Respiratory:  Positive for shortness of  "breath. Negative for cough and chest tightness.    Cardiovascular:  Negative for chest pain, palpitations and leg swelling.   Gastrointestinal:  Negative for abdominal distention.   Endocrine: Negative.    Genitourinary: Negative.    Musculoskeletal: Negative.    Skin: Negative.    Neurological:  Negative for dizziness, syncope and weakness.   Hematological: Negative.    Psychiatric/Behavioral: Negative.           Allergies   Allergen Reactions    Other Other (See Comments)     Pain in back after contrast used for echos, Definity    Atorvastatin Cough     Per pt has had a problem with other statins also, does not remember names    Bee Venom     Diphenhydramine Other (See Comments)    Iodinated Contrast Media Other (See Comments)    Lisinopril Other (See Comments)     cough    Perflutren Lipid Microsphere     Perflutren Lipid Microspheres Other (See Comments)     severe back lower back spasm    Ranolazine      Alters mood.    Rosuvastatin Myalgia    Sacubitril-Valsartan Other (See Comments)     \"severe back pain\"    Simvastatin Myalgia     .    Current Outpatient Medications:     acetaminophen (TYLENOL) 500 mg tablet, Take 500 mg by mouth, Disp: , Rfl:     Alirocumab (Praluent) 75 MG/ML SOAJ, Inject 1 mL under the skin every 14 days, Disp: 2 mL, Rfl: 0    amoxicillin (AMOXIL) 500 MG tablet, 4 TABLETS BY MOUTH 1 HOUR BEFORE DENTIST (Patient not taking: Reported on 12/4/2023), Disp: , Rfl:     ascorbic acid (VITAMIN C) 500 MG tablet, Take 500 mg by mouth daily, Disp: , Rfl:     aspirin (ECOTRIN LOW STRENGTH) 81 mg EC tablet, Take 1 tablet by mouth daily, Disp: , Rfl:     Cholecalciferol (VITAMIN D) 2000 units CAPS, Take by mouth, Disp: , Rfl:     Coenzyme Q10 (CO Q 10) 100 MG CAPS, Take by mouth, Disp: , Rfl:     furosemide (LASIX) 20 mg tablet, Take 20 mg by mouth 2 (two) times a day, Disp: , Rfl:     gabapentin (NEURONTIN) 300 mg capsule, Take 1 PO AM and 2 PO HS (Patient not taking: Reported on 11/21/2024), Disp: 90 " capsule, Rfl: 1    isosorbide mononitrate (IMDUR) 60 mg 24 hr tablet, Take 1 tablet by mouth daily, Disp: , Rfl:     Jardiance 10 MG TABS tablet, TAKE 1 TABLET BY MOUTH EVERY MORNING., Disp: 90 tablet, Rfl: 1    levothyroxine (Synthroid) 50 mcg tablet, Take 1 tablet (50 mcg total) by mouth daily, Disp: 90 tablet, Rfl: 1    lidocaine (Lidoderm) 5 %, Apply 1 patch topically over 12 hours daily for 10 doses Remove & Discard patch within 12 hours or as directed by MD (Patient not taking: Reported on 7/9/2024), Disp: 10 patch, Rfl: 0    losartan (COZAAR) 25 mg tablet, Take 0.5 tablets by mouth daily, Disp: , Rfl:     Magnesium 400 MG TABS, Take by mouth, Disp: , Rfl:     metoprolol succinate (TOPROL-XL) 50 mg 24 hr tablet, Take 1 tablet (50 mg total) by mouth 2 (two) times a day, Disp: 180 tablet, Rfl: 3    Multiple Vitamins-Minerals (MULTIVITAMIN ADULT PO), Take 1 capsule by mouth, Disp: , Rfl:     nitroglycerin (NITROSTAT) 0.4 mg SL tablet, Place 1 tablet under the tongue every 5 (five) minutes as needed (Patient not taking: Reported on 2/19/2024), Disp: , Rfl:     rivaroxaban (XARELTO) 20 mg tablet, Take 1 tablet (20 mg total) by mouth daily with dinner, Disp: 28 tablet, Rfl: 0    TURMERIC PO, Take 1 Dose by mouth, Disp: , Rfl:     Social History     Socioeconomic History    Marital status: /Civil Union     Spouse name: Not on file    Number of children: Not on file    Years of education: Not on file    Highest education level: Not on file   Occupational History    Occupation: retired   Tobacco Use    Smoking status: Former     Current packs/day: 0.50     Average packs/day: 0.5 packs/day for 25.0 years (12.5 ttl pk-yrs)     Types: Cigarettes    Smokeless tobacco: Never   Vaping Use    Vaping status: Never Used   Substance and Sexual Activity    Alcohol use: Not Currently     Comment: social    Drug use: No    Sexual activity: Not on file   Other Topics Concern    Not on file   Social History Narrative    Not on  file     Social Drivers of Health     Financial Resource Strain: Low Risk  (1/31/2024)    Overall Financial Resource Strain (CARDIA)     Difficulty of Paying Living Expenses: Not hard at all   Food Insecurity: No Food Insecurity (12/5/2023)    [facility]    Hunger Vital Sign     Worried About Running Out of Food in the Last Year: Never true     Ran Out of Food in the Last Year: Never true   Transportation Needs: No Transportation Needs (1/31/2024)    PRAPARE - Transportation     Lack of Transportation (Medical): No     Lack of Transportation (Non-Medical): No   Physical Activity: Not on file   Stress: Not on file   Social Connections: Unknown (6/18/2024)    Received from [facility]     How often do you feel lonely or isolated from those around you? (Adult - for ages 18 years and over): Not on file   Intimate Partner Violence: Not At Risk (12/5/2023)    [facility]    Humiliation, Afraid, Rape, and Kick questionnaire     Fear of Current or Ex-Partner: No     Emotionally Abused: No     Physically Abused: No     Sexually Abused: No   Housing Stability: Not on file       Family History   Problem Relation Age of Onset    Lung cancer Mother     Cancer Mother     Coronary artery disease Father     Hypertension Father     Diabetes Brother     Lung cancer Family        Physical Exam:    Vitals: There were no vitals taken for this visit., There is no height or weight on file to calculate BMI.,   Wt Readings from Last 3 Encounters:   11/21/24 117 kg (257 lb)   09/05/24 114 kg (251 lb)   08/14/24 116 kg (255 lb)       Physical Exam  Constitutional:       Appearance: He is well-developed.   HENT:      Head: Normocephalic and atraumatic.   Eyes:      Pupils: Pupils are equal, round, and reactive to light.   Neck:      Vascular: No JVD.   Cardiovascular:      Rate and Rhythm: Normal rate  "and regular rhythm.      Heart sounds: No murmur heard.  Pulmonary:      Effort: Pulmonary effort is normal. No respiratory distress.      Breath sounds: Normal breath sounds.   Abdominal:      General: There is no distension.      Palpations: Abdomen is soft.      Tenderness: There is no abdominal tenderness.   Musculoskeletal:         General: Normal range of motion.      Cervical back: Normal range of motion.   Skin:     General: Skin is warm and dry.      Findings: No rash.   Neurological:      Mental Status: He is alert and oriented to person, place, and time.         Labs & Results:    Lab Results   Component Value Date    WBC 4.7 08/09/2024    HGB 16.2 08/09/2024    HCT 46.9 08/09/2024    MCV 86.7 08/09/2024     08/09/2024     Lab Results   Component Value Date    SODIUM 141 12/09/2024    K 4.1 12/09/2024     12/09/2024    CO2 33 (H) 12/09/2024    BUN 22 12/09/2024    CREATININE 0.80 12/09/2024    GLUC 93 12/09/2024    CALCIUM 9.6 12/09/2024     Lab Results   Component Value Date    NTBNP 1,272 (H) 11/08/2022      Lab Results   Component Value Date    CHOLESTEROL 115 08/09/2024    CHOLESTEROL 110 06/12/2023    CHOLESTEROL 192 08/12/2022     Lab Results   Component Value Date    HDL 37 (L) 08/09/2024    HDL 36 (L) 06/12/2023    HDL 34 (L) 08/12/2022     Lab Results   Component Value Date    TRIG 118 08/12/2022     No results found for: \"NONHDLC\"    EKG personally reviewed by Miguel Ángel Butler DO.     Counseling / Coordination of Care  I have spent a total time of 35 minutes on 12/10/24 in caring for this patient including Diagnostic results, Prognosis, Risks and benefits of tx options, and Impressions.      Thank you for the opportunity to participate in the care of this patient.    MIGUEL ÁNGEL BUTLER D.O.  DIRECTOR OF HEART FAILURE/ PULMONARY HYPERTENSION  MEDICAL DIRECTOR OF LVAD PROGRAM  [facility]    "

## 2024-12-10 NOTE — LETTER
December 10, 2024     Tray Ocasio MD  [address]  [address]  [place] [address]    Patient: Jose Corondao   YOB: 1940   Date of Visit: 12/10/2024       Dear Tray,    Thank you for referring Jose Coronado to me for evaluation. Below are my notes for this consultation.    If you have questions, please do not hesitate to call me. I look forward to following your patient along with you.         Sincerely,    King Bubba Hinton MD        CC: No Recipients    Bubba Hinton MD  12/10/2024  1:05 PM  Sign when Signing Visit  Chief Complaint: Sebaceous cyst      History of Present Illness: Patient is an 84-year-old white male with a fairly extensive cardiac history status post CABG, stents, pacer defibrillator Sater etc.  He is on chronic Xarelto therapy.  He is in chronic atrial fibrillation and is scheduled for a cardiac ablation in the very near future.  He presents the office today with his wife Mariangel.    Patient is a fairly long history of a small cyst on his sternum.  It is essentially asymptomatic.  Recently has changed in color and has gotten slightly darker.  There is no drainage.  There is no pain.  He was recently seen in the office of his family physician Dr. Ocasio who kindly referred him to our office in regards to this.        Past Medical History:   Past Medical History:   Diagnosis Date   • Bleeding hemorrhoids    • Choledocholithiasis    • Coronary artery disease    • COVID-19 05/23/2022    Symptoms onset 5/20/22   • Difficulty breathing    • Diverticulosis    • Hypertension 1970    Been taking medications for it.   • Ileus (HCC)    • Lymphadenopathy of head and neck 09/29/2020   • Myocardial infarction (HCC)    • Pacemaker          Past Surgical History:    Past Surgical History:   Procedure Laterality Date   • APPENDECTOMY     • CARDIAC VALVE REPLACEMENT     • CHOLECYSTECTOMY LAPAROSCOPIC     • CORONARY ANGIOPLASTY     • CORONARY ANGIOPLASTY WITH STENT PLACEMENT   "   • CORONARY ARTERY BYPASS GRAFT  1999   • ERCP     • JOINT REPLACEMENT  2/8/23   • KNEE SURGERY Right 2023   • TONSILLECTOMY           Allergies:    Allergies   Allergen Reactions   • Other Other (See Comments)     Pain in back after contrast used for echos, Definity   • Atorvastatin Cough     Per pt has had a problem with other statins also, does not remember names   • Bee Venom    • Diphenhydramine Other (See Comments)   • Iodinated Contrast Media Other (See Comments)   • Lisinopril Other (See Comments)     cough   • Perflutren Lipid Microsphere    • Perflutren Lipid Microspheres Other (See Comments)     severe back lower back spasm   • Ranolazine      Alters mood.   • Rosuvastatin Myalgia   • Sacubitril-Valsartan Other (See Comments)     \"severe back pain\"   • Simvastatin Myalgia         Medications:    Current Outpatient Medications:   •  acetaminophen (TYLENOL) 500 mg tablet, Take 500 mg by mouth, Disp: , Rfl:   •  Alirocumab (Praluent) 75 MG/ML SOAJ, Inject 1 mL under the skin every 14 days, Disp: 2 mL, Rfl: 0  •  amoxicillin-clavulanate (AUGMENTIN) 875-125 mg per tablet, Take 1 tablet by mouth every 12 (twelve) hours for 7 doses, Disp: 7 tablet, Rfl: 0  •  ascorbic acid (VITAMIN C) 500 MG tablet, Take 500 mg by mouth daily, Disp: , Rfl:   •  aspirin (ECOTRIN LOW STRENGTH) 81 mg EC tablet, Take 1 tablet by mouth daily, Disp: , Rfl:   •  Cholecalciferol (VITAMIN D) 2000 units CAPS, Take by mouth, Disp: , Rfl:   •  Coenzyme Q10 (CO Q 10) 100 MG CAPS, Take by mouth, Disp: , Rfl:   •  furosemide (LASIX) 20 mg tablet, Take 20 mg by mouth 2 (two) times a day, Disp: , Rfl:   •  isosorbide mononitrate (IMDUR) 60 mg 24 hr tablet, Take 1 tablet by mouth daily, Disp: , Rfl:   •  Jardiance 10 MG TABS tablet, TAKE 1 TABLET BY MOUTH EVERY MORNING., Disp: 90 tablet, Rfl: 1  •  levothyroxine (Synthroid) 50 mcg tablet, Take 1 tablet (50 mcg total) by mouth daily, Disp: 90 tablet, Rfl: 1  •  losartan (COZAAR) 25 mg tablet, " Take 0.5 tablets by mouth daily, Disp: , Rfl:   •  Magnesium 400 MG TABS, Take by mouth, Disp: , Rfl:   •  metoprolol succinate (TOPROL-XL) 50 mg 24 hr tablet, Take 1 tablet (50 mg total) by mouth 2 (two) times a day, Disp: 180 tablet, Rfl: 3  •  Multiple Vitamins-Minerals (MULTIVITAMIN ADULT PO), Take 1 capsule by mouth, Disp: , Rfl:   •  nitroglycerin (NITROSTAT) 0.4 mg SL tablet, Place 1 tablet under the tongue every 5 (five) minutes as needed, Disp: , Rfl:   •  rivaroxaban (XARELTO) 20 mg tablet, Take 1 tablet (20 mg total) by mouth daily with dinner, Disp: 28 tablet, Rfl: 0  •  TURMERIC PO, Take 1 Dose by mouth, Disp: , Rfl:   •  amoxicillin (AMOXIL) 500 MG tablet, 4 TABLETS BY MOUTH 1 HOUR BEFORE DENTIST (Patient not taking: Reported on 12/4/2023), Disp: , Rfl:   •  gabapentin (NEURONTIN) 300 mg capsule, Take 1 PO AM and 2 PO HS (Patient not taking: Reported on 11/21/2024), Disp: 90 capsule, Rfl: 1  •  lidocaine (Lidoderm) 5 %, Apply 1 patch topically over 12 hours daily for 10 doses Remove & Discard patch within 12 hours or as directed by MD (Patient not taking: Reported on 7/9/2024), Disp: 10 patch, Rfl: 0      Social History:  Social History    Social History     Substance and Sexual Activity   Alcohol Use Not Currently    Comment: social     Social History     Substance and Sexual Activity   Drug Use No     Social History     Tobacco Use   Smoking Status Former   • Current packs/day: 0.50   • Average packs/day: 0.5 packs/day for 25.0 years (12.5 ttl pk-yrs)   • Types: Cigarettes   Smokeless Tobacco Never         Family History:    Family History   Problem Relation Age of Onset   • Lung cancer Mother    • Cancer Mother    • Coronary artery disease Father    • Hypertension Father    • Diabetes Brother    • Lung cancer Family          Review of Systems:    Generalized arthralgias.  Otherwise negative    Vitals:  Vitals:    12/10/24 1138   BP: 114/68   Pulse: 69   Temp: (!) 97.1 °F (36.2 °C)   SpO2: 96%        Physical Exam:  Elderly white male 6 feet tall 250 pounds.  He is awake alert no distress.    Vital signs as above    Chest sternotomy scar well-healed.  Mediastinal chest tube scars noted also.  At the area of the xiphoid is a 1 cm in diameter pink sebaceous cystic lesion.  No fluctuance.  No tenderness.  No obvious cellulitis.      Lab Results: I have personally reviewed pertinent reports. See below.  Imaging: I have personally reviewed pertinent reports.   EKG, Pathology, and Other Studies: I have personally reviewed pertinent reports.     No visits with results within 1 Day(s) from this visit.   Latest known visit with results is:   Office Visit on 08/14/2024   Component Date Value   • TSH 11/14/2024 4.53 (H)          Impression:  Sebaceous cyst sternal area.  Asymptomatic.  The patient asked if it has to be removed and the answer is of course no.  It is asymptomatic.  It is not grossly infected.  It is not fluctuant and does not need to be I&D.   It has changed color slightly and is slightly darker.    Plan:  Short course of antibiotics prophylactically.  Told to notify our office if it changes, starts to drain, starts to get bigger, starts to hurt etc.  Both Radhames and Mariangel understand.  They are very nice people.

## 2024-12-11 ENCOUNTER — OFFICE VISIT (OUTPATIENT)
Dept: CARDIOLOGY CLINIC | Facility: CLINIC | Age: 84
End: 2024-12-11
Payer: COMMERCIAL

## 2024-12-11 VITALS
DIASTOLIC BLOOD PRESSURE: 68 MMHG | HEIGHT: 73 IN | SYSTOLIC BLOOD PRESSURE: 94 MMHG | BODY MASS INDEX: 33.8 KG/M2 | WEIGHT: 255 LBS | HEART RATE: 75 BPM | OXYGEN SATURATION: 94 %

## 2024-12-11 DIAGNOSIS — I25.5 CARDIOMYOPATHY, ISCHEMIC: Primary | ICD-10-CM

## 2024-12-11 DIAGNOSIS — E78.00 PURE HYPERCHOLESTEROLEMIA: ICD-10-CM

## 2024-12-11 DIAGNOSIS — I48.0 PAROXYSMAL ATRIAL FIBRILLATION (HCC): ICD-10-CM

## 2024-12-11 DIAGNOSIS — I11.0 HYPERTENSIVE HEART DISEASE WITH CONGESTIVE HEART FAILURE, UNSPECIFIED HEART FAILURE TYPE (HCC): ICD-10-CM

## 2024-12-11 PROCEDURE — 99214 OFFICE O/P EST MOD 30 MIN: CPT | Performed by: INTERNAL MEDICINE

## 2024-12-18 ENCOUNTER — TELEPHONE (OUTPATIENT)
Dept: CARDIOLOGY CLINIC | Facility: CLINIC | Age: 84
End: 2024-12-18

## 2024-12-18 NOTE — TELEPHONE ENCOUNTER
Called patient 11/7.  Dr. Garcia saw patient 12/11.  Afib ablation at [facility] today.   Patient will have to check with the pharmacy on the butalbital since it was prescribed for a 90 day supply.    Patient will need to be seen in order to get a refill of the Diflucan.

## 2024-12-18 NOTE — TELEPHONE ENCOUNTER
----- Message from Dorita KENNEY sent at 12/18/2024  6:00 AM EST -----  Regarding: hlf  Pts hlf above threshold @ 7, pt takes lasix, metoprolol succ, xarelto, imdur, asa 81mg. EF: 55% (echo 10/29/24).  Thanks,  ~[facility]  NON-BILLABLE LATITUDE TRANSMISSION: BATTERY VOLTAGE ADEQUATE (6.5 YRS). AP: 11%. : 9%. ALL AVAILABLE LEAD PARAMETERS WITHIN NORMAL LIMITS. NO SIGNIFICANT HIGH RATE EPISODES. HEARTLOGIC HEART FAILURE INDEX ABOVE THRESHOLD @ 7. PT TAKES LASIX, METOPROLOL SUCC, XARELTO, IMDUR, ASA 81MG. EF: 55% (ECHO 10/29/24). TASK TO HF TEAM. NORMAL DEVICE FUNCTION. CH

## 2024-12-25 ENCOUNTER — RESULTS FOLLOW-UP (OUTPATIENT)
Dept: CARDIOLOGY CLINIC | Facility: CLINIC | Age: 84
End: 2024-12-25

## 2024-12-30 DIAGNOSIS — I11.0 HYPERTENSIVE HEART DISEASE WITH CONGESTIVE HEART FAILURE, UNSPECIFIED HEART FAILURE TYPE (HCC): ICD-10-CM

## 2024-12-31 RX ORDER — ALIROCUMAB 75 MG/ML
INJECTION, SOLUTION SUBCUTANEOUS
Qty: 6 ML | Refills: 1 | Status: SHIPPED | OUTPATIENT
Start: 2024-12-31

## 2025-01-08 ENCOUNTER — RESULTS FOLLOW-UP (OUTPATIENT)
Dept: CARDIOLOGY CLINIC | Facility: CLINIC | Age: 85
End: 2025-01-08

## 2025-01-08 ENCOUNTER — REMOTE DEVICE CLINIC VISIT (OUTPATIENT)
Dept: CARDIOLOGY CLINIC | Facility: CLINIC | Age: 85
End: 2025-01-08
Payer: COMMERCIAL

## 2025-01-08 DIAGNOSIS — I50.22 CHRONIC HFREF (HEART FAILURE WITH REDUCED EJECTION FRACTION) (HCC): Primary | ICD-10-CM

## 2025-01-08 DIAGNOSIS — Z95.810 ICD (IMPLANTABLE CARDIOVERTER-DEFIBRILLATOR) IN PLACE: ICD-10-CM

## 2025-01-08 DIAGNOSIS — I50.42 CHRONIC COMBINED SYSTOLIC AND DIASTOLIC CONGESTIVE HEART FAILURE (HCC): ICD-10-CM

## 2025-01-08 PROCEDURE — 93297 REM INTERROG DEV EVAL ICPMS: CPT | Performed by: INTERNAL MEDICINE

## 2025-01-08 NOTE — PROGRESS NOTES
[facility]  DC ICD/ACTIVE SYSTEM IS MRI CONDITIONAL   LATITUDE TRANSMISSION -HLHF INDEX ONLY : HEARTLOGIC HEART FAILURE INDEX WAS CROSSED BUT NOW WITHIN NORMAL RANGE (1). BATTERY VOLTAGE ADEQUATE (6.5 YRS). AP 17%   3% (DDD 60 PPM). ALL AVAILABLE LEAD PARAMETERS WITHIN NORMAL LIMITS. NO SIGNIFICANT HIGH RATE EPISODES. NORMAL DEVICE FUNCTION.   ES

## 2025-01-21 DIAGNOSIS — I50.22 CHRONIC HFREF (HEART FAILURE WITH REDUCED EJECTION FRACTION) (HCC): Primary | ICD-10-CM

## 2025-01-21 NOTE — TELEPHONE ENCOUNTER
Reason for call:   [x] Refill   [] Prior Auth  [] Other:     Office:   [] PCP/Provider -   [x] Specialty/Provider - Miguel Ángel Wiley    Medication: furosemide    Dose/Frequency: 20mg three times daily -Preiously ordered by patients old Cardiologist.    Quantity: 270    Pharmacy: [facility]  [place]    Does the patient have enough for 3 days?   [x] Yes   [] No - Send as HP to POD  en

## 2025-01-22 RX ORDER — FUROSEMIDE 20 MG/1
20 TABLET ORAL 3 TIMES DAILY
Qty: 90 TABLET | Refills: 0 | Status: SHIPPED | OUTPATIENT
Start: 2025-01-22

## 2025-01-24 LAB
ALBUMIN SERPL-MCNC: 4.3 G/DL (ref 3.6–5.1)
ALBUMIN/GLOB SERPL: 1.7 (CALC) (ref 1–2.5)
ALP SERPL-CCNC: 51 U/L (ref 35–144)
ALT SERPL-CCNC: 16 U/L (ref 9–46)
AST SERPL-CCNC: 21 U/L (ref 10–35)
BILIRUB SERPL-MCNC: 0.7 MG/DL (ref 0.2–1.2)
BUN SERPL-MCNC: 21 MG/DL (ref 7–25)
BUN/CREAT SERPL: ABNORMAL (CALC) (ref 6–22)
CALCIUM SERPL-MCNC: 9.4 MG/DL (ref 8.6–10.3)
CHLORIDE SERPL-SCNC: 101 MMOL/L (ref 98–110)
CHOLEST SERPL-MCNC: 116 MG/DL
CHOLEST/HDLC SERPL: 3.1 (CALC)
CO2 SERPL-SCNC: 33 MMOL/L (ref 20–32)
CREAT SERPL-MCNC: 0.76 MG/DL (ref 0.7–1.22)
GFR/BSA.PRED SERPLBLD CYS-BASED-ARV: 90 ML/MIN/1.73M2
GLOBULIN SER CALC-MCNC: 2.6 G/DL (CALC) (ref 1.9–3.7)
GLUCOSE SERPL-MCNC: 112 MG/DL (ref 65–99)
HDLC SERPL-MCNC: 38 MG/DL
LDLC SERPL CALC-MCNC: 58 MG/DL (CALC)
NONHDLC SERPL-MCNC: 78 MG/DL (CALC)
POTASSIUM SERPL-SCNC: 4.1 MMOL/L (ref 3.5–5.3)
PROT SERPL-MCNC: 6.9 G/DL (ref 6.1–8.1)
SODIUM SERPL-SCNC: 142 MMOL/L (ref 135–146)
TRIGL SERPL-MCNC: 123 MG/DL

## 2025-02-04 ENCOUNTER — OFFICE VISIT (OUTPATIENT)
Dept: FAMILY MEDICINE CLINIC | Facility: CLINIC | Age: 85
End: 2025-02-04
Payer: COMMERCIAL

## 2025-02-04 VITALS
WEIGHT: 250.4 LBS | DIASTOLIC BLOOD PRESSURE: 64 MMHG | RESPIRATION RATE: 16 BRPM | BODY MASS INDEX: 33.92 KG/M2 | OXYGEN SATURATION: 95 % | SYSTOLIC BLOOD PRESSURE: 110 MMHG | HEIGHT: 72 IN | HEART RATE: 54 BPM | TEMPERATURE: 97.4 F

## 2025-02-04 DIAGNOSIS — Z23 NEED FOR ZOSTER VACCINE: ICD-10-CM

## 2025-02-04 DIAGNOSIS — E78.2 MIXED HYPERLIPIDEMIA: ICD-10-CM

## 2025-02-04 DIAGNOSIS — I25.5 CARDIOMYOPATHY, ISCHEMIC: ICD-10-CM

## 2025-02-04 DIAGNOSIS — I20.89 STABLE ANGINA (HCC): ICD-10-CM

## 2025-02-04 DIAGNOSIS — I11.0 HYPERTENSIVE HEART DISEASE WITH CHRONIC COMBINED SYSTOLIC AND DIASTOLIC CONGESTIVE HEART FAILURE (HCC): ICD-10-CM

## 2025-02-04 DIAGNOSIS — Z29.11 NEED FOR RSV VACCINATION: ICD-10-CM

## 2025-02-04 DIAGNOSIS — I50.42 CHRONIC COMBINED SYSTOLIC AND DIASTOLIC CONGESTIVE HEART FAILURE (HCC): ICD-10-CM

## 2025-02-04 DIAGNOSIS — M48.061 SPINAL STENOSIS OF LUMBAR REGION, UNSPECIFIED WHETHER NEUROGENIC CLAUDICATION PRESENT: ICD-10-CM

## 2025-02-04 DIAGNOSIS — I77.810 AORTIC ROOT DILATATION (HCC): ICD-10-CM

## 2025-02-04 DIAGNOSIS — N40.2 PROSTATE NODULE: ICD-10-CM

## 2025-02-04 DIAGNOSIS — Z78.9 STATIN INTOLERANCE: ICD-10-CM

## 2025-02-04 DIAGNOSIS — I48.0 PAROXYSMAL ATRIAL FIBRILLATION (HCC): Primary | ICD-10-CM

## 2025-02-04 DIAGNOSIS — I50.42 HYPERTENSIVE HEART DISEASE WITH CHRONIC COMBINED SYSTOLIC AND DIASTOLIC CONGESTIVE HEART FAILURE (HCC): ICD-10-CM

## 2025-02-04 DIAGNOSIS — Z23 NEED FOR INFLUENZA VACCINATION: ICD-10-CM

## 2025-02-04 DIAGNOSIS — I73.9 PERIPHERAL ARTERY DISEASE (HCC): ICD-10-CM

## 2025-02-04 DIAGNOSIS — E03.9 ACQUIRED HYPOTHYROIDISM: ICD-10-CM

## 2025-02-04 DIAGNOSIS — Z23 NEED FOR COVID-19 VACCINE: ICD-10-CM

## 2025-02-04 DIAGNOSIS — Z23 NEED FOR VACCINATION FOR STREP PNEUMONIAE: ICD-10-CM

## 2025-02-04 DIAGNOSIS — Z00.00 ENCOUNTER FOR ANNUAL WELLNESS VISIT (AWV) IN MEDICARE PATIENT: ICD-10-CM

## 2025-02-04 PROBLEM — U07.1 COVID-19 VIRUS INFECTION: Status: RESOLVED | Noted: 2020-12-15 | Resolved: 2025-02-04

## 2025-02-04 PROCEDURE — G0439 PPPS, SUBSEQ VISIT: HCPCS | Performed by: FAMILY MEDICINE

## 2025-02-04 PROCEDURE — 99214 OFFICE O/P EST MOD 30 MIN: CPT | Performed by: FAMILY MEDICINE

## 2025-02-04 PROCEDURE — G2211 COMPLEX E/M VISIT ADD ON: HCPCS | Performed by: FAMILY MEDICINE

## 2025-02-04 RX ORDER — AMIODARONE HYDROCHLORIDE 200 MG/1
200 TABLET ORAL DAILY
COMMUNITY
Start: 2024-12-26

## 2025-02-04 NOTE — ASSESSMENT & PLAN NOTE
Wt Readings from Last 3 Encounters:   02/04/25 114 kg (250 lb 6.4 oz)   12/11/24 116 kg (255 lb)   12/10/24 115 kg (254 lb)     Blood pressure continues to be a little bit low, but he is not really having as many problems with hypotension or orthostasis.  Follow-up per cardiology.        Orders:  •  Comprehensive metabolic panel; Future

## 2025-02-04 NOTE — ASSESSMENT & PLAN NOTE
Patient was unable to take statins.  Currently using Praluent.  Excellent effect from the Praluent.

## 2025-02-04 NOTE — PATIENT INSTRUCTIONS
1. Paroxysmal atrial fibrillation (HCC)  Assessment & Plan:  Patient is following with cardiology.  Did have the potential for cardioversion recently, but unfortunately was not able to do that.  He was no longer in A-fib.  Had A-fib noted on cardia monitor after that.  This does suggest paroxysmal atrial fibrillation.  Follow with cardiology.       2. Aortic root dilatation (HCC)  Assessment & Plan:  Stable at the moment.  No specific change.  Will consider CT in the future.       3. Cardiomyopathy, ischemic  Assessment & Plan:  Follow with cardiology.  No specific change at the moment.       4. Chronic combined systolic and diastolic congestive heart failure (HCC)  Assessment & Plan:  Wt Readings from Last 3 Encounters:   02/04/25 114 kg (250 lb 6.4 oz)   12/11/24 116 kg (255 lb)   12/10/24 115 kg (254 lb)   Stable.  Reviewed about medications for CHF.  He is on the appropriate medications at this point, including adding Jardiance from cardiology.          Orders:    Comprehensive metabolic panel; Future    Orders:  -     Comprehensive metabolic panel; Future; Expected date: 08/04/2025  -     Comprehensive metabolic panel  5. Mixed hyperlipidemia  Assessment & Plan:  Stable.  Cholesterol doing extremely well with using Praluent.  No change.  Check in 6 months.  Orders:    Cholesterol, total; Future    Comprehensive metabolic panel; Future    HDL cholesterol; Future    LDL cholesterol, direct; Future    Orders:  -     Cholesterol, total; Future; Expected date: 08/04/2025  -     Comprehensive metabolic panel; Future; Expected date: 08/04/2025  -     HDL cholesterol; Future; Expected date: 08/04/2025  -     LDL cholesterol, direct; Future; Expected date: 08/04/2025  -     Cholesterol, total  -     Comprehensive metabolic panel  -     HDL cholesterol  -     LDL cholesterol, direct  6. 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)  Assessment & Plan:  Wt Readings  from Last 3 Encounters:   02/04/25 114 kg (250 lb 6.4 oz)   12/11/24 116 kg (255 lb)   12/10/24 115 kg (254 lb)     Blood pressure continues to be a little bit low, but he is not really having as many problems with hypotension or orthostasis.  Follow-up per cardiology.        Orders:    Comprehensive metabolic panel; Future    Orders:  -     Comprehensive metabolic panel; Future; Expected date: 08/04/2025  -     Comprehensive metabolic panel  7. Acquired hypothyroidism  Assessment & Plan:  Stable.  Check TSH in the future.       8. Peripheral artery disease (HCC)  Assessment & Plan:  Continues to follow with vascular.         9. Prostate nodule  Assessment & Plan:  Recommend recheck PSA.  Follow-up afterwards.  Consider follow-up with urology.  Orders:    PSA Total (Reflex To Free); Future    Orders:  -     PSA Total (Reflex To Free); Future; Expected date: 08/04/2025  -     PSA Total (Reflex To Free)  10. Spinal stenosis of lumbar region, unspecified whether neurogenic claudication present  Assessment & Plan:  Patient reports he is doing quite well with that at the moment.       11. Statin intolerance  Assessment & Plan:  Patient was unable to take statins.  Currently using Praluent.  Excellent effect from the Praluent.       12. Encounter for annual wellness visit (AWV) in Medicare patient  Comments:  Reviewed health maintenance, advance directives, vaccines.  13. Need for influenza vaccination  Comments:  Patient did not wish to have influenza vaccine today.  14. Need for COVID-19 vaccine  Comments:  Recommend COVID-vaccine for 24/25 season.  Patient declines.  15. Need for zoster vaccine  Comments:  Recommend that he consider Shingrix series at local pharmacy.  16. Need for vaccination for Strep pneumoniae  Comments:  Recommend Prevnar 20 once in his lifetime over the age of 65.  17. Need for RSV vaccination  Comments:  Recommend RSV vaccine, would need this at local pharmacy.  18. Stable angina  ([facility])  Assessment & Plan:  Follow with cardiology           COVID 19 Instructions    Jose Coronado was advised to limit contact with others to essential tasks such as getting food, medications, and medical care.    Proper handwashing reviewed, and Hand sanitzer when washing is not available.    If the patient develops symptoms of COVID 19, the patient should call the office as soon as possible.    It is strongly recommended that Flu Vaccinations be obtained.      Virtual Visits:  AmWell: This works on smart phones (any phone with Internet browsing capability).  You should get a text message when the provider is ready to see you.  Click on the link in the text message, and the call should start.  You will need to type in your name, and allow camera and microphone access.  This is HIPPA compliant, and secure.      If you have not already done so, get immunized to COVID 19.      We are committed to getting you vaccinated as soon as possible and will be closely following CDC and [facility] guidelines as they are released and revised.  Please refer to our COVID-19 vaccine webpage for the most up to date information on the vaccine and our distribution efforts.    This site will also have the most up to date recommendations for COVID booster vaccine.    [email or web address]    Call [phone] ([phone]), option 7    You can also visit https://www.vaccines.gov/ to find vaccines in your area.    OUR LOCATION:    [facility]  [address] [place] [address], [address]  [place], [place], [address] [phone]  Fax: [phone]    Lab services, Rheumatology, and OB/GYN are at this location as well.  Thank you for your inquiry about the RSV vaccine.  As you can see below, the CDC has recommended that you discuss this with your Primary Care provider and decide if the vaccine is right for you.  This discussion can be accomplished at your next office visit.  The vaccine is  currently available at your local pharmacy if you choose to get it prior to your next office visit.     Respiratory syncytial (sin-SISH-magaly) virus, or RSV, is a common respiratory virus that usually causes mild, cold-like symptoms. Most people recover in a week or two, but RSV can be serious. Infants and older adults are more likely to develop severe RSV and need hospitalization. Vaccines are available to protect older adults from severe RSV. Monoclonal antibody products are available to protect infants and young children from severe RSV.    CDC Recommendations  Adults 60 years old and over  Adults 60 years of age and older may receive a single dose of RSV vaccine using shared clinical decision-making.    Infants and young children  1 dose of nirsevimab for all infants younger than 8 months born during or entering their first RSV season.  1 dose of nirsevimab for infants and children 8-19 months old who are at increased risk for severe RSV disease and entering their second RSV season.  Note: A different monoclonal antibody, palivizumab, is limited to children under 24 months of age with certain conditions that place them at high risk for severe RSV disease. It must be given once a month during RSV season. Please see AAP guidelines for palivizumab.    Pregnant people  1 dose of maternal RSV vaccine during weeks 32 through 36 of pregnancy, administered immediately before or during RSV season. Abrysvo is the only RSV vaccine recommended during pregnancy.    If you have any questions about RSV or the products above, talk to your healthcare provider.

## 2025-02-04 NOTE — ASSESSMENT & PLAN NOTE
Stable.  Cholesterol doing extremely well with using Praluent.  No change.  Check in 6 months.  Orders:  •  Cholesterol, total; Future  •  Comprehensive metabolic panel; Future  •  HDL cholesterol; Future  •  LDL cholesterol, direct; Future

## 2025-02-04 NOTE — ASSESSMENT & PLAN NOTE
Wt Readings from Last 3 Encounters:   02/04/25 114 kg (250 lb 6.4 oz)   12/11/24 116 kg (255 lb)   12/10/24 115 kg (254 lb)   Stable.  Reviewed about medications for CHF.  He is on the appropriate medications at this point, including adding Jardiance from cardiology.          Orders:  •  Comprehensive metabolic panel; Future

## 2025-02-04 NOTE — ASSESSMENT & PLAN NOTE
Recommend recheck PSA.  Follow-up afterwards.  Consider follow-up with urology.  Orders:  •  PSA Total (Reflex To Free); Future

## 2025-02-04 NOTE — PROGRESS NOTES
Name: Jose Coronado      : 1940      MRN: [id number]  Encounter Provider: Tray Ocasio MD  Encounter Date: 2025   Encounter department: [facility]    Assessment & Plan  Paroxysmal atrial fibrillation (HCC)  Patient is following with cardiology.  Did have the potential for cardioversion recently, but unfortunately was not able to do that.  He was no longer in A-fib.  Had A-fib noted on cardia monitor after that.  This does suggest paroxysmal atrial fibrillation.  Follow with cardiology.       Aortic root dilatation (HCC)  Stable at the moment.  No specific change.  Will consider CT in the future.       Cardiomyopathy, ischemic  Follow with cardiology.  No specific change at the moment.       Chronic combined systolic and diastolic congestive heart failure (HCC)  Wt Readings from Last 3 Encounters:   25 114 kg (250 lb 6.4 oz)   24 116 kg (255 lb)   12/10/24 115 kg (254 lb)   Stable.  Reviewed about medications for CHF.  He is on the appropriate medications at this point, including adding Jardiance from cardiology.          Orders:  •  Comprehensive metabolic panel; Future    Mixed hyperlipidemia  Stable.  Cholesterol doing extremely well with using Praluent.  No change.  Check in 6 months.  Orders:  •  Cholesterol, total; Future  •  Comprehensive metabolic panel; Future  •  HDL cholesterol; Future  •  LDL cholesterol, direct; Future    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)  Wt Readings from Last 3 Encounters:   25 114 kg (250 lb 6.4 oz)   24 116 kg (255 lb)   12/10/24 115 kg (254 lb)     Blood pressure continues to be a little bit low, but he is not really having as many problems with hypotension or orthostasis.  Follow-up per cardiology.        Orders:  •  Comprehensive metabolic panel; Future    Acquired hypothyroidism  Stable.  Check TSH in the future.       Peripheral artery  disease (HCC)  Continues to follow with vascular.         Prostate nodule  Recommend recheck PSA.  Follow-up afterwards.  Consider follow-up with urology.  Orders:  •  PSA Total (Reflex To Free); Future    Spinal stenosis of lumbar region, unspecified whether neurogenic claudication present  Patient reports he is doing quite well with that at the moment.       Statin intolerance  Patient was unable to take statins.  Currently using Praluent.  Excellent effect from the Praluent.       Encounter for annual wellness visit (AWV) in Medicare patient         Need for influenza vaccination         Need for COVID-19 vaccine         Need for zoster vaccine         Need for vaccination for Strep pneumoniae         Need for RSV vaccination         Stable angina (HCC)  Follow with cardiology          Preventive health issues were discussed with patient, and age appropriate screening tests were ordered as noted in patient's After Visit Summary. Personalized health advice and appropriate referrals for health education or preventive services given if needed, as noted in patient's After Visit Summary.      1. Paroxysmal atrial fibrillation (HCC)  Assessment & Plan:  Patient is following with cardiology.  Did have the potential for cardioversion recently, but unfortunately was not able to do that.  He was no longer in A-fib.  Had A-fib noted on cardia monitor after that.  This does suggest paroxysmal atrial fibrillation.  Follow with cardiology.       2. Aortic root dilatation (HCC)  Assessment & Plan:  Stable at the moment.  No specific change.  Will consider CT in the future.       3. Cardiomyopathy, ischemic  Assessment & Plan:  Follow with cardiology.  No specific change at the moment.       4. Chronic combined systolic and diastolic congestive heart failure (HCC)  Assessment & Plan:  Wt Readings from Last 3 Encounters:   02/04/25 114 kg (250 lb 6.4 oz)   12/11/24 116 kg (255 lb)   12/10/24 115 kg (254 lb)   Stable.  Reviewed  about medications for CHF.  He is on the appropriate medications at this point, including adding Jardiance from cardiology.          Orders:  •  Comprehensive metabolic panel; Future    Orders:  -     Comprehensive metabolic panel; Future; Expected date: 08/04/2025  -     Comprehensive metabolic panel  5. Mixed hyperlipidemia  Assessment & Plan:  Stable.  Cholesterol doing extremely well with using Praluent.  No change.  Check in 6 months.  Orders:  •  Cholesterol, total; Future  •  Comprehensive metabolic panel; Future  •  HDL cholesterol; Future  •  LDL cholesterol, direct; Future    Orders:  -     Cholesterol, total; Future; Expected date: 08/04/2025  -     Comprehensive metabolic panel; Future; Expected date: 08/04/2025  -     HDL cholesterol; Future; Expected date: 08/04/2025  -     LDL cholesterol, direct; Future; Expected date: 08/04/2025  -     Cholesterol, total  -     Comprehensive metabolic panel  -     HDL cholesterol  -     LDL cholesterol, direct  6. 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)  Assessment & Plan:  Wt Readings from Last 3 Encounters:   02/04/25 114 kg (250 lb 6.4 oz)   12/11/24 116 kg (255 lb)   12/10/24 115 kg (254 lb)     Blood pressure continues to be a little bit low, but he is not really having as many problems with hypotension or orthostasis.  Follow-up per cardiology.        Orders:  •  Comprehensive metabolic panel; Future    Orders:  -     Comprehensive metabolic panel; Future; Expected date: 08/04/2025  -     Comprehensive metabolic panel  7. Acquired hypothyroidism  Assessment & Plan:  Stable.  Check TSH in the future.       8. Peripheral artery disease (HCC)  Assessment & Plan:  Continues to follow with vascular.         9. Prostate nodule  Assessment & Plan:  Recommend recheck PSA.  Follow-up afterwards.  Consider follow-up with urology.  Orders:  •  PSA Total (Reflex To Free); Future    Orders:  -     PSA Total  (Reflex To Free); Future; Expected date: 08/04/2025  -     PSA Total (Reflex To Free)  10. Spinal stenosis of lumbar region, unspecified whether neurogenic claudication present  Assessment & Plan:  Patient reports he is doing quite well with that at the moment.       11. Statin intolerance  Assessment & Plan:  Patient was unable to take statins.  Currently using Praluent.  Excellent effect from the Praluent.       12. Encounter for annual wellness visit (AWV) in Medicare patient  Comments:  Reviewed health maintenance, advance directives, vaccines.  13. Need for influenza vaccination  Comments:  Patient did not wish to have influenza vaccine today.  14. Need for COVID-19 vaccine  Comments:  Recommend COVID-vaccine for 24/25 season.  Patient declines.  15. Need for zoster vaccine  Comments:  Recommend that he consider Shingrix series at local pharmacy.  16. Need for vaccination for Strep pneumoniae  Comments:  Recommend Prevnar 20 once in his lifetime over the age of 65.  17. Need for RSV vaccination  Comments:  Recommend RSV vaccine, would need this at local pharmacy.  18. Stable angina (HCC)  Assessment & Plan:  Follow with cardiology           Chief Complaint   Patient presents with   • Medicare Wellness Visit         History of Present Illness     Patient is here for annual wellness visit.    A-fib: No specific issues at the moment.  Patient was in the hospital for cardioversion, but ended up not having that due to not being in A-fib at the moment.  Patient did have Kardia monitor that he was using at home, and it did show A-fib.    Aortic root dilation: Following with cardiology.    Cardiomyopathy: Has had follow up with Cardio. No changes.    Hyperlipidemia: Reviewed labs.  Currently using Praluent.    CHF: Following with cardiology, especially heart failure specialist.    Prostate nodule: Noted before.          · Paroxysmal atrial fibrillation (HCC)    · Aortic root dilatation (HCC)    · Cardiomyopathy,  ischemic    · Chronic combined systolic and diastolic congestive heart failure (HCC)    · Mixed hyperlipidemia    · 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)    · Acquired hypothyroidism    · Peripheral artery disease (HCC)    · Prostate nodule    · Spinal stenosis of lumbar region, unspecified whether neurogenic claudication present    · Statin intolerance    · Need for COVID-19 vaccine    · Need for influenza vaccination    · Need for zoster vaccine    · Need for vaccination for Strep pneumoniae    · Need for RSV vaccination           Patient Care Team:  Tray Ocasio MD as PCP - General (Family Medicine)  Tray Ocasio MD as PCP - PCP-[facility] (RTE)  Tray Ocasio MD as PCP - PCP-[facility] (RTE)    Review of Systems   Constitutional:  Negative for appetite change and fever.   Respiratory:  Negative for chest tightness and shortness of breath.    Cardiovascular:  Negative for chest pain, palpitations and leg swelling.   Gastrointestinal:  Negative for abdominal pain.   Genitourinary:  Negative for difficulty urinating.   Musculoskeletal:  Negative for arthralgias and myalgias.   Skin:  Negative for wound.   Neurological:  Negative for dizziness, light-headedness and headaches.   Psychiatric/Behavioral:  Negative for dysphoric mood and sleep disturbance. The patient is not nervous/anxious.      Medical History Reviewed by provider this encounter:       Annual Wellness Visit Questionnaire   Jose is here for his Subsequent Wellness visit.     Health Risk Assessment:   Patient rates overall health as good. Patient feels that their physical health rating is slightly worse. Patient is very satisfied with their life. Eyesight was rated as same. Hearing was rated as same. Patient feels that their emotional and mental health rating is same. Patients states they are never, rarely angry. Patient states they are often  unusually tired/fatigued. Pain experienced in the last 7 days has been some. Patient's pain rating has been 1/10. Patient states that he has experienced no weight loss or gain in last 6 months.     Depression Screening:   PHQ-2 Score: 0      Fall Risk Screening:   In the past year, patient has experienced: no history of falling in past year      Home Safety:  Patient has trouble with stairs inside or outside of their home. Patient has working smoke alarms and has working carbon monoxide detector. Home safety hazards include: none.     Nutrition:   Current diet is Regular.     Medications:   Patient is currently taking over-the-counter supplements. OTC medications include: see medication list. Patient is able to manage medications.     Activities of Daily Living (ADLs)/Instrumental Activities of Daily Living (IADLs):   Walk and transfer into and out of bed and chair?: Yes  Dress and groom yourself?: Yes    Bathe or shower yourself?: Yes    Feed yourself? Yes  Do your laundry/housekeeping?: Yes  Manage your money, pay your bills and track your expenses?: Yes  Make your own meals?: Yes    Do your own shopping?: Yes    Previous Hospitalizations:   Any hospitalizations or ED visits within the last 12 months?: Yes    How many hospitalizations have you had in the last year?: 1-2    Advance Care Planning:   Living will: No    Durable POA for healthcare: Yes    Advanced directive: Yes    Advanced directive counseling given: Yes      Cognitive Screening:   Provider or family/friend/caregiver concerned regarding cognition?: No    PREVENTIVE SCREENINGS      Cardiovascular Screening:    General: Screening Not Indicated and History Lipid Disorder      Diabetes Screening:     General: Screening Current      Colorectal Cancer Screening:     General: Screening Current      Prostate Cancer Screening:    General: Screening Not Indicated      Osteoporosis Screening:    General: Screening Not Indicated      Abdominal Aortic Aneurysm  (AAA) Screening:    Risk factors include: tobacco use        General: Screening Not Indicated      Lung Cancer Screening:     General: Screening Not Indicated      Hepatitis C Screening:    General: Screening Not Indicated    Screening, Brief Intervention, and Referral to Treatment (SBIRT)    Screening  Typical number of drinks in a day: 0  Typical number of drinks in a week: 0  Interpretation: Low risk drinking behavior.    AUDIT-C Screenin) How often did you have a drink containing alcohol in the past year? never  2) How many drinks did you have on a typical day when you were drinking in the past year? 0  3) How often did you have 6 or more drinks on one occasion in the past year? never    AUDIT-C Score: 0  Interpretation: Score 0-3 (male): Negative screen for alcohol misuse    Single Item Drug Screening:  How often have you used an illegal drug (including marijuana) or a prescription medication for non-medical reasons in the past year? never    Single Item Drug Screen Score: 0  Interpretation: Negative screen for possible drug use disorder    Social Drivers of Health     Financial Resource Strain: Low Risk  (2024)    Overall Financial Resource Strain (CARDIA)    • Difficulty of Paying Living Expenses: Not hard at all   Food Insecurity: No Food Insecurity (2025)    Hunger Vital Sign    • Worried About Running Out of Food in the Last Year: Never true    • Ran Out of Food in the Last Year: Never true   Transportation Needs: No Transportation Needs (2025)    PRAPARE - Transportation    • Lack of Transportation (Medical): No    • Lack of Transportation (Non-Medical): No   Housing Stability: Unknown (2025)    Housing Stability Vital Sign    • Unable to Pay for Housing in the Last Year: No    • Homeless in the Last Year: No   Utilities: Not At Risk (2025)    [facility] Utilities    • Threatened with loss of utilities: No     No results found.    Objective   /64 (BP Location: Left arm, Patient  Position: Sitting, Cuff Size: Adult)   Pulse (!) 54   Temp (!) 97.4 °F (36.3 °C) (Temporal)   Resp 16   Ht 6' (1.829 m)   Wt 114 kg (250 lb 6.4 oz)   SpO2 95%   BMI 33.96 kg/m²     Blood sugar 112.  Creatinine 0.76, GFR 90.  Sodium 142, potassium 4.1, calcium 9.4.  AST 21, ALT 16.  Total cholesterol 116, LDL 58, HDL 38, triglycerides 123.    Physical Exam  Vitals and nursing note reviewed.   Constitutional:       Appearance: Normal appearance. He is well-developed.   HENT:      Head: Normocephalic and atraumatic.   Cardiovascular:      Rate and Rhythm: Normal rate and regular rhythm.      Pulses:           Carotid pulses are 2+ on the right side and 2+ on the left side.     Heart sounds: Normal heart sounds. No murmur heard.     No friction rub. No gallop.   Pulmonary:      Effort: Pulmonary effort is normal. No respiratory distress.      Breath sounds: Normal breath sounds. No wheezing or rales.   Musculoskeletal:      Cervical back: Normal range of motion and neck supple.   Neurological:      Mental Status: He is alert.

## 2025-02-04 NOTE — ASSESSMENT & PLAN NOTE
Patient is following with cardiology.  Did have the potential for cardioversion recently, but unfortunately was not able to do that.  He was no longer in A-fib.  Had A-fib noted on cardia monitor after that.  This does suggest paroxysmal atrial fibrillation.  Follow with cardiology.

## 2025-02-06 ENCOUNTER — REMOTE DEVICE CLINIC VISIT (OUTPATIENT)
Dept: CARDIOLOGY CLINIC | Facility: CLINIC | Age: 85
End: 2025-02-06
Payer: COMMERCIAL

## 2025-02-06 DIAGNOSIS — Z95.810 PRESENCE OF IMPLANTABLE CARDIOVERTER-DEFIBRILLATOR (ICD): Primary | ICD-10-CM

## 2025-02-06 PROCEDURE — 93295 DEV INTERROG REMOTE 1/2/MLT: CPT | Performed by: INTERNAL MEDICINE

## 2025-02-06 PROCEDURE — 93296 REM INTERROG EVL PM/IDS: CPT | Performed by: INTERNAL MEDICINE

## 2025-02-06 NOTE — PROGRESS NOTES
BSC  DC ICD/ACTIVE SYSTEM IS [facility] CONDITIONAL   LATITUDE TRANSMISSION:  BATTERY VOLTAGE ADEQUATE (8 YR.).  AP 26%  1%.  ALL LEAD PARAMETERS WITHIN NORMAL LIMITS.  2 AT/AF EPISODES, 7 S AND 4 M 25 S.  PATIENT TAKES XARELTO, AMIODARONE, METOPROLOL, AND ASA.  HEARTLOGIC HEART FAILURE INDEX WITHIN NORMAL LIMITS.  NORMAL DEVICE FUNCTION.  RG

## 2025-02-08 ENCOUNTER — RESULTS FOLLOW-UP (OUTPATIENT)
Dept: CARDIOLOGY CLINIC | Facility: CLINIC | Age: 85
End: 2025-02-08

## 2025-02-15 DIAGNOSIS — I50.22 CHRONIC HFREF (HEART FAILURE WITH REDUCED EJECTION FRACTION) (HCC): ICD-10-CM

## 2025-02-17 RX ORDER — FUROSEMIDE 20 MG/1
20 TABLET ORAL 3 TIMES DAILY
Qty: 90 TABLET | Refills: 5 | Status: SHIPPED | OUTPATIENT
Start: 2025-02-17 | End: 2025-02-20 | Stop reason: SDUPTHER

## 2025-02-19 ENCOUNTER — TELEPHONE (OUTPATIENT)
Age: 85
End: 2025-02-19

## 2025-02-19 NOTE — TELEPHONE ENCOUNTER
Caller: [facility]     Doctor: Dr. Garcia    Reason for call: patient contacted pharmacy and is requesting a 90 day supply of furosemide. He takes 3 pills a day and was only supplied 90 pills. Can a new order either be faxed or electronically sent for 270 pills? Please advise     Call back#: [phone]

## 2025-02-20 RX ORDER — FUROSEMIDE 20 MG/1
20 TABLET ORAL 3 TIMES DAILY
Qty: 270 TABLET | Refills: 1 | Status: SHIPPED | OUTPATIENT
Start: 2025-02-20

## 2025-02-20 NOTE — TELEPHONE ENCOUNTER
Reason for call: Not a Duplicate. Mail Order pharmacy must be a 90 days supply  [x] Refill   [] Prior Auth  [] Other:     Office:   [] PCP/Provider -   [x] Specialty/Provider - Miguel Ángel Garcia DO / CARDIO     Medication: furosemide (LASIX) 20 mg tablet     Dose/Frequency: Take 1 tablet by mouth 3 times a day     Quantity: 270    Pharmacy: [facility]     Does the patient have enough for 3 days?   [x] Yes   [] No - Send as HP to POD

## 2025-02-25 NOTE — PROGRESS NOTES
Heart Failure Office Note - Jose Coronado 84 y.o. male MRN: [id number]    @ Encounter: [id number]      Assessment/Plan:    Patient Active Problem List    Diagnosis Date Noted    Peripheral artery disease (HCC) 03/04/2024    Abnormal gait 01/31/2024    Elevated troponin 12/05/2023    Lesion of left shoulder 12/05/2023    Aortic root dilatation (HCC) 12/04/2023    Mass of skin of back 12/04/2023    Lumbar radiculopathy 10/23/2023    Left sided sciatica 08/24/2023    Lumbar spondylosis 08/24/2023    Insomnia 08/09/2023    Anxiety 04/06/2023    Hydrocele 03/28/2023    Bronchitis 11/17/2022    Statin intolerance 11/14/2022    SOB (shortness of breath) 11/07/2022    Colon polyp 05/09/2022    Rectal bleeding 04/22/2022    Stable angina (HCC) 03/15/2021    ICD (implantable cardioverter-defibrillator) in place 02/18/2021    Anticoagulated by anticoagulation treatment 12/01/2020    Cataract 02/25/2020    Osteoarthritis 02/25/2020    Thoracic back pain 02/25/2020    Renal cyst 07/13/2018    Chronic maxillary sinusitis 05/16/2018    Acute low back pain without sciatica 10/26/2017    Cardiomyopathy, ischemic 07/25/2017    CHF (congestive heart failure) (HCC) 09/26/2016    Atrial fibrillation (HCC) 09/26/2016    Hyperlipidemia 04/25/2016    S/P PTCA (percutaneous transluminal coronary angioplasty) 02/02/2016    Hyperglycemia 11/20/2014    History of non-ST elevation myocardial infarction (NSTEMI) 11/20/2014    Prostate nodule 11/20/2014    Calculus of gallbladder and bile duct with acute on chronic cholecystitis 06/20/2013    Disc degeneration, lumbar 06/20/2013    Diverticulosis 06/20/2013    Obese 06/20/2013    Spinal stenosis 06/20/2013    3-vessel CAD 12/03/2012    Hemorrhoids 12/03/2012    Hypertensive heart disease with congestive heart failure (HCC) 12/03/2012    Hypothyroidism 12/03/2012       # Chronic HFimpEF, Stage C, NYHA  Etiology: iCM    Weight: 255 lbs  BNP:     Studies- personally reviewed by me  Echo  10/29/24:  LVEF: 55%  RV: normal  Ascending aorta 4.5 cm    NM stress 12/6/23: large infarct involving inferolateral- posterolateral wall    Echo 1/9/23- [facility]  LVEF: 25-30%  LVIDd:  RV: normal  Other: aortic root 4.4 cm    Echo 11/29/22:  LVEF: 35-40%  LVEDd: 6.3 cm  Aorta 4.4 cm    TTE 11/11/20: LVEF: 30-35%  TTE 2/27/19: LVEF: 45-50%  TTE 4/6/18: LVEF: 35-40%  TTE 8/18/17: EF: 35-40%  TTE 11/20/15: EF: 55%    Neurohormonal Blockade:  --Beta-Blocker: Toprol XL 50 mg BID  --ACEi, ARB or ARNi: losartan 12.5 mg daily (states he had back pain with Entresto)  Imdur 60 mg daily  --Aldosterone Receptor Blocker: spironolactone 25 mg daily  --SGLT2-I: Jardiance 10 mg daily  --Diuretic: furosemide 20 mg BID up to TID    Sudden Cardiac Death Risk Reduction:  --ICD 6/20/19- [facility]  Interrogation 2/6/25: 2 AT/AF, heart logic normal  Interrogation. Heart logic index passed    Electrical Resynchronization:  --Candidacy for BiV device:    Advanced Therapies (if appropriate):  --Inotrope:  --LVAD/Transplant Candidacy:    # PAF/atrial flutter- ablation at [facility] 4/22/21  Cardioversion 10/3/24; afib ablation 12/18/24  AC: Xarelto 20 mg daily  Rate: Toprol XL 50 mg BID  Rhythm:    # PAD  Duplex 3/13/24: right: AUGUSTA 0.86, left: AUGUSTA 0.78    # CAD w/ hx of CABG and redo sternotomy for pseudoaneurysm rupture 2015  Also with multiple PCI  Rx: toprol  Angina: Imdur 60 mg daily  [facility]  normal LM, 100% prox LAD, patent LIMA  LCx: 100% proximal after small OM2. Large OM3 sub-branchs fill faintly via collaterals.   RCA: 100% proximal occlusion, distal RCA fills faintly via collaterals from the LAD  LV function: EF 40-45% with inferobasal and postero-lateral hypokinesis.  Mitral Regurgitation: None   Bypass Grafts:  1. Widely patent and large LIMA graft to the distal LAD with some collaterals to distal RCA and OM3.  2. Chronically occluded SVG to RCA.  3. Occluded SVG to large OM3 with distal vessel filling via collaterals from LAD   PCI of SVG  to OM3 attempted. The ostial SVG occlusion could not be crossed     # dilated ascending thoracic aorta  Echo 10/2024: 4.5 cm  CTA chest 12/18/23: 4.4 cm    # dyslipidemia- Praluent (has not tolerated statins)  1/23/25: LDL 58, HDL 38  12/6/23: LDL 35, HDL 30    Today's Plan:  Echo done at [facility] with normal EF read seems unlikely given he was down around 35-40% for years  Continue GDMT- Jardiance, spironolactone, losartan, Toprol for HFrEF  Next Wednesday getting afib ablation  Will be starting steroids for back pain, watch for fluid retention  Discussed possible CCM- we will keep this on a back burner  Lipids at goal on Praluent   Probably on dry side, cut back lasix to BID  --2g sodium diet  Weights daily    HPI:       85 yo male with hx of CAD  s/p CABG in 2000/ multiple stents, 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 Failed attempt at PCI of SVG-OM3.      In Dec was admitted, told he had a mild heart attack. He is tired all the time, has trouble breathing. Legs feel week. Also gets angina.     Interim:  Afib ablation 12/18/24  His Kardia is saying he is in afib   Lightheaded in am  Occasional burning in chest, just sitting   Past Medical History:   Diagnosis Date    Bleeding hemorrhoids     Choledocholithiasis     Coronary artery disease     COVID-19 05/23/2022    Symptoms onset 5/20/22    COVID-19 virus infection 12/15/2020    15Dec:  Testing ordered, positive.  16 December:  Continued shortness of breath and fatigue.  Myalgias.  Bamlanivimab infusion ordered  17 December:  Shortness of breath and fatigue are about the same.  18Dec: Continued SOB and cough, and fatigue.  21Dec: Improving. Less issues.   22Dec: Day 9: Stable. Smell returning. No SOB related to COVID.  23Dec: Day 10: Improving.     August 2024: New episod  "   Difficulty breathing     Diverticulosis     Hypertension 1970    Been taking medications for it.    Ileus (HCC)     Lymphadenopathy of head and neck 09/29/2020    Myocardial infarction (HCC)     Pacemaker        Review of Systems   Constitutional:  Positive for fatigue. Negative for activity change, appetite change and unexpected weight change.   HENT:  Negative for congestion and nosebleeds.    Eyes: Negative.    Respiratory:  Positive for shortness of breath. Negative for cough and chest tightness.    Cardiovascular:  Negative for chest pain, palpitations and leg swelling.   Gastrointestinal:  Negative for abdominal distention.   Endocrine: Negative.    Genitourinary: Negative.    Musculoskeletal: Negative.    Skin: Negative.    Neurological:  Negative for dizziness, syncope and weakness.   Hematological: Negative.    Psychiatric/Behavioral: Negative.           Allergies   Allergen Reactions    Other Other (See Comments)     Pain in back after contrast used for echos, Definity    Atorvastatin Cough     Per pt has had a problem with other statins also, does not remember names    Bee Venom     Diphenhydramine Other (See Comments)    Iodinated Contrast Media Other (See Comments)    Lisinopril Other (See Comments)     cough    Perflutren Lipid Microsphere     Perflutren Lipid Microspheres Other (See Comments)     severe back lower back spasm    Ranolazine      Alters mood.    Rosuvastatin Myalgia    Sacubitril-Valsartan Other (See Comments)     \"severe back pain\"    Simvastatin Myalgia     .    Current Outpatient Medications:     acetaminophen (TYLENOL) 500 mg tablet, Take 500 mg by mouth, Disp: , Rfl:     Alirocumab (Praluent) 75 MG/ML SOAJ, Inject 1 mL under the skin every 14 days, Disp: 6 mL, Rfl: 1    ascorbic acid (VITAMIN C) 500 MG tablet, Take 500 mg by mouth daily, Disp: , Rfl:     aspirin (ECOTRIN LOW STRENGTH) 81 mg EC tablet, Take 1 tablet by mouth daily, Disp: , Rfl:     Cholecalciferol (VITAMIN D) 2000 " units CAPS, Take by mouth, Disp: , Rfl:     Coenzyme Q10 (CO Q 10) 100 MG CAPS, Take by mouth, Disp: , Rfl:     furosemide (LASIX) 20 mg tablet, Take 1 tablet (20 mg total) by mouth 3 (three) times a day, Disp: 270 tablet, Rfl: 1    isosorbide mononitrate (IMDUR) 60 mg 24 hr tablet, Take 1 tablet by mouth daily, Disp: , Rfl:     Jardiance 10 MG TABS tablet, TAKE 1 TABLET BY MOUTH EVERY MORNING., Disp: 90 tablet, Rfl: 1    levothyroxine (Synthroid) 50 mcg tablet, Take 1 tablet (50 mcg total) by mouth daily, Disp: 90 tablet, Rfl: 1    Magnesium 400 MG TABS, Take by mouth, Disp: , Rfl:     metoprolol succinate (TOPROL-XL) 50 mg 24 hr tablet, Take 1 tablet (50 mg total) by mouth 2 (two) times a day, Disp: 180 tablet, Rfl: 3    Multiple Vitamins-Minerals (MULTIVITAMIN ADULT PO), Take 1 capsule by mouth, Disp: , Rfl:     nitroglycerin (NITROSTAT) 0.4 mg SL tablet, Place 1 tablet under the tongue every 5 (five) minutes as needed, Disp: , Rfl:     rivaroxaban (XARELTO) 20 mg tablet, Take 1 tablet (20 mg total) by mouth daily with dinner, Disp: 28 tablet, Rfl: 0    TURMERIC PO, Take 1 Dose by mouth, Disp: , Rfl:     amoxicillin (AMOXIL) 500 MG tablet, 4 TABLETS BY MOUTH 1 HOUR BEFORE DENTIST (Patient not taking: Reported on 12/4/2023), Disp: , Rfl:     Social History     Socioeconomic History    Marital status: /Civil Union     Spouse name: Not on file    Number of children: Not on file    Years of education: Not on file    Highest education level: Not on file   Occupational History    Occupation: retired   Tobacco Use    Smoking status: Former     Current packs/day: 0.50     Average packs/day: 0.5 packs/day for 25.0 years (12.5 ttl pk-yrs)     Types: Cigarettes    Smokeless tobacco: Never   Vaping Use    Vaping status: Never Used   Substance and Sexual Activity    Alcohol use: Not Currently     Comment: social    Drug use: No    Sexual activity: Not Currently     Partners: Female   Other Topics Concern    Not on file    Social History Narrative    Not on file     Social Drivers of Health     Financial Resource Strain: Low Risk  (1/31/2024)    Overall Financial Resource Strain (CARDIA)     Difficulty of Paying Living Expenses: Not hard at all   Food Insecurity: No Food Insecurity (2/2/2025)    Hunger Vital Sign     Worried About Running Out of Food in the Last Year: Never true     Ran Out of Food in the Last Year: Never true   Transportation Needs: No Transportation Needs (2/2/2025)    PRAPARE - Transportation     Lack of Transportation (Medical): No     Lack of Transportation (Non-Medical): No   Physical Activity: Not on file   Stress: Not on file   Social Connections: Unknown (6/18/2024)    Received from [employer]     How often do you feel lonely or isolated from those around you? (Adult - for ages 18 years and over): Not on file   Intimate Partner Violence: Not At Risk (12/5/2023)    [facility]    Humiliation, Afraid, Rape, and Kick questionnaire     Fear of Current or Ex-Partner: No     Emotionally Abused: No     Physically Abused: No     Sexually Abused: No   Housing Stability: Unknown (2/2/2025)    Housing Stability Vital Sign     Unable to Pay for Housing in the Last Year: No     Number of Times Moved in the Last Year: Not on file     Homeless in the Last Year: No       Family History   Problem Relation Age of Onset    Lung cancer Mother     Cancer Mother     Coronary artery disease Father     Hypertension Father     Diabetes Brother     Lung cancer Family        Physical Exam:    Vitals: Blood pressure 140/70, pulse 87, height 6' (1.829 m), weight 115 kg (254 lb), SpO2 98%., Body mass index is 34.45 kg/m².,   Wt Readings from Last 3 Encounters:   02/27/25 115 kg (254 lb)   02/04/25 114 kg (250 lb 6.4 oz)   12/11/24 116 kg (255 lb)       Physical Exam  Constitutional:       Appearance: He is well-developed.   HENT:      Head: Normocephalic  "and atraumatic.   Eyes:      Pupils: Pupils are equal, round, and reactive to light.   Neck:      Vascular: No JVD.   Cardiovascular:      Rate and Rhythm: Normal rate and regular rhythm.      Heart sounds: No murmur heard.  Pulmonary:      Effort: Pulmonary effort is normal. No respiratory distress.      Breath sounds: Normal breath sounds.   Abdominal:      General: There is no distension.      Palpations: Abdomen is soft.      Tenderness: There is no abdominal tenderness.   Musculoskeletal:         General: Normal range of motion.      Cervical back: Normal range of motion.   Skin:     General: Skin is warm and dry.      Findings: No rash.   Neurological:      Mental Status: He is alert and oriented to person, place, and time.         Labs & Results:    Lab Results   Component Value Date    WBC 4.7 08/09/2024    HGB 16.2 08/09/2024    HCT 46.9 08/09/2024    MCV 86.7 08/09/2024     08/09/2024     Lab Results   Component Value Date    SODIUM 142 01/23/2025    K 4.1 01/23/2025     01/23/2025    CO2 33 (H) 01/23/2025    BUN 21 01/23/2025    CREATININE 0.76 01/23/2025    GLUC 112 (H) 01/23/2025    CALCIUM 9.4 01/23/2025     Lab Results   Component Value Date    NTBNP 1,272 (H) 11/08/2022      Lab Results   Component Value Date    CHOLESTEROL 116 01/23/2025    CHOLESTEROL 115 08/09/2024    CHOLESTEROL 110 06/12/2023     Lab Results   Component Value Date    HDL 38 (L) 01/23/2025    HDL 37 (L) 08/09/2024    HDL 36 (L) 06/12/2023     Lab Results   Component Value Date    TRIG 123 01/23/2025    TRIG 118 08/12/2022     No results found for: \"NONHDLC\"    EKG personally reviewed by Miguel Ángel Butler DO.     Counseling / Coordination of Care  I have spent a total time of 35 minutes on 02/27/25 in caring for this patient including Diagnostic results, Prognosis, Risks and benefits of tx options, and Impressions.      Thank you for the opportunity to participate in the care of this patient.    MIGUEL ÁNGEL BUTLER D.O.  DIRECTOR " OF HEART FAILURE/ PULMONARY HYPERTENSION  MEDICAL DIRECTOR OF LVAD PROGRAM  [facility]

## 2025-02-27 ENCOUNTER — OFFICE VISIT (OUTPATIENT)
Dept: CARDIOLOGY CLINIC | Facility: CLINIC | Age: 85
End: 2025-02-27
Payer: COMMERCIAL

## 2025-02-27 VITALS
DIASTOLIC BLOOD PRESSURE: 70 MMHG | OXYGEN SATURATION: 98 % | BODY MASS INDEX: 34.4 KG/M2 | SYSTOLIC BLOOD PRESSURE: 140 MMHG | HEIGHT: 72 IN | WEIGHT: 254 LBS | HEART RATE: 87 BPM

## 2025-02-27 DIAGNOSIS — I48.0 PAROXYSMAL ATRIAL FIBRILLATION (HCC): ICD-10-CM

## 2025-02-27 DIAGNOSIS — I25.5 CARDIOMYOPATHY, ISCHEMIC: Primary | ICD-10-CM

## 2025-02-27 DIAGNOSIS — I20.89 STABLE ANGINA (HCC): ICD-10-CM

## 2025-02-27 DIAGNOSIS — I73.9 PERIPHERAL ARTERY DISEASE (HCC): ICD-10-CM

## 2025-02-27 PROCEDURE — 93000 ELECTROCARDIOGRAM COMPLETE: CPT | Performed by: INTERNAL MEDICINE

## 2025-02-27 PROCEDURE — 99214 OFFICE O/P EST MOD 30 MIN: CPT | Performed by: INTERNAL MEDICINE

## 2025-03-10 ENCOUNTER — OFFICE VISIT (OUTPATIENT)
Dept: FAMILY MEDICINE CLINIC | Facility: CLINIC | Age: 85
End: 2025-03-10
Payer: COMMERCIAL

## 2025-03-10 VITALS
HEIGHT: 72 IN | OXYGEN SATURATION: 99 % | HEART RATE: 80 BPM | SYSTOLIC BLOOD PRESSURE: 140 MMHG | BODY MASS INDEX: 33.72 KG/M2 | RESPIRATION RATE: 18 BRPM | WEIGHT: 249 LBS | DIASTOLIC BLOOD PRESSURE: 78 MMHG

## 2025-03-10 DIAGNOSIS — I48.0 PAROXYSMAL ATRIAL FIBRILLATION (HCC): ICD-10-CM

## 2025-03-10 DIAGNOSIS — I11.0 HYPERTENSIVE HEART DISEASE WITH CHRONIC COMBINED SYSTOLIC AND DIASTOLIC CONGESTIVE HEART FAILURE (HCC): ICD-10-CM

## 2025-03-10 DIAGNOSIS — J40 BRONCHITIS: Primary | ICD-10-CM

## 2025-03-10 DIAGNOSIS — I50.42 HYPERTENSIVE HEART DISEASE WITH CHRONIC COMBINED SYSTOLIC AND DIASTOLIC CONGESTIVE HEART FAILURE (HCC): ICD-10-CM

## 2025-03-10 PROCEDURE — G2211 COMPLEX E/M VISIT ADD ON: HCPCS | Performed by: FAMILY MEDICINE

## 2025-03-10 PROCEDURE — 99214 OFFICE O/P EST MOD 30 MIN: CPT | Performed by: FAMILY MEDICINE

## 2025-03-10 RX ORDER — AZITHROMYCIN 250 MG/1
TABLET, FILM COATED ORAL
Qty: 6 TABLET | Refills: 0 | Status: SHIPPED | OUTPATIENT
Start: 2025-03-10 | End: 2025-03-15

## 2025-03-10 NOTE — ASSESSMENT & PLAN NOTE
Wt Readings from Last 3 Encounters:   03/10/25 113 kg (249 lb)   02/27/25 115 kg (254 lb)   02/04/25 114 kg (250 lb 6.4 oz)     Blood pressure today is minimally elevated.  Would not make any adjustments based on him feeling terrible today.

## 2025-03-10 NOTE — ASSESSMENT & PLAN NOTE
Recommend Zithromax, continue with Mucinex over-the-counter.  Should help out with decreasing the cough and irritation.  Orders:  •  azithromycin (ZITHROMAX) 250 mg tablet; Take 2 tablets on day 1, then 1 tablet daily days 2 through 5

## 2025-03-10 NOTE — PROGRESS NOTES
Name: Jose Coronado      : 1940      MRN: [id number]  Encounter Provider: Tray Ocasio MD  Encounter Date: 3/10/2025   Encounter department: [facility]  :  Assessment & Plan  Bronchitis  Recommend Zithromax, continue with Mucinex over-the-counter.  Should help out with decreasing the cough and irritation.  Orders:  •  azithromycin (ZITHROMAX) 250 mg tablet; Take 2 tablets on day 1, then 1 tablet daily days 2 through 5    Paroxysmal atrial fibrillation (HCC)  Patient with atrial fibrillation.  No specific changes.         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  Would not make any adjustments based on him feeling terrible today.                   1. Bronchitis  Assessment & Plan:  Recommend Zithromax, continue with Mucinex over-the-counter.  Should help out with decreasing the cough and irritation.  Orders:  •  azithromycin (ZITHROMAX) 250 mg tablet; Take 2 tablets on day 1, then 1 tablet daily days 2 through 5    Orders:  -     azithromycin (ZITHROMAX) 250 mg tablet; Take 2 tablets on day 1, then 1 tablet daily days 2 through 5  2. Paroxysmal atrial fibrillation (HCC)  Assessment & Plan:  Patient with atrial fibrillation.  No specific changes.    3. 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  Would not make any adjustments based on him feeling terrible today.          Chief Complaint   Patient presents with   • Cough     Per pt since Thursday.            History of Present Illness   Patient has  been coughing since Thursday.  No congestion with it.  No sore throat.  Just coughing.  No other symptoms.    Patient did a home COVID test which was negative on Sunday.    Cough  This is a new problem. The current episode started yesterday. The problem has been gradually worsening. The problem occurs every few minutes. The cough is Productive of sputum. Associated symptoms include chest pain and shortness of breath. Pertinent negatives include no chills, ear congestion, ear pain, fever, headaches, heartburn, hemoptysis, myalgias, nasal congestion, postnasal drip, rash, rhinorrhea, sore throat, sweats, weight loss or wheezing.     Review of Systems   Constitutional:  Negative for chills, fever and weight loss.   HENT:  Negative for ear pain, postnasal drip, rhinorrhea and sore throat.    Respiratory:  Positive for cough and shortness of breath. Negative for hemoptysis and wheezing.    Cardiovascular:  Positive for chest pain.   Gastrointestinal:  Negative for heartburn.   Musculoskeletal:  Negative for myalgias.   Skin:  Negative for rash.   Neurological:  Negative for headaches.       Objective   /78 (BP Location: Left arm, Patient Position: Sitting, Cuff Size: Large)   Pulse 80   Resp 18   Ht 6' (1.829 m)   Wt 113 kg (249 lb)   SpO2 99%   BMI 33.77 kg/m²      Physical Exam  Vitals and nursing note reviewed.   Constitutional:       Appearance: Normal appearance. He is well-developed.   HENT:      Head: Normocephalic and atraumatic.   Cardiovascular:      Rate and Rhythm: Normal rate and regular rhythm.      Pulses:           Carotid pulses are 2+ on the right side and 2+ on the left side.     Heart sounds: Normal heart sounds. No murmur heard.     No friction rub. No gallop.   Pulmonary:      Effort: Pulmonary effort is normal. No respiratory distress.      Breath sounds: Normal breath sounds. No wheezing or rales.      Comments: No egophony noted  Musculoskeletal:      Cervical back: Normal range of  motion and neck supple.   Neurological:      Mental Status: He is alert.

## 2025-03-11 ENCOUNTER — OFFICE VISIT (OUTPATIENT)
Dept: CARDIOLOGY CLINIC | Facility: CLINIC | Age: 85
End: 2025-03-11
Payer: COMMERCIAL

## 2025-03-11 VITALS
HEART RATE: 71 BPM | DIASTOLIC BLOOD PRESSURE: 62 MMHG | SYSTOLIC BLOOD PRESSURE: 110 MMHG | BODY MASS INDEX: 33.59 KG/M2 | WEIGHT: 248 LBS | HEIGHT: 72 IN

## 2025-03-11 DIAGNOSIS — Z79.01 ANTICOAGULATED BY ANTICOAGULATION TREATMENT: ICD-10-CM

## 2025-03-11 DIAGNOSIS — Z86.79 S/P ABLATION OF ATRIAL FIBRILLATION: ICD-10-CM

## 2025-03-11 DIAGNOSIS — Z95.810 ICD (IMPLANTABLE CARDIOVERTER-DEFIBRILLATOR) IN PLACE: ICD-10-CM

## 2025-03-11 DIAGNOSIS — I48.0 PAROXYSMAL ATRIAL FIBRILLATION (HCC): ICD-10-CM

## 2025-03-11 DIAGNOSIS — Z98.890 S/P ABLATION OF ATRIAL FIBRILLATION: ICD-10-CM

## 2025-03-11 DIAGNOSIS — I25.5 CARDIOMYOPATHY, ISCHEMIC: Primary | ICD-10-CM

## 2025-03-11 DIAGNOSIS — I20.89 STABLE ANGINA (HCC): ICD-10-CM

## 2025-03-11 DIAGNOSIS — M15.0 PRIMARY OSTEOARTHRITIS INVOLVING MULTIPLE JOINTS: ICD-10-CM

## 2025-03-11 DIAGNOSIS — R53.82 CHRONIC FATIGUE: ICD-10-CM

## 2025-03-11 DIAGNOSIS — M17.12 ARTHRITIS OF LEFT KNEE: ICD-10-CM

## 2025-03-11 PROCEDURE — 93000 ELECTROCARDIOGRAM COMPLETE: CPT | Performed by: INTERNAL MEDICINE

## 2025-03-11 PROCEDURE — 99204 OFFICE O/P NEW MOD 45 MIN: CPT | Performed by: INTERNAL MEDICINE

## 2025-03-11 RX ORDER — NEBIVOLOL 10 MG/1
10 TABLET ORAL DAILY
Qty: 90 TABLET | Refills: 3 | Status: SHIPPED | OUTPATIENT
Start: 2025-03-11

## 2025-03-11 NOTE — PROGRESS NOTES
EPS Consultation/New Patient Evaluation - Jose Coronado 84 y.o. male MRN: [id number]           ASSESSMENT:  1. Cardiomyopathy, ischemic        2. Paroxysmal atrial fibrillation (HCC)  Ambulatory referral to Cardiac Electrophysiology    POCT ECG    nebivolol (BYSTOLIC) 10 mg tablet      3. Stable angina (HCC)        4. ICD (implantable cardioverter-defibrillator) in place        5. Arthritis of left knee        6. Primary osteoarthritis involving multiple joints        7. Anticoagulated by anticoagulation treatment        8. Chronic fatigue        9. S/P ablation of atrial fibrillation                PLAN:  Persistent atrial fibrillation status post pulmonary vein isolation in December 2024.  I personally interrogated the patient's ICD today and demonstrated for him the fact that he really has not had any atrial fibrillation of appreciable duration since the ablation.  I explained that his Kardia mobile is likely picking up premature atrial contractions and premature ventricular contractions and miss classifying them his atrial fibrillation.  I explained that in his situation with frequent PACs the Kardia mobile would not be accurate    I explained to Jose that his fatigue is likely multifactorial but not related to atrial fibrillation given the fact that he has essentially 0 A-fib burden since the ablation    He is on metoprolol 50 mg twice daily which certainly can cause fatigue and we are going to switch it to nebivolol 10 mg daily to see if this causes less fatigue    He can continue his cardiac care with Dr. Miguel Ángel Garcia and his electrophysiology care with Dr. Andrei Tena as previously scheduled    I did also recommend that he follow-up with orthopedics as he clearly walks with an abnormal gait which is likely related to his left knee pain    Jose was very reassured to find out today that he really is not having any atrial fibrillation of appreciable burden since the ablation and that it was  successful.    He will follow-up with me on a as needed basis    CC/HPI:   This very pleasant 84-year-old male with history of ischemic cardiomyopathy atrial flutter and atrial fibrillation presents for second opinion regarding his atrial fibrillation.  He underwent a pulmonary vein isolation by Dr. Andrei Tena at [facility] in December 2024.  Despite a successful ablation with no significant atrial fibrillation postoperatively except for 1 very brief episode he remains fatigued.  He states that his Kardia mobile indicates that he is continuing to have atrial fibrillation.    He follows with Dr. Garcia for cardiology at [facility].  Of note his orthopedic care is performed at [facility]    He does not specifically have palpitations.  He does have some shortness of breath with exertion and occasional lightheadedness    He does have arthritis in his knee and has had a right knee replacement which improved his arthritis his left knee still bothers him    He has a dual-chamber AICD from [employer]          Review of Systems   Constitutional: Positive for malaise/fatigue. Negative for chills and fever.   HENT:  Negative for congestion and sore throat.    Eyes:  Negative for blurred vision and double vision.   Cardiovascular:  Positive for dyspnea on exertion. Negative for chest pain, claudication, leg swelling, near-syncope, orthopnea, palpitations, paroxysmal nocturnal dyspnea and syncope.   Respiratory:  Negative for cough, shortness of breath and sputum production.    Hematologic/Lymphatic: Negative for bleeding problem. Does not bruise/bleed easily.   Skin:  Negative for itching and rash.   Musculoskeletal:  Positive for joint pain and joint swelling. Negative for arthritis.   Gastrointestinal:  Negative for abdominal pain, nausea and vomiting.   Genitourinary:  Negative for hematuria.   Neurological:  Negative for dizziness, focal weakness, headaches,  light-headedness and weakness.   Psychiatric/Behavioral:  Negative for depression. The patient is not nervous/anxious.          Objective:     Vitals: Blood pressure 110/62, pulse 71, height 6' (1.829 m), weight 112 kg (248 lb)., Body mass index is 33.63 kg/m².,        Physical Exam:    GEN: Jose Coronado appears well, alert and oriented x 3, pleasant and cooperative   HEENT: pupils equal, round, and reactive to light; extraocular muscles intact  NECK: supple, no carotid bruits   HEART: regular rhythm, normal S1 and S2, no murmurs, clicks, gallops or rubs   LUNGS: clear to auscultation bilaterally; no wheezes, rales, or rhonchi   ABDOMEN: normal bowel sounds, soft, no tenderness, no distention  EXTREMITIES: peripheral pulses normal; no clubbing, cyanosis, or edema  NEURO: no focal findings   SKIN: normal without suspicious lesions on exposed skin    Medications:      Current Outpatient Medications:     acetaminophen (TYLENOL) 500 mg tablet, Take 500 mg by mouth, Disp: , Rfl:     Alirocumab (Praluent) 75 MG/ML SOAJ, Inject 1 mL under the skin every 14 days, Disp: 6 mL, Rfl: 1    ascorbic acid (VITAMIN C) 500 MG tablet, Take 500 mg by mouth daily, Disp: , Rfl:     aspirin (ECOTRIN LOW STRENGTH) 81 mg EC tablet, Take 1 tablet by mouth daily, Disp: , Rfl:     azithromycin (ZITHROMAX) 250 mg tablet, Take 2 tablets on day 1, then 1 tablet daily days 2 through 5, Disp: 6 tablet, Rfl: 0    Cholecalciferol (VITAMIN D) 2000 units CAPS, Take by mouth, Disp: , Rfl:     Coenzyme Q10 (CO Q 10) 100 MG CAPS, Take by mouth, Disp: , Rfl:     furosemide (LASIX) 20 mg tablet, Take 1 tablet (20 mg total) by mouth 3 (three) times a day, Disp: 270 tablet, Rfl: 1    isosorbide mononitrate (IMDUR) 60 mg 24 hr tablet, Take 1 tablet by mouth daily, Disp: , Rfl:     Jardiance 10 MG TABS tablet, TAKE 1 TABLET BY MOUTH EVERY MORNING., Disp: 90 tablet, Rfl: 1    levothyroxine (Synthroid) 50 mcg tablet, Take 1 tablet (50 mcg total) by mouth  daily, Disp: 90 tablet, Rfl: 1    Magnesium 400 MG TABS, Take by mouth, Disp: , Rfl:     Multiple Vitamins-Minerals (MULTIVITAMIN ADULT PO), Take 1 capsule by mouth, Disp: , Rfl:     nebivolol (BYSTOLIC) 10 mg tablet, Take 1 tablet (10 mg total) by mouth daily, Disp: 90 tablet, Rfl: 3    nitroglycerin (NITROSTAT) 0.4 mg SL tablet, Place 1 tablet under the tongue every 5 (five) minutes as needed, Disp: , Rfl:     rivaroxaban (XARELTO) 20 mg tablet, Take 1 tablet (20 mg total) by mouth daily with dinner, Disp: 28 tablet, Rfl: 0    TURMERIC PO, Take 1 Dose by mouth, Disp: , Rfl:     amoxicillin (AMOXIL) 500 MG tablet, 4 TABLETS BY MOUTH 1 HOUR BEFORE DENTIST (Patient not taking: Reported on 12/4/2023), Disp: , Rfl:      Family History   Problem Relation Age of Onset    Lung cancer Mother     Cancer Mother     Coronary artery disease Father     Hypertension Father     Diabetes Brother     Lung cancer Family      Social History     Socioeconomic History    Marital status: /Civil Union     Spouse name: Not on file    Number of children: Not on file    Years of education: Not on file    Highest education level: Not on file   Occupational History    Occupation: retired   Tobacco Use    Smoking status: Former     Current packs/day: 0.50     Average packs/day: 0.5 packs/day for 25.0 years (12.5 ttl pk-yrs)     Types: Cigarettes    Smokeless tobacco: Never   Vaping Use    Vaping status: Never Used   Substance and Sexual Activity    Alcohol use: Not Currently     Comment: social    Drug use: No    Sexual activity: Not Currently     Partners: Female   Other Topics Concern    Not on file   Social History Narrative    Not on file     Social Drivers of Health     Financial Resource Strain: Low Risk  (1/31/2024)    Overall Financial Resource Strain (CARDIA)     Difficulty of Paying Living Expenses: Not hard at all   Food Insecurity: No Food Insecurity (2/2/2025)    Hunger Vital Sign     Worried About Running Out of Food in  the Last Year: Never true     Ran Out of Food in the Last Year: Never true   Transportation Needs: No Transportation Needs (2/2/2025)    PRAPARE - Transportation     Lack of Transportation (Medical): No     Lack of Transportation (Non-Medical): No   Physical Activity: Not on file   Stress: Not on file   Social Connections: Unknown (6/18/2024)    Received from [employer]     How often do you feel lonely or isolated from those around you? (Adult - for ages 18 years and over): Not on file   Intimate Partner Violence: Not At Risk (12/5/2023)    [facility]    Humiliation, Afraid, Rape, and Kick questionnaire     Fear of Current or Ex-Partner: No     Emotionally Abused: No     Physically Abused: No     Sexually Abused: No   Housing Stability: Unknown (2/2/2025)    Housing Stability Vital Sign     Unable to Pay for Housing in the Last Year: No     Number of Times Moved in the Last Year: Not on file     Homeless in the Last Year: No     Social History     Tobacco Use   Smoking Status Former    Current packs/day: 0.50    Average packs/day: 0.5 packs/day for 25.0 years (12.5 ttl pk-yrs)    Types: Cigarettes   Smokeless Tobacco Never     Social History     Substance and Sexual Activity   Alcohol Use Not Currently    Comment: social       Labs & Results:  Below is the patient's most recent value for Albumin, ALT, AST, BUN, Calcium, Chloride, Cholesterol, CO2, Creatinine, GFR, Glucose, HDL, Hematocrit, Hemoglobin, Hemoglobin A1C, LDL, Magnesium, Phosphorus, Platelets, Potassium, PSA, Sodium, Triglycerides, and WBC.   Lab Results   Component Value Date    ALT 16 01/23/2025    AST 21 01/23/2025    BUN 21 01/23/2025    CALCIUM 9.4 01/23/2025     01/23/2025    CO2 33 (H) 01/23/2025    CREATININE 0.76 01/23/2025    HDL 38 (L) 01/23/2025    HCT 46.9 08/09/2024    HGB 16.2 08/09/2024    PHOS 3.6 11/11/2020     08/09/2024    K 4.1 01/23/2025     TRIG 123 01/23/2025    WBC 4.7 08/09/2024     Note: for a comprehensive list of the patient's lab results, access the Results Review activity.            Cardiac testing:   No results found for this or any previous visit.    No results found for this or any previous visit.    No results found for this or any previous visit.    No results found for this or any previous visit.

## 2025-03-11 NOTE — LETTER
March 11, 2025     Tray Ocasio MD  [address]  [address]  [place] [facility] [address]    Patient: Jose Coronado   YOB: 1940   Date of Visit: 3/11/2025       Dear Dr. Ocasio:    Thank you for referring Jose Coronado to me for evaluation. Below are my notes for this consultation.    If you have questions, please do not hesitate to call me. I look forward to following your patient along with you.         Sincerely,        Alden Osuna DO        CC: MD Alden Macdonald DO  3/11/2025 11:35 AM  Sign when Signing Visit  EPS Consultation/New Patient Evaluation - Jose Coronado 84 y.o. male MRN: [id number]           ASSESSMENT:  1. Cardiomyopathy, ischemic        2. Paroxysmal atrial fibrillation (HCC)  Ambulatory referral to Cardiac Electrophysiology    POCT ECG    nebivolol (BYSTOLIC) 10 mg tablet      3. Stable angina (HCC)        4. ICD (implantable cardioverter-defibrillator) in place        5. Arthritis of left knee        6. Primary osteoarthritis involving multiple joints        7. Anticoagulated by anticoagulation treatment        8. Chronic fatigue        9. S/P ablation of atrial fibrillation                PLAN:  Persistent atrial fibrillation status post pulmonary vein isolation in December 2024.  I personally interrogated the patient's ICD today and demonstrated for him the fact that he really has not had any atrial fibrillation of appreciable duration since the ablation.  I explained that his Kardia mobile is likely picking up premature atrial contractions and premature ventricular contractions and miss classifying them his atrial fibrillation.  I explained that in his situation with frequent PACs the Kardia mobile would not be accurate    I explained to Jose that his fatigue is likely multifactorial but not related to atrial fibrillation given the fact that he has essentially 0 A-fib burden since the ablation    He is on metoprolol 50 mg twice daily which  certainly can cause fatigue and we are going to switch it to nebivolol 10 mg daily to see if this causes less fatigue    He can continue his cardiac care with Dr. Miguel Ángel Garcia and his electrophysiology care with Dr. Andrei Tena as previously scheduled    I did also recommend that he follow-up with orthopedics as he clearly walks with an abnormal gait which is likely related to his left knee pain    Jose was very reassured to find out today that he really is not having any atrial fibrillation of appreciable burden since the ablation and that it was successful.    He will follow-up with me on a as needed basis    CC/HPI:   This very pleasant 84-year-old male with history of ischemic cardiomyopathy atrial flutter and atrial fibrillation presents for second opinion regarding his atrial fibrillation.  He underwent a pulmonary vein isolation by Dr. Andrei Tena at [facility] in December 2024.  Despite a successful ablation with no significant atrial fibrillation postoperatively except for 1 very brief episode he remains fatigued.  He states that his Kardia mobile indicates that he is continuing to have atrial fibrillation.    He follows with Dr. Garcia for cardiology at [facility] [address].  Of note his orthopedic care is performed at [facility]    He does not specifically have palpitations.  He does have some shortness of breath with exertion and occasional lightheadedness    He does have arthritis in his knee and has had a right knee replacement which improved his arthritis his left knee still bothers him    He has a dual-chamber AICD from [email or web address]          Review of Systems   Constitutional: Positive for malaise/fatigue. Negative for chills and fever.   HENT:  Negative for congestion and sore throat.    Eyes:  Negative for blurred vision and double vision.   Cardiovascular:  Positive for dyspnea on exertion. Negative for chest pain, claudication, leg swelling,  near-syncope, orthopnea, palpitations, paroxysmal nocturnal dyspnea and syncope.   Respiratory:  Negative for cough, shortness of breath and sputum production.    Hematologic/Lymphatic: Negative for bleeding problem. Does not bruise/bleed easily.   Skin:  Negative for itching and rash.   Musculoskeletal:  Positive for joint pain and joint swelling. Negative for arthritis.   Gastrointestinal:  Negative for abdominal pain, nausea and vomiting.   Genitourinary:  Negative for hematuria.   Neurological:  Negative for dizziness, focal weakness, headaches, light-headedness and weakness.   Psychiatric/Behavioral:  Negative for depression. The patient is not nervous/anxious.          Objective:     Vitals: Blood pressure 110/62, pulse 71, height 6' (1.829 m), weight 112 kg (248 lb)., Body mass index is 33.63 kg/m².,        Physical Exam:    GEN: Jose Coronado appears well, alert and oriented x 3, pleasant and cooperative   HEENT: pupils equal, round, and reactive to light; extraocular muscles intact  NECK: supple, no carotid bruits   HEART: regular rhythm, normal S1 and S2, no murmurs, clicks, gallops or rubs   LUNGS: clear to auscultation bilaterally; no wheezes, rales, or rhonchi   ABDOMEN: normal bowel sounds, soft, no tenderness, no distention  EXTREMITIES: peripheral pulses normal; no clubbing, cyanosis, or edema  NEURO: no focal findings   SKIN: normal without suspicious lesions on exposed skin    Medications:      Current Outpatient Medications:   •  acetaminophen (TYLENOL) 500 mg tablet, Take 500 mg by mouth, Disp: , Rfl:   •  Alirocumab (Praluent) 75 MG/ML SOAJ, Inject 1 mL under the skin every 14 days, Disp: 6 mL, Rfl: 1  •  ascorbic acid (VITAMIN C) 500 MG tablet, Take 500 mg by mouth daily, Disp: , Rfl:   •  aspirin (ECOTRIN LOW STRENGTH) 81 mg EC tablet, Take 1 tablet by mouth daily, Disp: , Rfl:   •  azithromycin (ZITHROMAX) 250 mg tablet, Take 2 tablets on day 1, then 1 tablet daily days 2 through 5, Disp: 6  tablet, Rfl: 0  •  Cholecalciferol (VITAMIN D) 2000 units CAPS, Take by mouth, Disp: , Rfl:   •  Coenzyme Q10 (CO Q 10) 100 MG CAPS, Take by mouth, Disp: , Rfl:   •  furosemide (LASIX) 20 mg tablet, Take 1 tablet (20 mg total) by mouth 3 (three) times a day, Disp: 270 tablet, Rfl: 1  •  isosorbide mononitrate (IMDUR) 60 mg 24 hr tablet, Take 1 tablet by mouth daily, Disp: , Rfl:   •  Jardiance 10 MG TABS tablet, TAKE 1 TABLET BY MOUTH EVERY MORNING., Disp: 90 tablet, Rfl: 1  •  levothyroxine (Synthroid) 50 mcg tablet, Take 1 tablet (50 mcg total) by mouth daily, Disp: 90 tablet, Rfl: 1  •  Magnesium 400 MG TABS, Take by mouth, Disp: , Rfl:   •  Multiple Vitamins-Minerals (MULTIVITAMIN ADULT PO), Take 1 capsule by mouth, Disp: , Rfl:   •  nebivolol (BYSTOLIC) 10 mg tablet, Take 1 tablet (10 mg total) by mouth daily, Disp: 90 tablet, Rfl: 3  •  nitroglycerin (NITROSTAT) 0.4 mg SL tablet, Place 1 tablet under the tongue every 5 (five) minutes as needed, Disp: , Rfl:   •  rivaroxaban (XARELTO) 20 mg tablet, Take 1 tablet (20 mg total) by mouth daily with dinner, Disp: 28 tablet, Rfl: 0  •  TURMERIC PO, Take 1 Dose by mouth, Disp: , Rfl:   •  amoxicillin (AMOXIL) 500 MG tablet, 4 TABLETS BY MOUTH 1 HOUR BEFORE DENTIST (Patient not taking: Reported on 12/4/2023), Disp: , Rfl:      Family History   Problem Relation Age of Onset   • Lung cancer Mother    • Cancer Mother    • Coronary artery disease Father    • Hypertension Father    • Diabetes Brother    • Lung cancer Family      Social History     Socioeconomic History   • Marital status: /Civil Union     Spouse name: Not on file   • Number of children: Not on file   • Years of education: Not on file   • Highest education level: Not on file   Occupational History   • Occupation: retired   Tobacco Use   • Smoking status: Former     Current packs/day: 0.50     Average packs/day: 0.5 packs/day for 25.0 years (12.5 ttl pk-yrs)     Types: Cigarettes   • Smokeless  tobacco: Never   Vaping Use   • Vaping status: Never Used   Substance and Sexual Activity   • Alcohol use: Not Currently     Comment: social   • Drug use: No   • Sexual activity: Not Currently     Partners: Female   Other Topics Concern   • Not on file   Social History Narrative   • Not on file     Social Drivers of Health     Financial Resource Strain: Low Risk  (1/31/2024)    Overall Financial Resource Strain (CARDIA)    • Difficulty of Paying Living Expenses: Not hard at all   Food Insecurity: No Food Insecurity (2/2/2025)    Hunger Vital Sign    • Worried About Running Out of Food in the Last Year: Never true    • Ran Out of Food in the Last Year: Never true   Transportation Needs: No Transportation Needs (2/2/2025)    PRAPARE - Transportation    • Lack of Transportation (Medical): No    • Lack of Transportation (Non-Medical): No   Physical Activity: Not on file   Stress: Not on file   Social Connections: Unknown (6/18/2024)    Received from [employer]    [employer]    • How often do you feel lonely or isolated from those around you? (Adult - for ages 18 years and over): Not on file   Intimate Partner Violence: Not At Risk (12/5/2023)    [facility]    Humiliation, Afraid, Rape, and Kick questionnaire    • Fear of Current or Ex-Partner: No    • Emotionally Abused: No    • Physically Abused: No    • Sexually Abused: No   Housing Stability: Unknown (2/2/2025)    Housing Stability Vital Sign    • Unable to Pay for Housing in the Last Year: No    • Number of Times Moved in the Last Year: Not on file    • Homeless in the Last Year: No     Social History     Tobacco Use   Smoking Status Former   • Current packs/day: 0.50   • Average packs/day: 0.5 packs/day for 25.0 years (12.5 ttl pk-yrs)   • Types: Cigarettes   Smokeless Tobacco Never     Social History     Substance and Sexual Activity   Alcohol Use Not Currently    Comment: social       Labs &  Results:  Below is the patient's most recent value for Albumin, ALT, AST, BUN, Calcium, Chloride, Cholesterol, CO2, Creatinine, GFR, Glucose, HDL, Hematocrit, Hemoglobin, Hemoglobin A1C, LDL, Magnesium, Phosphorus, Platelets, Potassium, PSA, Sodium, Triglycerides, and WBC.   Lab Results   Component Value Date    ALT 16 01/23/2025    AST 21 01/23/2025    BUN 21 01/23/2025    CALCIUM 9.4 01/23/2025     01/23/2025    CO2 33 (H) 01/23/2025    CREATININE 0.76 01/23/2025    HDL 38 (L) 01/23/2025    HCT 46.9 08/09/2024    HGB 16.2 08/09/2024    PHOS 3.6 11/11/2020     08/09/2024    K 4.1 01/23/2025    TRIG 123 01/23/2025    WBC 4.7 08/09/2024     Note: for a comprehensive list of the patient's lab results, access the Results Review activity.            Cardiac testing:   No results found for this or any previous visit.    No results found for this or any previous visit.    No results found for this or any previous visit.    No results found for this or any previous visit.

## 2025-03-24 DIAGNOSIS — E03.9 ACQUIRED HYPOTHYROIDISM: ICD-10-CM

## 2025-03-25 RX ORDER — LEVOTHYROXINE SODIUM 50 UG/1
50 TABLET ORAL DAILY
Qty: 90 TABLET | Refills: 0 | Status: SHIPPED | OUTPATIENT
Start: 2025-03-25

## 2025-04-11 DIAGNOSIS — I11.0 HYPERTENSIVE HEART DISEASE WITH CONGESTIVE HEART FAILURE, UNSPECIFIED HEART FAILURE TYPE (HCC): ICD-10-CM

## 2025-04-11 RX ORDER — ALIROCUMAB 75 MG/ML
INJECTION, SOLUTION SUBCUTANEOUS
Qty: 6 ML | Refills: 0 | Status: CANCELLED | OUTPATIENT
Start: 2025-04-11

## 2025-04-11 NOTE — TELEPHONE ENCOUNTER
Reason for call:   [x] Refill   [] Prior Auth  [] Other:     Office:   [] PCP/Provider -   [x] Specialty/Provider - CARD Miguel Ángel Garcia     Medication: Praluent soaj    Dose/Frequency: 75 mg/ml Q 14 Days    Quantity: 6 ml    Pharmacy: [facility]    Local Pharmacy   Does the patient have enough for 3 days?   [] Yes   [x] No - Send as HP to POD    Mail Away Pharmacy   Does the patient have enough for 10 days?   [] Yes   [] No - Send as HP to POD

## 2025-04-13 RX ORDER — ALIROCUMAB 75 MG/ML
INJECTION, SOLUTION SUBCUTANEOUS
Qty: 6 ML | Refills: 1 | Status: SHIPPED | OUTPATIENT
Start: 2025-04-13 | End: 2025-04-15

## 2025-04-14 DIAGNOSIS — E78.00 PURE HYPERCHOLESTEROLEMIA: Primary | ICD-10-CM

## 2025-04-14 DIAGNOSIS — I25.10 3-VESSEL CAD: ICD-10-CM

## 2025-04-14 DIAGNOSIS — I11.0 HYPERTENSIVE HEART DISEASE WITH CONGESTIVE HEART FAILURE, UNSPECIFIED HEART FAILURE TYPE (HCC): ICD-10-CM

## 2025-04-14 DIAGNOSIS — I25.5 CARDIOMYOPATHY, ISCHEMIC: ICD-10-CM

## 2025-04-15 RX ORDER — ALIROCUMAB 75 MG/ML
INJECTION, SOLUTION SUBCUTANEOUS
Qty: 6 ML | Refills: 1 | Status: SHIPPED | OUTPATIENT
Start: 2025-04-15

## 2025-04-15 RX ORDER — EMPAGLIFLOZIN 10 MG/1
10 TABLET, FILM COATED ORAL EVERY MORNING
Qty: 90 TABLET | Refills: 1 | Status: SHIPPED | OUTPATIENT
Start: 2025-04-15

## 2025-04-18 ENCOUNTER — TELEPHONE (OUTPATIENT)
Age: 85
End: 2025-04-18

## 2025-04-18 NOTE — TELEPHONE ENCOUNTER
Caller: Gayathri    Reason for call: Gayathri called stating that the patient needs a prior authorization for nebivolol (BYSTOLIC) and Alirocumab (Praluent).    Call back#: [phone]

## 2025-04-21 ENCOUNTER — TELEPHONE (OUTPATIENT)
Age: 85
End: 2025-04-21

## 2025-04-21 NOTE — TELEPHONE ENCOUNTER
PA for praluent 75 mg/ml  APPROVED     Date(s) approved 4/21/25-4/21/26    Case #    Patient advised by          [][employer] Message  [x]Phone call   []LMOM  []L/M to call office as no active Communication consent on file  []Unable to leave detailed message as VM not approved on Communication consent       Pharmacy advised by    []Fax  []Phone call  []Secure Chat    Specialty Pharmacy    []     Approval letter scanned into Media Yes

## 2025-04-21 NOTE — TELEPHONE ENCOUNTER
Additional information form received filled out and faxed back to [facility]

## 2025-04-21 NOTE — TELEPHONE ENCOUNTER
Please updated icd code attached to praluent to reflect hyperlipidemia and  CAD, currently under hypertensive heart disease with chf and will most likely be denied under this dx, please  route back to the pod once updated for processing

## 2025-04-21 NOTE — TELEPHONE ENCOUNTER
Received call from [facility]er [facility] team regarding patient's nebivolol prescription. She has faxed a form that needs to be filled out for prior authorization and faxed back to them by 2pm today. ID #: [id number]. Fax: [phone]

## 2025-04-21 NOTE — TELEPHONE ENCOUNTER
PA for nebivolol 10 mg SUBMITTED to [employer]    via    [x]CMM-KEY: [id number]  []Surescripts-Case ID #   []Availity-Auth ID # NDC #   []Faxed to plan   []Other website   []Phone call Case ID #     []PA sent as URGENT    All office notes, labs and other pertaining documents and studies sent. Clinical questions answered. Awaiting determination from insurance company.     Turnaround time for your insurance to make a decision on your Prior Authorization can take 7-21 business days.

## 2025-04-21 NOTE — TELEPHONE ENCOUNTER
PA for Praluent 75 mg/ml SUBMITTED to [employer]     via    [x]CMM-KEY: [id number]  []Surescripts-Case ID #   []Availity-Auth ID # NDC #   []Faxed to plan   []Other website   []Phone call Case ID #     []PA sent as URGENT    All office notes, labs and other pertaining documents and studies sent. Clinical questions answered. Awaiting determination from insurance company.     Turnaround time for your insurance to make a decision on your Prior Authorization can take 7-21 business days.

## 2025-04-24 DIAGNOSIS — I50.42 HYPERTENSIVE HEART DISEASE WITH CHRONIC COMBINED SYSTOLIC AND DIASTOLIC CONGESTIVE HEART FAILURE (HCC): ICD-10-CM

## 2025-04-24 DIAGNOSIS — I73.9 PERIPHERAL ARTERY DISEASE (HCC): Primary | ICD-10-CM

## 2025-04-24 DIAGNOSIS — I11.0 HYPERTENSIVE HEART DISEASE WITH CHRONIC COMBINED SYSTOLIC AND DIASTOLIC CONGESTIVE HEART FAILURE (HCC): ICD-10-CM

## 2025-04-24 RX ORDER — BISOPROLOL FUMARATE 10 MG/1
10 TABLET, FILM COATED ORAL DAILY
Qty: 30 TABLET | Refills: 0 | Status: SHIPPED | OUTPATIENT
Start: 2025-04-24

## 2025-04-24 NOTE — TELEPHONE ENCOUNTER
Order placed.  Spouse aware that insurance denied and new medication ordered and we are trailing.

## 2025-05-08 ENCOUNTER — REMOTE DEVICE CLINIC VISIT (OUTPATIENT)
Dept: CARDIOLOGY CLINIC | Facility: CLINIC | Age: 85
End: 2025-05-08

## 2025-05-08 DIAGNOSIS — Z95.810 PRESENCE OF AUTOMATIC CARDIOVERTER/DEFIBRILLATOR (AICD): Primary | ICD-10-CM

## 2025-05-09 NOTE — PROGRESS NOTES
BSC  DC ICD/ACTIVE SYSTEM IS [facility] CONDITIONAL   LATITUDE TRANSMISSION:  BATTERY VOLTAGE ADEQUATE (8 YR.).  AP 29%  5% (DDDR 60 PPM).  ALL AVAILABLE LEAD PARAMETERS WITHIN NORMAL LIMITS. 2 VT-NS EPISODES (NO EGM'S),   2 AT/AF EPISODES, DURATION - 7 SEC & 6 SECS (NO EGM'S). HX: SAME &  PATIENT TAKES ASA 81, XARELTO, AMIODARONE, METOPROLOL SUCC.  HEARTLOGIC HEART FAILURE INDEX WITHIN NORMAL LIMITS.  NORMAL DEVICE FUNCTION.  ES

## 2025-05-17 ENCOUNTER — RESULTS FOLLOW-UP (OUTPATIENT)
Dept: CARDIOLOGY CLINIC | Facility: CLINIC | Age: 85
End: 2025-05-17

## 2025-05-20 NOTE — TELEPHONE ENCOUNTER
Diagnosis: Adenocarcinoma, colon   Regimen: Folfiri/Panitumumab  Cycle/Day: C2D1  Is this a C1D1 appt?  No    Rosemary REEDER is supervising clinician today.    ECOG:   ECOG [05/20/25 0908]   ECOG Performance Status 1       Review and verified Advanced Directives: No: Patient declined to create/provide document at this time     Verified if patient has state DNR bracelet on: No; Full Code    Nursing Assessment:   A focused nursing assessment addressing the toxicity of chemotherapy was performed and the patient reports the following:    Anxiety/Depression/Insomnia: Anxiety: No, Depression: No, and Insomnia: No  Pain: NO    Toxicity Assessment    Adverse Events?  Adverse Events: No    Auditory/Ear  Assessment: Yes (Within Defined Limits)    Cardiac General  Assessment: Yes (w/ Exceptions to WDL)  Hypertension: Grade 1    Constitutional  Assessment: Yes (Within Defined Limits)    Dermatology/Skin  Assessment: Yes (w/ Exceptions to WDL) (panitumumab rash)    Endocrine  Assessment: Yes (Within Defined Limits)    Gastrointestinal  Assessment: Yes (w/ Exceptions to WDL)  Anorexia: Grade 1  Constipation: Grade 1    Hemorrhage/Bleeding  Assessment: Yes (Within Defined Limits)    Infection  Assessment: Yes (Within Defined Limits)    Lymphatics  Assessment: Yes (Within Defined Limits)    Musculoskeletal  Assessment: Yes (Within Defined Limits)    Neurology  Assessment: Yes (w/ Exceptions to WDL)  Paresthesia: Grade 1 (cold sensitivity improving)    Ocular  Assessment: Yes (Within Defined Limits)    Pain  Assessment: Yes (Within Defined Limits)    Pulmonary/Upper Respiratory  Assessment: Yes (Within Defined Limits)    Genitourinary  Assessment: Yes (Within Defined Limits)       Pre-Treatment: Treatment consent signed  Patient has valid pre-authorization  VS completed  Height and weight verified  BSA independently double checked & verified by two practitioners  Premed orders, including hydration, are verified prior to  Please advise patient that if his oxygen saturation is 90% or lower he must be evaluated in the ED immediately  I would recommend patient have a follow-up office visit tomorrow  administration  Treatment parameters verified in patient protocol  Lab results reviewed: Treatment conditions met, ok to proceed with treatment   Chemotherapy doses independently doubled checked & verified by two practitioners    Treatment: I have reviewed the following with the patient:  Name of chemo drug, duration and route of infusion, and reportable infusion-related symptoms.  Chemotherapy has not ; double checked & verified by two practitioners  Appearance and physical integrity of drugs meets standard of drug monograph; double checked & verified by two practitioners  Rate set on infusion pump is in alignment with ordered rate; double checked & verified by two practitioners  Blood return confirmed before, during and after treatment administered  Infusion pump used for non-vesicant drugs  Drugs were administered in proper sequencing  Refer to LDA and MAR for line assessment and medication administration  Home ambulatory pump on discharge. 5FU for continuous infusion: Infusion pump provided by [facility], medication supplied and pump connected by [facility].  Extravasation/Infiltration: No    Post Treatment: Treatment tolerated well; no adverse reaction    Transfusion: Not needed    Integrative Medicine: No    Oral Chemotherapy: No    Education: No new instructions needed    Next appointment scheduled: 25 for pump disconnect.  Patient instructed to call the office with any questions or concerns.      Patient Discharged: patient discharged to home per self, ambulatory

## 2025-05-21 DIAGNOSIS — I50.42 HYPERTENSIVE HEART DISEASE WITH CHRONIC COMBINED SYSTOLIC AND DIASTOLIC CONGESTIVE HEART FAILURE (HCC): ICD-10-CM

## 2025-05-21 DIAGNOSIS — I11.0 HYPERTENSIVE HEART DISEASE WITH CHRONIC COMBINED SYSTOLIC AND DIASTOLIC CONGESTIVE HEART FAILURE (HCC): ICD-10-CM

## 2025-05-21 DIAGNOSIS — I73.9 PERIPHERAL ARTERY DISEASE (HCC): ICD-10-CM

## 2025-05-21 RX ORDER — BISOPROLOL FUMARATE 10 MG/1
10 TABLET, FILM COATED ORAL DAILY
Qty: 30 TABLET | Refills: 5 | Status: SHIPPED | OUTPATIENT
Start: 2025-05-21

## 2025-06-16 DIAGNOSIS — I11.0 HYPERTENSIVE HEART DISEASE WITH CHRONIC COMBINED SYSTOLIC AND DIASTOLIC CONGESTIVE HEART FAILURE (HCC): ICD-10-CM

## 2025-06-16 DIAGNOSIS — I73.9 PERIPHERAL ARTERY DISEASE (HCC): ICD-10-CM

## 2025-06-16 DIAGNOSIS — I50.42 HYPERTENSIVE HEART DISEASE WITH CHRONIC COMBINED SYSTOLIC AND DIASTOLIC CONGESTIVE HEART FAILURE (HCC): ICD-10-CM

## 2025-06-16 RX ORDER — BISOPROLOL FUMARATE 10 MG/1
10 TABLET, FILM COATED ORAL DAILY
Qty: 90 TABLET | Refills: 1 | Status: SHIPPED | OUTPATIENT
Start: 2025-06-16

## 2025-06-26 ENCOUNTER — TELEPHONE (OUTPATIENT)
Dept: CARDIOLOGY CLINIC | Facility: CLINIC | Age: 85
End: 2025-06-26

## 2025-06-26 NOTE — TELEPHONE ENCOUNTER
HF INDEX  Received: Yesterday  Opal Mortensen, RN  [facility] Clinical Support Staff  FYI:    HEARTLOGIC HEART FAILURE INDEX CROSSED- 21/6. PT ON FUROSEMIDE. WILL RECHECK IN 1 MONTH.    Thanks,  Pooja

## 2025-06-26 NOTE — TELEPHONE ENCOUNTER
F/U call to patient who stated he is SOB intermittently, has exertional dyspnea going up a flight of stairs. Mostly notices SOB in the AM after doing AM care & morning activities, before taking AM medications. Stated he does become fatigued. Denied any Sob @ night or difficulty sleeping.  Denied any edema to extremities or abdominal bloating.  Weighing himself daily.  Today's Wt - 247.1 lb  Following a low salt diet.  Taking all cardiac medications as prescribed.  Reviewed with patient when to call office for weight gain of 3 lbs in a day, 5 lbs in 5-7 days.  Reviewed with patient his next scheduled OV with Dr Garcia on 7/14.

## 2025-07-01 DIAGNOSIS — I25.10 CORONARY ARTERY DISEASE INVOLVING NATIVE CORONARY ARTERY OF NATIVE HEART WITHOUT ANGINA PECTORIS: Primary | ICD-10-CM

## 2025-07-02 RX ORDER — ISOSORBIDE MONONITRATE 60 MG/1
60 TABLET, EXTENDED RELEASE ORAL DAILY
Qty: 90 TABLET | Refills: 1 | Status: SHIPPED | OUTPATIENT
Start: 2025-07-02

## 2025-07-10 NOTE — PROGRESS NOTES
Heart Failure Office Note - Jose Coronado 84 y.o. male MRN: [id number]    @ Encounter: [id number]      Assessment/Plan:    Patient Active Problem List    Diagnosis Date Noted    Chronic fatigue 03/11/2025    Arthritis of left knee 03/11/2025    S/P ablation of atrial fibrillation 03/11/2025    Peripheral artery disease (HCC) 03/04/2024    Abnormal gait 01/31/2024    Elevated troponin 12/05/2023    Lesion of left shoulder 12/05/2023    Aortic root dilatation (HCC) 12/04/2023    Mass of skin of back 12/04/2023    Lumbar radiculopathy 10/23/2023    Left sided sciatica 08/24/2023    Lumbar spondylosis 08/24/2023    Insomnia 08/09/2023    Anxiety 04/06/2023    Hydrocele 03/28/2023    Bronchitis 11/17/2022    Statin intolerance 11/14/2022    SOB (shortness of breath) 11/07/2022    Colon polyp 05/09/2022    Rectal bleeding 04/22/2022    Stable angina (HCC) 03/15/2021    ICD (implantable cardioverter-defibrillator) in place 02/18/2021    Anticoagulated by anticoagulation treatment 12/01/2020    Cataract 02/25/2020    Osteoarthritis 02/25/2020    Thoracic back pain 02/25/2020    Renal cyst 07/13/2018    Chronic maxillary sinusitis 05/16/2018    Acute low back pain without sciatica 10/26/2017    Cardiomyopathy, ischemic 07/25/2017    CHF (congestive heart failure) (HCC) 09/26/2016    Atrial fibrillation (HCC) 09/26/2016    Hyperlipidemia 04/25/2016    S/P PTCA (percutaneous transluminal coronary angioplasty) 02/02/2016    Hyperglycemia 11/20/2014    History of non-ST elevation myocardial infarction (NSTEMI) 11/20/2014    Prostate nodule 11/20/2014    Calculus of gallbladder and bile duct with acute on chronic cholecystitis 06/20/2013    Disc degeneration, lumbar 06/20/2013    Diverticulosis 06/20/2013    Obese 06/20/2013    Spinal stenosis 06/20/2013    3-vessel CAD 12/03/2012    Hemorrhoids 12/03/2012    Hypertensive heart disease with congestive heart failure (HCC) 12/03/2012    Hypothyroidism 12/03/2012       # Chronic  HFimpEF, Stage C, NYHA  Etiology: iCM    Weight: 255 lbs--> 249 lbs  BNP:     Studies- personally reviewed by me  Echo 10/29/24:  LVEF: 55%  RV: normal  Ascending aorta 4.5 cm    NM stress 12/6/23: large infarct involving inferolateral- posterolateral wall    Echo 1/9/23- [facility]  LVEF: 25-30%  LVIDd:  RV: normal  Other: aortic root 4.4 cm    Echo 11/29/22:  LVEF: 35-40%  LVEDd: 6.3 cm  Aorta 4.4 cm    TTE 11/11/20: LVEF: 30-35%  TTE 2/27/19: LVEF: 45-50%  TTE 4/6/18: LVEF: 35-40%  TTE 8/18/17: EF: 35-40%  TTE 11/20/15: EF: 55%    Neurohormonal Blockade:  --Beta-Blocker: bisoprolol 10 mg daily- wants to switch back to Toprol XL 50 mg BID  Thins may have itching from bisoprolol  --ACEi, ARB or ARNi: losartan 12.5 mg daily (states he had back pain with Entresto)  Imdur 60 mg daily  --Aldosterone Receptor Blocker: spironolactone 25 mg daily  --SGLT2-I: Jardiance 10 mg daily  --Diuretic: furosemide 20 mg BID  to TID    Sudden Cardiac Death Risk Reduction:  --ICD 6/20/19- [facility]  Interrogation 6/25/25: heart logic index crossed  Interrogation 2/6/25: 2 AT/AF, heart logic normal  Interrogation. Heart logic index passed    Electrical Resynchronization:  --Candidacy for BiV device:    Advanced Therapies (if appropriate):  --Inotrope:  --LVAD/Transplant Candidacy:    # PAF/atrial flutter- ablation at [facility] 4/22/21  Cardioversion 10/3/24; afib ablation 12/18/24  AC: Xarelto 20 mg daily  Rate: Toprol XL 50 mg BID  Rhythm:    # PAD  Duplex 3/13/24: right: AUGUSTA 0.86, left: AUGUSTA 0.78    # CAD w/ hx of CABG and redo sternotomy for pseudoaneurysm rupture 2015  Also with multiple PCI  Rx: toprol  Angina: Imdur 60 mg daily  LHC- [facility]  normal LM, 100% prox LAD, patent LIMA  LCx: 100% proximal after small OM2. Large OM3 sub-branchs fill faintly via collaterals.   RCA: 100% proximal occlusion, distal RCA fills faintly via collaterals from the LAD  LV function: EF 40-45% with inferobasal and postero-lateral hypokinesis.  Mitral Regurgitation:  None   Bypass Grafts:  1. Widely patent and large LIMA graft to the distal LAD with some collaterals to distal RCA and OM3.  2. Chronically occluded SVG to RCA.  3. Occluded SVG to large OM3 with distal vessel filling via collaterals from LAD   PCI of SVG to OM3 attempted. The ostial SVG occlusion could not be crossed     # dilated ascending thoracic aorta  Echo 10/2024: 4.5 cm  CTA chest 12/18/23: 4.4 cm    # dyslipidemia- Praluent (has not tolerated statins)  1/23/25: LDL 58, HDL 38  12/6/23: LDL 35, HDL 30    Today's Plan:  Heartlogic crossed 6/25  Echo done at [facility] with normal EF read seems unlikely given he was down around 35-40% for years  Continue GDMT- Jardiance, spironolactone, losartan, Toprol for HFrEF  Lipids at goal on Praluent   --2g sodium diet  Weights daily    HPI:       85 yo male with hx of CAD  s/p CABG in 2000/ multiple stents, 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 severe native vessel disease, patent LIMA-LAD, chronically occluded SVG-RCA, and occluded SVG to large OM3. Failed attempt at PCI of SVG-OM3.      In Dec was admitted, told he had a mild heart attack. He is tired all the time, has trouble breathing. Legs feel week. Also gets angina.     Interim:  Has Kardia to monitor Afib  Interrogation 6/25/25: heart logic index crossed  More winded  Past Medical History:   Diagnosis Date    Bleeding hemorrhoids     Choledocholithiasis     Coronary artery disease     COVID-19 05/23/2022    Symptoms onset 5/20/22    COVID-19 virus infection 12/15/2020    15Dec:  Testing ordered, positive.  16 December:  Continued shortness of breath and fatigue.  Myalgias.  Bamlanivimab infusion ordered  17 December:  Shortness of breath and fatigue are about the same.  18Dec: Continued SOB and cough, and fatigue.  21Dec: Improving. Less issues.   22Dec: Day 9: Stable. Smell returning. No SOB related to COVID.  23Dec: Day 10:  "Improving.     August 2024: New episod    Difficulty breathing     Diverticulosis     Hypertension 1970    Been taking medications for it.    Ileus (HCC)     Lymphadenopathy of head and neck 09/29/2020    Myocardial infarction (HCC)     Pacemaker        Review of Systems   Constitutional:  Positive for fatigue. Negative for activity change, appetite change and unexpected weight change.   HENT:  Negative for congestion and nosebleeds.    Eyes: Negative.    Respiratory:  Positive for shortness of breath. Negative for cough and chest tightness.    Cardiovascular:  Negative for chest pain, palpitations and leg swelling.   Gastrointestinal:  Negative for abdominal distention.   Endocrine: Negative.    Genitourinary: Negative.    Musculoskeletal: Negative.    Skin: Negative.    Neurological:  Negative for dizziness, syncope and weakness.   Hematological: Negative.    Psychiatric/Behavioral: Negative.           Allergies   Allergen Reactions    Other Other (See Comments)     Pain in back after contrast used for echos, Definity    Atorvastatin Cough     Per pt has had a problem with other statins also, does not remember names    Bee Venom     Diphenhydramine Other (See Comments)    Iodinated Contrast Media Other (See Comments)    Lisinopril Other (See Comments)     cough    Perflutren Lipid Microsphere     Perflutren Lipid Microspheres Other (See Comments)     severe back lower back spasm    Ranolazine      Alters mood.    Rosuvastatin Myalgia    Sacubitril-Valsartan Other (See Comments)     \"severe back pain\"    Simvastatin Myalgia     .    Current Outpatient Medications:     acetaminophen (TYLENOL) 500 mg tablet, Take 500 mg by mouth, Disp: , Rfl:     Alirocumab (Praluent) 75 MG/ML SOAJ, Inject 1 mL under the skin every 14 days, Disp: 6 mL, Rfl: 1    ascorbic acid (VITAMIN C) 500 MG tablet, Take 500 mg by mouth in the morning., Disp: , Rfl:     aspirin (ECOTRIN LOW STRENGTH) 81 mg EC tablet, Take 1 tablet by mouth in the " morning., Disp: , Rfl:     bisoprolol (ZEBETA) 10 MG tablet, TAKE 1 TABLET BY MOUTH EVERY DAY, Disp: 90 tablet, Rfl: 1    Cholecalciferol (VITAMIN D) 2000 units CAPS, Take by mouth, Disp: , Rfl:     Coenzyme Q10 (CO Q 10) 100 MG CAPS, Take by mouth, Disp: , Rfl:     furosemide (LASIX) 20 mg tablet, Take 1 tablet (20 mg total) by mouth 3 (three) times a day, Disp: 270 tablet, Rfl: 1    isosorbide mononitrate (IMDUR) 60 mg 24 hr tablet, Take 1 tablet (60 mg total) by mouth daily., Disp: 90 tablet, Rfl: 1    Jardiance 10 MG TABS tablet, TAKE 1 TABLET BY MOUTH EVERY MORNING., Disp: 90 tablet, Rfl: 1    Magnesium 400 MG TABS, Take by mouth, Disp: , Rfl:     Multiple Vitamins-Minerals (MULTIVITAMIN ADULT PO), Take 1 capsule by mouth, Disp: , Rfl:     nitroglycerin (NITROSTAT) 0.4 mg SL tablet, Place 1 tablet under the tongue every 5 (five) minutes as needed, Disp: , Rfl:     rivaroxaban (XARELTO) 20 mg tablet, Take 1 tablet (20 mg total) by mouth daily with dinner, Disp: 28 tablet, Rfl: 0    TURMERIC PO, Take 1 Dose by mouth, Disp: , Rfl:     amoxicillin (AMOXIL) 500 MG tablet, 4 TABLETS BY MOUTH 1 HOUR BEFORE DENTIST (Patient not taking: Reported on 12/4/2023), Disp: , Rfl:     levothyroxine (Synthroid) 50 mcg tablet, Take 1 tablet (50 mcg total) by mouth daily, Disp: 90 tablet, Rfl: 0    Social History     Socioeconomic History    Marital status: /Civil Union     Spouse name: Not on file    Number of children: Not on file    Years of education: Not on file    Highest education level: Not on file   Occupational History    Occupation: retired   Tobacco Use    Smoking status: Former     Current packs/day: 0.50     Average packs/day: 0.5 packs/day for 25.0 years (12.5 ttl pk-yrs)     Types: Cigarettes    Smokeless tobacco: Never   Vaping Use    Vaping status: Never Used   Substance and Sexual Activity    Alcohol use: Not Currently     Comment: social    Drug use: No    Sexual activity: Not Currently     Partners:  Female   Other Topics Concern    Not on file   Social History Narrative    Not on file     Social Drivers of Health     Financial Resource Strain: Low Risk  (1/31/2024)    Overall Financial Resource Strain (CARDIA)     Difficulty of Paying Living Expenses: Not hard at all   Food Insecurity: No Food Insecurity (2/2/2025)    Nursing - Inadequate Food Risk Classification     Worried About Running Out of Food in the Last Year: Never true     Ran Out of Food in the Last Year: Never true     Ran Out of Food in the Last Year: Not on file   Transportation Needs: No Transportation Needs (2/2/2025)    PRAPARE - Transportation     Lack of Transportation (Medical): No     Lack of Transportation (Non-Medical): No   Physical Activity: Not on file   Stress: Not on file   Social Connections: Unknown (6/18/2024)    Received from [employer]     How often do you feel lonely or isolated from those around you? (Adult - for ages 18 years and over): Not on file   Intimate Partner Violence: Not At Risk (12/5/2023)    Received from [facility]    Humiliation, Afraid, Rape, and Kick questionnaire     Fear of Current or Ex-Partner: No     Emotionally Abused: No     Physically Abused: No     Sexually Abused: No   Housing Stability: Unknown (2/2/2025)    Housing Stability Vital Sign     Unable to Pay for Housing in the Last Year: No     Number of Times Moved in the Last Year: Not on file     Homeless in the Last Year: No       Family History   Problem Relation Name Age of Onset    Lung cancer Mother So coronado     Cancer Mother So coronado     Coronary artery disease Father Michel coronado     Hypertension Father Michel coronado     Diabetes Brother Yoandy Coronado     Lung cancer Family         Physical Exam:    Vitals: Blood pressure 118/78, pulse 85, height 6' (1.829 m), weight 113 kg (249 lb), SpO2 98%., Body mass index is 33.77 kg/m².,   Wt Readings from Last 3 Encounters:   07/14/25 113 kg  "(249 lb)   03/11/25 112 kg (248 lb)   03/10/25 113 kg (249 lb)       Physical Exam  Constitutional:       Appearance: He is well-developed.   HENT:      Head: Normocephalic and atraumatic.     Eyes:      Pupils: Pupils are equal, round, and reactive to light.     Neck:      Vascular: No JVD.     Cardiovascular:      Rate and Rhythm: Normal rate and regular rhythm.      Heart sounds: No murmur heard.  Pulmonary:      Effort: Pulmonary effort is normal. No respiratory distress.      Breath sounds: Normal breath sounds.   Abdominal:      General: There is no distension.      Palpations: Abdomen is soft.      Tenderness: There is no abdominal tenderness.     Musculoskeletal:         General: Normal range of motion.      Cervical back: Normal range of motion.     Skin:     General: Skin is warm and dry.      Findings: No rash.     Neurological:      Mental Status: He is alert and oriented to person, place, and time.         Labs & Results:    Lab Results   Component Value Date    WBC 4.7 08/09/2024    HGB 16.2 08/09/2024    HCT 46.9 08/09/2024    MCV 86.7 08/09/2024     08/09/2024     Lab Results   Component Value Date    SODIUM 142 01/23/2025    K 4.1 01/23/2025     01/23/2025    CO2 33 (H) 01/23/2025    BUN 21 01/23/2025    CREATININE 0.76 01/23/2025    GLUC 112 (H) 01/23/2025    CALCIUM 9.4 01/23/2025     Lab Results   Component Value Date    NTBNP 1,272 (H) 11/08/2022      Lab Results   Component Value Date    CHOLESTEROL 116 01/23/2025    CHOLESTEROL 115 08/09/2024    CHOLESTEROL 110 06/12/2023     Lab Results   Component Value Date    HDL 38 (L) 01/23/2025    HDL 37 (L) 08/09/2024    HDL 36 (L) 06/12/2023     Lab Results   Component Value Date    TRIG 123 01/23/2025    TRIG 118 08/12/2022     No results found for: \"NONHDLC\"    EKG personally reviewed by Miguel Ángel Garcia DO.     Counseling / Coordination of Care  I have spent a total time of 35 minutes on 07/14/25 in caring for this patient including " Diagnostic results, Prognosis, Risks and benefits of tx options, and Impressions.      Thank you for the opportunity to participate in the care of this patient.    BHAVYA BUTLER D.O.  DIRECTOR OF HEART FAILURE/ PULMONARY HYPERTENSION  MEDICAL DIRECTOR OF LVAD PROGRAM  [facility]

## 2025-07-14 ENCOUNTER — OFFICE VISIT (OUTPATIENT)
Dept: CARDIOLOGY CLINIC | Facility: CLINIC | Age: 85
End: 2025-07-14
Payer: COMMERCIAL

## 2025-07-14 VITALS
SYSTOLIC BLOOD PRESSURE: 118 MMHG | WEIGHT: 249 LBS | HEIGHT: 72 IN | BODY MASS INDEX: 33.72 KG/M2 | OXYGEN SATURATION: 98 % | HEART RATE: 85 BPM | DIASTOLIC BLOOD PRESSURE: 78 MMHG

## 2025-07-14 DIAGNOSIS — I25.5 CARDIOMYOPATHY, ISCHEMIC: Primary | ICD-10-CM

## 2025-07-14 DIAGNOSIS — I48.0 PAROXYSMAL ATRIAL FIBRILLATION (HCC): ICD-10-CM

## 2025-07-14 DIAGNOSIS — I50.22 CHRONIC SYSTOLIC CONGESTIVE HEART FAILURE (HCC): ICD-10-CM

## 2025-07-14 DIAGNOSIS — I25.10 3-VESSEL CAD: ICD-10-CM

## 2025-07-14 DIAGNOSIS — I77.810 AORTIC ROOT DILATATION (HCC): ICD-10-CM

## 2025-07-14 PROCEDURE — 99214 OFFICE O/P EST MOD 30 MIN: CPT | Performed by: INTERNAL MEDICINE

## 2025-07-14 RX ORDER — METOPROLOL SUCCINATE 50 MG/1
50 TABLET, EXTENDED RELEASE ORAL 2 TIMES DAILY
Qty: 180 TABLET | Refills: 3 | Status: SHIPPED | OUTPATIENT
Start: 2025-07-14

## 2025-07-30 LAB
ALBUMIN SERPL-MCNC: 4.3 G/DL (ref 3.6–5.1)
ALBUMIN/GLOB SERPL: 1.7 (CALC) (ref 1–2.5)
ALP SERPL-CCNC: 41 U/L (ref 35–144)
ALT SERPL-CCNC: 16 U/L (ref 9–46)
AST SERPL-CCNC: 16 U/L (ref 10–35)
BILIRUB SERPL-MCNC: 0.9 MG/DL (ref 0.2–1.2)
BUN SERPL-MCNC: 21 MG/DL (ref 7–25)
BUN/CREAT SERPL: ABNORMAL (CALC) (ref 6–22)
CALCIUM SERPL-MCNC: 9.2 MG/DL (ref 8.6–10.3)
CHLORIDE SERPL-SCNC: 103 MMOL/L (ref 98–110)
CHOLEST SERPL-MCNC: 102 MG/DL
CO2 SERPL-SCNC: 29 MMOL/L (ref 20–32)
CREAT SERPL-MCNC: 0.75 MG/DL (ref 0.7–1.22)
GFR/BSA.PRED SERPLBLD CYS-BASED-ARV: 90 ML/MIN/1.73M2
GLOBULIN SER CALC-MCNC: 2.6 G/DL (CALC) (ref 1.9–3.7)
GLUCOSE SERPL-MCNC: 110 MG/DL (ref 65–99)
HDLC SERPL-MCNC: 35 MG/DL
LDLC SERPL DIRECT ASSAY-MCNC: 55 MG/DL
POTASSIUM SERPL-SCNC: 4.1 MMOL/L (ref 3.5–5.3)
PROT SERPL-MCNC: 6.9 G/DL (ref 6.1–8.1)
PSA FREE MFR SERPL: 18 % (CALC)
PSA FREE SERPL-MCNC: 1.1 NG/ML
PSA SERPL-MCNC: 6.1 NG/ML
SODIUM SERPL-SCNC: 141 MMOL/L (ref 135–146)

## 2025-08-05 ENCOUNTER — HOSPITAL ENCOUNTER (OUTPATIENT)
Dept: NON INVASIVE DIAGNOSTICS | Facility: CLINIC | Age: 85
Discharge: HOME | End: 2025-08-05
Attending: INTERNAL MEDICINE
Payer: COMMERCIAL

## 2025-08-05 VITALS
SYSTOLIC BLOOD PRESSURE: 118 MMHG | DIASTOLIC BLOOD PRESSURE: 78 MMHG | HEIGHT: 72 IN | WEIGHT: 249 LBS | BODY MASS INDEX: 33.72 KG/M2 | HEART RATE: 85 BPM

## 2025-08-05 DIAGNOSIS — I48.0 PAROXYSMAL ATRIAL FIBRILLATION (HCC): ICD-10-CM

## 2025-08-05 DIAGNOSIS — I50.22 CHRONIC SYSTOLIC CONGESTIVE HEART FAILURE (HCC): ICD-10-CM

## 2025-08-05 LAB
AORTIC ROOT: 3.7 CM
AORTIC VALVE MEAN VELOCITY: 8.6 M/S
ASCENDING AORTA: 4.6 CM
AV AREA BY CONTINUOUS VTI: 3.2 CM2
AV AREA PEAK VELOCITY: 2.8 CM2
AV LVOT MEAN GRADIENT: 1 MMHG
AV LVOT PEAK GRADIENT: 2 MMHG
AV MEAN PRESS GRAD SYS DOP V1V2: 3 MMHG
AV ORIFICE AREA US: 3.18 CM2
AV PEAK GRADIENT: 5 MMHG
AV VELOCITY RATIO: 0.65
AV VMAX SYS DOP: 1.16 M/S
BSA FOR ECHO PROCEDURE: 2.34 M2
DOP CALC AO VTI: 23.86 CM
DOP CALC LVOT AREA: 4.91 CM2
DOP CALC LVOT CARDIAC INDEX: 2.09 L/MIN/M2
DOP CALC LVOT CARDIAC OUTPUT: 4.89 L/MIN
DOP CALC LVOT DIAMETER: 2.5 CM
DOP CALC LVOT PEAK VEL VTI: 15.45 CM
DOP CALC LVOT PEAK VEL: 0.65 M/S
DOP CALC LVOT STROKE INDEX: 31.6 ML/M2
DOP CALC LVOT STROKE VOLUME: 74
E WAVE DECELERATION TIME: 235 MS
E/A RATIO: 1.36
FRACTIONAL SHORTENING: 16 (ref 28–44)
INTERVENTRICULAR SEPTUM IN DIASTOLE (PARASTERNAL SHORT AXIS VIEW): 1.4 CM
INTERVENTRICULAR SEPTUM: 1.4 CM (ref 0.6–1.1)
LAAS-AP2: 29.2 CM2
LAAS-AP4: 25.6 CM2
LEFT ATRIUM SIZE: 5.2 CM
LEFT ATRIUM VOLUME (MOD BIPLANE): 94 ML
LEFT ATRIUM VOLUME INDEX (MOD BIPLANE): 40.2 ML/M2
LEFT INTERNAL DIMENSION IN SYSTOLE: 5.1 CM (ref 2.1–4)
LEFT VENTRICLE DIASTOLIC VOLUME (MOD BIPLANE): 199 ML
LEFT VENTRICLE DIASTOLIC VOLUME INDEX (MOD BIPLANE): 85 ML/M2
LEFT VENTRICLE SYSTOLIC VOLUME (MOD BIPLANE): 112 ML
LEFT VENTRICLE SYSTOLIC VOLUME INDEX (MOD BIPLANE): 47.9 ML/M2
LEFT VENTRICULAR INTERNAL DIMENSION IN DIASTOLE: 6.1 CM (ref 3.5–6)
LEFT VENTRICULAR POSTERIOR WALL IN END DIASTOLE: 1.3 CM
LEFT VENTRICULAR STROKE VOLUME: 61 ML
LV EF BIPLANE MOD: 44 %
LV EF US.2D.A4C+ESTIMATED: 48 %
LVSV (TEICH): 61 ML
MV PEAK A VEL: 0.47 M/S
MV PEAK E VEL: 64 CM/S
MV STENOSIS PRESSURE HALF TIME: 68 MS
MV VALVE AREA P 1/2 METHOD: 3.2
RIGHT ATRIUM AREA SYSTOLE A4C: 18.9 CM2
RIGHT VENTRICLE ID DIMENSION: 4.5 CM
SINOTUBULAR JUNCTION: 3.8 CM
SL CV LEFT ATRIUM LENGTH A2C: 6.6 CM
SL CV LV EF: 45
SL CV PED ECHO LEFT VENTRICLE DIASTOLIC VOLUME (MOD BIPLANE) 2D: 184 ML
SL CV PED ECHO LEFT VENTRICLE SYSTOLIC VOLUME (MOD BIPLANE) 2D: 123 ML
SL CV SINUS OF VALSALVA 2D: 4.2 CM
STJ: 3.8 CM
TR MAX PG: 24 MMHG
TR PEAK VELOCITY: 2.4 M/S
TRICUSPID ANNULAR PLANE SYSTOLIC EXCURSION: 1.7 CM
TRICUSPID VALVE PEAK REGURGITATION VELOCITY: 2.44 M/S

## 2025-08-05 PROCEDURE — 93306 TTE W/DOPPLER COMPLETE: CPT

## 2025-08-05 PROCEDURE — 93306 TTE W/DOPPLER COMPLETE: CPT | Performed by: INTERNAL MEDICINE

## 2025-08-06 ENCOUNTER — TELEPHONE (OUTPATIENT)
Dept: ADMINISTRATIVE | Facility: OTHER | Age: 85
End: 2025-08-06

## 2025-08-07 ENCOUNTER — OFFICE VISIT (OUTPATIENT)
Dept: FAMILY MEDICINE CLINIC | Facility: CLINIC | Age: 85
End: 2025-08-07
Payer: COMMERCIAL

## 2025-08-07 ENCOUNTER — REMOTE DEVICE CLINIC VISIT (OUTPATIENT)
Dept: CARDIOLOGY CLINIC | Facility: CLINIC | Age: 85
End: 2025-08-07
Payer: COMMERCIAL

## 2025-08-07 VITALS
HEIGHT: 72 IN | WEIGHT: 253 LBS | DIASTOLIC BLOOD PRESSURE: 60 MMHG | RESPIRATION RATE: 94 BRPM | HEART RATE: 65 BPM | TEMPERATURE: 98.7 F | BODY MASS INDEX: 34.27 KG/M2 | SYSTOLIC BLOOD PRESSURE: 126 MMHG

## 2025-08-07 DIAGNOSIS — E03.9 ACQUIRED HYPOTHYROIDISM: ICD-10-CM

## 2025-08-07 DIAGNOSIS — I50.42 CHRONIC COMBINED SYSTOLIC AND DIASTOLIC CONGESTIVE HEART FAILURE (HCC): ICD-10-CM

## 2025-08-07 DIAGNOSIS — I11.0 HYPERTENSIVE HEART DISEASE WITH CHRONIC COMBINED SYSTOLIC AND DIASTOLIC CONGESTIVE HEART FAILURE (HCC): ICD-10-CM

## 2025-08-07 DIAGNOSIS — I77.810 AORTIC ROOT DILATATION (HCC): ICD-10-CM

## 2025-08-07 DIAGNOSIS — I48.0 PAROXYSMAL ATRIAL FIBRILLATION (HCC): ICD-10-CM

## 2025-08-07 DIAGNOSIS — Z95.810 AICD (AUTOMATIC CARDIOVERTER/DEFIBRILLATOR) PRESENT: Primary | ICD-10-CM

## 2025-08-07 DIAGNOSIS — E78.2 MIXED HYPERLIPIDEMIA: ICD-10-CM

## 2025-08-07 DIAGNOSIS — N40.2 PROSTATE NODULE: Primary | ICD-10-CM

## 2025-08-07 DIAGNOSIS — I50.42 HYPERTENSIVE HEART DISEASE WITH CHRONIC COMBINED SYSTOLIC AND DIASTOLIC CONGESTIVE HEART FAILURE (HCC): ICD-10-CM

## 2025-08-07 PROCEDURE — 93296 REM INTERROG EVL PM/IDS: CPT | Performed by: INTERNAL MEDICINE

## 2025-08-07 PROCEDURE — G2211 COMPLEX E/M VISIT ADD ON: HCPCS | Performed by: FAMILY MEDICINE

## 2025-08-07 PROCEDURE — 93295 DEV INTERROG REMOTE 1/2/MLT: CPT | Performed by: INTERNAL MEDICINE

## 2025-08-07 PROCEDURE — 99214 OFFICE O/P EST MOD 30 MIN: CPT | Performed by: FAMILY MEDICINE

## 2025-08-08 ENCOUNTER — TELEPHONE (OUTPATIENT)
Age: 85
End: 2025-08-08